# Patient Record
Sex: FEMALE | Race: AMERICAN INDIAN OR ALASKA NATIVE
[De-identification: names, ages, dates, MRNs, and addresses within clinical notes are randomized per-mention and may not be internally consistent; named-entity substitution may affect disease eponyms.]

---

## 2017-04-20 ENCOUNTER — HOSPITAL ENCOUNTER (OUTPATIENT)
Dept: HOSPITAL 42 - ED | Age: 62
Setting detail: OBSERVATION
LOS: 2 days | Discharge: HOME | End: 2017-04-22
Attending: INTERNAL MEDICINE | Admitting: INTERNAL MEDICINE
Payer: COMMERCIAL

## 2017-04-20 VITALS — BODY MASS INDEX: 25.3 KG/M2

## 2017-04-20 DIAGNOSIS — Z79.82: ICD-10-CM

## 2017-04-20 DIAGNOSIS — J20.9: ICD-10-CM

## 2017-04-20 DIAGNOSIS — Z91.018: ICD-10-CM

## 2017-04-20 DIAGNOSIS — N28.9: ICD-10-CM

## 2017-04-20 DIAGNOSIS — M54.9: ICD-10-CM

## 2017-04-20 DIAGNOSIS — N17.9: ICD-10-CM

## 2017-04-20 DIAGNOSIS — Z87.891: ICD-10-CM

## 2017-04-20 DIAGNOSIS — J44.9: ICD-10-CM

## 2017-04-20 DIAGNOSIS — M17.9: ICD-10-CM

## 2017-04-20 DIAGNOSIS — J81.1: ICD-10-CM

## 2017-04-20 DIAGNOSIS — E78.5: ICD-10-CM

## 2017-04-20 DIAGNOSIS — I08.1: Primary | ICD-10-CM

## 2017-04-20 DIAGNOSIS — R07.89: ICD-10-CM

## 2017-04-20 DIAGNOSIS — K76.0: ICD-10-CM

## 2017-04-20 DIAGNOSIS — R53.81: ICD-10-CM

## 2017-04-20 DIAGNOSIS — I48.0: ICD-10-CM

## 2017-04-20 DIAGNOSIS — M25.569: ICD-10-CM

## 2017-04-20 DIAGNOSIS — I10: ICD-10-CM

## 2017-04-20 DIAGNOSIS — K75.9: ICD-10-CM

## 2017-04-20 DIAGNOSIS — J45.909: ICD-10-CM

## 2017-04-20 DIAGNOSIS — R79.89: ICD-10-CM

## 2017-04-20 DIAGNOSIS — R40.2412: ICD-10-CM

## 2017-04-20 DIAGNOSIS — Z79.899: ICD-10-CM

## 2017-04-20 DIAGNOSIS — M10.9: ICD-10-CM

## 2017-04-20 DIAGNOSIS — Z90.49: ICD-10-CM

## 2017-04-20 DIAGNOSIS — R00.0: ICD-10-CM

## 2017-04-20 DIAGNOSIS — E78.00: ICD-10-CM

## 2017-04-20 DIAGNOSIS — Z82.49: ICD-10-CM

## 2017-04-20 DIAGNOSIS — I51.89: ICD-10-CM

## 2017-04-20 DIAGNOSIS — D64.9: ICD-10-CM

## 2017-04-20 DIAGNOSIS — E86.0: ICD-10-CM

## 2017-04-20 LAB
ADD MANUAL DIFF?: NO
ALBUMIN/GLOB SERPL: 1.1 {RATIO} (ref 1.1–1.8)
ALP SERPL-CCNC: 154 U/L (ref 38–133)
ALT SERPL-CCNC: 113 U/L (ref 7–56)
APPEARANCE UR: (no result)
APTT BLD: 23.3 SECONDS (ref 23.7–30.8)
AST SERPL-CCNC: 175 U/L (ref 15–39)
BACTERIA #/AREA URNS HPF: (no result) /[HPF]
BASE EXCESS BLDV CALC-SCNC: -2 MMOL/L (ref 0–2)
BASOPHILS # BLD AUTO: 0.02 K/MM3 (ref 0–2)
BASOPHILS NFR BLD: 0.3 % (ref 0–3)
BILIRUB SERPL-MCNC: 1 MG/DL (ref 0.2–1.3)
BILIRUB UR-MCNC: NEGATIVE MG/DL
BUN SERPL-MCNC: 17 MG/DL (ref 7–21)
CALCIUM SERPL-MCNC: 9.9 MG/DL (ref 8.4–10.5)
CHLORIDE SERPL-SCNC: 95 MMOL/L (ref 98–107)
CO2 SERPL-SCNC: 25 MMOL/L (ref 21–33)
COLOR UR: (no result)
EOSINOPHIL # BLD: 0 10*3/UL (ref 0–0.7)
EOSINOPHIL NFR BLD: 0.6 % (ref 1.5–5)
ERYTHROCYTE [DISTWIDTH] IN BLOOD BY AUTOMATED COUNT: 14.5 % (ref 11.5–14.5)
GLOBULIN SER-MCNC: 3.8 GM/DL
GLUCOSE SERPL-MCNC: 169 MG/DL (ref 70–110)
GLUCOSE UR STRIP-MCNC: NEGATIVE MG/DL
GRANULOCYTES # BLD: 4.48 10*3/UL (ref 1.4–6.5)
GRANULOCYTES NFR BLD: 62.4 % (ref 50–68)
HCT VFR BLD CALC: 33.9 % (ref 36–48)
INR PPP: 0.99 (ref 0.93–1.08)
KETONES UR STRIP-MCNC: (no result) MG/DL
LEUKOCYTE ESTERASE UR-ACNC: (no result) LEU/UL
LYMPHOCYTES # BLD: 1.6 10*3/UL (ref 1.2–3.4)
LYMPHOCYTES NFR BLD AUTO: 22.6 % (ref 22–35)
MAGNESIUM SERPL-MCNC: 1.6 MG/DL (ref 1.7–2.2)
MCH RBC QN AUTO: 31.5 PG (ref 25–35)
MCHC RBC AUTO-ENTMCNC: 34.5 G/DL (ref 31–37)
MCV RBC AUTO: 91.1 FL (ref 80–105)
MONOCYTES # BLD AUTO: 1 10*3/UL (ref 0.1–0.6)
MONOCYTES NFR BLD: 14.1 % (ref 1–6)
PH BLDV: 7.3 [PH] (ref 7.32–7.43)
PH UR STRIP: 6 [PH] (ref 4.7–8)
PHOSPHATE SERPL-MCNC: 3.4 MG/DL (ref 2.5–4.5)
PLATELET # BLD: 249 10^3/UL (ref 120–450)
PMV BLD AUTO: 9.9 FL (ref 7–11)
POTASSIUM SERPL-SCNC: 4 MMOL/L (ref 3.6–5)
PROT SERPL-MCNC: 7.9 G/DL (ref 5.8–8.3)
PROT UR STRIP-MCNC: NEGATIVE MG/DL
RBC # UR STRIP: NEGATIVE /UL
RBC #/AREA URNS HPF: (no result) /HPF (ref 0–2)
SODIUM SERPL-SCNC: 132 MMOL/L (ref 132–148)
SP GR UR STRIP: 1.02 (ref 1–1.03)
TROPONIN I SERPL-MCNC: < 0.01 NG/ML
UROBILINOGEN UR STRIP-ACNC: 0.2 E.U./DL
WBC # BLD AUTO: 7.2 10^3/UL (ref 4.5–11)
WBC #/AREA URNS HPF: (no result) /HPF (ref 0–6)

## 2017-04-20 PROCEDURE — 83036 HEMOGLOBIN GLYCOSYLATED A1C: CPT

## 2017-04-20 PROCEDURE — 83615 LACTATE (LD) (LDH) ENZYME: CPT

## 2017-04-20 PROCEDURE — 93306 TTE W/DOPPLER COMPLETE: CPT

## 2017-04-20 PROCEDURE — 84145 PROCALCITONIN (PCT): CPT

## 2017-04-20 PROCEDURE — 36415 COLL VENOUS BLD VENIPUNCTURE: CPT

## 2017-04-20 PROCEDURE — 84443 ASSAY THYROID STIM HORMONE: CPT

## 2017-04-20 PROCEDURE — 99285 EMERGENCY DEPT VISIT HI MDM: CPT

## 2017-04-20 PROCEDURE — 85610 PROTHROMBIN TIME: CPT

## 2017-04-20 PROCEDURE — 96376 TX/PRO/DX INJ SAME DRUG ADON: CPT

## 2017-04-20 PROCEDURE — 83735 ASSAY OF MAGNESIUM: CPT

## 2017-04-20 PROCEDURE — 82550 ASSAY OF CK (CPK): CPT

## 2017-04-20 PROCEDURE — 71010: CPT

## 2017-04-20 PROCEDURE — 96367 TX/PROPH/DG ADDL SEQ IV INF: CPT

## 2017-04-20 PROCEDURE — 85027 COMPLETE CBC AUTOMATED: CPT

## 2017-04-20 PROCEDURE — 76700 US EXAM ABDOM COMPLETE: CPT

## 2017-04-20 PROCEDURE — 87804 INFLUENZA ASSAY W/OPTIC: CPT

## 2017-04-20 PROCEDURE — 84100 ASSAY OF PHOSPHORUS: CPT

## 2017-04-20 PROCEDURE — 87086 URINE CULTURE/COLONY COUNT: CPT

## 2017-04-20 PROCEDURE — 96365 THER/PROPH/DIAG IV INF INIT: CPT

## 2017-04-20 PROCEDURE — 80061 LIPID PANEL: CPT

## 2017-04-20 PROCEDURE — 93017 CV STRESS TEST TRACING ONLY: CPT

## 2017-04-20 PROCEDURE — 87040 BLOOD CULTURE FOR BACTERIA: CPT

## 2017-04-20 PROCEDURE — 85730 THROMBOPLASTIN TIME PARTIAL: CPT

## 2017-04-20 PROCEDURE — 81001 URINALYSIS AUTO W/SCOPE: CPT

## 2017-04-20 PROCEDURE — 82803 BLOOD GASES ANY COMBINATION: CPT

## 2017-04-20 PROCEDURE — 84484 ASSAY OF TROPONIN QUANT: CPT

## 2017-04-20 PROCEDURE — 96375 TX/PRO/DX INJ NEW DRUG ADDON: CPT

## 2017-04-20 PROCEDURE — 85025 COMPLETE CBC W/AUTO DIFF WBC: CPT

## 2017-04-20 PROCEDURE — 80074 ACUTE HEPATITIS PANEL: CPT

## 2017-04-20 PROCEDURE — 93005 ELECTROCARDIOGRAM TRACING: CPT

## 2017-04-20 PROCEDURE — 80053 COMPREHEN METABOLIC PANEL: CPT

## 2017-04-20 NOTE — RAD
HISTORY:

chest pain  



COMPARISON:

01/13/2017 



FINDINGS:



LUNGS:

No active pulmonary disease.



PLEURA:

No significant pleural effusion identified, no pneumothorax apparent.



CARDIOVASCULAR:

Normal.



OSSEOUS STRUCTURES:

No significant abnormalities.



VISUALIZED UPPER ABDOMEN:

Normal.



OTHER FINDINGS:

None.



IMPRESSION:

No active disease.

## 2017-04-20 NOTE — ED PDOC
Arrival/HPI





- General


Chief Complaint: Weakness/Neurological Deficit


Time Seen by Provider: 04/20/17 14:38


Historian: Patient





- History of Present Illness


Narrative History of Present Illness (Text): 


04/20/17 14:59


62yo female with PMHx of Afib, Asthma, Hypertension, BIBA for complaint of 

generalized weakness. Reports clear productive cough since Monday. States she 

had chest pain earlier today that lasted for minutes. Also admits to melena 

since Monday.  She saw her PMD 3weeks ago. Denies fever, chills, abdominal pain

, nausea, vomiting, diarrhea, constipation, SOB, diaphoresis, LE edema, sore 

throat, sick contact, travel.














Past Medical History





- Provider Review


Nursing Documentation Reviewed: Yes





- Infectious Disease


Hx of Infectious Diseases: None





- Tetanus Immunization


Tetanus Immunization: Unknown





- Past Medical History


Past Medical History: No Previous





- Cardiac


Hx Cardiac Disorders: Yes


Hx Cardiac Arrhythmia: Yes


Hx Heart Murmur: Yes


Hx Hypertension: Yes





- Pulmonary


Hx Respiratory Disorders: Yes


Hx Asthma: Yes


Hx Chronic Obstructive Pulmonary Disease (COPD): Yes





- Neurological


Hx Neurological Disorder: No


Hx Paralysis: No





- HEENT


Hx HEENT Disorder: No


Hx Blind: No


Hx Cataracts: No


Hx Deafness: No


Hx Difficulty Chewing: No


Hx Epistaxis: No


Hx Glaucoma: No


Hx Macular Degeneration: No





- Renal


Hx Renal Disorder: No





- Endocrine/Metabolic


Hx Endocrine Disorders: No





- Hematological/Oncological


Hx Blood Disorders: Yes


Hx Blood Transfusions: Yes (4 YRS AGO)





- Integumentary


Hx Dermatological Disorder: No


Hx Cellulitis: No





- Musculoskeletal/Rheumatological


Hx Musculoskeletal Disorders: Yes


Hx Falls: Yes





- Gastrointestinal


Hx Gastrointestinal Disorders: No


Hx Crohn's Disease: No


Hx Diverticulitis: No


Hx Gastroesophageal Reflux: No


Hx Gastrointestinal Ulcer: No


Hx Liver Failure: No





- Genitourinary/Gynecological


Hx Genitourinary Disorders: No





- Psychiatric


Hx Psychophysiologic Disorder: No


Hx Emotional Abuse: No


Hx Physical Abuse: No


Hx Substance Use: No





- Surgical History


Hx Cardiac Catheterization: Yes


Hx Cholecystectomy: Yes


Other/Comment: B/L Bunion removal to feet





- Anesthesia


Hx Anesthesia Reactions: No


Hx Malignant Hyperthermia: No





- Suicidal Assessment


Feels Threatened In Home Enviroment: No





Family/Social History





- Physician Review


Nursing Documentation Reviewed: Yes


Family/Social History: Unknown Family HX


Smoking Status: Former Smoker


Hx Alcohol Use: No


Amount per day: 6


Hx Substance Use: No


Hx Substance Use Treatment: No





Allergies/Home Meds


Allergies/Adverse Reactions: 


Allergies





ALTOIDS Allergy (Intermediate, Uncoded 04/20/17 14:45)


 SWELLING FACE/LIPS


UNKNOWN FOOD Allergy (Intermediate, Uncoded 04/20/17 14:45)


 SWELLING FACE/LIPS


 SEEN IN ED 8/31/16 








Home Medications: 


 Home Meds











 Medication  Instructions  Recorded  Confirmed


 


Aspirin [Aspir 81] 81 mg PO DAILY 07/12/13 12/04/16


 


Fluticasone/Salmeterol [Advair 1 puff IH BID PRN 07/12/13 12/04/16





250-50 Diskus]   


 


Metoprolol Tartrate [Lopressor] 25 mg PO BID 07/12/13 12/04/16


 


Tiotropium [Spiriva] 18 mcg IH DAILY PRN 07/12/13 12/04/16


 


Enalapril Maleate [Enalapril] 5 mg PO QAM 09/01/16 12/04/16


 


Furosemide 40 mg PO QAM 09/01/16 12/04/16


 


traMADol [Ultram] 50 mg PO TID PRN 11/22/16 12/04/16














Review of Systems





- Physician Review


All systems were reviewed & negative as marked: Yes





- Review of Systems


Constitutional: Fatigue


Eyes: Normal


ENT: Normal


Respiratory: Normal


Cardiovascular: Chest Pain.  absent: Palpitations, Edema, Calf Pain


Gastrointestinal: Stool Changes (Melena).  absent: Abdominal Pain, Constipation

, Diarrhea, Nausea, Vomiting, Hematochezia, Hematemesis


Genitourinary Female: Normal


Musculoskeletal: Normal


Skin: Normal


Neurological: Normal


Endocrine: Normal


Hemo/Lymphatic: Normal


Psychiatric: Normal





Physical Exam


Vital Signs Reviewed: Yes


Vital Signs











  Temp Pulse Resp BP Pulse Ox


 


 04/20/17 20:08   102 H  20  123/81  100


 


 04/20/17 17:00   126 H  18  152/79 H  100


 


 04/20/17 15:47  98.4 F  115 H  20  158/86 H  100











Temperature: Afebrile


Blood Pressure: Normal


Pulse: Regular


Respiratory Rate: Normal


Appearance: Positive for: Well-Appearing, Non-Toxic, Comfortable


Pain Distress: None


Mental Status: Positive for: Alert and Oriented X 3





- Systems Exam


Head: Present: Atraumatic, Normocephalic


Pupils: Present: PERRL


Extroacular Muscles: Present: EOMI


Conjunctiva: Present: Normal


Mouth: Present: Moist Mucous Membranes


Neck: Present: Normal Range of Motion


Respiratory/Chest: Present: Clear to Auscultation, Good Air Exchange.  No: 

Respiratory Distress, Accessory Muscle Use, Wheezes, Decreased Breath Sounds, 

Rales, Retracting, Rhonchi


Cardiovascular: Present: Regular Rate and Rhythm, Normal S1, S2.  No: Murmurs


Abdomen: Present: Normal Bowel Sounds.  No: Tenderness, Distention, Peritoneal 

Signs, Rebound, Guarding, McBurney's Point Tender, Rovsing's Sign Present


Back: Present: Normal Inspection


Upper Extremity: Present: Normal Inspection.  No: Cyanosis, Edema


Lower Extremity: Present: Normal Inspection.  No: Edema


Neurological: Present: GCS=15, CN II-XII Intact, Speech Normal


Skin: Present: Warm, Dry, Normal Color.  No: Rashes


Psychiatric: Present: Alert, Oriented x 3, Normal Insight, Normal Concentration





Medical Decision Making


ED Course and Treatment: 





04/20/17 22:30


PT presented for stated history. She remained tacy in ED even with hydration. 

Lab was reviewed with elevated LFT, Cr. and Lactate noted. Elevated LFT was pt'

s baseline when compared to previous lab result.





Review of pt's lab and VS however indicated that pt does not meet the Sepsis 

criteria. She had no leukocytosis and hypotensive. 





CXR was NAD.





Rocephin and Zithromax was given secondary to pt's complaint of productive 

cough and history of COPD.





Case was DW Dr. Dinh and pt was placed in Tele OBS.





Result and plan was DW the pt and she agreed.











- Lab Interpretations


Lab Results: 








 04/20/17 15:59 





 04/20/17 15:59 





 Lab Results





04/20/17 18:10: Influenza Typ A,B (EIA) Negative for flu a/b


04/20/17 16:05: Phosphorus 3.4, Magnesium 1.6 L


04/20/17 15:59: WBC 7.2, RBC 3.72, Hgb 11.7 L, Hct 33.9 L, MCV 91.1, MCH 31.5, 

MCHC 34.5, RDW 14.5, Plt Count 249, MPV 9.9, Gran % 62.4, Lymph % (Auto) 22.6, 

Mono % (Auto) 14.1 H, Eos % (Auto) 0.6 L, Baso % (Auto) 0.3, Gran # 4.48, Lymph 

# 1.6, Mono # 1.0 H, Eos # 0.0, Baso # 0.02, PT 10.7, INR 0.99, APTT 23.3 L, 

pO2 36, VBG pH 7.30 L, VBG pCO2 51.0, VBG HCO3 25.1, VBG Total CO2 26.7, VBG O2 

Sat (Calc) 68.2 H, VBG Base Excess -2.0 L, VBG Potassium 3.7, Glucose 176 H, 

Lactate 4.4 H*, FiO2 21.0, Sodium 133.0, Potassium 4.0, Chloride 97.0 L, Carbon 

Dioxide 25, Anion Gap 16, BUN 17, Creatinine 1.5 H, Est GFR ( Amer) 43, 

Est GFR (Non-Af Amer) 35, Random Glucose 169 H, Calcium 9.9, Total Bilirubin 1.0

,  H,  H, Alkaline Phosphatase 154 H, Lactate Dehydrogenase 690, 

Total Creatine Kinase 70, Troponin I < 0.01, Total Protein 7.9, Albumin 4.1, 

Globulin 3.8, Albumin/Globulin Ratio 1.1, Venous Blood Potassium 3.7











- RAD Interpretation


Radiology Orders: 








04/20/17 14:58


CHEST PORTABLE [RAD] Stat 














- EKG Interpretation


Interpreted by ED Physician: Yes (sinus tachy with PAC; LAD; LVH @ 101bpm. No 

ST changes)





- Medication Orders


Current Medication Orders: 











Discontinued Medications





Sodium Chloride (Sodium Chloride 0.9%)  1,000 mls @ 250 mls/hr IV .Q4H ONE


   Stop: 04/20/17 18:56


   Last Admin: 04/20/17 16:14  Dose: 250 MLS/HR





eMAR Start Stop


 Document     04/20/17 16:14  OCS  (Rec: 04/20/17 16:14  OCS  [id number])


     Intravenous Solution


      Start Date                                 04/20/17


      Start Time                                 16:14





Sodium Chloride 2,200 ml/ IV (SUPPLIES)  2,200 mls @ 4,408.92 mls/hr IV ONCE ONE


   PRN Reason: 60 ML/KG/HR


   Stop: 04/20/17 16:25


Azithromycin (Zithromax 500mg In Ns)  250 mls @ 167 mls/hr IVPB STAT STA


   PRN Reason: Protocol


   Stop: 04/20/17 18:47


   Last Admin: 04/20/17 20:27  Dose: 167 MLS/HR





eMAR Start Stop


 Document     04/20/17 20:27  OCS  (Rec: 04/20/17 20:28  OCS  [id number])


     Intravenous Solution


      Start Date                                 04/20/17


      Start Time                                 20:28





Ceftriaxone Sodium (Rocephin 1 Gram Ivpb)  100 mls @ 200 mls/hr IVPB STAT STA


   PRN Reason: Protocol


   Stop: 04/20/17 17:46


   Last Admin: 04/20/17 17:31  Dose: 200 MLS/HR





eMAR Start Stop


 Document     04/20/17 17:31  OCS  (Rec: 04/20/17 17:31  OCS  [facility] [id number]


     Intravenous Solution


      Start Date                                 04/20/17


      Start Time                                 17:31














Disposition/Present on Arrival





- Present on Arrival


Any Indicators Present on Arrival: No


History of DVT/PE: No


History of Uncontrolled Diabetes: No


Urinary Catheter: No


History of Decub. Ulcer: No


History Surgical Site Infection Following: None





- Disposition


Have Diagnosis and Disposition been Completed?: Yes


Diagnosis: 


 Chest pain, Bronchitis, Malaise, Elevated liver function tests, Dehydration


Disposition: HOME/ ROUTINE


Disposition Time: 17:20


Patient Problems: 


 Current Active Problems











Problem Status Diagnosed


 


Bronchitis Acute 


 


Chest pain Acute 


 


Malaise Acute 











Condition: FAIR

## 2017-04-21 LAB
ALBUMIN/GLOB SERPL: 1.1 {RATIO} (ref 1.1–1.8)
ALP SERPL-CCNC: 120 U/L (ref 38–133)
ALT SERPL-CCNC: 93 U/L (ref 7–56)
AST SERPL-CCNC: 124 U/L (ref 15–39)
BILIRUB SERPL-MCNC: 0.8 MG/DL (ref 0.2–1.3)
BUN SERPL-MCNC: 14 MG/DL (ref 7–21)
CALCIUM SERPL-MCNC: 9 MG/DL (ref 8.4–10.5)
CHLORIDE SERPL-SCNC: 101 MMOL/L (ref 98–107)
CHOLEST SERPL-MCNC: 171 MG/DL (ref 130–200)
CO2 SERPL-SCNC: 24 MMOL/L (ref 21–33)
GLOBULIN SER-MCNC: 3.3 GM/DL
GLUCOSE SERPL-MCNC: 105 MG/DL (ref 70–110)
MAGNESIUM SERPL-MCNC: 1.5 MG/DL (ref 1.7–2.2)
PHOSPHATE SERPL-MCNC: 3.6 MG/DL (ref 2.5–4.5)
POTASSIUM SERPL-SCNC: 3.4 MMOL/L (ref 3.6–5)
PROT SERPL-MCNC: 6.8 G/DL (ref 5.8–8.3)
SODIUM SERPL-SCNC: 135 MMOL/L (ref 132–148)

## 2017-04-21 RX ADMIN — DILTIAZEM HYDROCHLORIDE SCH MG: 240 CAPSULE, COATED, EXTENDED RELEASE ORAL at 11:30

## 2017-04-21 RX ADMIN — AZITHROMYCIN DIHYDRATE SCH: 500 INJECTION, POWDER, LYOPHILIZED, FOR SOLUTION INTRAVENOUS at 13:41

## 2017-04-21 RX ADMIN — CEFTRIAXONE SCH MLS/HR: 1 INJECTION, SOLUTION INTRAVENOUS at 12:45

## 2017-04-21 RX ADMIN — AZITHROMYCIN DIHYDRATE SCH MLS/HR: 500 INJECTION, POWDER, LYOPHILIZED, FOR SOLUTION INTRAVENOUS at 13:41

## 2017-04-21 NOTE — CP.PCM.CON
History of Present Illness





- History of Present Illness


History of Present Illness: 


61 year old female with PMH of atrial fibrillation, asthma, HTN came in to 

[facility] because of generalized weakness, associated with cough 

productive of clear phlegm and some shortness of breath. She denies fever or 

chills, no nausea or vomiting, no chest pain, no headache or dizziness, no 

abdominal pain, no diarrhea, no dysuria, no sore throat, no colds. Infectious 

Diseases consult is requested to further evaluate and manage.





Review of Systems





- Review of Systems


All systems: reviewed and no additional remarkable complaints except (as per HPI

)





Past Patient History





- Infectious Disease


Hx of Infectious Diseases: None





- Tetanus Immunizations


Tetanus Immunization: Unknown





- Past Social History


Smoking Status: Former Smoker





- CARDIAC


Hx Cardiac Disorders: Yes


Hx Cardia Arrhythmia: Yes


Hx Heart Murmur: Yes


Hx Hypertension: Yes





- PULMONARY


Hx Respiratory Disorders: Yes


Hx Asthma: Yes


Hx Chronic Obstructive Pulmonary Disease (COPD): Yes





- NEUROLOGICAL


Hx Neurological Disorder: No


Hx Paralysis: No





- HEENT


Hx HEENT Problems: No


Hx Blind: No


Hx Cataracts: No


Hx Deafness: No


Hx Difficulty Chewing: No


Hx Epistaxis: No


Hx Glaucoma: No


Hx Macular Degeneration: No





- RENAL


Hx Chronic Kidney Disease: No





- ENDOCRINE/METABOLIC


Hx Endocrine Disorders: No





- HEMATOLOGICAL/ONCOLOGICAL


Hx Blood Disorders: Yes


Hx Blood Transfusions: Yes (4 YRS AGO)





- INTEGUMENTARY


Hx Dermatological Problems: No


Hx Cellulitis: No





- MUSCULOSKELETAL/RHEUMATOLOGICAL


Hx Musculoskeletal Disorders: Yes


Hx Falls: Yes





- GASTROINTESTINAL


Hx Gastrointestinal Disorders: No


Hx Crohn's Disease: No


Hx Diverticulitis: No


Hx Gastroesophageal Reflux: No


Hx Liver Failure: No





- GENITOURINARY/GYNECOLOGICAL


Hx Genitourinary Disorders: No





- PSYCHIATRIC


Hx Psychophysiologic Disorder: No


Hx Emotional Abuse: No


Hx Physical Abuse: No


Hx Substance Use: No





- SURGICAL HISTORY


Hx Cardiac Catheterization: Yes


Hx Cholecystectomy: Yes


Other/Comment: B/L Bunion removal to feet





- ANESTHESIA


Hx Anesthesia Reactions: No


Hx Malignant Hyperthermia: No





Meds


Allergies/Adverse Reactions: 


 Allergies











Allergy/AdvReac Type Severity Reaction Status Date / Time


 


ALTOIDS Allergy Intermediate SWELLING Uncoded 04/20/17 14:45





   FACE/LIPS  


 


UNKNOWN FOOD Allergy Intermediate SWELLING Uncoded 04/20/17 14:45





   FACE/LIPS  














Physical Exam





- Constitutional


Appears: Non-toxic, No Acute Distress





- Head Exam


Head Exam: NORMAL INSPECTION





- ENT Exam


ENT Exam: Mucous Membranes Moist





- Neck Exam


Neck exam: Negative for: Lymphadenopathy, Meningismus





- Respiratory Exam


Respiratory Exam: Decreased Breath Sounds





- Cardiovascular Exam


Cardiovascular Exam: +S1, +S2





- GI/Abdominal Exam


GI & Abdominal Exam: Soft.  absent: Tenderness





Results





- Vital Signs


Recent Vital Signs: 


 Last Vital Signs











Temp  98.4 F   04/20/17 15:47


 


Pulse  102 H  04/20/17 20:08


 


Resp  20   04/20/17 20:08


 


BP  123/81   04/20/17 20:08


 


Pulse Ox  100   04/20/17 20:08














- Labs


Result Diagrams: 


 04/20/17 15:59





 04/21/17 08:30


Labs: 


 Laboratory Results - last 24 hr











  04/20/17





  19:45


 


Urine Color  Dark yellow


 


Urine Appearance  Sl cloudy


 


Urine pH  6.0


 


Ur Specific Gravity  1.020


 


Urine Protein  Negative


 


Urine Glucose (UA)  Negative


 


Urine Ketones  Trace H


 


Urine Blood  Negative


 


Urine Nitrate  Negative


 


Urine Bilirubin  Negative


 


Urine Urobilinogen  0.2


 


Ur Leukocyte Esterase  Trace H


 


Urine RBC  0 - 2


 


Urine WBC  0 - 2


 


Ur Epithelial Cells  1 - 3


 


Urine Bacteria  Rare














Assessment & Plan





- Assessment and Plan (Free Text)


Plan: 


Assessment


Consider acute bronchitis


atrial fibrillation


asthma


HTN





Plan


Started patient on Rocephin and Zithromax; follow up blood cx; reviewed CXR 

which did not show infiltrates; PCT is only 0.15


Will follow clinically

## 2017-04-21 NOTE — US
HISTORY:

high lfts and high creatinine



COMPARISON:

None available.



TECHNIQUE:

Sonographic evaluation of the abdomen.



FINDINGS:



LIVER:

Measures 20.5 cm in sagittal dimension. Echogenic liver may be seen 

in setting of hepatic parenchymal disease or fatty infiltration.  No 

focal hepatic mass identified. The main portal vein appears patent 

with normal directional flow.   No intrahepatic bile duct dilatation.



GALLBLADDER:

Cholecystectomy. 



COMMON BILE DUCT:

Measures 4 mm. 



PANCREAS:

Not well visualized.



RIGHT KIDNEY:

Measures 9.8 x 4.4 x 5.5cm.  No obstructing calculus or 

hydronephrosis identified.



LEFT KIDNEY:

Measures 8.2 x 5.2 x 5.8cm. No obstructing calculus or hydronephrosis 

identified.



SPLEEN:

Measures approximately 5.0 x 3.1 x 2.5 cm. 



AORTA:

Not well-visualized. 



IVC:

Not well-visualized. 



OTHER FINDINGS:

None. 



IMPRESSION:

Hepatomegaly.  Echogenic liver may be seen in setting of hepatic 

parenchymal disease or fatty infiltration. 



Cholecystectomy.

## 2017-04-21 NOTE — CARD
--------------- APPROVED REPORT --------------





EKG Measurement

Heart [id number]

CA [id number]

[id number]

[id number]

[id number]



<Conclusion>

Sinus tachycardia with premature atrial complexes with aberrant 

conduction

Left axis deviation

Moderate voltage criteria for LVH, may be normal variant

Nonspecific ST abnormality

Abnormal ECG

## 2017-04-21 NOTE — CON
DATE: 2017



This patient was seen and evaluated earlier today.  This is a 61-year-old patient with a past medical
 history of paroxysmal atrial fibrillation, hypertension, asthma, admitted with chest pain.  The noah
ent also was admitted with chest pain and dizziness.  The patient also complains of some dark stool s
jw Monday.  No history of vomiting blood.  No abdominal pain.  The patient also complains of exerti
onal shortness of breath.  She said this has been progressively for the last 2 years, but recently go
t worse.  Other past medical history as above.  



The patient has a history of asthma, history of a colonoscopy done in the [facility] about close to 5 year
s ago, history of carpal tunnel syndrome surgery, status post cholecystectomy, and history of bunion 
surgery done in the past.



SOCIAL HISTORY:  Denies smoking.  Alcohol socially over the weekend.



FAMILY HISTORY:  Positive for MI, coronary artery disease.  Brother  at the age of 39 with MI.



ALLERGIES TO SOME FOOD.  



REVIEW OF SYSTEMS:  Positive as above.  Other systems reviewed and negative. 



On examination, the patient is lying on the bed not in acute distress.

Temperature is 97.8, pulse 97, blood pressure is 105/59, respirations 18.

HENT:  Atraumatic.  Anicteric.

NECK:  Supple.

HEART:  S1, S2 heard.

LUNGS:  Bilateral air entry present.

ABDOMEN:  Soft.  There was no tenderness.

HEART:  S1, S2 is heard.  There was a systolic murmur present.

ABDOMEN:  Soft.  There is no mass, no tenderness.

EXTREMITIES:  No edema, no cyanosis.  



CLINICAL LABORATORIES:  Hemoglobin 11.7, hematocrit 33.9, WBC 7.2, platelets 249, BUN 14, creatinine 
0.9, total bilirubin is 1.5.  AST is 134, ALT is 93, improving.  



The patient did have an ultrasound scan of the abdomen done status post cholecystectomy.  Common bile
 duct is normal; measures only 4 mm.



IMPRESSION:  This 61-year-old patient with significant valvular disease secondary to the papular dysf
unction, and a history of paroxysmal atrial fibrillation in the past, admitted with chest pain and al
so shortness of breath.  The patient also was found to have an elevated liver enzymes.  Status post c
holecystectomy, common bile duct is normal.  The patient has come in with exertion.  The likelihood o
f questionable history of dark stools.  



On exam, the patient's hemoglobin was normal at 11.7 _____.  Her other comorbidities include asthma, 
hypertension, asthma, suspected asthmatic bronchitis.  The patient has been started on antibiotic, ce
ftriaxone.  



The LFTs showing improving trend.  The most likely cause for the LFTs could be secondary to the hepat
ic congestion.  Hepatitis profile has been requested.  Will follow up on that.  Followup of CBC.  The
 patient did have a colonoscopy done close to 5 years ago in [facility].  She was told there were no polyp
s.  



Will continue to closely follow up her care, and suggest further management based on the clinical cou
rse.





__________________________________________

Jonathan Frost MD







cc:



DD: 2017 18:35:29  [id number]

TT: 2017 19:25:05

Confirmation # [id number]

Dictation # [id number]

ximena

## 2017-04-21 NOTE — CARD
--------------- APPROVED REPORT --------------





EXAM: Two-dimensional and M-mode echocardiogram with Doppler and 

color Doppler.



INDICATION

CP/LVFX/MR



2D DIMENSIONS 

IVSd1.0   (0.7-1.1cm)LVDd4.1   (3.9-5.9cm)

PWd1.3   (0.7-1.1cm)LVDs3.0   (2.5-4.0cm)

FS (%) 27.4   %LVEF (%)53.6   (>50%)



M-Mode DIMENSIONS 

Aortic Root3.10   (2.2-3.7cm)Aortic Cusp Exc.1.50   (1.5-2.0cm)



Aortic Valve

AoV Peak Ncmusifj048.0cm/Jose Peak GR.9mmHg



Mitral Valve

MV E Ngjimovi85.3cm/sMV A Hcxyxshd244.0cm/sE/A ratio0.5



TDI

Lateral E' Peak V8.87cm/sMedial E' Peak V7.70cm/sE/Lateral E'6.1

E/Medial E'7.1



Pulmonary Valve

PV Peak Wybhqxrn29.9cm/sPV Peak Grad.2mmHg



Tricuspid Valve

TR Peak Zgxxpzwf550ta/sRAP [id number] Peak Gr.29mmHg

[id number]



 LEFT VENTRICLE 

The left ventricle is normal size. There is normal left ventricular 

wall thickness. The left ventricular function is normal.EF-55-60% 

There is borderline hypokinesis in the apical anterior wall. 

Transmitral Doppler flow pattern is Grade III-reversible restrictive 

diastolic dysfunction. No left ventricle thrombus noted on this 

study. There is no ventricular septal defect visualized. There is no 

left ventricular aneurysm. There is no mass noted in the left 

ventricle.



 RIGHT VENTRICLE 

The right ventricle is mildly to moderately dilated. There is normal 

right ventricular wall thickness. Systolic function of RV is mildly 

reduced.



 ATRIA 

The left atrium is mildly dilated. The right atrium is mildly 

dilated. The atrial septum is aneurysmal.



 AORTIC VALVE 

The aortic valve is thickened but opens well. There is trace aortic 

regurgitation. There is no aortic valvular stenosis. There is no 

aortic valvular vegetation.



 MITRAL VALVE 

The mitral valve is thickened but opens well. Mitral annular 

calcification is mild to moderate. Mitral regurgitation is severe. 

The mitral regurgitant jet is posteriorly directed, which is 

consistent with anterior leaflet pathology. There is no mitral valve 

stenosis. A mild mitral valve prolapse is present, of anterior leaflet



 TRICUSPID VALVE 

The tricuspid valve leaflets are thickened , but open well. There is 

moderate tricuspid regurgitation.RVSP-39 mmof Hg. There is no 

tricuspid valve stenosis. There is no tricuspid valve prolapse or 

vegetation.



 PULMONIC VALVE 

The pulmonic valve is mildly thickened. There is no pulmonic valvular 

regurgitation. There is no pulmonic valvular stenosis.



 GREAT VESSELS 

The aortic root is normal in size. The ascending aorta is normal in 

size. The pulmonary artery is normal. The IVC is normal in size and 

collapses >50% with inspiration.



 PERICARDIAL EFFUSION 

There is no pleural effusion. There is no pericardial effusion.



<Conclusion>

The left ventricle is normal size.

There is normal left ventricular wall thickness.

The left ventricular function is normal.EF-55-60%

The right ventricle is mildly to moderately dilated.

Systolic function of RV  is mildly reduced.

There is trace aortic regurgitation.

Mitral regurgitation is  severe.

The mitral regurgitant jet is posteriorly directed, which is 

consistent with anterior leaflet pathology.

There is  moderate tricuspid regurgitation.RVSP-39 mmof Hg.

The IVC is normal in size and collapses >50% with inspiration.

There is no pericardial effusion.

## 2017-04-21 NOTE — HP
CHIEF COMPLAINT:  Generalized weakness, chest pain.



HISTORY OF PRESENT ILLNESS:  According to the patient, she feels whole body aches, general weakness, 
a little bit of cough for the last few days.  She did have chest pain for a few minutes, then went aw
ay and keeps coming back again.  The patient denied any relation to exertion.  She has that pain whil
e she is sitting.  She has no nausea, no dizziness, no other complaint.  The patient also denied any 
fever, chills.  However, she has these symptoms for the last few days, seems getting more frequent of
 chest pain.  Her body aches are still there and she still feels generally weak.



PAST MEDICAL HISTORY:  She has history of hypertension, high cholesterol, history of gout and pseudog
out.  She had history of back cyst, surgical removed cyst from her back and bunion surgery.  She had 
history of supraventricular tachycardia, palpitations, cardiac arrhythmia, supraventricular, mitral r
egurg.  No history of coronary artery disease, no history of any cardiac events like MI.  She also do
es have a history of asthma, sometimes she uses inhalers.



ALLERGIES:  SHE SAID ALTOIDS, BUT NO OTHER ALLERGIES.



SOCIAL HISTORY:  No smoking, no drinking.  She lives by herself.



FAMILY HISTORY:  She had a brother had a heart attack at age 39.



REVIEW OF SYSTEMS:  Shows complaint of knee arthritis, knee pain, back pain.  Sometimes she does comp
hoang of dyspnea or cough, wheezing.  Otherwise, negative.



HOME MEDICATIONS:  She has tramadol, Cardizem-, Spiriva inhalers, Naprosyn, Lopressor 25 b.i.d.
, Mobic 7.5.  Probably, patient does not take both of them, but she takes it when she needs it.  Lasi
x 40 every day, Advair, enalapril 5 mg once a day, colchicine 0.6 once a day, aspirin 81 once a day, 
allopurinol 100 once a day.



PHYSICAL EXAMINATION:  Please be advised that patient was seen on 4/20/2017:

GENERAL:  The patient in the bed.  She is comfortable, no respiratory distress, does not seem to be i
n any significant distress.

VITAL SIGNS:  Temperature 98, heart rate 115, blood pressure 158/86, respirations 20, saturating 100%
.

HEAD AND NECK:  Normal.  No JVD, no thyromegaly.

CHEST:  Clear.

CARDIAC:  First sound, second sound normal.

ABDOMEN:  Soft, nontender, obese.

EXTREMITIES:  No edema.

NEUROLOGIC:  She is normal.  She is alert, awake, oriented x 3, moves all extremities.



LABORATORY DATA:  White count 7.2, hemoglobin 11.7, hematocrit 33.9, platelets 249.  Chemistry:  Sodi
um 132, potassium 4, chloride 95, bicarbonate 25, BUN 17, creatinine 1.5, blood sugar 169.  Her liver
 function test:  AST and ALT is elevated including also alkaline phosphatase.  The patient also had m
agnesium which is low at 1.6.  Her lactate level was slightly elevated.  The patient also had a urina
lysis, which was negative.  She also had serology for influenza type A and B and it was negative.  Sh
e also had a PT/INR, which is normal, PTT was normal.  Her ABG shows pH 7.30, pCO2 36, pCO2 51.  That
 is venous blood and lactate level was 4.4 on room air.



Chest x-ray also was done, which shows no active pulmonary disease.  Cardiovascular is normal and the
 pleura with no significant effusion.  Also, she had an EKG was reported.  There is no acute ST eleva
tion reported.



IMPRESSION AND PLAN:

1.  This is a 61-year-old female with history of hypertension, moderate mitral regurgitation.  Came i
n with generalized weakness, cough, chest pain, which is not related to her cough.  We will admit the
 patient to telemetry.  We will get cardiac enzymes, cardiology consult, ID consult.  Cardiology, Dr. Gaxiola.  ID consult, Dr. Hsu.  We will put the patient on Rocephin, Zithromax for now.  Cultures hav
e been obtained.

2.  Renal insufficiency.

3.  Abnormal liver functions test, hepatitis, could be drug induced versus vital.  Will consider GI c
onsult.  Will give the patient IV fluid.  Repeat labs in the morning.  We did advise the patient not 
to take nonsteroidals unnecessarily.  Hold off on any nonsteroidal anti-inflammatory drugs.  IV hydra
tion.  Hold on Lasix.  Continue IV.  Repeat labs and will follow up clinically.  Ultrasound of the li
minor and kidney has been ordered and will continue follow up with the patient and other consultants.





__________________________________________

Philip Dinh MD







cc:



DD: 04/21/2017 08:11:25  [id number]

TT: 04/21/2017 10:18:48

Job # [id number]

en

## 2017-04-21 NOTE — CON
DATE: 2017



SERVICE:  Cardiology.



CONSULTING PHYSICIAN:  Dr. Ajay Gaxiola.



REASON FOR CONSULTATION:  Cardiac evaluation.  History of mitral regurgitation, shortness of breath, 
admitted with chest pain, palpitation, felt like she was going to pass out.



BRIEF CLINICAL HISTORY:  This is a 61-year-old female with past medical history of asthma, paroxysmal
 atrial fibrillation, hypertension, mitral regurgitation, status post cardiac catheterization many ye
ars ago, normal coronaries, moderate to severe mitral regurgitation, papillary muscle dysfunction.  T
EE was suggested, but patient never showed up and never followed through,  came in yesterday with com
plaint of very brief episode of chest pain for 1 minute.  Prior to that, patient had shortness of stephen
ath, felt like she was going to passed out, used pump couple of times, but did not get better (albute
rol), so came to the Emergency Room.  Denies any episode of chest pain, but complained of shortness o
f breath, dyspnea on exertion.



PAST MEDICAL HISTORY:  Significant for hypertension, history of mitral regurgitation, papillary muscl
e dysfunction.  VALENTÍN was suggested, but the patient never showed up.  History of asthma and questionab
le history of palpitation and paroxysmal atrial fibrillation.



PAST SURGICAL HISTORY:  Significant for surgery for carpal tunnel syndrome and history of cholecystec
emigdio and history of bunion removal from the toe.



Previous cardiac workup as follows:  The patient had a cardiac catheterization done on 2013 siena
t shows normal coronaries, moderate to severe mitral regurgitation.  The patient had a stress test do
ne on 2015 which was negative, ejection fraction 64%, negative for ischemia.  The patient had ec
ho on 2015 that shows ejection fraction 50%, moderate mitral regurgitation, mild to moderate tri
cuspid regurg, RV systolic pressure 35.  Echo shows papillary muscle dysfunction, mild to moderate mi
tral regurg, RV systolic pressure 35.  VALENTÍN was suggested and scheduled.  The patient never showed up.




SOCIAL HISTORY:  Denies smoking.  Denies any history of alcohol abuse, socially drinks alcohol.



FAMILY HISTORY:  One brother  at the age of 39 with MI.



ALLERGIES:  ALLERGY TO UNKNOWN FOOD.



PHYSICAL EXAMINATION:

VITAL SIGNS:  Height of the patient 5 feet 7 inches, weight of the [id number] pounds, body mass index
 25.4 kg/m2.  Temperature afebrile, heart rate 120, blood pressure 142/89.

HEENT:  PERRLA.  Extraocular muscles intact.

NECK:  Supple.  No carotid bruits.  No thyromegaly.

CHEST:  Clear to auscultation.

HEART:  S1, S2 regular.

ABDOMEN:  Soft.

EXTREMITIES:  Clubbing and cyanosis negative.



LABORATORY DATA:  Blood workup as follows:  WBC 7.8, hemoglobin 11.7, hematocrit 33.9, platelet count
 249.  Chemistry shows sodium 130, potassium 4, chloride 95, carbon dioxide 16, anion gap of 17, BUN 
17, creatinine 1.5.



IMPRESSION:  Acute kidney injury, sinus tachycardia, history of mitral regurgitation, moderate to sev
ere; papillary muscle dysfunction, asthma, paroxysmal atrial fibrillation, status post cardiac cathet
erization 2013, normal coronaries, status post a stress test 2015, normal cardiac perfusi
on study.  History of echo 2015 that shows ejection fraction 50%, moderate mitral regurgitation,
 papillary muscle dysfunction, mild to moderate tricuspid regurgitation, right ventricular systolic p
ressure 35.  Transesophageal echocardiogram was recommended and scheduled, but patient never showed u
p.



RECOMMENDATION:  

1.  We will lower the fluid to prevent going into pulmonary edema.  The patient has mitral regurgitat
ion.  We will cut down to 50 mL an hour.  

2.  Schedule repeat echo to assess LV function and mitral regurgitation.  We will get a stress test t
o rule out any ischemia.  Low-dose beta-blocker.  We will follow with you with a lipid profile, TSH, 
thyroid profile, lipid profile and hemoglobin A1c because fasting blood sugar 169.  We will follow wi
th you.  Also, supplement magnesium and repeat the lab in the morning.



Thank you, Dr. Dinh, for providing us the opportunity in taking care of the patient.





__________________________________________

Ajay Gaxiola MD







cc:



DD: 2017 08:22:58  305

TT: 2017 10:14:10

Confirmation # [id number]

Dictation # [id number]

tn

## 2017-04-22 VITALS — OXYGEN SATURATION: 100 % | TEMPERATURE: 98 F

## 2017-04-22 VITALS — SYSTOLIC BLOOD PRESSURE: 91 MMHG | HEART RATE: 75 BPM | DIASTOLIC BLOOD PRESSURE: 69 MMHG

## 2017-04-22 VITALS — RESPIRATION RATE: 18 BRPM

## 2017-04-22 LAB
ALBUMIN/GLOB SERPL: 1 {RATIO} (ref 1.1–1.8)
ALP SERPL-CCNC: 112 U/L (ref 38–133)
ALT SERPL-CCNC: 90 U/L (ref 7–56)
AST SERPL-CCNC: 126 U/L (ref 15–39)
BILIRUB SERPL-MCNC: 0.7 MG/DL (ref 0.2–1.3)
BUN SERPL-MCNC: 9 MG/DL (ref 7–21)
CALCIUM SERPL-MCNC: 9.2 MG/DL (ref 8.4–10.5)
CHLORIDE SERPL-SCNC: 104 MMOL/L (ref 98–107)
CO2 SERPL-SCNC: 23 MMOL/L (ref 21–33)
ERYTHROCYTE [DISTWIDTH] IN BLOOD BY AUTOMATED COUNT: 14.7 % (ref 11.5–14.5)
GLOBULIN SER-MCNC: 3.3 GM/DL
GLUCOSE SERPL-MCNC: 91 MG/DL (ref 70–110)
HCT VFR BLD CALC: 29.2 % (ref 36–48)
MCH RBC QN AUTO: 31.4 PG (ref 25–35)
MCHC RBC AUTO-ENTMCNC: 34.2 G/DL (ref 31–37)
MCV RBC AUTO: 91.8 FL (ref 80–105)
PLATELET # BLD: 247 10^3/UL (ref 120–450)
PMV BLD AUTO: 10.2 FL (ref 7–11)
POTASSIUM SERPL-SCNC: 3.8 MMOL/L (ref 3.6–5)
PROT SERPL-MCNC: 6.5 G/DL (ref 5.8–8.3)
SODIUM SERPL-SCNC: 135 MMOL/L (ref 132–148)
WBC # BLD AUTO: 4.8 10^3/UL (ref 4.5–11)

## 2017-04-22 RX ADMIN — DILTIAZEM HYDROCHLORIDE SCH MG: 240 CAPSULE, COATED, EXTENDED RELEASE ORAL at 10:27

## 2017-04-22 RX ADMIN — AZITHROMYCIN DIHYDRATE SCH MLS/HR: 500 INJECTION, POWDER, LYOPHILIZED, FOR SOLUTION INTRAVENOUS at 10:30

## 2017-04-22 RX ADMIN — CEFTRIAXONE SCH MLS/HR: 1 INJECTION, SOLUTION INTRAVENOUS at 10:30

## 2017-04-22 NOTE — CP.PCM.PN
Subjective





- Date & Time of Evaluation


Date of Evaluation: 04/22/17


Time of Evaluation: 06:54





- Subjective


Subjective: 


Nurse Sharonda calls :"Right  sided chest pain."


 Patient was seen at bedside.


 Complains of waking up with left sided chest pain 20 minutes ago, at 8 /10 in  

the beginning now at 5/10, heaviness, no radiation of pain.


 No complaints of sob, nausea, sweating, palpitation.


 Has no other complaints.


 EKG troponin were ordered.


 This 61 year odl  woman was admitted with body ache , 

generalized weakness, cough, 


 Has PMH of HTN,HLD,Gout, pseudo gout, SVT, MR.








Objective





- Vital Signs/Intake and Output


Vital Signs (last 24 hours): 


 











Temp Pulse Resp BP Pulse Ox


 


 98.0 F   83   18   133/76   99 


 


 04/22/17 05:51  04/22/17 05:51  04/22/17 05:51  04/22/17 05:51  04/22/17 05:51








Intake and Output: 


 











 04/21/17 04/22/17





 18:59 06:59


 


Intake Total 180 1610


 


Output Total 1300 


 


Balance -1120 1610














- Medications


Medications: 


 Current Medications





Allopurinol (Zyloprim)  100 mg PO DAILY [facility]


   Last Admin: 04/21/17 11:31 Dose:  100 mg


Aspirin (Ecotrin)  81 mg PO DAILY [facility]


   Last Admin: 04/21/17 11:31 Dose:  81 mg


Diltiazem HCl (Cardizem Cd)  240 mg PO DAILY [facility]


   Last Admin: 04/21/17 11:30 Dose:  240 mg


Azithromycin (Zithromax 500mg In Ns)  250 mls @ 167 mls/hr IVPB DAILY [facility]


   PRN Reason: Protocol


   Last Admin: 04/21/17 13:41 Dose:  167 mls/hr


Ceftriaxone Sodium (Rocephin 1 Gram Ivpb)  100 mls @ 100 mls/hr IVPB DAILY [facility]


   PRN Reason: Protocol


   Last Admin: 04/21/17 12:45 Dose:  100 mls/hr


Lisinopril (Zestril)  5 mg PO DAILY [facility]


   Last Admin: 04/21/17 11:31 Dose:  5 mg


Metoprolol Tartrate (Lopressor)  25 mg PO BID [facility]


   Last Admin: 04/21/17 18:15 Dose:  25 mg


Tiotropium Bromide (Spiriva)  18 mcg IH DAILY [facility]


   PRN Reason: Cough and congestion


Tramadol HCl (Ultram)  50 mg PO TID [facility]


   PRN Reason: Pain, moderate (4-7)











- Labs


Labs: 


 





 04/21/17 08:30 





 











PT  10.7 Seconds (9.9-11.8)   04/20/17  15:59    


 


INR  0.99  (0.93-1.08)   04/20/17  15:59    


 


APTT  23.3 Seconds (23.7-30.8)  L  04/20/17  15:59    














- Constitutional


Appears: Well, No Acute Distress





- Head Exam


Head Exam: ATRAUMATIC, NORMAL INSPECTION, NORMOCEPHALIC





- Eye Exam


Eye Exam: Normal appearance





- ENT Exam


ENT Exam: Normal External Ear Exam





- Neck Exam


Neck Exam: Normal Inspection





- Respiratory Exam


Respiratory Exam: Clear to Ausculation Bilateral, NORMAL BREATHING PATTERN.  

absent: Accessory Muscle Use, Chest Wall Tenderness, Rhonchi, Wheezes, 

Respiratory Distress, Stridor





- Cardiovascular Exam


Cardiovascular Exam: REGULAR RHYTHM, +S1 (Normal.), +S2 (Normal.).  absent: JVD





- GI/Abdominal Exam


GI & Abdominal Exam: Normal Bowel Sounds.  absent: Distended





- Rectal Exam


Rectal Exam: Deferred





- Extremities Exam


Extremities Exam: Normal Inspection





- Back Exam


Back Exam: NORMAL INSPECTION





- Neurological Exam


Neurological Exam: Oriented x3





- Psychiatric Exam


Psychiatric exam: Normal Affect, Normal Mood, Suicidal Ideation





- Skin


Skin Exam: Normal Color





Assessment and Plan





- Assessment and Plan (Free Text)


Assessment: 


A/P : Chest pain.


         Abnormal LFT.


         Anemia.


         EKG- New flipped T's in 1,aVL, II.


         Troponin -Pending.


         0.4mg SL NTG.


         Serial EKG,troponins.

## 2017-04-22 NOTE — CARD
--------------- APPROVED REPORT --------------





EKG Measurement

Heart [id number]

OR [id number]

[id number]

[id number]

[id number]



<Conclusion>

Sinus rhythm with premature atrial complexes

Minimal voltage criteria for LVH, may be normal variant

consider inferior ischemia

Borderline ECG

## 2017-04-22 NOTE — CARD
--------------- APPROVED REPORT --------------





EKG Measurement

Heart [id number]

OR [id number]

[id number]

[id number]

[id number]



<Conclusion>

 arm lead reversal, 

Sinus rhythm with premature atrial complexes



Septal infarct, age undetermined



Abnormal ECG

## 2017-04-23 NOTE — PN
DATE: 04/21/2017



This patient was seen and evaluated today.  The patient is comfortable, tolerating the diet.



PHYSICAL EXAMINATION:

ABDOMEN:  Soft.  There is no mass palpable.  No tenderness.

HEART:  S1, S2 heard.  There is a systolic murmur present.

LUNGS:  Bilateral air entry present.

EXTREMITIES:  No edema.  No cyanosis.



LABORATORY DATA:  Hemoglobin 10, hematocrit 29.2, WBCs 4.8, platelets 241.  Chemistries showed LFTs: 
 AST is 126, ALT is 90, _____.



IMPRESSION:  This is a 61-year-old patient was admitted with atypical chest pain.  The patient also f
ound to have elevated liver enzymes.  GI consult was requested to evaluate.  Sonogram was negative, s
tatus post cholecystectomy.  The patient does _____ binge drinking, mainly during the weekend.  Recen
tly, she has increased her alcohol intake.  She is very concerned about it.  She is worried about the
 liver damage.  I did explain to her that part of the liver enzyme elevation could be due to the alco
hol and she has promised to change her lifestyle.  The patient may benefit from the elective upper ga
strointestinal endoscopic evaluation.  She did give a history of having colonoscopy done more than 5 
years ago.  She said there were no polyps at that time.  The patient is fully aware of the importance
 of followup outpatient to have this workup done.



Thank you very much for allowing us to participate in the care of the patient.  The patient would mary
efit from empiric PPI, Protonix therapy, empiric short course of PPI therapy.





__________________________________________

Jonathan Frost MD







cc:



DD: 04/23/2017 01:11:08  [id number]

TT: 04/23/2017 07:33:53

Confirmation # [id number]

Dictation # [id number]

en

## 2017-04-24 NOTE — PN
DATE: 04/21/2017



SUBJECTIVE:  The patient is comfortable, no distress.  She feels better.  She had a stress test done,
 results still pending, but she feels better.  



PHYSICAL EXAMINATION:

On 04/21:  

VITAL SIGNS:  Temperature 98.4, heart rate 91, blood pressure 115/77, respirations 20, saturation 90 
on room air.

HEAD AND NECK:  Normal.  No JVD, no thyromegaly.

CHEST:  Clear, good entry.

CARDIAC:  First sound, second sounds are normal.

ABDOMEN:  Soft, nontender.

EXTREMITIES:  No edema.

NEUROLOGIC:  Normal.



LABORATORY DATA:  The patient had an echocardiogram; laboratory-wise, echocardiogram shows severe butch
ral regurgitations.  She also had abnormal liver functions test and high creatinine, and labs were or
dered for tomorrow to check on her creatinine and liver functions test.  She had also liver ultrasoun
d, which shows hepatomegaly, echogenic liver, may be seen in the setting of hepatic parenchymal disea
ses or fatty infiltrations.



IMPRESSION AND PLAN:

1.  Chest pain, dyspnea.  The patient was seen by Dr. Gaxiola.  Echocardiography shows severe mitral reg
urgitation, stress test still pending.  The patient will need a transesophageal echo, she refused.  W
ill discuss with her later.  

2.  History of hypertension, history of paroxysmal atrial fibrillation, stable now.  No need for any 
anticoagulation; however, with severe mitral regurgitation, she may need surgical evaluations includi
ng mitral valve replacement or valve ligation.  Will discuss further with the cardiology consult.

3.  The patient does have a history of abnormal liver function tests, high creatinine.  Will repeat l
abs in the morning.  We will repeat chemistry and liver enzymes and so far her ultrasound of the live
r shows fatty liver.

4.  Valvular heart disease, severe mitral regurgitation, transesophageal echocardiogram recommended.



PLAN:  Continue current treatment for now.  We will follow up clinically.  We will discuss further [facility] cardiology.  Awaiting a stress test.





__________________________________________

Philip Dinh MD







cc:



DD: 04/24/2017 10:02:30  223

TT: 04/24/2017 10:30:37

Confirmation # [id number]

Dictation # [id number]

an

## 2017-04-24 NOTE — DS
The patient - she feels better.  She has no new complaints.  She is comfortable.  She wants to go [facility], and will be discharged home today.  Her discharge date is 4/22/2017.



PHYSICAL EXAMINATION:

VITAL SIGNS:  Today, temperature 98, heart rate 86, blood pressure 134/88, respirations 19, saturatio
n 100%.

HEAD AND NECK:  Normal.  No JVD, no thyromegaly.

CHEST:  Clear.

CARDIAC:  First and second sounds are normal.  Systolic murmur.

ABDOMEN:  Soft, obese, nontender.

EXTREMITIES:  No edema, some varicose veins.

NEUROLOGIC:  Normal.



LABORATORY STUDIES:  The patient has urine culture negative.  Blood culture negative, and she also ha
d a stress thallium, which was negative for ischemia.  Echocardiograph:  Severe MR. The patient advis
ed VALENTÍN.  She refused now.



DISCHARGE DIAGNOSES:

1.  Severe mitral regurgitation.

2.  Chest pain, noncardiac.

3.  Hypertension, hypercholesterolemia.

4.  Abnormal liver function test.  We discontinued allopurinol.  We discontinued metoprolol 25 b.i.d.
 ____ because of low blood pressure of 91, and we will keep enalapril 5 mg 1 a day, and we will keep 
Cardizem.  We will monitor her vitals - blood pressure and heart rate, as outpatient.

5.  Renal insufficiency, resolved.  We will monitor her kidney function as outpatient.  



The patient also advised to hold off on colchicine, to hold off on any allopurinol since she has only
 1 or 2 ____ per year, and we will monitor her condition for that - only use it p.r.n. for the colchi
cine p.r.n.  The patient advised to continue tramadol p.r.n., Cardizem  once a day, Spiriva, La
six 40 mg in the morning, Advair, aspirin 81, Protonix 40 mg 1 a day, and follow up in the office.  E
nalapril 5 mg 1 a day.





__________________________________________

Philip Dinh MD







cc:



DD: 04/24/2017 10:05:40  223

TT: 04/24/2017 10:28:27

Job # [id number]

ximena

## 2017-05-03 ENCOUNTER — HOSPITAL ENCOUNTER (EMERGENCY)
Dept: HOSPITAL 42 - ED | Age: 62
Discharge: HOME | End: 2017-05-03
Payer: MEDICARE

## 2017-05-03 VITALS
SYSTOLIC BLOOD PRESSURE: 106 MMHG | RESPIRATION RATE: 18 BRPM | OXYGEN SATURATION: 95 % | HEART RATE: 97 BPM | DIASTOLIC BLOOD PRESSURE: 75 MMHG

## 2017-05-03 VITALS — TEMPERATURE: 99.2 F

## 2017-05-03 VITALS — BODY MASS INDEX: 25.3 KG/M2

## 2017-05-03 DIAGNOSIS — Z87.891: ICD-10-CM

## 2017-05-03 DIAGNOSIS — T78.40XA: Primary | ICD-10-CM

## 2017-05-03 DIAGNOSIS — I10: ICD-10-CM

## 2017-05-03 PROCEDURE — 99283 EMERGENCY DEPT VISIT LOW MDM: CPT

## 2017-05-03 PROCEDURE — 96372 THER/PROPH/DIAG INJ SC/IM: CPT

## 2017-05-03 NOTE — ED PDOC
Arrival/HPI





- General


Chief Complaint: Allergic Reaction


Time Seen by Provider: 05/03/17 11:17


Historian: Patient





- History of Present Illness


Narrative History of Present Illness (Text): 


05/03/17 11:18


A 61 year old female, whose past medical history includes hypertension, high 

cholesterol, gout, and asthma, presents to the emergency department complaining 

of an allergic reaction that began last night. Patient reports yesterday for 

dinner she had Popeyes chicken, which she usually does but also had a Chewy 

yogurt bar. She states afterwards she went to shower and she used her body wash 

on her face because she ran out of her soap. Patient says before going to bed 

she felt a bump on the left side of her face but went to sleep without 

difficulty. This morning upon waking up she noticed swelling to her face and 

lips. Patient denies any shortness of breath, rash, feeling of throat 

constricting, or any other complaints at this time.





PMD: Dr. Dinh


Time/Duration: Other (8-16 hours)


Symptom Onset: Gradual


Symptom Course: Worsening


Quality: Other


Activities at Onset: Rest


Context: Home





Past Medical History





- Provider Review


Nursing Documentation Reviewed: Yes





- Infectious Disease


Hx of Infectious Diseases: None





- Tetanus Immunization


Tetanus Immunization: Unknown





- Past Medical History


Past Medical History: No Previous





- Cardiac


Hx Cardiac Disorders: Yes


Hx Cardiac Arrhythmia: Yes


Hx Heart Murmur: Yes


Hx Hypertension: Yes





- Pulmonary


Hx Respiratory Disorders: Yes


Hx Asthma: Yes


Hx Chronic Obstructive Pulmonary Disease (COPD): Yes





- Neurological


Hx Neurological Disorder: No


Hx Paralysis: No





- HEENT


Hx HEENT Disorder: No


Hx Sinusitis: Yes





- Renal


Hx Renal Disorder: No





- Endocrine/Metabolic


Hx Endocrine Disorders: No





- Hematological/Oncological


Hx Blood Disorders: Yes


Hx Blood Transfusions: Yes





- Integumentary


Hx Dermatological Disorder: No


Hx Cellulitis: No





- Musculoskeletal/Rheumatological


Hx Musculoskeletal Disorders: Yes


Hx Falls: Yes





- Gastrointestinal


Hx Gastrointestinal Disorders: No


Hx Crohn's Disease: No


Hx Diverticulitis: No


Hx Gastroesophageal Reflux: No


Hx Liver Failure: No





- Genitourinary/Gynecological


Hx Genitourinary Disorders: No





- Psychiatric


Hx Psychophysiologic Disorder: No


Hx Emotional Abuse: No


Hx Physical Abuse: No


Hx Substance Use: No





- Surgical History


Hx Cardiac Catheterization: Yes


Hx Cholecystectomy: Yes


Hx Orthopedic Surgery: Yes





- Anesthesia


Hx Anesthesia Reactions: No


Hx Malignant Hyperthermia: No





- Suicidal Assessment


Feels Threatened In Home Enviroment: No





Family/Social History





- Physician Review


Nursing Documentation Reviewed: Yes


Family/Social History: Unknown Family HX


Smoking Status: Former Smoker


Hx Alcohol Use: No


Amount per day: 6


Hx Substance Use: No


Hx Substance Use Treatment: No





Allergies/Home Meds


Allergies/Adverse Reactions: 


Allergies





ALTOIDS Allergy (Intermediate, Uncoded 05/03/17 11:03)


 SWELLING FACE/LIPS


UNKNOWN FOOD Allergy (Intermediate, Uncoded 05/03/17 11:03)


 SWELLING FACE/LIPS


 SEEN IN ED 8/31/16 








Home Medications: 


 Home Meds











 Medication  Instructions  Recorded  Confirmed


 


Fluticasone/Salmeterol [Advair 1 puff IH BID PRN 07/12/13 05/03/17





250-50 Diskus]   


 


Metoprolol Tartrate [Lopressor] 25 mg PO BID 07/12/13 05/03/17


 


Tiotropium [Spiriva] 18 mcg IH DAILY PRN 07/12/13 12/04/16


 


Furosemide 40 mg PO QAM 09/01/16 05/03/17


 


traMADol [Ultram] 50 mg PO TID PRN 11/22/16 05/03/17














Review of Systems





- Physician Review


All systems were reviewed & negative as marked: Yes





- Review of Systems


Constitutional: Other (facial swelling)


ENT: Other (lip swelling; no throat closing sensation)


Respiratory: absent: SOB


Skin: absent: Rash





Physical Exam


Vital Signs Reviewed: Yes


Vital Signs











  Temp Pulse Resp BP Pulse Ox


 


 05/03/17 14:17   97 H  18  106/75  95


 


 05/03/17 11:06  99.2 F  112 H  17  102/71  97











Temperature: Afebrile


Blood Pressure: Normal


Pulse: Tachycardic


Respiratory Rate: Normal


Appearance: Positive for: Well-Appearing, Non-Toxic, Comfortable


Pain Distress: None


Mental Status: Positive for: Alert and Oriented X 3





- Systems Exam


Head: Present: Atraumatic, Normocephalic, Swelling


Pupils: Present: PERRL


Extroacular Muscles: Present: EOMI


Conjunctiva: Present: Normal


Mouth: Present: Moist Mucous Membranes.  No: Normal Lips (swelling)


Pharnyx: Present: Normal.  No: ERYTHEMA, EXUDATE, TONSILS ENLARGED


Neck: Present: Normal Range of Motion


Respiratory/Chest: Present: Clear to Auscultation, Good Air Exchange.  No: 

Respiratory Distress, Accessory Muscle Use


Cardiovascular: Present: Normal S1, S2, Tachycardic.  No: Murmurs


Abdomen: Present: Normal Bowel Sounds.  No: Tenderness, Distention, Peritoneal 

Signs


Upper Extremity: Present: Normal Inspection.  No: Cyanosis, Edema


Lower Extremity: Present: Normal Inspection.  No: Edema


Neurological: Present: GCS=15, CN II-XII Intact, Speech Normal


Skin: Present: Warm, Dry, Normal Color.  No: Rashes


Psychiatric: Present: Alert, Oriented x 3, Normal Insight, Normal Concentration





Medical Decision Making


ED Course and Treatment: 


05/03/17 11:18


Impression:


A 61 year old female with swelling to the face and lips.





Differential Diagnosis included but are not limited to:  Allergic reaction





Plan:


-- Solu-Medrol and Benadryl


-- Reassess and disposition





Prior Visits:


Notes and results from previous visits were reviewed. The patient last 

presented to the emergency department on 04/20/17 for evaluation of generalized 

weakness. 





Progress Notes:





05/03/17 13:52


On reevaluation the patient feels better and is in no acute distress. Patient 

oxygen saturation is 100. She is sitting up eating food without difficulty. I 

have discussed the results and plan with the patient, who expresses 

understanding. Patient given the opportunity to ask question, all questions 

were answered and there is agreement with the plan to discharge the patient 

home.  Patient is stable for discharge. Patient was instructed to follow up 

with physician/clinic in 1-2 days or return if symptoms persist/worsen or new 

concerning symptoms arise.





- Medication Orders


Current Medication Orders: 











Discontinued Medications





Dexamethasone (Decadron Inj)  10 mg IM STAT STA


   Stop: 05/03/17 11:59


   Last Admin: 05/03/17 12:11  Dose: 10 mg





Diphenhydramine HCl (Benadryl)  25 mg IVP STAT STA


   Stop: 05/03/17 11:26


Diphenhydramine HCl (Benadryl)  25 mg PO STAT STA


   Stop: 05/03/17 11:59


   Last Admin: 05/03/17 12:11  Dose: 25 mg





Methylprednisolone (Solu-Medrol)  125 mg IVP STAT STA


   Stop: 05/03/17 11:25











- Scribe Statement


The provider has reviewed the documentation as recorded by the Mark Li





Provider Scribe Attestation:


All medical record entries made by the Scribe were at my direction and 

personally dictated by me. I have reviewed the chart and agree that the record 

accurately reflects my personal performance of the history, physical exam, 

medical decision making, and the department course for this patient. I have 

also personally directed, reviewed, and agree with the discharge instructions 

and disposition.








Disposition/Present on Arrival





- Present on Arrival


Any Indicators Present on Arrival: No


History of DVT/PE: No


History of Uncontrolled Diabetes: No


Urinary Catheter: No


History of Decub. Ulcer: No


History Surgical Site Infection Following: None





- Disposition


Have Diagnosis and Disposition been Completed?: Yes


Diagnosis: 


 Allergic reaction





Disposition: HOME/ ROUTINE


Disposition Time: 13:52


Patient Problems: 


 Current Active Problems











Problem Status Onset


 


Allergic reaction Acute  











Condition: IMPROVED


Discharge Instructions (ExitCare):  General Allergic Reaction (ED)


Additional Instructions: 





Thank you for letting us take care of you today. Your provider was Dr. Rivas. You were treated for an allergic reaction. The emergency medical 

care you received today was directed at your acute symptoms. If you were 

prescribed any medication, please fill it and take as directed. It may take 

several days for your symptoms to resolve. Return to the Emergency Department 

if your symptoms worsen, do not improve, or if you have any other problems.





Please contact your doctor or call one of the physicians/clinics you have been 

referred to that are listed on the Patient Visit Information form that is 

included in your discharge packet. Bring any paperwork you were given at 

discharge with you along with any medications you are taking to your follow up 

visit. Our treatment cannot replace ongoing medical care by a primary care 

provider (PCP) outside of the emergency department.





Thank you for allowing the [facility] team to be part of your care today.














Follow up with your doctor in 2-3 days to be re-evaluation.








DO NOT EAT THE GRANOLA BAR THAT YOU MAY BE ALLERGIC TO!


Prescriptions: 


predniSONE [Prednisone] 40 mg PO DAILY #10 tab


Referrals: 


Philip Dinh MD [Primary Care Provider] - Follow up with primary

## 2017-06-16 ENCOUNTER — HOSPITAL ENCOUNTER (INPATIENT)
Dept: HOSPITAL 42 - ED | Age: 62
LOS: 3 days | Discharge: HOME | DRG: 309 | End: 2017-06-19
Attending: INTERNAL MEDICINE | Admitting: INTERNAL MEDICINE
Payer: MEDICARE

## 2017-06-16 VITALS — BODY MASS INDEX: 25.3 KG/M2

## 2017-06-16 DIAGNOSIS — I27.2: ICD-10-CM

## 2017-06-16 DIAGNOSIS — Z79.01: ICD-10-CM

## 2017-06-16 DIAGNOSIS — M10.9: ICD-10-CM

## 2017-06-16 DIAGNOSIS — E78.00: ICD-10-CM

## 2017-06-16 DIAGNOSIS — I10: ICD-10-CM

## 2017-06-16 DIAGNOSIS — J44.1: ICD-10-CM

## 2017-06-16 DIAGNOSIS — J45.909: ICD-10-CM

## 2017-06-16 DIAGNOSIS — K76.1: ICD-10-CM

## 2017-06-16 DIAGNOSIS — M17.0: ICD-10-CM

## 2017-06-16 DIAGNOSIS — I48.2: ICD-10-CM

## 2017-06-16 DIAGNOSIS — E87.1: ICD-10-CM

## 2017-06-16 DIAGNOSIS — M11.20: ICD-10-CM

## 2017-06-16 DIAGNOSIS — I08.1: ICD-10-CM

## 2017-06-16 DIAGNOSIS — I48.0: Primary | ICD-10-CM

## 2017-06-16 DIAGNOSIS — I47.1: ICD-10-CM

## 2017-06-16 DIAGNOSIS — E66.3: ICD-10-CM

## 2017-06-16 DIAGNOSIS — D64.9: ICD-10-CM

## 2017-06-16 LAB
ADD MANUAL DIFF?: NO
ALBUMIN/GLOB SERPL: 1.2 {RATIO} (ref 1.1–1.8)
ALP SERPL-CCNC: 180 U/L (ref 38–133)
ALT SERPL-CCNC: 68 U/L (ref 7–56)
APTT BLD: 24.9 SECONDS (ref 23.7–30.8)
AST SERPL-CCNC: 82 U/L (ref 15–39)
BASOPHILS # BLD AUTO: 0.02 K/MM3 (ref 0–2)
BASOPHILS NFR BLD: 0.2 % (ref 0–3)
BILIRUB SERPL-MCNC: 0.7 MG/DL (ref 0.2–1.3)
BUN SERPL-MCNC: 24 MG/DL (ref 7–21)
CALCIUM SERPL-MCNC: 9.5 MG/DL (ref 8.4–10.5)
CHLORIDE SERPL-SCNC: 96 MMOL/L (ref 98–107)
CO2 SERPL-SCNC: 21 MMOL/L (ref 21–33)
EOSINOPHIL # BLD: 0 10*3/UL (ref 0–0.7)
EOSINOPHIL NFR BLD: 0.1 % (ref 1.5–5)
ERYTHROCYTE [DISTWIDTH] IN BLOOD BY AUTOMATED COUNT: 13.6 % (ref 11.5–14.5)
GLOBULIN SER-MCNC: 3.5 GM/DL
GLUCOSE SERPL-MCNC: 107 MG/DL (ref 70–110)
GRANULOCYTES # BLD: 5.81 10*3/UL (ref 1.4–6.5)
GRANULOCYTES NFR BLD: 68.4 % (ref 50–68)
HCT VFR BLD CALC: 35.8 % (ref 36–48)
INR PPP: 1.02 (ref 0.93–1.08)
LYMPHOCYTES # BLD: 1.5 10*3/UL (ref 1.2–3.4)
LYMPHOCYTES NFR BLD AUTO: 17.7 % (ref 22–35)
MCH RBC QN AUTO: 31.5 PG (ref 25–35)
MCHC RBC AUTO-ENTMCNC: 35.2 G/DL (ref 31–37)
MCV RBC AUTO: 89.5 FL (ref 80–105)
MONOCYTES # BLD AUTO: 1.2 10*3/UL (ref 0.1–0.6)
MONOCYTES NFR BLD: 13.6 % (ref 1–6)
PLATELET # BLD: 315 10^3/UL (ref 120–450)
PMV BLD AUTO: 9.6 FL (ref 7–11)
POTASSIUM SERPL-SCNC: 4.5 MMOL/L (ref 3.6–5)
PROT SERPL-MCNC: 7.7 G/DL (ref 5.8–8.3)
SODIUM SERPL-SCNC: 129 MMOL/L (ref 132–148)
TROPONIN I SERPL-MCNC: 0.02 NG/ML
WBC # BLD AUTO: 8.5 10^3/UL (ref 4.5–11)

## 2017-06-16 RX ADMIN — HEPARIN SODIUM PRN MLS/HR: 10000 INJECTION, SOLUTION INTRAVENOUS at 22:58

## 2017-06-16 NOTE — ED PDOC
Arrival/HPI





- General


Chief Complaint: Respiratory Distress


Time Seen by Provider: 06/16/17 18:29


Historian: Patient





- History of Present Illness


Narrative History of Present Illness (Text): 





06/16/17 18:30


Patient is a 61 year old female whose past medical history includes atrial 

fibrillation and "asthma since I was a kid", who presents to the emergency 

department with shortness of breath for the past 2-3 days. She states it has 

been constant. She also reports intermittent chest pain. Denies leg pain or 

swelling. Denies pleuritic pain. Denies lightheadedness or dizziness. Nonsmoker 

but states family members smoked heavily in past. Currently no chest discomfort.











Time/Duration: < week


Symptom Onset: Gradual


Symptom Course: Unchanged


Modifying Factors (Text): 


None 





Past Medical History





- Provider Review


Nursing Documentation Reviewed: Yes





- Infectious Disease


Hx of Infectious Diseases: None





- Tetanus Immunization


Tetanus Immunization: Unknown





- Past Medical History


Past Medical History: No Previous





- Cardiac


Hx Cardiac Disorders: Yes


Hx Cardiac Arrhythmia: Yes


Hx Heart Murmur: Yes


Hx Hypertension: Yes





- Pulmonary


Hx Respiratory Disorders: Yes


Hx Asthma: Yes


Hx Chronic Obstructive Pulmonary Disease (COPD): Yes





- Neurological


Hx Neurological Disorder: No





- HEENT


Hx HEENT Disorder: No





- Renal


Hx Renal Disorder: No





- Endocrine/Metabolic


Hx Endocrine Disorders: No





- Hematological/Oncological


Hx Blood Disorders: Yes


Hx Blood Transfusions: Yes





- Integumentary


Hx Dermatological Disorder: No





- Musculoskeletal/Rheumatological


Hx Musculoskeletal Disorders: Yes


Hx Falls: Yes





- Gastrointestinal


Hx Gastrointestinal Disorders: No





- Genitourinary/Gynecological


Hx Genitourinary Disorders: No





- Psychiatric


Hx Psychophysiologic Disorder: No


Hx Substance Use: No





- Surgical History


Hx Cardiac Catheterization: Yes


Hx Cholecystectomy: Yes


Hx Orthopedic Surgery: Yes





- Anesthesia


Hx Anesthesia Reactions: No


Hx Malignant Hyperthermia: No





- Suicidal Assessment


Feels Threatened In Home Enviroment: No





Family/Social History





- Physician Review


Nursing Documentation Reviewed: Yes


Family/Social History: Unknown Family HX


Smoking Status: Former Smoker


Hx Alcohol Use: No


Amount per day: 6


Hx Substance Use: No


Hx Substance Use Treatment: No





Allergies/Home Meds


Allergies/Adverse Reactions: 


Allergies





ALTOIDS Allergy (Intermediate, Uncoded 05/03/17 11:03)


 SWELLING FACE/LIPS


UNKNOWN FOOD Allergy (Intermediate, Uncoded 05/03/17 11:03)


 SWELLING FACE/LIPS


 SEEN IN ED 8/31/16 








Home Medications: 


 Home Meds











 Medication  Instructions  Recorded  Confirmed


 


Unobtainable  06/16/17 06/16/17














Review of Systems





- Review of Systems


Eyes: absent: Vision Changes


ENT: absent: Hearing Changes


Respiratory: SOB


Cardiovascular: Chest Pain, Palpitations, MCLEAN.  absent: Edema, Calf Pain


Gastrointestinal: absent: Abdominal Pain, Nausea, Vomiting


Genitourinary Female: absent: Dysuria


Musculoskeletal: absent: Back Pain


Skin: absent: Rash


Neurological: absent: Dizziness


Endocrine: absent: Diaphoresis


Psychiatric: absent: Depression





Physical Exam





- Physical Exam


Narrative Physical Exam (Text): 


Head:  Atraumatic.  Normocephalic. 


Eyes:  PERRL.  EOMI.  Conjunctivae are not pale.


ENT:  Mucous membranes are moist and intact.  Oropharynx is clear and 

symmetric. 


Neck:  Supple.  Full ROM.  No JVD.  No lymphadenopathy. No palpable thyromegaly.


Cardiovascular:  Tachycardic, systolic murmur noted.


Pulmonary/Chest:  No evidence of respiratory distress.  Mild expiratory wheeze, 

no rales or rhonchi.


Abdominal:  Soft and non-distended.  There is no tenderness.  No rebound, 

guarding, or rigidity.  No organomegaly.  Good bowel sounds. 


Back:  No CVA tenderness.


Extremities:  No edema.   No cyanosis.  No clubbing.  Full range of motion in 

all extremities.  No calf tenderness. Soft tissue swelling to lateral aspect of 

left neck soft and nontender, no fluctuance.


Skin:  Skin is warm and dry.  No petechiae.  No purpura. No vesicular rash.


Neurological:  Alert, awake, and oriented to person, place, time, and 

situation.  Normal speech. Motor and sensory exam intact.


Psychiatric:  Good eye contact.  Normal interaction, affect, and behavior.


 








Vital Signs Reviewed: Yes


Vital Signs











  Temp Pulse Resp BP Pulse Ox


 


 06/17/17 01:00   126 H  16  151/96 H  100


 


 06/16/17 23:21   124 H  16  124/84  100


 


 06/16/17 20:10   125 H   136/59 L 


 


 06/16/17 20:03   136 H   136/59 L 


 


 06/16/17 19:08   122 H   155/92 H 


 


 06/16/17 18:14    20  


 


 06/16/17 18:13  98.0 F  103 H  20  152/105 H  97











Temperature: Afebrile


Blood Pressure: Normal


Pulse: Tachycardic


Respiratory Rate: Normal


Appearance: Positive for: Well-Appearing, Non-Toxic


Pain Distress: None


Mental Status: Positive for: Alert and Oriented X 3





Medical Decision Making


ED Course and Treatment: 


Differential Diagnosis included but are not limited to:  Atrial fibrillation 

with RVR vs congestive heart failure vs. copd   





Patient is noted to be in narrow complex tachcardia.


EKG suggestive of sinus tachycardia with PACs, although on monitor patient is 

noted to have intermittent irregularly irregular rhythm consistent with atrial 

fibrillation.


Suspicion that patient alternates from sinus tachycardia with PACs and atrial 

fibrillation. She reports prior history of atrial fibrillation.


Denies smoking.


Denies ocp.


Denies calf pain or swelling. Denies prolonged immobilization.


Denies headache or trauma or recent active bleeding.


She denies chest pain or pleuritic chest discomfort.


Cardizem bolus and drip given with improvement in heart rate. On exam, patient 

feels less sob.


Heparin ordered for tachyarrhythmia.





Case d/w Dr Dinh, will admit to telemetry and consult Dr. Gaxiola.


Risks/side effects of medications reviewed with patient.





06/16/17 23:16





06/16/17 23:20


LFTs elevated, but no abdominal pain or ruq pain.


Patient also noted to be hyponatremic, cannot exclude lung disease or 

malignancy.


Currently no obvious infiltrate noted on cxr, if symptoms persist recommend 

chest ct.


06/16/17 23:57


VQ scan ordered as persistent tachycardia.


I endorsed VQ scan results to Dr. Carrillo, house doctor. Patient with no hypoxia, 

no pleuritic pain at this time.


Prior vq scan from 2014 reviewed low probability.





- Critical Care


Critical Care Minutes: 30 minutes





- Lab Interpretations


Lab Results: 








 06/16/17 19:00 





 06/16/17 19:00 





 Lab Results





06/16/17 19:00: Sodium 129 L, Potassium 4.5, Chloride 96 L, Carbon Dioxide 21, 

Anion Gap 17, BUN 24 H, Creatinine 1.2, Est GFR (African Amer) 55, Est GFR (Non-

Af Amer) 46, Random Glucose 107, Calcium 9.5, Total Bilirubin 0.7, AST 82 H, 

ALT 68 H, Alkaline Phosphatase 180 H, Lactate Dehydrogenase 602, Total Creatine 

Kinase 57, Troponin I 0.02  D, NT-Pro-B Natriuret Pep 115, Total Protein 7.7, 

Albumin 4.2, Globulin 3.5, Albumin/Globulin Ratio 1.2


06/16/17 19:00: PT 11.0, INR 1.02, APTT 24.9


06/16/17 19:00: WBC 8.5  D, RBC 4.00, Hgb 12.6, Hct 35.8 L, MCV 89.5, MCH 31.5, 

MCHC 35.2, RDW 13.6, Plt Count 315, MPV 9.6, Gran % 68.4 H, Lymph % (Auto) 17.7 

L, Mono % (Auto) 13.6 H, Eos % (Auto) 0.1 L, Baso % (Auto) 0.2, Gran # 5.81, 

Lymph # 1.5, Mono # 1.2 H, Eos # 0.0, Baso # 0.02











- RAD Interpretation


Radiology Orders: 








06/16/17 18:35


CHEST PORTABLE [RAD] Stat 











: Radiologist





- EKG Interpretation


Interpreted by ED Physician: Yes


Type: 12 lead EKG





- Medication Orders


Current Medication Orders: 








Heparin Sodium/Sodium Chloride (Heparin 08374 Units/250ml 1/2 Normal Saline)  25

,000 units in 250 mls @ 13.227 mls/hr IV .[id number] PRN; Protocol; 18 UNITS/KG/HR


   PRN Reason: ADJUST RATE PER PROTOCOL


   Last Admin: 06/17/17 10:56  Dose: 13.88 units/kg/hr, 10.2 mls/hr





diltiaZEM IVPB 100mg in NS (Cardizem 100mg In Ns)  100 mls @ 10 mls/hr IV .Q10H 

PRN; Protocol; 10 MG/HR


   PRN Reason: TITRATE PER MD ORDER


   Last Admin: 06/17/17 10:50  Dose: 10 mg/hr, 10 mls/hr





Levalbuterol HCl (Xopenex)  1.25 mg IH QIDRESP PRN


   PRN Reason: Shortness of Breath


Metoprolol Tartrate (Lopressor)  25 mg PO BRKDIN [facility]


   Last Admin: 06/17/17 08:16  Dose: 25 mg





Discontinued Medications





Diltiazem HCl (Cardizem)  15 mg IVP ONCE ONE


   Stop: 06/16/17 18:37


   Last Admin: 06/16/17 19:08  Dose: 15 mg





Diltiazem HCl (Cardizem)  20 mg IVP ONCE ONE


   Stop: 06/16/17 19:53


   Last Admin: 06/16/17 20:10  Dose: 20 mg





Diltiazem HCl (Cardizem)  10 mg IVP STAT STA


   Stop: 06/17/17 07:00


   Last Admin: 06/17/17 07:55  Dose: 10 mg





Heparin Sodium (Porcine) (Heparin)  4,000 units IV ONCE ONE


   PRN Reason: Protocol


   Stop: 06/16/17 21:22


   Last Admin: 06/16/17 22:57  Dose: 4,000 units





diltiaZEM IVPB 100mg in NS (Cardizem 100mg In Ns)  100 mls @ 5 mls/hr IV .Q20H 

PRN; Protocol; 5 MG/HR


   PRN Reason: PER MD


   Last Admin: 06/16/17 20:03  Dose: 5 mls/hr











- Scribe Statement


The provider has reviewed the documentation as recorded by the Mark Queen





Provider Scribe Attestation:


All medical record entries made by the Mark were at my direction and 

personally dictated by me. I have reviewed the chart and agree that the record 

accurately reflects my personal performance of the history, physical exam, 

medical decision making, and the department course for this patient. I have 

also personally directed, reviewed, and agree with the discharge instructions 

and disposition. 





Disposition/Present on Arrival





- Present on Arrival


Any Indicators Present on Arrival: No


History of DVT/PE: No


History of Uncontrolled Diabetes: No


Urinary Catheter: No


History of Decub. Ulcer: No


History Surgical Site Infection Following: None





- Disposition


Have Diagnosis and Disposition been Completed?: Yes


Diagnosis: 


 Atrial fibrillation, Tachycardia, Elevated liver enzymes, Abnormal chest xray





Disposition: HOSPITALIZED


Disposition Time: 18:00


Patient Plan: Admission, Telemetry


Patient Problems: 


 Current Active Problems











Problem Status Onset


 


Atrial fibrillation Acute  


 


Tachycardia Acute  











Condition: SERIOUS

## 2017-06-16 NOTE — RAD
HISTORY:

sob   



COMPARISON:

Chest x-ray performed 4/20/17 



TECHNIQUE:

Chest, one view.



FINDINGS:





LUNGS:

Hyperinflation may be seen in the setting of COPD. No focal 

consolidation.



Please note that chest x-ray has limited sensitivity for the 

detection of pulmonary masses.



PLEURA:

No significant pleural effusion identified. No definite pneumothorax .



CARDIOVASCULAR:

Borderline cardiomegaly. 



OSSEOUS STRUCTURES:

 Degenerative changes.



VISUALIZED UPPER ABDOMEN:

Unremarkable.



OTHER FINDINGS:

None.



IMPRESSION:

Findings consistent with COPD.  Correlate clinically.

## 2017-06-17 LAB
ADD MANUAL DIFF?: NO
ALBUMIN/GLOB SERPL: 1.2 {RATIO} (ref 1.1–1.8)
ALP SERPL-CCNC: 142 U/L (ref 38–133)
ALT SERPL-CCNC: 57 U/L (ref 7–56)
APPEARANCE UR: CLEAR
APTT BLD: 178.8 SECONDS (ref 23.7–30.8)
AST SERPL-CCNC: 58 U/L (ref 15–39)
BACTERIA #/AREA URNS HPF: (no result) /[HPF]
BASOPHILS # BLD AUTO: 0.02 K/MM3 (ref 0–2)
BASOPHILS NFR BLD: 0.3 % (ref 0–3)
BILIRUB SERPL-MCNC: 1.1 MG/DL (ref 0.2–1.3)
BILIRUB UR-MCNC: NEGATIVE MG/DL
BUN SERPL-MCNC: 22 MG/DL (ref 7–21)
CALCIUM SERPL-MCNC: 9.1 MG/DL (ref 8.4–10.5)
CHLORIDE SERPL-SCNC: 99 MMOL/L (ref 95–110)
CHOLEST SERPL-MCNC: 217 MG/DL (ref 130–200)
CO2 SERPL-SCNC: 20 MMOL/L (ref 21–33)
COLOR UR: YELLOW
EOSINOPHIL # BLD: 0 10*3/UL (ref 0–0.7)
EOSINOPHIL NFR BLD: 0.4 % (ref 1.5–5)
ERYTHROCYTE [DISTWIDTH] IN BLOOD BY AUTOMATED COUNT: 13.5 % (ref 11.5–14.5)
GLOBULIN SER-MCNC: 3.1 GM/DL
GLUCOSE SERPL-MCNC: 136 MG/DL (ref 70–110)
GLUCOSE UR STRIP-MCNC: NEGATIVE MG/DL
GRANULOCYTES # BLD: 4.53 10*3/UL (ref 1.4–6.5)
GRANULOCYTES NFR BLD: 63.2 % (ref 50–68)
HCT VFR BLD CALC: 32.4 % (ref 36–48)
INR PPP: 1.06 (ref 0.93–1.08)
KETONES UR STRIP-MCNC: NEGATIVE MG/DL
LEUKOCYTE ESTERASE UR-ACNC: NEGATIVE LEU/UL
LYMPHOCYTES # BLD: 1.4 10*3/UL (ref 1.2–3.4)
LYMPHOCYTES NFR BLD AUTO: 19.3 % (ref 22–35)
MAGNESIUM SERPL-MCNC: 1.6 MG/DL (ref 1.7–2.2)
MCH RBC QN AUTO: 30.8 PG (ref 25–35)
MCHC RBC AUTO-ENTMCNC: 34.3 G/DL (ref 31–37)
MCV RBC AUTO: 90 FL (ref 80–105)
MONOCYTES # BLD AUTO: 1.2 10*3/UL (ref 0.1–0.6)
MONOCYTES NFR BLD: 16.8 % (ref 1–6)
PH UR STRIP: 6 [PH] (ref 4.7–8)
PLATELET # BLD: 279 10^3/UL (ref 120–450)
PMV BLD AUTO: 9.6 FL (ref 7–11)
POTASSIUM SERPL-SCNC: 3.8 MMOL/L (ref 3.6–5)
PROT SERPL-MCNC: 6.7 G/DL (ref 5.8–8.3)
PROT UR STRIP-MCNC: (no result) MG/DL
RBC # UR STRIP: NEGATIVE /UL
RBC #/AREA URNS HPF: (no result) /HPF (ref 0–2)
SODIUM SERPL-SCNC: 128 MMOL/L (ref 132–148)
SP GR UR STRIP: 1.02 (ref 1–1.03)
UROBILINOGEN UR STRIP-ACNC: 0.2 E.U./DL
WBC # BLD AUTO: 7.2 10^3/UL (ref 4.5–11)
WBC #/AREA URNS HPF: (no result) /HPF (ref 0–6)

## 2017-06-17 RX ADMIN — DILTIAZEM HYDROCHLORIDE PRN MLS/HR: 100 INJECTION, POWDER, LYOPHILIZED, FOR SOLUTION INTRAVENOUS at 20:33

## 2017-06-17 RX ADMIN — DILTIAZEM HYDROCHLORIDE PRN MLS/HR: 100 INJECTION, POWDER, LYOPHILIZED, FOR SOLUTION INTRAVENOUS at 10:50

## 2017-06-17 RX ADMIN — HEPARIN SODIUM PRN MLS/HR: 10000 INJECTION, SOLUTION INTRAVENOUS at 10:56

## 2017-06-17 RX ADMIN — HEPARIN SODIUM PRN MLS/HR: 10000 INJECTION, SOLUTION INTRAVENOUS at 20:32

## 2017-06-17 RX ADMIN — HEPARIN SODIUM PRN MLS/HR: 10000 INJECTION, SOLUTION INTRAVENOUS at 19:21

## 2017-06-17 NOTE — CARD
--------------- APPROVED REPORT --------------





EKG Measurement

Heart [id number]

AK [id number]

[id number]

[id number]

[id number]



<Conclusion>

Sinus tachycardia and premature ventricular complexes or fusion 

complexes

Left axis deviation

Minimal voltage criteria for LVH, may be normal variant

Nonspecific ST abnormality

Abnormal ECG

## 2017-06-17 NOTE — CON
DATE: 06/17/2017



REASON FOR CONSULTATION:  Rapid atrial fibrillation.



HISTORY OF PRESENT ILLNESS:  The patient is a 61-year-old female who has a history of bronchial asthm
a and severe significant mitral insufficiency related to anterior mitral leaflet pathology.  The noah
ent also has right ventricular systolic dysfunction.  According to the patient, she was offered heart
 mitral valve surgery in the past.  The patient presented because of palpitations and dizziness, was 
found to be in rapid atrial fibrillation and was started on Cardizem infusion.  The patient is curren
tly on a rate of 10 mg per hour.  The patient denies any syncope.  The patient is unaware of any muna
nary artery disease.  Myoview stress test was attempted in April of this year, but was not completed 
because the patient was claustrophobic.  The patient denies any retrosternal chest pain.



MEDICATIONS:  Cardizem infusion at 10 mg per hour, intravenous heparin infusion in a therapeutic jose
men, Lopressor 25 mg once a day, Xopenex inhaler p.r.n.



REVIEW OF SYSTEMS:  No nausea or vomiting.  No fever or chills.



PHYSICAL EXAMINATION:

GENERAL:  The patient is a middle-aged female who does not appear to be in any distress.

VITAL SIGNS:  Blood pressure 111/69, heart rate 70, temperature 98.5, respirations 16.

HEENT:  Normocephalic.

NECK:  No JVD.

CHEST:  Bilateral rhonchi.

HEART:  S1, S2 irregular.

ABDOMEN:  Soft.

EXTREMITIES:  No edema.



LABORATORY DATA:  Hemoglobin and hematocrit 11.1 and 32.4, white count and platelet count are within 
normal limits.  SMA-7:  Sodium 128, potassium 3.8, chloride 99, CO2 of 20, glucose 136, BUN 22, creat
inine 1.2.  ALT and AST are 58 and 57 respectively, slight improvement compared to yesterday.  Total 
cholesterol 217, HDL cholesterol is 98, both are elevated.  TSH level is within normal limits.  The m
ost recent PTT is 178 seconds which is significantly high on heparin therapy.  EKG revealed sinus tac
hycardia with PVCs, heart rate 113, minimal voltage criteria for LVH.



ASSESSMENT:

1.  New onset atrial fibrillation.

2.  Severe mitral insufficiency.

3.  Cor pulmonale with mildly depressed right ventricular systolic function and mild pulmonary hypert
ension.



RECOMMENDATIONS:  Continue current IV Cardizem and IV heparin.  Continue Lopressor 25 mg twice a day 
and Xopenex inhaler 1.25 mg p.r.n., ventilation perfusion scan was low probability for PE.  My review
 of the chest x-ray revealed right lower lobe infiltrate with the possibility of pneumonia.





__________________________________________

Victorino Coley MD







cc:



DD: 06/17/2017 14:40:39  [id number]

TT: 06/17/2017 19:06:21

Confirmation # [id number]

Dictation # [id number]

jn

## 2017-06-17 NOTE — CON
DATE: 2017



HISTORY OF PRESENT ILLNESS:  This patient was seen and evaluated earlier.  This is a 61-year-old noah
ent with a history of paroxysmal atrial fibrillation, presented to the Emergency Room with shortness 
of breath.  The patient was found to have AFib with rapid ventricular response.  The rate is controll
ed with Cardizem drip.  The patient has also been started on heparin.  The patient has a history of a
nemia, noticed to have a slight drop in hemoglobin from 12.6-11.1.  GI consultation was requested bec
ause of the drop in hemoglobin.  The patient also found to have elevated liver enzymes on admission. 
 Denies any abdominal pain.  The patient was seen by our service in April.  She was admitted at that 
time with chest pain.  The patient was evaluated by cardiologist.  The patient noticed to have severe
 mitral regurgitation due to papillary muscle dysfunction.  The patient also has a history of ETOH us
e.  Recommended at that time to follow up.  LFTs were improving.  The patient was advised to follow u
p as an outpatient evaluation for an endoscopy.  The patient was noncompliant, did not follow up.  Th
e patient still states that she has been socially drinking alcohol.



OTHER PAST MEDICAL HISTORY:  Status post cholecystectomy.  The patient had a colonoscopy about 5 year
s ago at [facility] and she was told there were no polyps.  The patient had bunion surgery and carpal tunn
el surgery in the past.



SOCIAL HISTORY:  Positive as above, history of ETOH use socially.  Denies smoking.



FAMILY HISTORY:  Positive for coronary artery disease.  Brother  at the age of 39.



ALLERGIES:  To some food.



REVIEW OF SYSTEMS:  Positive as above.  Other systems reviewed and negative.



PHYSICAL EXAMINATION:

GENERAL:  The patient is lying on the bed, not in acute distress.

VITAL SIGNS:  Temperature is 98.4, blood pressure 123/85, respirations are 16, pulse 86.

HEENT:  Atraumatic, anicteric.

NECK:  Supple.

HEART:  S1, S2.  There is a systolic murmur present.

EXTREMITIES:  No cyanosis.  No clubbing.

NEUROLOGIC:  Alert, oriented.  Moves all the extremities.



LABORATORY DATA:  Hemoglobin 11.1, hematocrit 32.4, WBC 7.2, platelets 279.  Chemistries:  Total bili
bustamante 1.1, AST 58, ALT 57, alkaline phosphatase 142.  



IMPRESSION: This is a 61-year-old patient admitted with shortness of breath, found to have atrial fib
rillation with rapid ventricular response.  The patient is on Cardizem drip and heparin.  The patient
 noticed to have a drop in hemoglobin from 12.6-11.1. 



PROBLEMS:

1.  Anemia, drop in blood count.  Her baseline in April was around 11.7.  The patient denies any obvi
ous bleeding per rectum or melena now.  The differential diagnosis should include peptic ulcer diseas
e, angiodysplasia, include gastrointestinal loss of blood.  Would recommend stool for occult blood an
d also close followup of the hemoglobin and hematocrit.  The patient would benefit from a gastrointes
tinal workup if any valvular surgery is considered prior to that, if the cardiac condition is optimiz
ed.  

2.  His abnormal liver function tests are improving.  Status post cholecystectomy.  The differential 
diagnosis should include hepatic congestion.  The patient also has increased alcohol intake and binge
 drinking, but she has now cut down.  This could also partly contribute.

3.  History of asthma.



Thank you very much for allowing us to participate in the care of the patient.  We will continue to c
losely follow up and suggest further management based on the clinical course.





__________________________________________

Jonathan Frost MD







cc:



DD: 2017 18:03:43  [id number]

TT: 2017 20:45:38

Confirmation # [id number]

Dictation # [id number]

dilan

## 2017-06-17 NOTE — CARD
--------------- APPROVED REPORT --------------





EKG Measurement

Heart [id number]

CO 142P29

[id number]

ZG332P-4

[id number]



<Conclusion>

Sinus tachycardia with premature supraventricular complexes

Left axis deviation

Minimal voltage criteria for LVH, may be normal variant

Nonspecific ST abnormality

Abnormal ECG

## 2017-06-17 NOTE — NM
COMPARISON:

Chest x-ray performed 6/16/17 



TECHNIQUE:

30.0 mCi technetium 99-m  DTPA was inhaled. 



2.0 mCI technetium 99-m MAA administered intravenously.



FINDINGS:



VENTILATION COMPONENT:

Ventilation images demonstrate heterogeneous radiopharmaceutical 

uptake.  Hilar clumping of radiopharmaceutical may represent 

underlying COPD. Tracheal deposition of radiotracer consistent with 

swallowed radiotracer.



PERFUSION COMPONENT:

No significant mismatched perfusion defects are appreciated.



IMPRESSION:

Lowprobability ventilation perfusion scan for pulmonary embolism. 



Additional findings as above. 



Preliminary impression was provided by virtual radiologic.

## 2017-06-18 LAB
BUN SERPL-MCNC: 17 MG/DL (ref 7–21)
CALCIUM SERPL-MCNC: 9.3 MG/DL (ref 8.4–10.5)
CHLORIDE SERPL-SCNC: 100 MMOL/L (ref 98–107)
CO2 SERPL-SCNC: 22 MMOL/L (ref 21–33)
GLUCOSE SERPL-MCNC: 166 MG/DL (ref 70–110)
POTASSIUM SERPL-SCNC: 4.5 MMOL/L (ref 3.6–5)
SODIUM SERPL-SCNC: 130 MMOL/L (ref 132–148)

## 2017-06-18 RX ADMIN — METHYLPREDNISOLONE SODIUM SUCCINATE SCH MG: 40 INJECTION, POWDER, FOR SOLUTION INTRAMUSCULAR; INTRAVENOUS at 23:15

## 2017-06-18 RX ADMIN — METHYLPREDNISOLONE SODIUM SUCCINATE SCH MG: 40 INJECTION, POWDER, FOR SOLUTION INTRAMUSCULAR; INTRAVENOUS at 01:14

## 2017-06-18 RX ADMIN — BUDESONIDE SCH: 0.5 SUSPENSION RESPIRATORY (INHALATION) at 02:20

## 2017-06-18 RX ADMIN — ARFORMOTEROL TARTRATE SCH MCG: 15 SOLUTION RESPIRATORY (INHALATION) at 08:02

## 2017-06-18 RX ADMIN — METHYLPREDNISOLONE SODIUM SUCCINATE SCH MG: 40 INJECTION, POWDER, FOR SOLUTION INTRAMUSCULAR; INTRAVENOUS at 18:19

## 2017-06-18 RX ADMIN — BUDESONIDE SCH MG: 0.5 SUSPENSION RESPIRATORY (INHALATION) at 19:24

## 2017-06-18 RX ADMIN — ENOXAPARIN SODIUM SCH MG: 60 INJECTION SUBCUTANEOUS at 14:47

## 2017-06-18 RX ADMIN — CEFTRIAXONE SCH MLS/HR: 1 INJECTION, SOLUTION INTRAVENOUS at 09:38

## 2017-06-18 RX ADMIN — METHYLPREDNISOLONE SODIUM SUCCINATE SCH MG: 40 INJECTION, POWDER, FOR SOLUTION INTRAMUSCULAR; INTRAVENOUS at 13:12

## 2017-06-18 RX ADMIN — BUDESONIDE SCH MG: 0.5 SUSPENSION RESPIRATORY (INHALATION) at 08:02

## 2017-06-18 RX ADMIN — ARFORMOTEROL TARTRATE SCH MCG: 15 SOLUTION RESPIRATORY (INHALATION) at 19:24

## 2017-06-18 RX ADMIN — DILTIAZEM HYDROCHLORIDE PRN MLS/HR: 100 INJECTION, POWDER, LYOPHILIZED, FOR SOLUTION INTRAVENOUS at 06:30

## 2017-06-18 RX ADMIN — METHYLPREDNISOLONE SODIUM SUCCINATE SCH MG: 40 INJECTION, POWDER, FOR SOLUTION INTRAMUSCULAR; INTRAVENOUS at 06:29

## 2017-06-18 NOTE — PN
DATE: 06/18/2017



SUBJECTIVE:  The patient is comfortable, tolerating the diet.  No abdominal pain.  Her breathing is a
lso better.



PHYSICAL EXAMINATION:

VITAL SIGNS:  Her temperature is 98, blood pressure 130/87, pulse 83, respirations 18.

HEENT:  Atraumatic, anicteric.

NECK:  Supple.

HEART:  S1, S2 heard.

LUNGS:  Bilateral air entry present.

ABDOMEN:  Soft.  There is no tenderness.

EXTREMITIES:  No cyanosis, no clubbing, no edema.



LABORATORY DATA:  There are no recent labs today.  Chemistry is essentially unremarkable except sodiu
m is 130.



IMPRESSION:  This is a 61-year-old patient admitted with shortness of breath, found to have atrial fi
brillation with rapid ventricular response.  The patient was on Cardizem drip, which has been changed
 to p.o. and heparin drip also has been discontinued and changed to Lovenox.  Her problems include:

1.  Anemia, drop in blood count, on anticoagulation.  Will need close followup of the hemoglobin and 
hematocrit.  Would need a gastrointestinal workup if _____ surgery is planned.

2.  Mitral regurgitation, due to papillary dysfunction.

3.  Abnormal liver function tests probably secondary to hepatic congestion.  The patient also has his
tory of ETOH use.



RECOMMENDATIONS:  Followup of the LFTs.  



Thank you very much for allowing us to participate in the care of the patient.





__________________________________________

Jonathan Frost MD







cc:



DD: 06/18/2017 16:48:49  [id number]

TT: 06/18/2017 17:46:00

Confirmation # [id number]

Dictation # [id number]

dilan

## 2017-06-18 NOTE — PN
DATE: 06/18/2017



The patient was seen today.  She was comfortable, in no distress.  She felt better after IV steroids.
  She has no chest pain, breathing is much better and she feels better.



PHYSICAL EXAMINATION:

VITAL SIGNS:  Temperature 98, heart rate 83, blood pressure 130/87, respirations _____.

HEAD AND NECK:  Normal.  No JVD, no thyromegaly.

CHEST:  Clear, good entry.

CARDIAC:  First sound, second sound regular.

ABDOMEN:  Soft, nontender.

EXTREMITIES:  No edema.

NEUROLOGIC:  Normal.



LABORATORY:  Sodium 130, potassium 4.5, chloride 100, bicarbonate 22, BUN 17, creatinine 1, blood sug
ar 166, calcium 9.3.  CBC is 7.2, white count 7.2, hemoglobin 11.1, hematocrit 32.4, platelets 279.



IMPRESSION:  

1.  Ventricular tachycardia, atrial fibrillation.  Continue Cardizem infusion; switch to p.o. Cardize
m per cardiology; she is a patient of Dr. Coley, and will continue Cardizem 60 mg 3 times a day a
nd metoprolol 25 mg p.o. daily.  Follow up clinically and will discuss further with the cardiology.

2.  Acute chronic obstructive pulmonary disease exacerbation, mild.  Continue IV steroids, continue i
nhaled bronchodilators, continue Rocephin.  Follow up clinically.

3.  History of gouty arthritis - pseudogout also.  Continue colchicine and allopurinol.

4.  Hypercholesterolemia.  The patient is getting Lipitor.



PLAN:  Continue current treatment.  Continue Lovenox 60 mg q.12.  Will follow up clinically.





__________________________________________

Philip Dinh MD







cc:



DD: 06/18/2017 17:57:50  223

TT: 06/18/2017 18:22:55

Confirmation # [id number]

Dictation # [id number]

dn

## 2017-06-18 NOTE — PN
DATE: 06/18/2017



SUBJECTIVE:  The patient denies any palpitations, dizziness, or shortness of breath.



OBJECTIVE

VITAL SIGNS:  Blood pressure 130/87, heart rate 83, temperature 98, respiration 18.  The monitor reve
als normal sinus rhythm.

HEAD, EARS, EYES, NOSE, AND THROAT:  Normocephalic.

CHEST:  Clear.

HEART:  S1 and S2 regular.

EXTREMITIES:  No edema.



LABORATORIES:  SMA-7:  Sodium 130, potassium 4.5, chloride 100, CO2 of 22, glucose 166, BUN 17, creat
inine 1.0.



ASSESSMENT

1.  Paroxysmal atrial fibrillation.

2.  Improving hyponatremia.

3.  Severe mitral insufficiency.

4.  Cor pulmonale with mildly depressed right ventricular systolic function.

5.  Mild pulmonary hypertension.



RECOMMENDATIONS:  Discontinue IV Cardizem.  Discontinue IV heparin and switch to therapeutic subcutan
eous Lovenox regimen.  Continue IV Rocephin at 1 gram daily, Lopressor 25 mg twice a day.  Start Card
izem at 60 mg orally t.i.d.





__________________________________________

Victorino Coley MD







cc:



DD: 06/18/2017 13:06:33  718

TT: 06/18/2017 14:14:28

Confirmation # [id number]

Dictation # [id number]

fn

## 2017-06-19 VITALS
DIASTOLIC BLOOD PRESSURE: 83 MMHG | HEART RATE: 74 BPM | TEMPERATURE: 98.7 F | RESPIRATION RATE: 74 BRPM | SYSTOLIC BLOOD PRESSURE: 135 MMHG

## 2017-06-19 VITALS — OXYGEN SATURATION: 98 %

## 2017-06-19 LAB
ALBUMIN/GLOB SERPL: 1.2 {RATIO} (ref 1.1–1.8)
ALP SERPL-CCNC: 110 U/L (ref 38–133)
ALT SERPL-CCNC: 47 U/L (ref 7–56)
AST SERPL-CCNC: 34 U/L (ref 15–39)
BILIRUB SERPL-MCNC: 0.5 MG/DL (ref 0.2–1.3)
BUN SERPL-MCNC: 17 MG/DL (ref 7–21)
CALCIUM SERPL-MCNC: 9.4 MG/DL (ref 8.4–10.5)
CHLORIDE SERPL-SCNC: 100 MMOL/L (ref 95–110)
CO2 SERPL-SCNC: 18 MMOL/L (ref 21–33)
ERYTHROCYTE [DISTWIDTH] IN BLOOD BY AUTOMATED COUNT: 13.5 % (ref 11.5–14.5)
FOLATE SERPL-MCNC: > 20 NG/ML
GLOBULIN SER-MCNC: 3.2 GM/DL
GLUCOSE SERPL-MCNC: 215 MG/DL (ref 70–110)
HCT VFR BLD CALC: 31.8 % (ref 36–48)
IRON SERPL-MCNC: 60 UG/DL (ref 45–180)
MAGNESIUM SERPL-MCNC: 1.6 MG/DL (ref 1.7–2.2)
MCH RBC QN AUTO: 30.7 PG (ref 25–35)
MCHC RBC AUTO-ENTMCNC: 34 G/DL (ref 31–37)
MCV RBC AUTO: 90.3 FL (ref 80–105)
PLATELET # BLD: 245 10^3/UL (ref 120–450)
PMV BLD AUTO: 9.6 FL (ref 7–11)
POTASSIUM SERPL-SCNC: 3.8 MMOL/L (ref 3.6–5)
PROT SERPL-MCNC: 6.9 G/DL (ref 5.8–8.3)
SODIUM SERPL-SCNC: 128 MMOL/L (ref 132–148)
WBC # BLD AUTO: 8.7 10^3/UL (ref 4.5–11)

## 2017-06-19 RX ADMIN — CEFTRIAXONE SCH MLS/HR: 1 INJECTION, SOLUTION INTRAVENOUS at 09:12

## 2017-06-19 RX ADMIN — METHYLPREDNISOLONE SODIUM SUCCINATE SCH MG: 40 INJECTION, POWDER, FOR SOLUTION INTRAMUSCULAR; INTRAVENOUS at 12:45

## 2017-06-19 RX ADMIN — ENOXAPARIN SODIUM SCH MG: 60 INJECTION SUBCUTANEOUS at 12:46

## 2017-06-19 RX ADMIN — ARFORMOTEROL TARTRATE SCH MCG: 15 SOLUTION RESPIRATORY (INHALATION) at 07:58

## 2017-06-19 RX ADMIN — ENOXAPARIN SODIUM SCH MG: 60 INJECTION SUBCUTANEOUS at 01:27

## 2017-06-19 RX ADMIN — METHYLPREDNISOLONE SODIUM SUCCINATE SCH MG: 40 INJECTION, POWDER, FOR SOLUTION INTRAMUSCULAR; INTRAVENOUS at 05:40

## 2017-06-19 RX ADMIN — BUDESONIDE SCH MG: 0.5 SUSPENSION RESPIRATORY (INHALATION) at 07:58

## 2017-06-19 NOTE — HP
REASON FOR ADMISSION:  Chest pain and palpitations.



HISTORY OF PRESENT ILLNESS:  The patient is a 61-year-old female with a history of mitral valvular di
sease, insufficiency.  She came into the hospital with palpitations, short of breath and feeling dizz
y.  The patient came to the ER for evaluation and found to have rapid atrial fibrillation.  No syncop
e or chest discomfort; maybe tightness, but no chest pain.  She denied any associated symptoms like f
ever.  Maybe a mild cough she has; otherwise, stable.



MEDICATIONS:  She takes Cardizem-CD, she takes metoprolol 25, she takes allopurinol 100 p.o. daily, s
he takes also aspirin 81 mg daily, she takes colchicine 0.6 mg once a day and she takes an inhaler, V
entolin.



ALLERGIES:  SHE IS ALLERGIC TO ALTOIDS, _____ FOOD AND NO DRUG ALLERGY.  



FAMILY HISTORY:  Noncontributory.



SOCIAL HISTORY:  She lives by herself, nonsmoker, no children.  She does drink alcohol occasionally. 
 She might drink a little more recently.



REVIEW OF SYSTEMS:  Knee arthritis.  She gets a cough, sometimes a wheeze, back pain, otherwise negat
fabio.



PAST MEDICAL HISTORY:  Mitral valve disease, she has had palpitations in the past, osteoarthritis bot
h knees, chronic back pain, overweight, hypertension.



PHYSICAL EXAMINATION:

VITAL SIGNS:  When she came in on the _____, the patient had the following: Temperature 98.5, heart r
ate 151 when she came in, blood pressure 118/84, respirations 18.

HEAD AND NECK:  Normal.  No JVD, no thyromegaly.

CHEST:  Clear, a few rhonchi bilaterally.

CARDIAC:  First sound and second sounds are normal.

ABDOMEN:  Soft, obese, nontender.

EXTREMITIES:  No edema.

NEUROLOGIC:  Normal.



LABORATORY DATA:  On the 06/17:  White count 8.5, hemoglobin 12.6, hematocrit 35.8, platelets 315.  C
hemistry noted for sodium 129, potassium 4.5, chloride 96, bicarbonate 21, BUN 24, creatinine 1.2, bl
ood sugar 107.  AST and ALT:  Mild elevation, AST is 82, ALT 68, alkaline phosphatase 180.  The patie
nt's troponin 0.02.  BNP is normal at 115.  The patient's chest x-ray is no active pulmonary disease,
 just COPD, no pneumonia, no infiltrates.  EKG:  Atrial fibrillation _____.  An electrocardiogram was
 done and was read by Dr. Coley, which shows left axis deviation, sinus tachycardia with prematur
e supraventricular complexes.



IMPRESSION AND PLAN:

1.  Acute supraventricular tachycardia, probably atrial fibrillations.  Will continue Cardizem infusi
ons, IV heparin and a cardiology consult with Dr. Coley.

2.  Chronic obstructive pulmonary disease, mild exacerbation.  Will put the patient on IV steroids an
d nebulizer treatment.  Continue metoprolol, continue inhalers, continue steroids, continue heparin. 
 The patient may benefit later on from Eliquis for chronic atrial fibrillations.  We will address the
 echo findings and mitral valve disease; maybe she needs a _____ mitral valve repair.  Will discuss t
hat with cardiology.





__________________________________________

Philip Dinh MD







cc:



DD: 06/18/2017 17:54:32  223

TT: 06/18/2017 18:40:01

Job # [id number]

jose m

## 2017-06-19 NOTE — PN
DATE: 06/19/2017



ADDENDUM



This is an addendum to the GI progress report dictated by BACILIO Sparks.  The patient has paroxysm
al atrial fibrillation.



This is an addendum to the GI progress report dictated by BACILIO Sparks.  The cardiology note was 
reviewed.  The patient has paroxysmal atrial fibrillation.  Admitted with anemia.  The patient had an
emia.  The patient would benefit from EGD and a colonoscopy.  The patient will be scheduled for this 
as an outpatient.  The patient agreed to schedule for a VALENTÍN.  Planned to have the VALENTÍN.  The patient i
s agreeable.  At the moment, the patient wants to make a decision about the valvular surgery.  The agata charles was strictly advised to lifestyle changes and reducing the alcohol intake.  Will continue to cl
osely follow up her care and suggest further management based on the clinical course.  The patient wi
ll be scheduled for an outpatient EGD and a colonoscopy.  If the patient is sent home with Lovenox, t
he plan is to hold the Lovenox dose the night before.





__________________________________________

Jonathan Frost MD







cc:



DD: 06/19/2017 20:15:01  416

TT: 06/19/2017 20:29:40

Confirmation # [id number]

Dictation # [id number]

dn

## 2017-06-19 NOTE — PN
DATE: 06/19/2017



REASON FOR CONSULTATION:  Shortness of breath, AFib with rapid ventricular rate, chronic atrial fibri
llation.



Consult was seen by Dr. Coley, who is covering us.



BRIEF CLINICAL HISTORY:  This is a 61-year-old female with a past medical history significant for ast
hma, history of chronic atrial fibrillation on anticoagulation.  Came in with palpitation, AFib with 
rapid rate of 120 and shortness of breath.  V/Q scan was done, was low probability.  History of parox
ysmal atrial fibrillation, history of mitral insufficiency, severe decreased LV function.  Denies any
 chest pain, shortness of breath, any palpitation.  Now lying flat.



PHYSICAL EXAMINATION:  As follows: 

VITAL SIGNS:  Temperature afebrile, heart rate 74, blood pressure 135/83.

HEENT:  PERRLA, intact.

NECK:  Supple.  No carotid bruits.  No thyromegaly.

CHEST:  Clear to auscultation.

HEART:  Loud systolic murmur noted in mitral area.  S1, S2 irregular.  

ABDOMEN:  Soft.

EXTREMITIES:  Clubbing and cyanosis negative.



BLOOD WORKUP:  As follows:  WBC 8.7, hemoglobin 10.8, hematocrit 31.8, platelet count 245.  Chemistry
 shows sodium 128, potassium ____, chloride ____, carbon dioxide 18, anion gap of 14, BUN 17, creatin
ine 0.9, magnesium 1.6.



IMPRESSION:  The patient's last echocardiography done on 04/21/2017, that showed ejection fraction 55
% to 60%, right ventricle mild to moderately dilated, systolic right ventricular function is mildly r
educed, trace aortic regurgitation, severe mitral regurgitation, mild ____ status post ____ consisten
t with anterior leaflet pathology, moderate tricuspid regurgitation, right ventricular systolic press
ure 39.  History of cardiac catheterization 08/01/2013 that shows essentially normal coronary, preser
chalino left ventricular function, ejection fraction 65%, normal right-sided pressure, 3-4+ mitral regurg
itation, possibly severe.  The patient was scheduled for VALENTÍN, but patient did not show up in the past
.  Status post cardiac catheterization, normal coronaries dated in 2015.  Last stress test 07/22/2015
 shows ischemia, but essentially unchanged from 2013.  After that, the patient had a cardiac catheter
ization that was found to be normal coronaries.  The patient's most recent echo was 04/21/2017.  The 
patient was mentioned to have VALENTÍN done to assess the severity of the mitral valve for possible mitral
 valve repair.  The patient is still thinking.  Discussed with Dr. Dinh this morning.  If the patie
nt agree, we will schedule a VALENTÍN as an outpatient for preparation of the valve surgery.  We will foll
ow with you.



Thank you, Dr. Dinh, for providing us the opportunity in taking care of the patient.  We will follo
w with you.





__________________________________________

Ajay Gaxiola MD







cc:



DD: 06/19/2017 15:20:30  305

TT: 06/19/2017 16:23:06

Confirmation # [id number]

Dictation # [id number]

sn

## 2017-06-19 NOTE — PN
DATE: 06/19/2017



ADDENDUM



Addendum to initial progress note.  



REASON FOR ADDENDUM:  Old chart reviewed.  The patient had a cardiac catheterization done on 08/01/20
13 that showed normal coronaries, preserved LV function, ejection fraction 55%, normal right-sided pr
essure, ejection fraction 65%, normal right-sided pressure, 3-4 MR, possibly severe MR, possibly jyoti
re MR with a severely enlarged left atrium, dated 08/01/2013.  The patient subsequently scheduled for
 VALENTÍN to assess MR 08/15/2013.  The patient did not show up.  Later on, patient had the echo done, mos
t recently on 04/21/2017 that shows severe mitral regurgitation, ____ 39, ____ function 55% to 60%.  
Discussed this morning about the patient, that needs VALENTÍN and possibly since 4 years, needs cardiac ca
theterization for progression of the valve.  The patient has a history of paroxysmal atrial fibrillat
ion.  The patient admitted in ____, now is normal sinus.  Discussed with the patient.  The patient is
 thinking, discussed also with Fabrizio.



Thank you, Dr. Dinh, for providing the opportunity in taking care of the patient.





__________________________________________

Ajay Gaxiola MD







cc:Philip Dinh MD



DD: 06/19/2017 15:23:28  305

TT: 06/19/2017 16:32:27

Confirmation # [id number]

Dictation # [id number]

sn

## 2017-06-19 NOTE — PN
DATE: 06/19/2017



ADDENDUM



TYPE OF DICTATION:  Addendum to the progress note dictated.



REASON FOR ADDENDUM:  Question was asked by _____ whether the patient needs long-term anticoagulation
 and can we send the patient home on Lovenox.  All EKG reviewed.  The patient very briefly was in Bisi
b, possibly secondary to, precipitated by upper respiratory tract infection as patient was at home, a
zithromycin, albuterol, and prednisone, so that triggers AFib.  All the previous EKG was normal sinus
, so we will discontinue Lovenox.  Change Cardizem to  mg daily and on Lopressor 25 mg and aspi
rin 325 mg daily.  If the patient agreed, we will schedule a VALENTÍN and cardiac catheterization _____ in
 preparation for the valve.  If the patient does not want to go for the valve, continue current treat
ment.  We will follow with you.  Will discuss with Dr. Dinh.



Thank you, Dr. Dinh, for providing us the opportunity in taking care of the patient.





__________________________________________

Ajay Gaxiola MD







cc:



DD: 06/19/2017 15:28:44  305

TT: 06/19/2017 16:29:17

Confirmation # [id number]

Dictation # [id number]

en

## 2017-06-19 NOTE — PN
DATE: 06/19/2017



Seen and examined at the bedside earlier today.  The patient states she is feeling better.  No shortn
ess of breath.  No chest pain.  She wants to go home.  Tolerating diet.



VITAL SIGNS:  Temperature is 98.3, blood pressure 141/78, her pulse is 97, respirations 21, 98% on ro
om air.



LABORATORIES:  WBC is 8.7, H and H is 10.8 and 31.8, platelets of 245.  Sodium is 128, K is 3.8, BUN 
17, creatinine 0.9.  Her LFTs are normal.  Mag is 1.6.



PHYSICAL EXAMINATION:

HEENT:  Sclera is anicteric.

NECK:  Supple.

CARDIAC:  S1, S2.

LUNG SOUNDS:  With decreased breath sounds at the bases, but good air entry, no rales or wheeze.

ABDOMEN:  With bowel sounds, soft, nontender.  No rebound, guarding, or organomegaly.

EXTREMITIES:  No edema.

NEUROLOGIC:  Awake, alert, and oriented.



ASSESSMENT:  This is a 61-year-old female with history of mitral regurgitation, came with shortness o
f breath and found to be in atrial fibrillation with rapid ventricular response.  The patient was on 
Cardizem and also on heparin drip, which have both been discontinued.  The patient is on oral Cardize
m and on Lovenox.  The patient has anemia, abnormal liver functions which may be secondary to hepatic
 congestion.  She does have a history of ethyl alcohol use.  Her liver enzymes are now normalized.  A
void any hepatotoxic drugs as well.  Had discussion with patient.  The patient wants to go home.  We 
discussed with her that she would benefit from endoscopy and colonoscopy in view of decreased blood c
ount and she is going to be on anticoagulants.  The patient would prefer to have this done as an outp
atient and would like to go home.  The patient is currently on Lovenox q. 12, on Solu-Medrol and, lik
e I said, Cardizem.  The patient has low magnesium, which is going to be replaced.



The patient was seen and case discussed with Dr. Frost.





__________________________________________

Xi QUEEN







cc:



DD: 06/19/2017 13:44:37  451

TT: 06/19/2017 14:12:25

Confirmation # [id number]

Dictation # [id number]

en

## 2017-06-20 NOTE — DS
SUBJECTIVE:  A 61-year-old female was discharged 6/19/2017.  Patient came in with acute chest discomf
ort associated with palpitations, found to have atrial fibrillation, transient, which converted to si
nus rhythm on IV Cardizem.  The patient was given IV heparin.  Seen initially by Dr. Coley and wa
s seen lately by Dr. Gaxiola.  The patient is stable.  She feels comfortable, no distress.



PHYSICAL EXAMINATION:

VITAL SIGNS:  Temperature 98.7, heart rate 74, blood pressure 135/83, respiratory rate 18.

HEAD AND NECK:  Normal.  No JVD, no thyromegaly.

CHEST:  Clear, good air entry.

CARDIAC:  First and second sounds are normal.

ABDOMEN:  Soft, nontender.

EXTREMITIES:  No edema.

NEUROLOGIC:  Normal.



LABORATORY DATA:  White count 8.7, hemoglobin 10.8, hematocrit 31.8, platelets 245.  Chemistry shows 
sodium 128, potassium 3.8, chloride 100, bicarbonate 18, BUN 17, creatinine 0.9, blood sugar was 215.
  The patient magnesium also was 1.6.



She was seen by cardiology, Dr. Gaxiola.  She also had a VQ scan initially when she came in for chest di
scomfort and palpitations, which shows low variability for ventilation perfusion scan.  The patient d
oes have a history also of mitral valve disease, was severe mitral regurgitation.  



DISCHARGE DIAGNOSIS:  

1.  Paroxysmal atrial fibrillation.  The patient now in sinus rhythm.  Discussed with Dr. Gaxiola.  No n
eed for Coumadin.  She will continue with aspirin 325.

2.  Hypertension.

3.  History of mitral valve disease with severe mitral regurgitation.  Patient will need a valve ____
_ or valve replacement.  He will be seen by a cardiac surgeon and he will look at the patient and her
 echo.

4.  The patient is obese.  The patient advised to follow diet, low carbohydrate diet and walking ever
y day.

5.  Chronic osteoarthritis.  Continue allopurinol and colchicine.

6.  Anemia.  The patient will benefit from esophagogastroduodenoscopy (EGD), colonoscopy.



PLAN:  We will discharge the patient home.  Continue aspirin, continue Cardizem 120 once a day, metop
rolol 25 b.i.d.  The patient also was given Medrol pack and magnesium 400 b.i.d., Advair, Z-YVONNE, Lipi
tor 20, and allopurinol 100 and also she has an inhaler bronchodilator Ventolin 2 puffs q.i.d. We mehdi
l discharge the patient and follow up in office.  The patient encouraged to go for cardiac catheteriz
ation for further evaluation of coronary anatomy as well valve pathology Discharge home.  Follow up w
[place] a week.  Also follow up with the GI consult, Dr. Frost for outpatient EGD, colon.





__________________________________________

Philip Dinh MD







cc:



DD: 06/20/2017 09:27:20  223

TT: 06/20/2017 11:12:25

Job # [id number]

jn

## 2017-06-23 ENCOUNTER — HOSPITAL ENCOUNTER (OUTPATIENT)
Dept: HOSPITAL 42 - ENDO | Age: 62
Discharge: HOME | End: 2017-06-23
Attending: INTERNAL MEDICINE
Payer: MEDICARE

## 2017-06-23 VITALS — BODY MASS INDEX: 25.3 KG/M2

## 2017-06-23 VITALS — OXYGEN SATURATION: 96 % | RESPIRATION RATE: 20 BRPM | TEMPERATURE: 98.1 F

## 2017-06-23 VITALS — SYSTOLIC BLOOD PRESSURE: 126 MMHG | DIASTOLIC BLOOD PRESSURE: 70 MMHG | HEART RATE: 72 BPM

## 2017-06-23 DIAGNOSIS — K29.50: ICD-10-CM

## 2017-06-23 DIAGNOSIS — D50.9: ICD-10-CM

## 2017-06-23 DIAGNOSIS — K64.8: ICD-10-CM

## 2017-06-23 DIAGNOSIS — K25.9: Primary | ICD-10-CM

## 2017-06-23 DIAGNOSIS — K57.10: ICD-10-CM

## 2017-06-23 DIAGNOSIS — K63.89: ICD-10-CM

## 2017-06-23 PROCEDURE — 43239 EGD BIOPSY SINGLE/MULTIPLE: CPT

## 2017-06-23 PROCEDURE — 45378 DIAGNOSTIC COLONOSCOPY: CPT

## 2017-06-23 PROCEDURE — 88305 TISSUE EXAM BY PATHOLOGIST: CPT

## 2017-06-23 PROCEDURE — 88342 IMHCHEM/IMCYTCHM 1ST ANTB: CPT

## 2017-08-19 ENCOUNTER — HOSPITAL ENCOUNTER (OUTPATIENT)
Dept: HOSPITAL 42 - ED | Age: 62
Setting detail: OBSERVATION
LOS: 1 days | Discharge: HOME | End: 2017-08-20
Attending: INTERNAL MEDICINE | Admitting: INTERNAL MEDICINE
Payer: MEDICARE

## 2017-08-19 VITALS — OXYGEN SATURATION: 99 %

## 2017-08-19 VITALS — BODY MASS INDEX: 25.9 KG/M2

## 2017-08-19 DIAGNOSIS — I48.0: ICD-10-CM

## 2017-08-19 DIAGNOSIS — Z87.891: ICD-10-CM

## 2017-08-19 DIAGNOSIS — J45.909: ICD-10-CM

## 2017-08-19 DIAGNOSIS — I08.3: ICD-10-CM

## 2017-08-19 DIAGNOSIS — R00.0: ICD-10-CM

## 2017-08-19 DIAGNOSIS — I10: Primary | ICD-10-CM

## 2017-08-19 DIAGNOSIS — M17.11: ICD-10-CM

## 2017-08-19 DIAGNOSIS — Z90.49: ICD-10-CM

## 2017-08-19 DIAGNOSIS — Z91.018: ICD-10-CM

## 2017-08-19 DIAGNOSIS — I51.7: ICD-10-CM

## 2017-08-19 LAB
ALBUMIN/GLOB SERPL: 1.2 {RATIO} (ref 1.1–1.8)
ALP SERPL-CCNC: 153 U/L (ref 38–133)
ALT SERPL-CCNC: 34 U/L (ref 7–56)
APPEARANCE UR: CLEAR
APTT BLD: 24.5 SECONDS (ref 23.7–30.8)
AST SERPL-CCNC: 41 U/L (ref 15–39)
BASOPHILS # BLD AUTO: 0.02 K/MM3 (ref 0–2)
BASOPHILS NFR BLD: 0.3 % (ref 0–3)
BILIRUB SERPL-MCNC: 0.6 MG/DL (ref 0.2–1.3)
BILIRUB UR-MCNC: (no result) MG/DL
BUN SERPL-MCNC: 7 MG/DL (ref 7–21)
CALCIUM SERPL-MCNC: 9.7 MG/DL (ref 8.4–10.5)
CHLORIDE SERPL-SCNC: 106 MMOL/L (ref 98–107)
CO2 SERPL-SCNC: 21 MMOL/L (ref 21–33)
COLOR UR: YELLOW
EOSINOPHIL # BLD: 0.1 10*3/UL (ref 0–0.7)
EOSINOPHIL NFR BLD: 1.7 % (ref 1.5–5)
ERYTHROCYTE [DISTWIDTH] IN BLOOD BY AUTOMATED COUNT: 14.1 % (ref 11.5–14.5)
GLOBULIN SER-MCNC: 3.4 GM/DL
GLUCOSE SERPL-MCNC: 149 MG/DL (ref 70–110)
GLUCOSE UR STRIP-MCNC: NEGATIVE MG/DL
GRANULOCYTES # BLD: 4.69 10*3/UL (ref 1.4–6.5)
GRANULOCYTES NFR BLD: 66.8 % (ref 50–68)
HCT VFR BLD CALC: 35.8 % (ref 36–48)
INR PPP: 0.99 (ref 0.93–1.08)
KETONES UR STRIP-MCNC: NEGATIVE MG/DL
LEUKOCYTE ESTERASE UR-ACNC: NEGATIVE LEU/UL
LYMPHOCYTES # BLD: 1.4 10*3/UL (ref 1.2–3.4)
LYMPHOCYTES NFR BLD AUTO: 19.8 % (ref 22–35)
MCH RBC QN AUTO: 30.7 PG (ref 25–35)
MCHC RBC AUTO-ENTMCNC: 33.8 G/DL (ref 31–37)
MCV RBC AUTO: 90.9 FL (ref 80–105)
MONOCYTES # BLD AUTO: 0.8 10*3/UL (ref 0.1–0.6)
MONOCYTES NFR BLD: 11.4 % (ref 1–6)
PH UR STRIP: 6 [PH] (ref 4.7–8)
PLATELET # BLD: 286 10^3/UL (ref 120–450)
PMV BLD AUTO: 10.1 FL (ref 7–11)
POTASSIUM SERPL-SCNC: 3.8 MMOL/L (ref 3.6–5)
PROT SERPL-MCNC: 7.4 G/DL (ref 5.8–8.3)
PROT UR STRIP-MCNC: (no result) MG/DL
RBC # UR STRIP: NEGATIVE /UL
SODIUM SERPL-SCNC: 139 MMOL/L (ref 132–148)
SP GR UR STRIP: 1.02 (ref 1–1.03)
TROPONIN I SERPL-MCNC: < 0.01 NG/ML
UROBILINOGEN UR STRIP-ACNC: 1 E.U./DL
WBC # BLD AUTO: 7 10^3/UL (ref 4.5–11)

## 2017-08-19 PROCEDURE — 84484 ASSAY OF TROPONIN QUANT: CPT

## 2017-08-19 PROCEDURE — 85610 PROTHROMBIN TIME: CPT

## 2017-08-19 PROCEDURE — 83615 LACTATE (LD) (LDH) ENZYME: CPT

## 2017-08-19 PROCEDURE — 80053 COMPREHEN METABOLIC PANEL: CPT

## 2017-08-19 PROCEDURE — 81001 URINALYSIS AUTO W/SCOPE: CPT

## 2017-08-19 PROCEDURE — 87040 BLOOD CULTURE FOR BACTERIA: CPT

## 2017-08-19 PROCEDURE — 85025 COMPLETE CBC W/AUTO DIFF WBC: CPT

## 2017-08-19 PROCEDURE — 93005 ELECTROCARDIOGRAM TRACING: CPT

## 2017-08-19 PROCEDURE — 99285 EMERGENCY DEPT VISIT HI MDM: CPT

## 2017-08-19 PROCEDURE — 83880 ASSAY OF NATRIURETIC PEPTIDE: CPT

## 2017-08-19 PROCEDURE — 36415 COLL VENOUS BLD VENIPUNCTURE: CPT

## 2017-08-19 PROCEDURE — 87086 URINE CULTURE/COLONY COUNT: CPT

## 2017-08-19 PROCEDURE — 73560 X-RAY EXAM OF KNEE 1 OR 2: CPT

## 2017-08-19 PROCEDURE — 85730 THROMBOPLASTIN TIME PARTIAL: CPT

## 2017-08-19 PROCEDURE — 80061 LIPID PANEL: CPT

## 2017-08-19 PROCEDURE — 71010: CPT

## 2017-08-19 PROCEDURE — 82550 ASSAY OF CK (CPK): CPT

## 2017-08-19 PROCEDURE — 71250 CT THORAX DX C-: CPT

## 2017-08-19 PROCEDURE — 85027 COMPLETE CBC AUTOMATED: CPT

## 2017-08-19 RX ADMIN — ASPIRIN SCH MG: 325 TABLET, DELAYED RELEASE ORAL at 19:14

## 2017-08-19 RX ADMIN — Medication SCH MG: at 19:14

## 2017-08-19 RX ADMIN — DILTIAZEM HYDROCHLORIDE SCH MG: 120 CAPSULE, COATED, EXTENDED RELEASE ORAL at 19:13

## 2017-08-19 NOTE — ED PDOC
Arrival/HPI





- General


Chief Complaint: Shortness Of Breath


Time Seen by Provider: 08/19/17 11:47


Historian: Patient





- History of Present Illness


Narrative History of Present Illness (Text): 


08/19/17 11:51


A 61 year old female, whose past medical history includes, atrial fibrillation 

and asthma, presents to the emergency department with shortness of breath upon 

exertion, which has worsened within the last few days. The patient notes she 

was hospitalized in June for the same exact complaint and symptoms. She admits 

to feel better after being hospitalized, but the pain returned shortly after 

and is now experiencing the same symptoms. She reports that over the past week 

she feels short of breath with exertion and intermittent chest discomfort. Pain 

not pleuritic. Also reports right knee pain for several weeks, denies trauma.











Time/Duration: < week (4 days)


Symptom Course: Unchanged


Activities at Onset: Light


Context: Home





Past Medical History





- Provider Review


Nursing Documentation Reviewed: Yes





- Infectious Disease


Hx of Infectious Diseases: None





- Tetanus Immunization


Tetanus Immunization: Unknown





- Reproductive


Menopause: Yes





- Past Medical History


Past Medical History: No Previous





- Cardiac


Hx Hypertension: Yes


Hx Pacemaker: No


Other/Comment: leaky valve





- Pulmonary


Hx Respiratory Disorders: Yes


Hx Asthma: Yes


Hx Chronic Obstructive Pulmonary Disease (COPD): Yes





- Neurological


Hx Paralysis: No





- HEENT


Hx HEENT Disorder: No





- Renal


Hx Renal Disorder: No





- Endocrine/Metabolic


Hx Endocrine Disorders: No





- Hematological/Oncological


Hx Blood Transfusions: Yes


Hx Blood Transfusion Reaction: No





- Integumentary


Hx Dermatological Disorder: No





- Musculoskeletal/Rheumatological


Hx Musculoskeletal Disorders: Yes





- Gastrointestinal


Hx Gastrointestinal Disorders: No





- Genitourinary/Gynecological


Hx Genitourinary Disorders: No





- Psychiatric


Hx Emotional Abuse: No


Hx Physical Abuse: No


Hx Substance Use: No





- Surgical History


Hx Orthopedic Surgery: Yes





- Anesthesia


Hx Anesthesia Reactions: No


Hx Malignant Hyperthermia: No





- Suicidal Assessment


Feels Threatened In Home Enviroment: No





Family/Social History





- Physician Review


Nursing Documentation Reviewed: Yes


Family/Social History: No Known Family HX


Smoking Status: Former Smoker


Hx Alcohol Use: Yes (SOCIALLY)


Amount per day: 6


Hx Substance Use: No


Hx Substance Use Treatment: No





Allergies/Home Meds


Allergies/Adverse Reactions: 


Allergies





ALTOIDS Allergy (Intermediate, Uncoded 08/19/17 11:50)


 SWELLING FACE/LIPS


UNKNOWN FOOD Allergy (Intermediate, Uncoded 08/19/17 11:50)


 SWELLING FACE/LIPS


 SEEN IN ED 8/31/16 








Home Medications: 


 Home Meds











 Medication  Instructions  Recorded  Confirmed


 


Albuterol/Ipratropium [Combivent 1 puff IH QID 08/19/17 08/19/17





Respimat]   


 


Enalapril Maleate [Vasotec] 5 mg PO DAILY 08/19/17 08/19/17


 


Ibuprofen [Motrin Tab] 600 mg PO TID 08/19/17 08/19/17


 


Pantoprazole [Protonix EC Tab] 40 mg PO DAILY 08/19/17 08/19/17














Review of Systems





- Review of Systems


Constitutional: absent: Fevers


Eyes: absent: Vision Changes


ENT: absent: Hearing Changes


Respiratory: SOB.  absent: Cough


Cardiovascular: Chest Pain, MCLEAN.  absent: Calf Pain


Gastrointestinal: absent: Abdominal Pain, Nausea, Vomiting


Genitourinary Female: absent: Hematuria


Musculoskeletal: Other (right side knee pain-chronic).  absent: Back Pain


Skin: absent: Rash


Neurological: absent: Headache


Endocrine: absent: Diaphoresis


Hemo/Lymphatic: absent: Easy Bleeding


Psychiatric: absent: Depression





Physical Exam





- Physical Exam


Narrative Physical Exam (Text): 


08/19/17 12:00


Head:  Atraumatic.  Normocephalic. 


Eyes:  PERRL.  EOMI.  Conjunctivae are not pale.


ENT:  Mucous membranes are moist and intact.  Oropharynx is clear and 

symmetric. 


Neck:  Supple.  Full ROM.  No JVD.  No lymphadenopathy.


Cardiovascular:  Occasional premature beat. Systolic murmur.  Distal pulses are 

2+ and symmetric.


Pulmonary/Chest:  No evidence of respiratory distress.  Clear to auscultation 

bilaterally.  No wheezing, rales or rhonchi.


Abdominal:  Soft and non-distended.  There is no tenderness.  No rebound, 

guarding, or rigidity.  No organomegaly.  Good bowel sounds. 


Back:  No CVA tenderness.


Extremities:  No edema.   No cyanosis.  No clubbing.  Full range of motion in 

all extremities. There is mild pain on palpation of anterior right knee but no 

warmth or erythema, no hip pain, no streaking. Distal pulses intact. No calf 

tenderness.


Skin:  Skin is warm and dry.  No petechiae.  No purpura. 


Neurological:  Alert, awake, and oriented to person, place, time, and 

situation.  Normal speech. Motor and sensory exam intact.


Psychiatric:  Good eye contact.  Normal interaction, affect, and behavior.


 











Vital Signs Reviewed: Yes


Vital Signs











  Temp Pulse Resp BP Pulse Ox


 


 08/19/17 21:41   97 H  18  150/72  99


 


 08/19/17 19:13   78   132/62 


 


 08/19/17 19:00   98 H  18  143/75  99


 


 08/19/17 17:00   102 H  18  145/81  99


 


 08/19/17 15:00   99 H  18  148/89  99


 


 08/19/17 13:39   112 H  18  151/99 H  99


 


 08/19/17 11:47    18  


 


 08/19/17 11:46  98.2 F  103 H  18  136/107 H  97











Temperature: Afebrile


Blood Pressure: Hypertensive


Pulse: Tachycardic


Respiratory Rate: Normal


Appearance: Positive for: Well-Appearing, Non-Toxic, Uncomfortable


Pain Distress: Mild


Mental Status: Positive for: Alert and Oriented X 3





Medical Decision Making


ED Course and Treatment: 


08/19/17 12:00


Impression: A 61 year old female with shortness of breath and mild chest pain.








Differential Diagnosis included but are not limited to:  CHF vs. COPD vs. CAD





Plan:





-- EKG


-- Chest X-ray


-- Labs


-- Urinalysis 


-- Reassess and disposition





Prior Visits:


Notes and results from previous visits were reviewed.


The patient was last seen in the emergency department on 06/16/17 for shortness 

of breath. The patient was hospitalized. 





Progress Notes:


Patient with complaints of chest pain and shortness of breath. Initial EKG 

unremarkable. Tachycardia improved on monitor. Not hypoxic. No calf pain or 

swelling.


There has been unremarkable VQ scan earlier this year. No pleuritic pain or 

hypoxia.





Chest X-Ray


Approver : Carlton Chaudhry MD


Report Date : 08/19/2017 12:54:30


HISTORY:sob  


COMPARISON:6/16/2017 


FINDINGS:


LUNGS:Ill-defined opacity at right base.  Possible early pneumonia.  Followup 

advised.


PLEURA:No significant pleural effusion identified, no pneumothorax apparent.


CARDIOVASCULAR:Normal.


OSSEOUS STRUCTURES:No significant abnormalities.


VISUALIZED UPPER ABDOMEN:Normal.


OTHER FINDINGS:None.


IMPRESSION: Possible early right basilar pneumonia.  Follow-up advised.





Given abnormal cxr findings, ct chest ordered as patient with complaints of 

chest pain and sob.





CT Chest without contrast


Approver : Carlton Chaudhry MD


Report Date : 08/19/2017 15:07:10


HISTORY:abnormal cxr ? pneumonia


COMPARISON:Chest x-ray 8/19/2017


FINDINGS:


LUNGS:No pulmonary infiltrate.  Bilateral minimal lower lobe linear scar/ 

atelectasis. Incidental 5 mm subpleural nodule in left lower lobe. No followup 

recommended as per Fleischner society criteria. 


MEDIASTINUM:Unremarkable thoracic aorta. No aneurysm. Normal sized heart. Main 

pulmonary artery unremarkable. No vascular congestion. No lymphadenopathy.


PLEURA:No pleural fluid. No pneumothorax.


BONES:Moderate thoracic levoscoliosis. No acute fracture. 


UPPER ABDOMEN:Grossly unremarkable.


OTHER FINDINGS:None.


IMPRESSION:No pulmonary infiltrate.  Opacity seen at right base on chest 

radiograph was artifactual. No acute abnormality.





Given persistent pain and no recent cardiac evaluation, patient admitted for 

observation for chest pain.


Case accepted by hospitalist.




















- Lab Interpretations


Microbiology Results: 


Microbiology Results





08/19/17 13:47   Blood   Blood Culture - Preliminary


                            NO GROWTH AFTER 24 HOURS


08/19/17 13:47   Blood   Blood Culture - Preliminary


                            NO GROWTH AFTER 24 HOURS








Lab Results: 








 08/19/17 13:47 





 08/19/17 13:47 





 Lab Results





08/19/17 13:47: PT 10.7, INR 0.99, APTT 24.5


08/19/17 13:47: WBC 7.0, RBC 3.94, Hgb 12.1, Hct 35.8 L, MCV 90.9, MCH 30.7, 

MCHC 33.8, RDW 14.1, Plt Count 286, MPV 10.1, Gran % 66.8, Lymph % (Auto) 19.8 L

, Mono % (Auto) 11.4 H, Eos % (Auto) 1.7, Baso % (Auto) 0.3, Gran # 4.69, Lymph 

# 1.4, Mono # 0.8 H, Eos # 0.1, Baso # 0.02


08/19/17 13:47: Sodium 139, Potassium 3.8, Chloride 106, Carbon Dioxide 21, 

Anion Gap 16, BUN 7, Creatinine 0.8, Est GFR (African Amer) > 60, Est GFR (Non-

Af Amer) > 60, Random Glucose 149 H, Calcium 9.7, Total Bilirubin 0.6, AST 41 H

, ALT 34, Alkaline Phosphatase 153 H, Lactate Dehydrogenase 578, Total Creatine 

Kinase 54, Troponin I < 0.01  D, NT-Pro-B Natriuret Pep 357, Total Protein 7.4, 

Albumin 4.0, Globulin 3.4, Albumin/Globulin Ratio 1.2











- RAD Interpretation


Radiology Orders: 








08/19/17 12:01


CHEST PORTABLE [RAD] Stat 





08/19/17 13:59


CHEST W/O CONTRAST [CT] Stat 














- EKG Interpretation


EKG Interpretation (Text): 





08/19/17 23:01


EKG sinus tachycardia rate of 108, left axis deviation, left ventricular 

hypertrophy


Interpreted by ED Physician: Yes


Type: 12 lead EKG





- Medication Orders


Current Medication Orders: 











Discontinued Medications





Albuterol Sulfate (Albuterol 0.083% Inhal Sol (2.5 Mg/3 Ml) Ud)  2.5 mg IH 

QIDRESP PRN


   PRN Reason: Shortness of Breath


Albuterol/Ipratropium (Duoneb 3 Mg/0.5 Mg (3 Ml) Ud)  3 ml IH QIDRESP [facility]


   Last Admin: 08/19/17 22:00  Dose: 3 ml





Aspirin (Ecotrin)  325 mg PO DAILY [facility]


   Last Admin: 08/20/17 09:05  Dose: 325 mg





Atorvastatin Calcium (Lipitor)  20 mg PO DIN [facility]


   Last Admin: 08/19/17 19:14  Dose: 20 mg





Diltiazem HCl (Cardizem Cd)  120 mg PO DAILY [facility]


   Last Admin: 08/20/17 09:05  Dose: 120 mg





Ibuprofen (Motrin Tab)  600 mg PO TID [facility]


Ibuprofen (Motrin Tab)  400 mg PO TID [facility]


Ibuprofen (Motrin Tab)  400 mg PO TID PRN


   PRN Reason: Pain, moderate (4-7)


   Last Admin: 08/20/17 03:21  Dose: 400 mg





Re-Assess: MAR Pain/Vitals


 Document     08/20/17 04:21  CDL  (Rec: 08/20/17 05:21  CDL  [id number])


     Pain Reassessment


      Is This A Pain ReAssessment?               Yes


     Sleep


      Is patient sleeping during reassessment?   Yes





Lisinopril (Zestril)  5 mg PO DAILY [facility]


   Last Admin: 08/20/17 09:06  Dose: 5 mg





Magnesium Oxide (Mag-Ox)  400 mg PO DAILY [facility]


   Last Admin: 08/20/17 09:05  Dose: 400 mg





Metoprolol Tartrate (Lopressor)  25 mg PO BRKDIN [facility]


   Last Admin: 08/20/17 08:25  Dose: 25 mg





Pantoprazole Sodium (Protonix Ec Tab)  40 mg PO ACB [facility]


   Last Admin: 08/20/17 08:26  Dose: 40 mg











- PA / NP / Resident Statement


MD/DO has reviewed & agrees with the documentation as recorded.





- Scribe Statement


The provider has reviewed the documentation as recorded by the Abneribe


Cindy Crouch





Provider Scribe Attestation:


All medical record entries made by the Abneribkarrie were at my direction and 

personally dictated by me. I have reviewed the chart and agree that the record 

accurately reflects my personal performance of the history, physical exam, 

medical decision making, and the department course for this patient. I have 

also personally directed, reviewed, and agree with the discharge instructions 

and disposition.








Disposition/Present on Arrival





- Present on Arrival


Any Indicators Present on Arrival: No


History of DVT/PE: No


History of Uncontrolled Diabetes: No


Urinary Catheter: No


History of Decub. Ulcer: No


History Surgical Site Infection Following: None





- Disposition


Have Diagnosis and Disposition been Completed?: Yes


Diagnosis: 


 Chest pain





Disposition: HOSPITALIZED


Disposition Time: 13:25


Patient Plan: Admission, Observation


Condition: FAIR

## 2017-08-19 NOTE — CP.PCM.HP
<Carlos Guzman - Last Filed: 08/19/17 17:50>





History of Present Illness





- History of Present Illness


History of Present Illness: 





CC: Shortness of breath, chest pain, and R knee pain 





61 F whose pmh includes severe mitral regurge,  atrial fibrillation and asthma, 

presents to the ED with chest pain and shortness of breath mainly upon 

exertion. Pt states that the pain and sob started 10 days ago but has gotten 

recently worse in the last few days. She c/o chest discomfort under her L 

breast that is non radiating and 4/10 in severity. Pt was hospitalized in June 

for similar complaints.  She also complains of right knee pain that she 

attributes to a fall in 2014 and she states that she normally is treated with a 

cortisone shot. The patient denies any f/c, vision changes, hearing changes, 

cough, wheezing, calf pain, abdominal pain, n/v, back pain, headache, sweats, 

or any other complaints at this time. 





PMH: as above 


PSH: cholecystectomy, bunioectomy, and carpal tunnel 


ALL: NKA 


Med: refer to MAR 


SH: denies any smoking, or drugs; social drinker 


FH: denies  





Present on Admission





- Present on Admission


Any Indicators Present on Admission: No





Review of Systems





- Review of Systems


All systems: reviewed and no additional remarkable complaints except (HPI)





Past Patient History





- Infectious Disease


Hx of Infectious Diseases: None





- Tetanus Immunizations


Tetanus Immunization: Unknown





- Past Social History


Smoking Status: Former Smoker





- CARDIAC


Hx Hypertension: Yes


Hx Pacemaker: No


Other/Comment: leaky valve





- PULMONARY


Hx Respiratory Disorders: Yes


Hx Asthma: Yes


Hx Chronic Obstructive Pulmonary Disease (COPD): Yes





- NEUROLOGICAL


Hx Paralysis: No





- HEENT


Hx HEENT Problems: No





- RENAL


Hx Chronic Kidney Disease: No





- ENDOCRINE/METABOLIC


Hx Endocrine Disorders: No





- HEMATOLOGICAL/ONCOLOGICAL


Hx Blood Transfusions: Yes


Hx Blood Transfusion Reaction: No





- INTEGUMENTARY


Hx Dermatological Problems: No





- MUSCULOSKELETAL/RHEUMATOLOGICAL


Hx Musculoskeletal Disorders: Yes





- GASTROINTESTINAL


Hx Gastrointestinal Disorders: No





- GENITOURINARY/GYNECOLOGICAL


Hx Genitourinary Disorders: No





- PSYCHIATRIC


Hx Emotional Abuse: No


Hx Physical Abuse: No


Hx Substance Use: No





- SURGICAL HISTORY


Hx Orthopedic Surgery: Yes





- ANESTHESIA


Hx Anesthesia Reactions: No


Hx Malignant Hyperthermia: No





Meds


Allergies/Adverse Reactions: 


 Allergies











Allergy/AdvReac Type Severity Reaction Status Date / Time


 


ALTOIDS Allergy Intermediate SWELLING Uncoded 08/19/17 11:50





   FACE/LIPS  


 


UNKNOWN FOOD Allergy Intermediate SWELLING Uncoded 08/19/17 11:50





   FACE/LIPS  














Physical Exam





- Constitutional


Appears: No Acute Distress





- Head Exam


Head Exam: ATRAUMATIC, NORMAL INSPECTION, NORMOCEPHALIC





- Eye Exam


Eye Exam: EOMI, PERRL


Pupil Exam: NORMAL ACCOMODATION, PERRL





- ENT Exam


ENT Exam: Mucous Membranes Moist





- Respiratory Exam


Respiratory Exam: Clear to Auscultation Bilateral.  absent: Rales, Rhonchi, 

Wheezes





- Cardiovascular Exam


Cardiovascular Exam: Tachycardia, +S1, +S2.  absent: Irregular Rhythm





- GI/Abdominal Exam


GI & Abdominal Exam: Normal Bowel Sounds, Soft.  absent: Tenderness





- Extremities Exam


Extremities exam: Negative for: calf tenderness, pedal edema, tenderness


Additional comments: 





R knee warm to touch and mildly swollen 





- Back Exam


Back exam: absent: CVA tenderness (L)





- Neurological Exam


Neurological exam: Alert, CN II-XII Intact, Oriented x3





- Psychiatric Exam


Psychiatric exam: Normal Affect, Normal Mood





- Skin


Skin Exam: Dry, Intact, Warm





Results





- Vital Signs


Recent Vital Signs: 





 Last Vital Signs











Temp  98.2 F   08/19/17 11:46


 


Pulse  102 H  08/19/17 17:00


 


Resp  18   08/19/17 17:00


 


BP  145/81   08/19/17 17:00


 


Pulse Ox  99   08/19/17 17:00














- Labs


Result Diagrams: 


 08/19/17 13:47





 08/19/17 13:47


Labs: 





 Laboratory Results - last 24 hr











  08/19/17





  16:26


 


Urine Color  Yellow


 


Urine Appearance  Clear


 


Urine pH  6.0


 


Ur Specific Gravity  1.020


 


Urine Protein  Trace H


 


Urine Glucose (UA)  Negative


 


Urine Ketones  Negative


 


Urine Blood  Negative


 


Urine Nitrate  Negative


 


Urine Bilirubin  Small H


 


Urine Urobilinogen  1.0 H


 


Ur Leukocyte Esterase  Negative


 


Urine RBC  1 - 3


 


Urine WBC  1 - 3


 


Ur Epithelial Cells  3 - 4














Assessment & Plan





- Assessment and Plan (Free Text)


Assessment: 





61 F whose pmh includes severe mitral regurg,  atrial fibrillation and asthma, 

presents to the ED with chest pain and shortness of breath possibly secondary 

to her paroxysmal afib, and R knee pain. 





1. Chest pain: 


- Trops x 1 negative, BNP negative 


- f/u serial trops 


- EKG showed sinus tach 103bpm, LVH and LAD 


- CXR showed possible infiltrates 


- CT chest showed no signs of pneumonia 


- Cardiology consulted for recs 


- Cont home Aspirin, Cardizem , and Metoprolol  


- F/u daily labs, Lipid profile 





2. R knee pain 


- Motrin 400mg PRN 


- F/u R knee xray 





3. HTN


- Cont home Lisinopril





4. Hx of asthma 


- Cont Albuterol as needed 





5. GI/DVT ppx 


- protonix and SCDs 





Case and plan was seen, reviewed, and discussed in detail with Dr Selby.  





<Trevon Selby - Last Filed: 08/19/17 18:12>





Results





- Vital Signs


Recent Vital Signs: 





 Last Vital Signs











Temp  98.2 F   08/19/17 11:46


 


Pulse  102 H  08/19/17 17:00


 


Resp  18   08/19/17 17:00


 


BP  145/81   08/19/17 17:00


 


Pulse Ox  99   08/19/17 17:00














- Labs


Result Diagrams: 


 08/19/17 13:47





 08/19/17 13:47


Labs: 





 Laboratory Results - last 24 hr











  08/19/17





  16:26


 


Urine Color  Yellow


 


Urine Appearance  Clear


 


Urine pH  6.0


 


Ur Specific Gravity  1.020


 


Urine Protein  Trace H


 


Urine Glucose (UA)  Negative


 


Urine Ketones  Negative


 


Urine Blood  Negative


 


Urine Nitrate  Negative


 


Urine Bilirubin  Small H


 


Urine Urobilinogen  1.0 H


 


Ur Leukocyte Esterase  Negative


 


Urine RBC  1 - 3


 


Urine WBC  1 - 3


 


Ur Epithelial Cells  3 - 4














Attending/Attestation





- Attestation


I have personally seen and examined this patient.: Yes


I have fully participated in the care of the patient.: Yes


I have reviewed all pertinent clinical information: Yes


Notes (Text): 





08/19/17 18:08


61 year old female with past medical history of paroxysmal afib, mitral 

regurgitation, asthma, arthritis and hypertension who presents with complaint 

of intermittent chest pain and shortness of breath.  EKG showed sinus 

tachycardia at 103 bpm.  Initial cardiac enzyme was negative.  CXR showed 

possible infiltrate however this was followed up with CT chest which was 

negative.





Will admit to telemetry unit and obtain serial cardiac enzymes to rule out ACS.

  Cardiology evaluation is requested.  Continue with home medications including 

aspirin, cardizem and metoprolol.





She also complained of right knee pain.  She is currently on motrin and xray of 

the knee is ordered.





She is on albuterol for history of asthma.





Trevon Selby MD


Hospitalist.

## 2017-08-19 NOTE — CARD
--------------- APPROVED REPORT --------------





EKG Measurement

Heart [id number]

[id number]

[id number]

[id number]

[id number]



<Conclusion>

Sinus tachycardia

Possible Left atrial enlargement

Left axis deviation

Left ventricular hypertrophy

Abnormal ECG

## 2017-08-19 NOTE — CT
PROCEDURE:  CT Chest without contrast



HISTORY:

abnormal cxr ? pneumonia



COMPARISON:

Chest x-ray 8/19/2017



TECHNIQUE:

Contiguous axial images were obtained through the chest without 

intravenous contrast enhancement. Sagittal and coronal 

reconstructions were performed.







Radiation dose (DLP): 341.13 mGy-cm. 



This CT exam was performed using one or more of the following dose 

reduction techniques: Automated exposure control, adjustment of the 

mA and/or kV according to patient size, and/or use of iterative 

reconstruction technique.



FINDINGS:



LUNGS:

No pulmonary infiltrate.  Bilateral minimal lower lobe linear scar/ 

atelectasis. Incidental 5 mm subpleural nodule in left lower lobe. No 

followup recommended as per Fleischner society criteria. 



MEDIASTINUM:

Unremarkable thoracic aorta. No aneurysm. Normal sized heart. Main 

pulmonary artery unremarkable. No vascular congestion. No 

lymphadenopathy.



PLEURA:

No pleural fluid. No pneumothorax.



BONES:

Moderate thoracic levoscoliosis. No acute fracture. 



UPPER ABDOMEN:

Grossly unremarkable.



OTHER FINDINGS:

None.



IMPRESSION:

No pulmonary infiltrate.  Opacity seen at right base on chest 

radiograph was artifactual. No acute abnormality.

## 2017-08-19 NOTE — RAD
HISTORY:

sob  



COMPARISON:

6/16/2017 



FINDINGS:



LUNGS:

Ill-defined opacity at right base.  Possible early pneumonia.  

Followup advised.



PLEURA:

No significant pleural effusion identified, no pneumothorax apparent.



CARDIOVASCULAR:

Normal.



OSSEOUS STRUCTURES:

No significant abnormalities.



VISUALIZED UPPER ABDOMEN:

Normal.



OTHER FINDINGS:

None.



IMPRESSION:

Possible early right basilar pneumonia.  Follow-up advised.

## 2017-08-20 VITALS — RESPIRATION RATE: 20 BRPM

## 2017-08-20 VITALS — TEMPERATURE: 98 F

## 2017-08-20 VITALS — SYSTOLIC BLOOD PRESSURE: 142 MMHG | DIASTOLIC BLOOD PRESSURE: 89 MMHG | HEART RATE: 111 BPM

## 2017-08-20 LAB
ALBUMIN/GLOB SERPL: 1.2 {RATIO} (ref 1.1–1.8)
ALP SERPL-CCNC: 129 U/L (ref 38–133)
ALT SERPL-CCNC: 33 U/L (ref 7–56)
AST SERPL-CCNC: 40 U/L (ref 15–39)
BILIRUB SERPL-MCNC: 0.4 MG/DL (ref 0.2–1.3)
BUN SERPL-MCNC: 8 MG/DL (ref 7–21)
CALCIUM SERPL-MCNC: 9.3 MG/DL (ref 8.4–10.5)
CHLORIDE SERPL-SCNC: 106 MMOL/L (ref 98–107)
CHOLEST SERPL-MCNC: 191 MG/DL (ref 130–200)
CO2 SERPL-SCNC: 24 MMOL/L (ref 21–33)
ERYTHROCYTE [DISTWIDTH] IN BLOOD BY AUTOMATED COUNT: 14.3 % (ref 11.5–14.5)
GLOBULIN SER-MCNC: 3 GM/DL
GLUCOSE SERPL-MCNC: 107 MG/DL (ref 70–110)
HCT VFR BLD CALC: 34.6 % (ref 36–48)
MCH RBC QN AUTO: 29.6 PG (ref 25–35)
MCHC RBC AUTO-ENTMCNC: 32.7 G/DL (ref 31–37)
MCV RBC AUTO: 90.6 FL (ref 80–105)
PLATELET # BLD: 254 10^3/UL (ref 120–450)
PMV BLD AUTO: 9.7 FL (ref 7–11)
POTASSIUM SERPL-SCNC: 3.9 MMOL/L (ref 3.6–5)
PROT SERPL-MCNC: 6.5 G/DL (ref 5.8–8.3)
SODIUM SERPL-SCNC: 138 MMOL/L (ref 132–148)
TROPONIN I SERPL-MCNC: < 0.01 NG/ML
TROPONIN I SERPL-MCNC: < 0.01 NG/ML
WBC # BLD AUTO: 4.2 10^3/UL (ref 4.5–11)

## 2017-08-20 RX ADMIN — DILTIAZEM HYDROCHLORIDE SCH MG: 120 CAPSULE, COATED, EXTENDED RELEASE ORAL at 09:05

## 2017-08-20 RX ADMIN — Medication SCH MG: at 09:05

## 2017-08-20 RX ADMIN — ASPIRIN SCH MG: 325 TABLET, DELAYED RELEASE ORAL at 09:05

## 2017-08-20 NOTE — RAD
PROCEDURE:  Right Knee Radiographs. 



HISTORY:

right knee pain



COMPARISON:

None.



FINDINGS:



BONES:

No acute fracture 



JOINTS:

Medial and patellofemoral osteoarthritis noted. No articular erosion. 



JOINT EFFUSION:

Trace 



OTHER FINDINGS:

None.



IMPRESSION:

Medial and patellofemoral osteoarthritis.

## 2017-08-20 NOTE — CP.PCM.DIS
<MU CLIFFORD - Last Filed: 08/20/17 15:14>





Provider





- Provider


Date of Admission: 


08/19/17 15:17





Attending physician: 


Trveon Selby MD





Primary care physician: 


Philip Dinh MD





Consults: 





Cardio: Minor





Time Spent in preparation of Discharge (in minutes): 45





Hospital Course





- Lab Results


Lab Results: 


 Micro Results





08/19/17 16:26   Urine   Urine Culture - Preliminary


                            No growth.





 Most Recent Lab Values











WBC  4.2 10^3/ul (4.5-11.0)  L D 08/20/17  06:00    


 


RBC  3.82 10^6/uL (3.5-6.1)   08/20/17  06:00    


 


Hgb  11.3 g/dL (12.0-16.0)  L  08/20/17  06:00    


 


Hct  34.6 % (36.0-48.0)  L  08/20/17  06:00    


 


MCV  90.6 fl (80.0-105.0)   08/20/17  06:00    


 


MCH  29.6 pg (25.0-35.0)   08/20/17  06:00    


 


MCHC  32.7 g/dl (31.0-37.0)   08/20/17  06:00    


 


RDW  14.3 % (11.5-14.5)   08/20/17  06:00    


 


Plt Count  254 10^3/uL (120.0-450.0)   08/20/17  06:00    


 


MPV  9.7 fl (7.0-11.0)   08/20/17  06:00    


 


Gran %  66.8 % (50.0-68.0)   08/19/17  13:47    


 


Lymph % (Auto)  19.8 % (22.0-35.0)  L  08/19/17  13:47    


 


Mono % (Auto)  11.4 % (1.0-6.0)  H  08/19/17  13:47    


 


Eos % (Auto)  1.7 % (1.5-5.0)   08/19/17  13:47    


 


Baso % (Auto)  0.3 % (0.0-3.0)   08/19/17  13:47    


 


Gran #  4.69  (1.4-6.5)   08/19/17  13:47    


 


Lymph #  1.4  (1.2-3.4)   08/19/17  13:47    


 


Mono #  0.8  (0.1-0.6)  H  08/19/17  13:47    


 


Eos #  0.1  (0.0-0.7)   08/19/17  13:47    


 


Baso #  0.02 K/mm3 (0.0-2.0)   08/19/17  13:47    


 


PT  10.7 Seconds (9.9-11.8)   08/19/17  13:47    


 


INR  0.99  (0.93-1.08)   08/19/17  13:47    


 


APTT  24.5 Seconds (23.7-30.8)   08/19/17  13:47    


 


Sodium  138 mmol/L (132-148)   08/20/17  06:00    


 


Potassium  3.9 mmol/L (3.6-5.0)   08/20/17  06:00    


 


Chloride  106 mmol/L ()   08/20/17  06:00    


 


Carbon Dioxide  24 mmol/L (21-33)   08/20/17  06:00    


 


Anion Gap  12  (10-20)   08/20/17  06:00    


 


BUN  8 mg/dL (7-21)   08/20/17  06:00    


 


Creatinine  0.9 mg/dL (0.5-1.4)   08/20/17  06:00    


 


Est GFR ( Amer)  > 60   08/20/17  06:00    


 


Est GFR (Non-Af Amer)  > 60   08/20/17  06:00    


 


Random Glucose  107 mg/dL ()   08/20/17  06:00    


 


Calcium  9.3 mg/dL (8.4-10.5)   08/20/17  06:00    


 


Total Bilirubin  0.4 mg/dL (0.2-1.3)   08/20/17  06:00    


 


AST  40 U/L (15-39)  H  08/20/17  06:00    


 


ALT  33 U/L (7-56)   08/20/17  06:00    


 


Alkaline Phosphatase  129 U/L ()   08/20/17  06:00    


 


Lactate Dehydrogenase  486 U/L (333-699)   08/20/17  06:00    


 


Total Creatine Kinase  40 U/L ()   08/20/17  06:00    


 


Troponin I  < 0.01 ng/mL  08/20/17  06:00    


 


NT-Pro-B Natriuret Pep  357 pg/mL (0-450)   08/19/17  13:47    


 


Total Protein  6.5 g/dL (5.8-8.3)   08/20/17  06:00    


 


Albumin  3.5 g/dL (3.0-4.8)   08/20/17  06:00    


 


Globulin  3.0 gm/dL  08/20/17  06:00    


 


Albumin/Globulin Ratio  1.2  (1.1-1.8)   08/20/17  06:00    


 


Triglycerides  55 mg/dL ()   08/20/17  06:00    


 


Cholesterol  191 mg/dL (130-200)   08/20/17  06:00    


 


LDL Cholesterol Direct  107 mg/dL (0-129)   08/20/17  06:00    


 


HDL Cholesterol  72 mg/dL (29-60)  H  08/20/17  06:00    


 


Urine Color  Yellow  (YELLOW)   08/19/17  16:26    


 


Urine Appearance  Clear  (CLEAR)   08/19/17  16:26    


 


Urine pH  6.0  (4.7-8.0)   08/19/17  16:26    


 


Ur Specific Gravity  1.020  (1.005-1.035)   08/19/17  16:26    


 


Urine Protein  Trace mg/dL (<30 mg/dL)  H  08/19/17  16:26    


 


Urine Glucose (UA)  Negative mg/dL (NEGATIVE)   08/19/17  16:26    


 


Urine Ketones  Negative mg/dL (NEGATIVE)   08/19/17  16:26    


 


Urine Blood  Negative  (NEGATIVE)   08/19/17  16:26    


 


Urine Nitrate  Negative  (NEGATIVE)   08/19/17  16:26    


 


Urine Bilirubin  Small  (NEGATIVE)  H  08/19/17  16:26    


 


Urine Urobilinogen  1.0 E.U./dL (<1 E.U./dL)  H  08/19/17  16:26    


 


Ur Leukocyte Esterase  Negative Malgorzata/uL (NEGATIVE)   08/19/17  16:26    


 


Urine RBC  1 - 3 /hpf (0-2)   08/19/17  16:26    


 


Urine WBC  1 - 3 /hpf (0-6)   08/19/17  16:26    


 


Ur Epithelial Cells  3 - 4 /hpf (0-5)   08/19/17  16:26    














- Hospital Course


Hospital Course: 





61 F whose pmh includes severe mitral regurge, atrial fibrillation and asthma, 

presents to the ED with chest pain and shortness of breath mainly upon 

exertion. Pt states that the pain and sob started 10 days ago but has gotten 

recently worse in the last few days. She c/o chest discomfort under her L 

breast that is non radiating and 4/10 in severity. Pt was hospitalized in June 

for similar complaints.  She also complains of right knee pain that she 

attributes to a fall in 2014 and she states that she normally is treated with a 

cortisone shot. The patient denies any f/c, vision changes, hearing changes, 

cough, wheezing, calf pain, abdominal pain, n/v, back pain, headache, sweats, 

or any other complaints at this time. In the ED, labs and imaging were ordered. 

EKG showed sinus tachycardia. CXR shows possible early right basilar pneumonia. 

Chest CT shows no pulmonary infiltrate. CBC and CMP were unremarkable. Troponin 

negative x1. Pt was admitted to evaluate chest pain to rule out acute coronary 

syndrome. Troponins were trended and were negative x2. Cardio was consulted. Pt 

clear for discharge from their standpoint, but should continue diltiazem and 

metoprolol. Today, pt was seen and examined at bedside. Pt denies any acute 

overnight events. Pt states that CP has subsided. Pt states that right knee was 

less swollen and painful. Official right knee x-ray interpretation showed 

osteoarthritis. Pt denied CP, SOB, n/v/d, chills, fevers, abdominal pain, HA, 

dizziness, dysuria, polyuria, or joint pain other than right knee. Pt will 

discharged home and continued on current medications. Pt was advised to follow 

up with PMD and cardiologist to further manage her condition.








Discharge Exam





- Head Exam


Head Exam: ATRAUMATIC, NORMAL INSPECTION, NORMOCEPHALIC





- Eye Exam


Eye Exam: EOMI, PERRL





- ENT Exam


ENT Exam: Mucous Membranes Moist





- Neck Exam


Neck exam: Full Rom





- Respiratory Exam


Respiratory Exam: Clear to PA & Lateral.  absent: Rales, Rhonchi, Wheezes





- Cardiovascular Exam


Cardiovascular Exam: RRR, +S1, +S2.  absent: Diastolic murmur, Gallop, Rubs, 

Systolic Murmur





- GI/Abdominal Exam


GI & Abdominal Exam: Soft.  absent: Distended, Guarding, Rebound, Rigid, 

Tenderness





- Extremities Exam


Additional comments: 





Decreased right knee swelling and tenderness





- Neurological Exam


Neurological exam: Alert, CN II-XII Intact, Oriented x3





- Psychiatric Exam


Psychiatric exam: Normal Affect, Normal Mood





- Skin


Skin Exam: Dry, Intact, Normal Color, Warm





Discharge Plan





- Follow Up Plan


Condition: FAIR


Disposition: HOME/ ROUTINE


Instructions:  Chest Pain (DC), Chest Pain (GEN)


Additional Instructions: 


1. Follow up with PMD within 1 week


2. Get referral for orthopedic from PMD if knee pain worsens


3. Follow up with cardiologist within 1 week


4. Take medications as prescribed


5. Return to ED, if symptoms worsen, including but not limited to chest with 

radiation to left arm or shortness of breath


Referrals: 


Ajay Gaxiola MD [Staff Provider] - 


Philip Dinh MD [Primary Care Provider] - 





<Trevon Selby - Last Filed: 08/20/17 15:45>





Provider





- Provider


Date of Admission: 


08/19/17 15:17





Attending physician: 


Trevon Selby MD





Primary care physician: 


Philip Dinh MD








Hospital Course





- Lab Results


Lab Results: 


 Micro Results





08/19/17 16:26   Urine   Urine Culture - Preliminary


                            No growth.





 Most Recent Lab Values











WBC  4.2 10^3/ul (4.5-11.0)  L D 08/20/17  06:00    


 


RBC  3.82 10^6/uL (3.5-6.1)   08/20/17  06:00    


 


Hgb  11.3 g/dL (12.0-16.0)  L  08/20/17  06:00    


 


Hct  34.6 % (36.0-48.0)  L  08/20/17  06:00    


 


MCV  90.6 fl (80.0-105.0)   08/20/17  06:00    


 


MCH  29.6 pg (25.0-35.0)   08/20/17  06:00    


 


MCHC  32.7 g/dl (31.0-37.0)   08/20/17  06:00    


 


RDW  14.3 % (11.5-14.5)   08/20/17  06:00    


 


Plt Count  254 10^3/uL (120.0-450.0)   08/20/17  06:00    


 


MPV  9.7 fl (7.0-11.0)   08/20/17  06:00    


 


Gran %  66.8 % (50.0-68.0)   08/19/17  13:47    


 


Lymph % (Auto)  19.8 % (22.0-35.0)  L  08/19/17  13:47    


 


Mono % (Auto)  11.4 % (1.0-6.0)  H  08/19/17  13:47    


 


Eos % (Auto)  1.7 % (1.5-5.0)   08/19/17  13:47    


 


Baso % (Auto)  0.3 % (0.0-3.0)   08/19/17  13:47    


 


Gran #  4.69  (1.4-6.5)   08/19/17  13:47    


 


Lymph #  1.4  (1.2-3.4)   08/19/17  13:47    


 


Mono #  0.8  (0.1-0.6)  H  08/19/17  13:47    


 


Eos #  0.1  (0.0-0.7)   08/19/17  13:47    


 


Baso #  0.02 K/mm3 (0.0-2.0)   08/19/17  13:47    


 


PT  10.7 Seconds (9.9-11.8)   08/19/17  13:47    


 


INR  0.99  (0.93-1.08)   08/19/17  13:47    


 


APTT  24.5 Seconds (23.7-30.8)   08/19/17  13:47    


 


Sodium  138 mmol/L (132-148)   08/20/17  06:00    


 


Potassium  3.9 mmol/L (3.6-5.0)   08/20/17  06:00    


 


Chloride  106 mmol/L ()   08/20/17  06:00    


 


Carbon Dioxide  24 mmol/L (21-33)   08/20/17  06:00    


 


Anion Gap  12  (10-20)   08/20/17  06:00    


 


BUN  8 mg/dL (7-21)   08/20/17  06:00    


 


Creatinine  0.9 mg/dL (0.5-1.4)   08/20/17  06:00    


 


Est GFR ( Amer)  > 60   08/20/17  06:00    


 


Est GFR (Non-Af Amer)  > 60   08/20/17  06:00    


 


Random Glucose  107 mg/dL ()   08/20/17  06:00    


 


Calcium  9.3 mg/dL (8.4-10.5)   08/20/17  06:00    


 


Total Bilirubin  0.4 mg/dL (0.2-1.3)   08/20/17  06:00    


 


AST  40 U/L (15-39)  H  08/20/17  06:00    


 


ALT  33 U/L (7-56)   08/20/17  06:00    


 


Alkaline Phosphatase  129 U/L ()   08/20/17  06:00    


 


Lactate Dehydrogenase  486 U/L (333-699)   08/20/17  06:00    


 


Total Creatine Kinase  40 U/L ()   08/20/17  06:00    


 


Troponin I  < 0.01 ng/mL  08/20/17  06:00    


 


NT-Pro-B Natriuret Pep  357 pg/mL (0-450)   08/19/17  13:47    


 


Total Protein  6.5 g/dL (5.8-8.3)   08/20/17  06:00    


 


Albumin  3.5 g/dL (3.0-4.8)   08/20/17  06:00    


 


Globulin  3.0 gm/dL  08/20/17  06:00    


 


Albumin/Globulin Ratio  1.2  (1.1-1.8)   08/20/17  06:00    


 


Triglycerides  55 mg/dL ()   08/20/17  06:00    


 


Cholesterol  191 mg/dL (130-200)   08/20/17  06:00    


 


LDL Cholesterol Direct  107 mg/dL (0-129)   08/20/17  06:00    


 


HDL Cholesterol  72 mg/dL (29-60)  H  08/20/17  06:00    


 


Urine Color  Yellow  (YELLOW)   08/19/17  16:26    


 


Urine Appearance  Clear  (CLEAR)   08/19/17  16:26    


 


Urine pH  6.0  (4.7-8.0)   08/19/17  16:26    


 


Ur Specific Gravity  1.020  (1.005-1.035)   08/19/17  16:26    


 


Urine Protein  Trace mg/dL (<30 mg/dL)  H  08/19/17  16:26    


 


Urine Glucose (UA)  Negative mg/dL (NEGATIVE)   08/19/17  16:26    


 


Urine Ketones  Negative mg/dL (NEGATIVE)   08/19/17  16:26    


 


Urine Blood  Negative  (NEGATIVE)   08/19/17  16:26    


 


Urine Nitrate  Negative  (NEGATIVE)   08/19/17  16:26    


 


Urine Bilirubin  Small  (NEGATIVE)  H  08/19/17  16:26    


 


Urine Urobilinogen  1.0 E.U./dL (<1 E.U./dL)  H  08/19/17  16:26    


 


Ur Leukocyte Esterase  Negative Malgorzata/uL (NEGATIVE)   08/19/17  16:26    


 


Urine RBC  1 - 3 /hpf (0-2)   08/19/17  16:26    


 


Urine WBC  1 - 3 /hpf (0-6)   08/19/17  16:26    


 


Ur Epithelial Cells  3 - 4 /hpf (0-5)   08/19/17  16:26    














Attending/Attestation





- Attestation


I have personally seen and examined this patient.: Yes


I have fully participated in the care of the patient.: Yes


I have reviewed all pertinent clinical information, including history, physical 

exam and plan: Yes


Notes (Text): 





08/20/17 15:40


61 year old female with past medical history of paroxysmal afib, mitral 

regurgitation, asthma, arthritis and hypertension who presented with complaint 

of intermittent chest pain and shortness of breath.  CXR showed possible 

infiltrate however this was followed up with CT chest which was negative.  

Serial cardiac enzymes were negative and ACS was ruled out.  Her symptoms 

improved.  She was seen by cardiology and cleared for discharge to continue 

with her home medications, aspirin, cardizem and metoprolol.


She also complained of right knee pain.  Xray of the knee showed arthritis.  

Symptoms improved with motrin.  





Patient is discharged home to follow up with her pmd Dr. Dinh and cardiology.


If worsening knee pain consider orthopedic or rheum eval as per pmd.





Trevon Selby MD


Hospitalist.

## 2017-08-21 NOTE — CON
DATE:  08/19/2017



CONSULTING PHYSICIAN:  Dr. Ajay Gaxiola.



REASON FOR CONSULTATION:  Followup chest pain, shortness of breath, right

knee pain, cardiac evaluation, history of cardiac catheterization, has

normal coronaries, and severe mitral regurgitation.



BRIEF CLINICAL HISTORY:  This is a 61-year-old female with past medical

history significant for severe mitral regurgitation, paroxysmal atrial

fibrillation, asthma, COPD, status post cardiac catheterization,has normal

coronaries, admitted with complaints of shortness of breath, mild chest

discomfort and right knee pain.  Denies any episode of chest pain.  Denies

any palpitation, but complaining of dyspnea on exertion and chest pain. 

The patient was offered for evaluation of open heart surgery, but the

patient scared and does not want to come in.  Now, the patient is

determined to go for open heart surgery.



PAST MEDICAL HISTORY:  Significant for paroxysmal atrial fibrillation,

mitral regurgitation, normal coronaries.



PAST SURGICAL HISTORY:  Significant for cholecystectomy, carpal tunnel

syndrome and bunionectomy, removal of bunion from the foot.



ALLERGIES:  NO KNOWN DRUG ALLERGIES.



SOCIAL HISTORY:  Denies smoking.  Denies any history of alcohol abuse.



CURRENT MEDICATIONS:  The patient is taking at home:  Ibuprofen, enalapril

5 mg daily, atorvastatin 20 mg daily, propranolol 20 mg daily, albuterol

sulfate 90 mcg daily, magnesium 400 mg daily, Cardizem 120 mg daily,

metoprolol tartarate 25 mg twice a day, and aspirin 81 mg daily.



REVIEW OF SYSTEMS:  As per HPI.



Previous cardiac workup as follows:



The patient's last echocardiography on 04/21/2017, ejection fraction

55-60%, right ventricle mild to moderately dilated, RV systolic pressure

mildly reduced, trace aortic regurgitation, severe mitral regurgitation,

eccentric jet _____ with anterior mitral valve pathology, moderate

tricuspid regurgitation, RV systolic pressure of 39.  History of cardiac

catheterization on 08/01/2013 that showed eccentric normal coronaries,

preserved LV function ejection fraction 65%,normal right sided pressure 3

to 4 mitral regurgitation, possibly severe.  She is scheduled for VALENTÍN, but

the patient did not show up in the past.  Last stress test 07/22/2015 shows

anemia, but essentially unchanged from 2013.  After that the patient had a

cardiac catheterization that revealed normal coronaries.  The patient was

scheduled multiple times for VALENTÍN, but the patient did not show up for VALENTÍN

to assess the severity of the mitral valve.  Last time, the patient

admitted in 2016 for a very brief period of atrial fibrillation after being

treated with Proventil, converted to normal sinus.  Will be discharged on

Cardizem  mg, Lopressor 25 mg b.i.d. and aspirin and scheduled for

VALENTÍN for preoperation evaluation, but the patient did not show up.  Now, the

patient think that she will go for surgery.



PHYSICAL EXAMINATION

VITAL SIGNS:  Temperature afebrile, heart rate 111, blood pressure 142/88.

HEENT:  PERRLA.  Extraocular muscles intact.

NECK:  Supple.  No carotid bruit or thyromegaly.

CHEST:  Clear to auscultation.

HEART:  S1 and S2 regular.  Loud systolic murmur in the mitral area.

ABDOMEN:  Soft.

EXTREMITIES:  Clubbing and cyanosis negative.



LABORATORY DATA:  Blood workup as follows:  WBC 4.2, hemoglobin 11.3,

hematocrit 34.6, platelet count 254.  Chemistry shows sodium 130, potassium

3.9, chloride 106, carbon dioxide 24, anion gap of 4, BUN 8, creatinine

0.9.  Troponin 0.01 negative.



EKG shows sinus tachycardia with APCs.  Repeat EKG shows heart rate of 100.



IMPRESSION:  Severe mitral regurgitation, normal coronaries, status post

cardiac catheterization, chronic obstructive pulmonary disease, possible

atrial fibrillation.  No evidence of acute myocardial infarction.  No

evidence of acute coronary artery syndrome.  Troponin remains flat and

negative.



RECOMMENDATION:  Okay to discharge.  Need the patient to follow up VALENTÍN and

possible cardiac catheterization as it was more than 2 to 3 years. 

Emphasis made with compliance of the medication as mentioned, the patient

may need mitral valve to be repaired.  The patient partially agreed and

wanted to go home.  We will discuss with Dr. Selby.  The patient will go

home and will schedule outpatient to see and to set up for VALENTÍN and followup

possible valve repair.  Continue Cardizem  mg.  Continue metoprolol. 

Continue atorvastatin.  Continue enalapril.  We will follow with you.



Thank you, Dr. Selby for providing me opportunity in taking care of the

patient, Malu Meeks.  We will follow with you.





__________________________________________

Ajay Gaxiola MD



cc:  Dr. Selby



DD:  08/20/2017 14:27:48

DT:  08/21/2017 2:43:02

Job # [id number]

## 2017-09-11 ENCOUNTER — HOSPITAL ENCOUNTER (OUTPATIENT)
Dept: HOSPITAL 42 - TEE | Age: 62
Discharge: HOME | End: 2017-09-11
Payer: MEDICARE

## 2017-09-11 VITALS — SYSTOLIC BLOOD PRESSURE: 137 MMHG | OXYGEN SATURATION: 100 % | HEART RATE: 85 BPM | DIASTOLIC BLOOD PRESSURE: 72 MMHG

## 2017-09-11 VITALS — TEMPERATURE: 98 F | RESPIRATION RATE: 18 BRPM

## 2017-09-11 VITALS — BODY MASS INDEX: 24.7 KG/M2

## 2017-09-11 DIAGNOSIS — I34.0: Primary | ICD-10-CM

## 2017-09-11 DIAGNOSIS — I10: ICD-10-CM

## 2017-09-11 DIAGNOSIS — G56.00: ICD-10-CM

## 2017-09-11 DIAGNOSIS — I48.0: ICD-10-CM

## 2017-09-11 DIAGNOSIS — E78.5: ICD-10-CM

## 2017-09-11 PROCEDURE — 93312 ECHO TRANSESOPHAGEAL: CPT

## 2017-09-11 NOTE — HP
REASON FOR ADMISSION:  Transesophageal echo, has significant mitral

regurgitation.



BRIEF CLINICAL HISTORY:  This is a 61-year-old female with past medical

history significant for paroxysmal atrial fibrillation, mitral

regurgitation, status post cardiac cath with normal coronaries, admitted

electively for VALENTÍN to assess the significance of mitral regurg for possible

mitral valve surgery, MV repair.  Denies any chest pain, shortness of

breath or any palpitation.



PAST MEDICAL HISTORY:  Significant for paroxysmal atrial fibrillation,

mitral regurgitation, normal coronaries.



PAST SURGICAL HISTORY:  Significant for cholecystectomy, carpal tunnel

syndrome and bunionectomy, removal of bunion from the foot.



ALLERGIES:  NO KNOWN DRUG ALLERGIES.



SOCIAL HISTORY:  Denies smoking.  Denies any history of alcohol abuse.



RECENT CARDIAC WORKUP:  The patient had prior echo done that shows

significant mitral regurgitation, history of cardiac catheterization in the

past.



PREVIOUS CARDIAC WORKUP:  As follows, the patient had echocardiography on

04/21/2017, ejection fraction _____ right ventricle mild-to-moderate

dilated, RV systolic function moderately reduced, trace aortic

regurgitation, severe mitral regurgitation, eccentric jet posteriorly

directed _____ posterior mitral valve pathology, moderate tricuspid

regurgitation, RV systolic pressure of 39.  History of cardiac

catheterization on 08/01/2013 that shows normal coronaries, preserved LV

function ejection fraction 65% and the patient had been earlier scheduled

VALENTÍN 2-3 times that was canceled.  The patient recently admitted to [facility] on 08/20/2017 and is scheduled for cardiac cath for VALENTÍN

today.



CURRENT MEDICATIONS:  The patient is taking simvastatin 20 mg,

cholestyramine, metoprolol tartarate 25 mg, enalapril 5 mg, Cardizem 180

mg, aspirin 81 mg, Protonix 40, hydroxyzine 25 mg daily.



REVIEW OF SYSTEMS:  As per HPI.



PHYSICAL EXAMINATION:

VITAL SIGNS:  As follows, temperature afebrile, heart rate 79, blood

pressure 114/74.

HEENT:  PERRLA.  Extraocular muscles intact.

NECK:  Supple.  No carotid bruit or thyromegaly.

CHEST:  Clear to auscultation.

HEART:  S1 and S2 regular.

ABDOMEN:  Soft.

EXTREMITIES:  Clubbing and cyanosis negative.



LABORATORY DATA:  Blood workup not done.  Last blood workup on 08/20/2017,

WBC 4.2, hemoglobin 11.3, hematocrit 34.6, platelet count 254.  Chemistry

shows sodium 130, potassium 3.9, chloride 106, carbon dioxide 24, anion gap

of 12, BUN 8, creatinine 0.9.  Troponin at that time was negative.



IMPRESSION:  Severe mitral regurgitation, ordered the transesophageal

echocardiography to assess significance of mitral regurgitation and need

for mitral valve repair, history of hypertension, hyperlipidemia,

paroxysmal atrial fibrillation.



RECOMMENDATION:  VALENTÍN.  Further recommendation after VALENTÍN.  We will follow

with you.



Thank you Dr. Selby for providing me the opportunity in taking care of

patient, Malu Meeks.





__________________________________________

Ajay Gaxiola MD



DD:  09/11/2017 13:00:56

DT:  09/11/2017 14:29:15

Job # [id number]

## 2017-09-12 NOTE — DS
BRIEF CLINICAL HISTORY:  This is a 61-year-old female with past medical

history significant for severe mitral regurgitation, paroxysmal atrial

fibrillation, admitted for VALENTÍN.  VALENTÍN was attempted.  A total 5 mg of Versed

was given and 150 mcg of fentanyl was given, and multiple attempts were

made for the VALENTÍN.  The patient pulled the tube three times, (while probe

was going into the esophagus), so the procedure was aborted to prevent

injury to the esophagus.  The patient was bending herself and making

posturing as well as pulling the tube, so we decided to abort the procedure

and refer the patient for evaluation for mitral valve repair and do the VALENTÍN

if needed under anesthesia.



FINAL DIAGNOSES:  Mitral regurgitation, paroxysmal atrial fibrillation,

hypertension, hyperlipidemia.



PRINCIPAL PROCEDURES DONE:  VALENTÍN was attempted, unsuccessful because the

patient pulled the tube 3 times.





__________________________________________

Ajay Gaxiola MD



cc:  _____



DD:  09/11/2017 13:03:11

DT:  09/11/2017 20:53:25

Job # [id number]

## 2017-11-05 ENCOUNTER — HOSPITAL ENCOUNTER (INPATIENT)
Dept: HOSPITAL 42 - TRCU | Age: 62
LOS: 7 days | Discharge: HOME | DRG: 945 | End: 2017-11-12
Attending: INTERNAL MEDICINE | Admitting: INTERNAL MEDICINE
Payer: COMMERCIAL

## 2017-11-05 VITALS — BODY MASS INDEX: 26.2 KG/M2

## 2017-11-05 DIAGNOSIS — E78.00: ICD-10-CM

## 2017-11-05 DIAGNOSIS — E46: ICD-10-CM

## 2017-11-05 DIAGNOSIS — I50.33: ICD-10-CM

## 2017-11-05 DIAGNOSIS — I13.0: ICD-10-CM

## 2017-11-05 DIAGNOSIS — R53.1: Primary | ICD-10-CM

## 2017-11-05 DIAGNOSIS — I48.2: ICD-10-CM

## 2017-11-05 DIAGNOSIS — G56.00: ICD-10-CM

## 2017-11-05 DIAGNOSIS — Z95.2: ICD-10-CM

## 2017-11-05 DIAGNOSIS — E66.9: ICD-10-CM

## 2017-11-05 DIAGNOSIS — I25.10: ICD-10-CM

## 2017-11-05 DIAGNOSIS — J44.0: ICD-10-CM

## 2017-11-05 DIAGNOSIS — Z79.01: ICD-10-CM

## 2017-11-05 DIAGNOSIS — Z90.49: ICD-10-CM

## 2017-11-05 DIAGNOSIS — J18.9: ICD-10-CM

## 2017-11-05 DIAGNOSIS — N18.1: ICD-10-CM

## 2017-11-05 DIAGNOSIS — I35.8: ICD-10-CM

## 2017-11-05 DIAGNOSIS — D62: ICD-10-CM

## 2017-11-05 DIAGNOSIS — E87.6: ICD-10-CM

## 2017-11-05 DIAGNOSIS — F41.9: ICD-10-CM

## 2017-11-05 DIAGNOSIS — R04.2: ICD-10-CM

## 2017-11-05 DIAGNOSIS — I48.0: ICD-10-CM

## 2017-11-05 DIAGNOSIS — K22.10: ICD-10-CM

## 2017-11-05 DIAGNOSIS — Z87.891: ICD-10-CM

## 2017-11-05 DIAGNOSIS — D50.0: ICD-10-CM

## 2017-11-05 DIAGNOSIS — J44.1: ICD-10-CM

## 2017-11-05 DIAGNOSIS — E83.42: ICD-10-CM

## 2017-11-05 DIAGNOSIS — T50.2X5A: ICD-10-CM

## 2017-11-05 DIAGNOSIS — I42.9: ICD-10-CM

## 2017-11-05 DIAGNOSIS — E87.1: ICD-10-CM

## 2017-11-05 PROCEDURE — 3E0F7GC INTRODUCTION OF OTHER THERAPEUTIC SUBSTANCE INTO RESPIRATORY TRACT, VIA NATURAL OR ARTIFICIAL OPENING: ICD-10-PCS | Performed by: INTERNAL MEDICINE

## 2017-11-05 RX ADMIN — POLYVINYL ALCOHOL SCH DROP: 14 SOLUTION/ DROPS OPHTHALMIC at 19:00

## 2017-11-05 RX ADMIN — OXYCODONE HYDROCHLORIDE AND ACETAMINOPHEN PRN TAB: 5; 325 TABLET ORAL at 19:24

## 2017-11-05 RX ADMIN — POLYVINYL ALCOHOL SCH: 14 SOLUTION/ DROPS OPHTHALMIC at 21:13

## 2017-11-05 RX ADMIN — ACETYLCYSTEINE SCH ML: 200 INHALANT RESPIRATORY (INHALATION) at 19:40

## 2017-11-05 RX ADMIN — PURIFIED WATER PRN LOZ: 99.05 LIQUID OPHTHALMIC at 21:32

## 2017-11-05 RX ADMIN — OXYCODONE HYDROCHLORIDE AND ACETAMINOPHEN PRN TAB: 5; 325 TABLET ORAL at 23:42

## 2017-11-05 RX ADMIN — METHYLPREDNISOLONE SODIUM SUCCINATE SCH MG: 40 INJECTION, POWDER, FOR SOLUTION INTRAMUSCULAR; INTRAVENOUS at 21:24

## 2017-11-05 RX ADMIN — GUAIFENESIN SCH MG: 600 TABLET, EXTENDED RELEASE ORAL at 19:00

## 2017-11-05 RX ADMIN — IPRATROPIUM BROMIDE AND ALBUTEROL SULFATE SCH ML: .5; 3 SOLUTION RESPIRATORY (INHALATION) at 19:40

## 2017-11-06 LAB
ALBUMIN/GLOB SERPL: 0.9 {RATIO} (ref 1.1–1.8)
ALP SERPL-CCNC: 113 U/L (ref 38–126)
ALT SERPL-CCNC: 41 U/L (ref 7–56)
AST SERPL-CCNC: 32 U/L (ref 14–36)
BASOPHILS # BLD AUTO: 0 K/MM3 (ref 0–2)
BASOPHILS NFR BLD: 0 % (ref 0–3)
BILIRUB SERPL-MCNC: 0.4 MG/DL (ref 0.2–1.3)
BUN SERPL-MCNC: 28 MG/DL (ref 7–21)
CALCIUM SERPL-MCNC: 9.2 MG/DL (ref 8.4–10.5)
CHLORIDE SERPL-SCNC: 105 MMOL/L (ref 95–110)
CO2 SERPL-SCNC: 29 MMOL/L (ref 21–33)
EOSINOPHIL # BLD: 0 10*3/UL (ref 0–0.7)
EOSINOPHIL NFR BLD: 0 % (ref 1.5–5)
ERYTHROCYTE [DISTWIDTH] IN BLOOD BY AUTOMATED COUNT: 14.5 % (ref 11.5–14.5)
GLOBULIN SER-MCNC: 3.3 GM/DL
GLUCOSE SERPL-MCNC: 190 MG/DL (ref 70–110)
GRANULOCYTES # BLD: 8.49 10*3/UL (ref 1.4–6.5)
GRANULOCYTES NFR BLD: 85.8 % (ref 50–68)
HCT VFR BLD CALC: 25.1 % (ref 36–48)
INR PPP: 4.34 (ref 0.93–1.08)
IRON SERPL-MCNC: 38 UG/DL (ref 45–180)
LYMPHOCYTES # BLD: 0.7 10*3/UL (ref 1.2–3.4)
LYMPHOCYTES NFR BLD AUTO: 7.3 % (ref 22–35)
MCH RBC QN AUTO: 29.6 PG (ref 25–35)
MCHC RBC AUTO-ENTMCNC: 33.1 G/DL (ref 31–37)
MCV RBC AUTO: 89.6 FL (ref 80–105)
MONOCYTES # BLD AUTO: 0.7 10*3/UL (ref 0.1–0.6)
MONOCYTES NFR BLD: 6.9 % (ref 1–6)
PLATELET # BLD: 524 10^3/UL (ref 120–450)
PMV BLD AUTO: 8.4 FL (ref 7–11)
POTASSIUM SERPL-SCNC: 4.1 MMOL/L (ref 3.6–5)
PROT SERPL-MCNC: 6.3 G/DL (ref 5.8–8.3)
SODIUM SERPL-SCNC: 139 MMOL/L (ref 132–148)
WBC # BLD AUTO: 9.9 10^3/UL (ref 4.5–11)

## 2017-11-06 PROCEDURE — F08Z4ZZ HOME MANAGEMENT TREATMENT: ICD-10-PCS | Performed by: INTERNAL MEDICINE

## 2017-11-06 PROCEDURE — F07Z9ZZ GAIT TRAINING/FUNCTIONAL AMBULATION TREATMENT: ICD-10-PCS | Performed by: INTERNAL MEDICINE

## 2017-11-06 RX ADMIN — POLYVINYL ALCOHOL SCH DROP: 14 SOLUTION/ DROPS OPHTHALMIC at 06:55

## 2017-11-06 RX ADMIN — POLYVINYL ALCOHOL SCH: 14 SOLUTION/ DROPS OPHTHALMIC at 02:22

## 2017-11-06 RX ADMIN — IPRATROPIUM BROMIDE AND ALBUTEROL SULFATE SCH ML: .5; 3 SOLUTION RESPIRATORY (INHALATION) at 20:11

## 2017-11-06 RX ADMIN — ACETYLCYSTEINE SCH ML: 200 INHALANT RESPIRATORY (INHALATION) at 01:06

## 2017-11-06 RX ADMIN — PURIFIED WATER PRN LOZ: 99.05 LIQUID OPHTHALMIC at 21:29

## 2017-11-06 RX ADMIN — POLYVINYL ALCOHOL SCH DROP: 14 SOLUTION/ DROPS OPHTHALMIC at 08:27

## 2017-11-06 RX ADMIN — POTASSIUM CHLORIDE SCH MEQ: 20 TABLET, EXTENDED RELEASE ORAL at 08:26

## 2017-11-06 RX ADMIN — ACETYLCYSTEINE SCH ML: 200 INHALANT RESPIRATORY (INHALATION) at 20:10

## 2017-11-06 RX ADMIN — GUAIFENESIN SCH MG: 600 TABLET, EXTENDED RELEASE ORAL at 17:22

## 2017-11-06 RX ADMIN — IPRATROPIUM BROMIDE AND ALBUTEROL SULFATE SCH ML: .5; 3 SOLUTION RESPIRATORY (INHALATION) at 01:07

## 2017-11-06 RX ADMIN — OXYCODONE HYDROCHLORIDE AND ACETAMINOPHEN PRN TAB: 5; 325 TABLET ORAL at 21:28

## 2017-11-06 RX ADMIN — POLYVINYL ALCOHOL SCH: 14 SOLUTION/ DROPS OPHTHALMIC at 11:29

## 2017-11-06 RX ADMIN — IPRATROPIUM BROMIDE AND ALBUTEROL SULFATE SCH ML: .5; 3 SOLUTION RESPIRATORY (INHALATION) at 07:23

## 2017-11-06 RX ADMIN — POLYVINYL ALCOHOL SCH: 14 SOLUTION/ DROPS OPHTHALMIC at 15:36

## 2017-11-06 RX ADMIN — GUAIFENESIN SCH MG: 600 TABLET, EXTENDED RELEASE ORAL at 10:10

## 2017-11-06 RX ADMIN — ACETYLCYSTEINE SCH ML: 200 INHALANT RESPIRATORY (INHALATION) at 13:37

## 2017-11-06 RX ADMIN — POLYVINYL ALCOHOL SCH DROP: 14 SOLUTION/ DROPS OPHTHALMIC at 10:05

## 2017-11-06 RX ADMIN — ACETYLCYSTEINE SCH ML: 200 INHALANT RESPIRATORY (INHALATION) at 07:23

## 2017-11-06 RX ADMIN — POLYVINYL ALCOHOL SCH DROP: 14 SOLUTION/ DROPS OPHTHALMIC at 17:21

## 2017-11-06 RX ADMIN — POLYVINYL ALCOHOL SCH DROP: 14 SOLUTION/ DROPS OPHTHALMIC at 21:23

## 2017-11-06 RX ADMIN — POLYVINYL ALCOHOL SCH: 14 SOLUTION/ DROPS OPHTHALMIC at 20:00

## 2017-11-06 RX ADMIN — POLYVINYL ALCOHOL SCH: 14 SOLUTION/ DROPS OPHTHALMIC at 15:00

## 2017-11-06 RX ADMIN — IPRATROPIUM BROMIDE AND ALBUTEROL SULFATE SCH ML: .5; 3 SOLUTION RESPIRATORY (INHALATION) at 13:37

## 2017-11-06 RX ADMIN — POLYVINYL ALCOHOL SCH: 14 SOLUTION/ DROPS OPHTHALMIC at 06:54

## 2017-11-06 RX ADMIN — METHYLPREDNISOLONE SODIUM SUCCINATE SCH MG: 40 INJECTION, POWDER, FOR SOLUTION INTRAMUSCULAR; INTRAVENOUS at 10:11

## 2017-11-06 NOTE — PN
DATE:  11/06/2017



SUBJECTIVE:  The patient is seen lying in bed.  She is awake and alert. 

She is comfortable.  She complains of cough.  She complains of whitish

phlegm.  She denies any shortness of breath, she denies any fevers.



PHYSICAL EXAMINATION

GENERAL:  Middle-aged lady sitting in bed.

VITAL SIGNS:  Blood pressure 118/78, heart rate 68, respiratory rate 20 and

temperature 97.6.

HEENT:  Normocephalic and atraumatic.

NECK:  Supple, no JVD.

LUNGS:  Bilateral equal entry, no rales.

CARDIAC:  S1 and S2, regular rate and rhythm, no murmur, no rub.

ABDOMEN:  Soft, nondistended, nontender, bowel sounds present.

EXTREMITIES:  No lower extremity edema.

INTAKE AND OUTPUT:  Not charted.



LABORATORY DATA:  WBC 9.9, hemoglobin 8.3, hematocrit 25 and platelets 524.

Sodium 139, potassium 4.1, chloride 105, CO2 of 29, BUN 28, creatinine 1.1,

glucose 190 and calcium 9.2.  Iron saturation 17, iron 38 and ferritin 190.

Albumin 2.9.



MEDICATIONS:  List reviewed.



ASSESSMENT:

1.  Resolved acute kidney injury.

2.  Stable chronic kidney disease stage I/II.

3.  Mild prerenal azotemia.

4.  History of mitral valve replacement.

5.  Left ventricular hypertrophy.

6.  Paroxysmal atrial fibrillation.

7.  History of anemia.

























PLAN:

1.  Avoid nephrotoxins.

2.  Intravenous Venofer.

3.  Monitor labs closely.

4.  Monitor H&H.

5.  Continue physical therapy.







__________________________________________

Archana Leong MD





DD:  11/06/2017 15:15:21

DT:  11/06/2017 15:20:17

Job # [id number]

## 2017-11-06 NOTE — CP.PCM.CON
History of Present Illness





- History of Present Illness


History of Present Illness: 


Heme/Onc Consult Note for NASIR Hatfield PGY2





Reason for consult: Anemia





This is a 61yo  female with past medical history of HTN, 

hyperlipidemia, atrial fibrillation, severe mitral valve regurgitation from 

history of mitral valve prolapse with recent mitral valve replacement at [facility] on 10/28/17 who was admitted for cough and shortness of breath. Patient 

was sent from [facility] for rehabilitation felt chest pain when 

coughing and shortness of breath. CXR in ED showed severe cardiomegaly with 

possible pneumonia. Patient was treated for pneumonia empirically. An echo was 

done which did not show vegetations or abnormalities of the new valve and EF 

was 52%. Patient was also found to have anemia upon admission. Upon examination

, patient reports having some chest soreness when she coughs. She reports 

coughing up clear mucus. She denies having dark colored stool, blood in stool, 

hematuria or any vaginal bleeding. She denies shortness of breath, nausea, 

vomiting, diarrhea, constipation, fever or chills. 





Past medical history: HTN, hyperlipidemia, atrial fibrillation, severe mitral 

valve regurgitation from history of mitral valve prolapse 


Past surgical history: open heart surgery with mitral valve replacement (

bioprosthetic), cholecystectomy, carpal tunnel repair, bunionectomy


Home meds: As per MAR


Allergies: no known drug allergies, allergies to altoids and unknown food 


Social history: Denies EtOH, drug or tobacco use











Review of Systems





- Review of Systems


All systems: reviewed and no additional remarkable complaints except


Review of Systems: 





+ cough,  pleuritic chest pain 





Past Patient History





- Infectious Disease


Hx of Infectious Diseases: None





- Tetanus Immunizations


Tetanus Immunization: Unknown





- Past Social History


Smoking Status: Never Smoked


Alcohol: None


Drugs: Denies





- CARDIAC


Hx Cardiac Disorders: Yes


Hx Pacemaker: No


Other/Comment: Mitral Valve Replacement





- PULMONARY


Hx Respiratory Disorders: Yes


Hx Asthma: Yes


Hx Chronic Obstructive Pulmonary Disease (COPD): Yes





- NEUROLOGICAL


Hx Neurological Disorder: Yes


Hx Dizziness: Yes


Hx Migraine: Yes





- HEENT


Hx HEENT Problems: No





- RENAL


Hx Chronic Kidney Disease: No





- ENDOCRINE/METABOLIC


Hx Endocrine Disorders: No





- HEMATOLOGICAL/ONCOLOGICAL


Hx Blood Disorders: No





- INTEGUMENTARY


Hx Dermatological Problems: No





- MUSCULOSKELETAL/RHEUMATOLOGICAL


Hx Falls: No





- GASTROINTESTINAL


Hx Gastrointestinal Disorders: No





- GENITOURINARY/GYNECOLOGICAL


Hx Genitourinary Disorders: No





- PSYCHIATRIC


Hx Emotional Abuse: No


Hx Physical Abuse: No





- SURGICAL HISTORY


Other/Comment: Mitral Valve Replacement





- ANESTHESIA


Hx Anesthesia: Yes


Hx Anesthesia Reactions: No


Hx Malignant Hyperthermia: No





Meds


Allergies/Adverse Reactions: 


 Allergies











Allergy/AdvReac Type Severity Reaction Status Date / Time


 


ALTOIDS Allergy Intermediate SWELLING Uncoded 11/05/17 17:20





   FACE/LIPS  


 


UNKNOWN FOOD Allergy Intermediate SWELLING Uncoded 11/05/17 17:20





   FACE/LIPS  














- Medications


Medications: 


 Current Medications





Acetylcysteine (Acetylcysteine 20%)  2 ml IH Q6 OLMAN


   PRN Reason: Protocol


   Last Admin: 11/06/17 07:23 Dose:  2 ml


Albuterol/Ipratropium (Duoneb 3 Mg/0.5 Mg (3 Ml) Ud)  3 ml IH [id number] OLMAN


   PRN Reason: Protocol


   Last Admin: 11/06/17 07:23 Dose:  3 ml


Artificial Tears (Artificial Tears)  0 ml OU Q4H OLMAN


   PRN Reason: Protocol


   Last Admin: 11/06/17 10:05 Dose:  1 drop


Artificial Tears (Artificial Tears)  0 ml OP Q4 OLMAN


   Last Admin: 11/06/17 08:27 Dose:  1 drop


Aspirin (Ecotrin)  81 mg PO DAILY OLMAN


   PRN Reason: Protocol


   Last Admin: 11/06/17 10:10 Dose:  81 mg


Atorvastatin Calcium (Lipitor)  10 mg PO HS OLMAN


   PRN Reason: Protocol


   Last Admin: 11/05/17 21:24 Dose:  10 mg


Benzocaine/Menthol (Cepacol Sore Throat)  1 wade MT Q2 PRN


   PRN Reason: Sore Throat


   Last Admin: 11/05/17 21:32 Dose:  1 wade


Bisacodyl (Dulcolax)  10 mg AR DAILY PRN; Protocol


   PRN Reason: Constipation


Carvedilol (Coreg)  6.25 mg PO BID OLMAN


   PRN Reason: Protocol


   Last Admin: 11/06/17 10:06 Dose:  6.25 mg


Colchicine (Colocrys)  0.6 mg PO DAILY OLMAN


   PRN Reason: Protocol


   Last Admin: 11/06/17 10:06 Dose:  0.6 mg


Diphenhydramine HCl (Benadryl)  25 mg PO HS PRN; Protocol


   PRN Reason: Insomnia


   Last Admin: 11/05/17 21:26 Dose:  25 mg


Doxycycline Hyclate (Doryx)  100 mg PO Q12 OLMAN


   PRN Reason: Protocol


   Stop: 11/11/17 22:01


   Last Admin: 11/06/17 10:10 Dose:  100 mg


Ergocalciferol (Drisdol 50,000 Intl Units Cap)  1 cap PO Q7D OLMAN


   PRN Reason: Protocol


Furosemide (Lasix)  40 mg PO BID OLMAN


   PRN Reason: Protocol


   Last Admin: 11/06/17 10:10 Dose:  40 mg


Guaifenesin (Mucinex La)  600 mg PO BID OLMAN


   PRN Reason: Protocol


   Last Admin: 11/06/17 10:10 Dose:  600 mg


Hydroxyzine HCl (Atarax)  25 mg PO Q6H PRN; Protocol


   PRN Reason: Allergy symptoms


Cefepime HCl (Maxipime 1gm)  1 gm in 100 mls @ 100 mls/hr IVPB Q24H OLMAN


   PRN Reason: Protocol


   Last Admin: 11/06/17 05:44 Dose:  100 mls/hr


Levalbuterol HCl (Xopenex)  0.63 mg IH [id number] PRN; Protocol


   PRN Reason: Shortness of Breath


Lisinopril (Zestril)  5 mg PO DAILY OLMAN


   PRN Reason: Protocol


   Last Admin: 11/06/17 10:11 Dose:  5 mg


Loratadine (Claritin)  10 mg PO DAILY OLMAN


   PRN Reason: Protocol


   Last Admin: 11/06/17 10:06 Dose:  10 mg


Methylprednisolone (Solu-Medrol)  40 mg IVP Q12 OLMAN


   PRN Reason: Protocol


   Last Admin: 11/06/17 10:11 Dose:  40 mg


Montelukast Sodium (Singulair)  10 mg PO HS OLMAN


   PRN Reason: Protocol


   Last Admin: 11/05/17 21:25 Dose:  10 mg


Oxycodone/Acetaminophen (Percocet 5/325 Mg Tab)  1 tab PO Q4H PRN; Protocol


   PRN Reason: Pain, moderate (4-7)


   Stop: 11/08/17 19:16


   Last Admin: 11/05/17 23:42 Dose:  1 tab


Pantoprazole Sodium (Protonix Ec Tab)  40 mg PO 0600 OLMAN


   PRN Reason: Protocol


Potassium Chloride (K-Dur 20 Meq Er Tab)  20 meq PO BRK OLMAN


   PRN Reason: Protocol


   Last Admin: 11/06/17 08:26 Dose:  20 meq











Physical Exam





- Constitutional


Appears: No Acute Distress





- Head Exam


Head Exam: ATRAUMATIC, NORMAL INSPECTION, NORMOCEPHALIC





- Eye Exam


Eye Exam: Normal appearance, PERRL


Pupil Exam: NORMAL ACCOMODATION





- ENT Exam


ENT Exam: Mucous Membranes Moist





- Respiratory Exam


Respiratory Exam: Clear to Auscultation Bilateral, NORMAL BREATHING PATTERN.  

absent: Rales, Rhonchi, Wheezes





- Cardiovascular Exam


Cardiovascular Exam: REGULAR RHYTHM, +S1, +S2.  absent: Gallop, Rubs, Systolic 

Murmur


Additional comments: 





midline sternal scar clean and dry 





- GI/Abdominal Exam


GI & Abdominal Exam: Normal Bowel Sounds, Soft.  absent: Rebound, Rigid, 

Tenderness





- Extremities Exam


Extremities exam: Positive for: normal inspection.  Negative for: calf 

tenderness, pedal edema





- Neurological Exam


Neurological exam: Alert, CN II-XII Intact, Oriented x3





- Psychiatric Exam


Psychiatric exam: Normal Affect, Normal Mood





- Skin


Skin Exam: Dry, Warm





Results





- Vital Signs


Recent Vital Signs: 


 Last Vital Signs











Temp  97.6 F   11/05/17 18:12


 


Pulse  68   11/05/17 18:12


 


Resp  20   11/05/17 18:12


 


BP  118/78   11/06/17 10:11


 


Pulse Ox      














- Labs


Result Diagrams: 


 11/06/17 07:48





 11/06/17 07:48


Labs: 


 Laboratory Results - last 24 hr











  11/06/17 11/06/17 11/06/17





  07:48 07:48 07:48


 


WBC  9.9  


 


RBC  2.80 L  


 


Hgb  8.3 L  


 


Hct  25.1 L  


 


MCV  89.6  


 


MCH  29.6  


 


MCHC  33.1  


 


RDW  14.5  


 


Plt Count  524 H  


 


MPV  8.4  


 


Gran %  85.8 H  


 


Lymph % (Auto)  7.3 L  


 


Mono % (Auto)  6.9 H  


 


Eos % (Auto)  0.0 L  


 


Baso % (Auto)  0.0  


 


Gran #  8.49 H  


 


Lymph #  0.7 L  


 


Mono #  0.7 H  


 


Eos #  0.0  


 


Baso #  0.00  


 


PT    49.2 H


 


INR    4.34 H*


 


Sodium   139 


 


Potassium   4.1 


 


Chloride   105 


 


Carbon Dioxide   29 


 


Anion Gap   9 L 


 


BUN   28 H 


 


Creatinine   1.1 


 


Est GFR ( Amer)   > 60 


 


Est GFR (Non-Af Amer)   50 


 


Random Glucose   190 H 


 


Calcium   9.2 


 


Iron   


 


TIBC   


 


% Saturation   


 


Total Bilirubin   0.4 


 


AST   32 


 


ALT   41 


 


Alkaline Phosphatase   113 


 


Total Protein   6.3 


 


Albumin   2.9 L 


 


Globulin   3.3 


 


Albumin/Globulin Ratio   0.9 L 














  11/06/17





  07:48


 


WBC 


 


RBC 


 


Hgb 


 


Hct 


 


MCV 


 


MCH 


 


MCHC 


 


RDW 


 


Plt Count 


 


MPV 


 


Gran % 


 


Lymph % (Auto) 


 


Mono % (Auto) 


 


Eos % (Auto) 


 


Baso % (Auto) 


 


Gran # 


 


Lymph # 


 


Mono # 


 


Eos # 


 


Baso # 


 


PT 


 


INR 


 


Sodium 


 


Potassium 


 


Chloride 


 


Carbon Dioxide 


 


Anion Gap 


 


BUN 


 


Creatinine 


 


Est GFR ( Amer) 


 


Est GFR (Non-Af Amer) 


 


Random Glucose 


 


Calcium 


 


Iron  38 L


 


TIBC  226 L


 


% Saturation  17 L


 


Total Bilirubin 


 


AST 


 


ALT 


 


Alkaline Phosphatase 


 


Total Protein 


 


Albumin 


 


Globulin 


 


Albumin/Globulin Ratio 














Assessment & Plan





- Assessment and Plan (Free Text)


Assessment: 


This is a 61yo  female with past medical history of HTN, 

hyperlipidemia, atrial fibrillation, severe mitral valve regurgitation from 

history of mitral valve prolapse with recent mitral valve replacement (

bioprosthetic valve) at [facility] on 10/28/17 who was admitted for cough and 

shortness of breath. Patient was found to have acute on chronic decompensated 

CHF secondary to recent mitral valve replacement surgery (POD#8) and pneumonia 

who was transferred to TCU for further strengthening. Patient was found to have 

anemia on admission. She was placed on Coumadin with supratherapeutic INR 

without overt signs of bleeding. Patient is also noted to have mild 

thrombocytosis as well. 





Anemia can be secondary to blood loss from surgery versus blood loss from 

elevated INR (unlikely). Thrombocytosis can be reactive from surgery versus 

iron deficiency anemia. With elevated thrombocytosis >600, patient can be at 

risk for small strokes. 


Plan: 


- Iron studies showed low Iron, TIBC and % saturation 


- Ferritin pending


- Continue to monitor INR- trending down (goal INR is between 2-3)


- Continue to hold Coumadin


- Continue ASA


- Will obtain stool occult 


- Will start patient on Venofer 200mg x 3. 





Case seen, discussed and reviewed with Dr. Alatorre. 


NASIR Marie PGY2





- Date & Time


Date: 11/06/17


Time: 11:11

## 2017-11-06 NOTE — CP.PCM.CON
History of Present Illness





- History of Present Illness


History of Present Illness: 


1 year old female with PMH of atrial fibrillation, asthma, HTN, severe mitral 

regurgitation S/P mitral valve replacement, esophageal ulcers, carpal tunnel 

syndrome, COPD, dyslipidemia, S/P cholecystectomy, S/P foot surgery came in to 

[facility] because of generalized weakness and cough and was found to be in congestive 

heart failure. Pneumonia could not be ruled out either and the patient is also 

being treated for this. She has been doing well and is now transferred to [facility] 

for continued medical therapy and physical therapy. Infectious Diseases consult 

is requested to continue her antibiotic therapy. She denies fever or chills, no 

nausea or vomiting, no chest pain, no SOB at rest, no abdominal pain , no 

diarrhea, no dysuria.





Review of Systems





- Review of Systems


All systems: reviewed and no additional remarkable complaints except (as per HPI

)





Past Patient History





- Infectious Disease


Hx of Infectious Diseases: None





- Tetanus Immunizations


Tetanus Immunization: Unknown





- Past Social History


Smoking Status: Never Smoked





- CARDIAC


Hx Cardiac Disorders: Yes


Hx Pacemaker: No


Other/Comment: Mitral Valve Replacement





- PULMONARY


Hx Respiratory Disorders: Yes


Hx Asthma: Yes


Hx Chronic Obstructive Pulmonary Disease (COPD): Yes





- NEUROLOGICAL


Hx Neurological Disorder: Yes


Hx Dizziness: Yes


Hx Migraine: Yes





- HEENT


Hx HEENT Problems: No





- RENAL


Hx Chronic Kidney Disease: No





- ENDOCRINE/METABOLIC


Hx Endocrine Disorders: No





- HEMATOLOGICAL/ONCOLOGICAL


Hx Blood Disorders: No





- INTEGUMENTARY


Hx Dermatological Problems: No





- MUSCULOSKELETAL/RHEUMATOLOGICAL


Hx Falls: No





- GASTROINTESTINAL


Hx Gastrointestinal Disorders: No





- GENITOURINARY/GYNECOLOGICAL


Hx Genitourinary Disorders: No





- PSYCHIATRIC


Hx Emotional Abuse: No


Hx Physical Abuse: No





- SURGICAL HISTORY


Other/Comment: Mitral Valve Replacement





- ANESTHESIA


Hx Anesthesia: Yes


Hx Anesthesia Reactions: No


Hx Malignant Hyperthermia: No





Meds


Allergies/Adverse Reactions: 


 Allergies











Allergy/AdvReac Type Severity Reaction Status Date / Time


 


ALTOIDS Allergy Intermediate SWELLING Uncoded 11/05/17 17:20





   FACE/LIPS  


 


UNKNOWN FOOD Allergy Intermediate SWELLING Uncoded 11/05/17 17:20





   FACE/LIPS  














- Medications


Medications: 


 Current Medications





Acetylcysteine (Acetylcysteine 20%)  2 ml IH Q6 OLMAN


   PRN Reason: Protocol


   Last Admin: 11/06/17 01:06 Dose:  2 ml


Albuterol/Ipratropium (Duoneb 3 Mg/0.5 Mg (3 Ml) Ud)  3 ml IH [id number] OLMAN


   PRN Reason: Protocol


   Last Admin: 11/06/17 01:07 Dose:  3 ml


Artificial Tears (Artificial Tears)  0 ml OU Q4H OLMAN


   PRN Reason: Protocol


   Last Admin: 11/06/17 02:22 Dose:  Not Given


Artificial Tears (Artificial Tears)  0 ml OP Q4 OLMAN


   Last Admin: 11/06/17 02:22 Dose:  Not Given


Aspirin (Ecotrin)  81 mg PO DAILY OLMAN


   PRN Reason: Protocol


Atorvastatin Calcium (Lipitor)  10 mg PO HS OLMAN


   PRN Reason: Protocol


   Last Admin: 11/05/17 21:24 Dose:  10 mg


Benzocaine/Menthol (Cepacol Sore Throat)  1 wade MT Q2 PRN


   PRN Reason: Sore Throat


   Last Admin: 11/05/17 21:32 Dose:  1 wade


Bisacodyl (Dulcolax)  10 mg [place] DAILY PRN; Protocol


   PRN Reason: Constipation


Carvedilol (Coreg)  6.25 mg PO BID OLMAN


   PRN Reason: Protocol


   Last Admin: 11/05/17 19:00 Dose:  6.25 mg


Colchicine (Colocrys)  0.6 mg PO DAILY OLMAN


   PRN Reason: Protocol


Diphenhydramine HCl (Benadryl)  25 mg PO HS PRN; Protocol


   PRN Reason: Insomnia


   Last Admin: 11/05/17 21:26 Dose:  25 mg


Doxycycline Hyclate (Doryx)  100 mg PO Q12 OLMAN


   PRN Reason: Protocol


   Stop: 11/11/17 22:01


   Last Admin: 11/05/17 21:24 Dose:  100 mg


Ergocalciferol (Drisdol 50,000 Intl Units Cap)  1 cap PO Q7D OLMAN


   PRN Reason: Protocol


Furosemide (Lasix)  40 mg PO BID OLMAN


   PRN Reason: Protocol


   Last Admin: 11/05/17 19:00 Dose:  40 mg


Guaifenesin (Mucinex La)  600 mg PO BID OLMAN


   PRN Reason: Protocol


   Last Admin: 11/05/17 19:00 Dose:  600 mg


Hydroxyzine HCl (Atarax)  25 mg PO Q6H PRN; Protocol


   PRN Reason: Allergy symptoms


Cefepime HCl (Maxipime 1gm)  1 gm in 100 mls @ 100 mls/hr IVPB Q24H OLMAN


   PRN Reason: Protocol


   Last Admin: 11/06/17 05:44 Dose:  100 mls/hr


Levalbuterol HCl (Xopenex)  0.63 mg IH [id number] PRN; Protocol


   PRN Reason: Shortness of Breath


Lisinopril (Zestril)  5 mg PO DAILY OLMAN


   PRN Reason: Protocol


Loratadine (Claritin)  10 mg PO DAILY OLMAN


   PRN Reason: Protocol


Methylprednisolone (Solu-Medrol)  40 mg IVP Q12 OLMAN


   PRN Reason: Protocol


   Last Admin: 11/05/17 21:24 Dose:  40 mg


Montelukast Sodium (Singulair)  10 mg PO HS OLMAN


   PRN Reason: Protocol


   Last Admin: 11/05/17 21:25 Dose:  10 mg


Oxycodone/Acetaminophen (Percocet 5/325 Mg Tab)  1 tab PO Q4H PRN; Protocol


   PRN Reason: Pain, moderate (4-7)


   Stop: 11/08/17 19:16


   Last Admin: 11/05/17 23:42 Dose:  1 tab


Pantoprazole Sodium (Protonix Ec Tab)  40 mg PO DAILY OLMAN


   PRN Reason: Protocol


Potassium Chloride (K-Dur 20 Meq Er Tab)  20 meq PO BRK OLMAN


   PRN Reason: Protocol











Physical Exam





- Constitutional


Appears: Non-toxic, No Acute Distress





- Head Exam


Head Exam: NORMAL INSPECTION





- Neck Exam


Neck exam: Negative for: Meningismus





- Respiratory Exam


Respiratory Exam: Decreased Breath Sounds





- Cardiovascular Exam


Cardiovascular Exam: +S1, +S2





- GI/Abdominal Exam


GI & Abdominal Exam: Soft.  absent: Tenderness





Results





- Vital Signs


Recent Vital Signs: 


 Last Vital Signs











Temp  97.6 F   11/05/17 18:12


 


Pulse  68   11/05/17 18:12


 


Resp  20   11/05/17 18:12


 


BP  122/76   11/05/17 19:00


 


Pulse Ox      














- Labs


Result Diagrams: 


 11/06/17 07:48





 11/06/17 07:48





Assessment & Plan





- Assessment and Plan (Free Text)


Plan: 





Assessment


sytemic inflammatory response syndrome, consider due to congestive heart failure

, R/O HCAP


severe mitral regurgitation S/P mitral valve replacement


atrial fibrillation


asthma


HTN


esophageal ulcers


carpal tunnel syndrome


COPD


dyslipidemia


atrial fibrillation


S/P cholecystectomy


S/P foot surgery





Plan


sputum cx only showing normal oral lata; PCT is low; continue Doxycycline day 

5 - continue for 2-4 more days 


will continue to follow clinically

## 2017-11-06 NOTE — CON
DATE:  11/06/2017



LOCATION:  The patient is in room 321, bed 1.



REASON FOR CONSULTATION:  Status post mitral valve bioprosthetic valve

replacement, CHF, and paroxysmal atrial fibrillation.



HISTORY OF PRESENT ILLNESS:  A 62-year-old female with past medical history

significant for severe mitral regurgitation, and chronic paroxysmal atrial

fibrillation since last two years.  The patient has history of mitral valve

prolapse along with severe mitral regurgitation and two years ago she was

told to have surgery, but she did not want to do it.  Finally, she agreed

and she recently had mitral valve replacement and status post continue to

have paroxysmal atrial fibrillation.  The patient was _____ nursing home

for this physical therapy, but the patient then started coughing with

shortness of breath and was brought to the emergency room for respiratory

tract infection and possible CHF.  The patient treated medically on the

floor.  Now, she is in Transitional Care Unit for deconditioning and

physical therapy.



PAST MEDICAL HISTORY:  Significant for paroxysmal atrial fibrillation,

mitral valve prolapse, severe mitral regurgitation, status post mitral

valve, bioprosthetic valve replacement.  The patient's cardiac

catheterization preop showed 50% LAD disease otherwise no significant

coronary artery disease was seen and the patient has only mitral valve

replacement surgery.  The patient also known to have cholecystectomy,

carpal tunnel surgery, bunionectomy, bioprosthetic mitral valve replacement

was done, worked by _____ [facility].



ALLERGIES:  THE PATIENT DENIES ANY ALLERGIES.



PERSONAL HISTORY:  No history of smoking or alcohol abuse.



MEDICATION:  The patient's medications from [facility] post

discharge included aspirin, oxycodone, albuterol inhaler, Coumadin 3 mg

daily, carvedilol, and atorvastatin.



REVIEW OF SYSTEMS:  All other system reviewed, positive mentioned the

history otherwise negative.



PHYSICAL EXAMINATION:

VITAL SIGNS:  Blood pressure is 118/78, respiration 20 afebrile, pulse is

70 per minute.

HEENT:  Head is normocephalic.  Eyes, pupil normal.  Conjunctivae are pale.

NECK:  JVP low.  Carotids equal.  Thorax; AP diameter normal.  Thorax has

surgical scar.

LUNGS:  No significant rales.

CARDIOVASCULAR:  S1 and S2.

ABDOMEN:  Soft and nontender.  No organomegaly.

EXTREMITIES:  No clubbing, no cyanosis.



LABORATORY DATA:  WBC 9.9, hemoglobin 8.3, hematocrit 25.1 and platelet

524.  Sodium 139, potassium 4.1, BUN 28 and creatinine 1.1.  AST and ALT

normal.  Total protein 6.3, albumin 2.9, prothrombin time 49.2, INR 4.34,

which is elevated.



DIAGNOSES:  Decompensated congestive heart failure, acute and chronic

secondary to recent postop IV fluid overload, diastolic dysfunction,

history of paroxysmal atrial fibrillation, severe mitral regurgitation,

status post mitral valve replacement, bioprosthetic mitral valve,

hypertension, cough, possible pneumonia, and deconditioning.  Chest x-ray

on 11/03/2017 showed vascular congestion.  EKG shows sinus rhythm, first

degree AV block, left axis deviation, septal infarct age undetermined, and

increased prothrombin time.



PLAN:  The patient is on carvedilol 6.25 mg b.i.d., colchicine 0.6 mg p.o.

daily, Doryx 100 mg p.o. q. 12 hour, DuoNeb hand nebulizer therapy, aspirin

81 mg daily, Venofer IV therapy, furosemide 40 mg b.i.d., Lipitor 10 mg

p.o. daily, Maxipime 1 g IV q. 24 hours, Protonix 40 mg daily, Singulair 10

mg at bedtime, Solu-Medrol 40 mg IV q. 12 hour, lisinopril 5 mg daily.  The

patient will have repeat PT/INR in the morning and we will follow closely.





__________________________________________

Ajay Myers MD







DD:  11/06/2017 13:31:04

DT:  11/06/2017 18:42:54

Job # [id number]

## 2017-11-07 LAB — INR PPP: 3.21 (ref 0.93–1.08)

## 2017-11-07 RX ADMIN — GUAIFENESIN SCH MG: 600 TABLET, EXTENDED RELEASE ORAL at 17:29

## 2017-11-07 RX ADMIN — POTASSIUM CHLORIDE SCH MEQ: 20 TABLET, EXTENDED RELEASE ORAL at 08:00

## 2017-11-07 RX ADMIN — ACETYLCYSTEINE SCH ML: 200 INHALANT RESPIRATORY (INHALATION) at 20:16

## 2017-11-07 RX ADMIN — POLYVINYL ALCOHOL SCH: 14 SOLUTION/ DROPS OPHTHALMIC at 21:04

## 2017-11-07 RX ADMIN — IPRATROPIUM BROMIDE AND ALBUTEROL SULFATE SCH ML: .5; 3 SOLUTION RESPIRATORY (INHALATION) at 07:27

## 2017-11-07 RX ADMIN — POLYVINYL ALCOHOL SCH: 14 SOLUTION/ DROPS OPHTHALMIC at 04:58

## 2017-11-07 RX ADMIN — ACETYLCYSTEINE SCH ML: 200 INHALANT RESPIRATORY (INHALATION) at 13:15

## 2017-11-07 RX ADMIN — POLYVINYL ALCOHOL SCH: 14 SOLUTION/ DROPS OPHTHALMIC at 01:41

## 2017-11-07 RX ADMIN — POLYVINYL ALCOHOL SCH: 14 SOLUTION/ DROPS OPHTHALMIC at 13:40

## 2017-11-07 RX ADMIN — POLYVINYL ALCOHOL SCH: 14 SOLUTION/ DROPS OPHTHALMIC at 17:26

## 2017-11-07 RX ADMIN — IPRATROPIUM BROMIDE AND ALBUTEROL SULFATE SCH ML: .5; 3 SOLUTION RESPIRATORY (INHALATION) at 01:38

## 2017-11-07 RX ADMIN — POLYVINYL ALCOHOL SCH: 14 SOLUTION/ DROPS OPHTHALMIC at 11:44

## 2017-11-07 RX ADMIN — IPRATROPIUM BROMIDE AND ALBUTEROL SULFATE SCH ML: .5; 3 SOLUTION RESPIRATORY (INHALATION) at 13:15

## 2017-11-07 RX ADMIN — PANTOPRAZOLE SODIUM SCH MG: 40 TABLET, DELAYED RELEASE ORAL at 06:40

## 2017-11-07 RX ADMIN — POLYVINYL ALCOHOL SCH: 14 SOLUTION/ DROPS OPHTHALMIC at 10:04

## 2017-11-07 RX ADMIN — POLYVINYL ALCOHOL SCH: 14 SOLUTION/ DROPS OPHTHALMIC at 06:40

## 2017-11-07 RX ADMIN — GUAIFENESIN SCH MG: 600 TABLET, EXTENDED RELEASE ORAL at 10:05

## 2017-11-07 RX ADMIN — ACETYLCYSTEINE SCH ML: 200 INHALANT RESPIRATORY (INHALATION) at 01:38

## 2017-11-07 RX ADMIN — POLYVINYL ALCOHOL SCH: 14 SOLUTION/ DROPS OPHTHALMIC at 00:00

## 2017-11-07 RX ADMIN — ACETYLCYSTEINE SCH ML: 200 INHALANT RESPIRATORY (INHALATION) at 07:26

## 2017-11-07 RX ADMIN — IPRATROPIUM BROMIDE AND ALBUTEROL SULFATE SCH ML: .5; 3 SOLUTION RESPIRATORY (INHALATION) at 20:16

## 2017-11-07 RX ADMIN — OXYCODONE HYDROCHLORIDE AND ACETAMINOPHEN PRN TAB: 5; 325 TABLET ORAL at 21:06

## 2017-11-07 RX ADMIN — POLYVINYL ALCOHOL SCH: 14 SOLUTION/ DROPS OPHTHALMIC at 07:58

## 2017-11-07 RX ADMIN — THERA TABS SCH TAB: TAB at 11:45

## 2017-11-07 NOTE — PN
DATE:  11/07/2017



REASON FOR CONSULTATION:  Followup status post mitral valve bioprosthetic

replaced, history of paroxysmal atrial fibrillation, admitted with cough

and shortness of breath postop from rehab facility.



SUBJECTIVE:  The patient denies any chest pain, shortness of breath or any

palpitation, feels a lot better.



PHYSICAL EXAMINATION:

VITAL SIGNS:  As follows; temperature afebrile, heart rate 91 and blood

pressure 130/83.

HEENT:  PERRLA.  Extraocular muscles intact.

NECK:  Supple.  No carotid bruit or thyromegaly.

CHEST:  Clear to auscultation.

HEART:  S1 and S2 regular.

ABDOMEN:  Soft.

EXTREMITIES:  Clubbing and cyanosis negative.



LABORATORY DATA:  Blood workup as follows; WBC 9.9, hemoglobin 8.3,

hematocrit 25.1, and platelet count 524.  Chemistry shows sodium 139,

potassium 4.1, chloride 105, carbon dioxide 29, anion gap of 9, BUN 28 and

creatinine 1.1.



IMPRESSION:  A 62-year-old female with past medical history significant for

severe mitral regurgitation for many years status post cardiac

catheterization twice, nonobstructive coronary artery disease, preop

evaluation, cardiac cath mid LAD 40% stenosis.  He is status post mitral

valve replacement bioprosthetic mitral valve, hypertension, cough, possible

pneumonia, deconditioning of the body where recent EKG shows normal sinus

rhythm and paroxysmal atrial fibrillation.



RECOMMENDATIONS:  Continue Coreg, continue colchicine, continue DuoNeb

nebulizer treatment.  Continue Lisinopril.  INR is 3.21.  When INR gets

below 2.5, we will start anticoagulation.  Right now, we will hold because

of supratherapeutic INR.  We will repeat INR tomorrow.  If INR is below

2.5, we will start Coumadin.  INR trend is down.  It was 5.2 on 11/05/2017,

now it is 3.2.  We will repeat tomorrow.





__________________________________________

Ajay Gaxiola MD





DD:  11/07/2017 9:57:36

DT:  11/07/2017 10:33:55

Job # [id number]

## 2017-11-07 NOTE — CON
PULMONARY CONSULTATION



DATE:  11/06/2017



REFERRING PHYSICIAN:  Philip Dinh MD



REASON FOR CONSULT:  Cough, shortness of breath.



HISTORY OF PRESENT ILLNESS:  This is a 62-year-old female known to me from

*------*of the hospital with cough, shortness of breath, sputum production,

treated with antibiotics and diuretics also, steroids and inhaled

bronchodilators given, now admitted to TICU for continued care.  Overall,

she feels better.  Still has some cough and shortness of breath.  No

nausea, no vomiting, diarrhea, leg pain or leg swelling.



PAST MEDICAL HISTORY:  Valvular heart disease status post valve repair,

chronic obstructive lung disease, atrial fibrillation.



ALLERGIES:  TO SEAFOOD AND ALSO ALTOIDS.



SOCIAL HISTORY:  A few years ago, stopped smoking.  Denied any alcohol use.



FAMILY HISTORY:  No significant cardiopulmonary disease reported.



MEDICATIONS:  She is on Mucomyst 20% 2 mL q. 6 hours, artificial tears q. 4

hours, Atarax 25 mg q. 6 hours p.r.n., Benadryl 25 mg at bedtime p.r.n,

Cepacol lozenges q. 12 hours p.r.n., Claritin 10 mg daily, colchicine 0.6

mg daily, Coreg 6.25 mg twice a day,doxycycline 100 mg twice a day, vitamin

D 50,000 units q. 7 days, Dulcolax suppository p.r.n. basis, DuoNeb q. 6

hours, Ecotrin 81 mg daily, iron sucrose 100 mg daily, potassium 20 mEq

daily, Lasix 20 mg twice a day, Lipitor 10 mg at bedtime, Mucinex  mg

twice a day, Percocet 5/325 one tablet q. 4 hours p.r.n., prednisone 30 mg

daily, Protonix 40 mg daily, Singulair 10 mg daily, Xopenex inhaled q. 6

hours, Zestril 5 mg daily.



REVIEW OF SYSTEMS:  No headache.  No rhinitis.  Still have cough, shortness

of breath.  No nausea.  No vomiting, diarrhea, leg pain or leg swelling.



PHYSICAL EXAMINATION:

GENERAL:  Sitting up in a chair, in no acute distress.

VITAL SIGNS:  Temp is 98, heart rate is 104, respiratory rate is 20, blood

pressure 136/89, pulse ox 93% on nasal cannula.

HEENT:  Moist mucous membranes.  Crowded airway.  Mallampati score is IV.

NECK:  Supple.  No JVD.

LUNGS:  Have prolonged expiratory phase.  Have scattered rhonchi.

HEART:  S1 and S2.

ABDOMEN:  Soft and nontender.  No organomegaly.

EXTREMITIES:  There is no edema.

NEUROLOGIC:  Awake and alert.  Follow simple commands.



LABORATORY DATA:  Shows hemoglobin 8.3, hematocrit 25.1, WBC 9.9, platelet

count is 524.  PTT is 4.34.  Sodium 136, potassium 4.1, chloride 105,

bicarbonate 29, BUN 28, creatinine 1.1, glucose 190, calcium is 9.2, iron

is 38, ferritin is 190, AST 32, ALT 41, alkaline phosphatase is 113,

albumin is 2.9, vitamin B12 is 741, folate is 10.8.



IMPRESSION AND PLAN:  Exacerbation of chronic obstructive lung disease,

cardiomyopathy, pulmonary infiltrate, may have a sleep apnea syndrome,

atrial fibrillation.  Pulmonary point of view, doing okay.  Continue p.r.n.

inhaled bronchodilator.  Gastric prophylaxis.  Hold Coumadin.  INR in the

morning.  Start therapy.



Thank you, and we will follow with you.





__________________________________________

Ajay Wisdom MD



DD:  11/06/2017 20:22:28

DT:  11/07/2017 1:01:20

Job # [id number]

## 2017-11-07 NOTE — PN
PULMONARY PROGRESS NOTE



DATE:  11/07/2017



REFERRING PHYSICIAN:  Philip Dinh MD



SUBJECTIVE:  She is sitting up in a chair, feels better, still has a cough,

shortness of breath.  No nausea or vomiting.  No diarrhea.  No leg pain or

leg swelling.



PHYSICAL EXAMINATION

GENERAL:  No acute distress.

VITAL SIGNS:  Temperature is 98, heart rate is 81, respiratory rate is 20,

blood pressure 125/78 and pulse oximetry is 93% on nasal cannula.

HEENT:  Moist mucous membranes.  Crowded airway.

NECK:  Supple.  No JVD.

LUNGS:  Has expiratory rhonchi, overall fair airflow.

HEART:  S1 and S2.

ABDOMEN:  Soft and nontender.  No organomegaly.

EXTREMITIES:  No edema.

NEUROLOGIC:  Awake and alert.  Follows simple commands.



MEDICATIONS:  She is on Mucomyst 2 mL q.6 hours, artificial tears to both

eyes q.4 hours, Atarax 25 mg q.6 hours p.r.n., Benadryl 25 mg at bedtime

p.r.n., Cepacol lozenges q.2 hours p.r.n., Claritin 10 mg daily, colchicine

0.6 mg daily, Coreg 6.25 mg twice a day, doxycycline 100 mg twice a day,

vitamin D 50,000 units subq q.7 days, Dulcolax 10 mg p.r.n.,

albuterol/Atrovent nebulizer q.6 hours around the clock, Ecotrin 81 mg

daily, ferrous sulfate 324 mg 3 times a day, also getting IV sucrose, Lasix

is at 40 mg twice a day, Lipitor 10 mg daily, Mucinex  mg twice a

day, Percocet 5/325 one tablet q.4 hours p.r.n., prednisone 30 mg daily,

Protonix 40 mg daily, multivitamins daily, Xopenex q.6 hours p.r.n. and

Zestril 5 mg daily.



LABORATORY DATA:  Reviewed and shows today's INR is 3.21.  Blood sugar is

260.



IMPRESSION AND PLAN:  Exacerbation of chronic obstructive lung disease,

cardiomyopathy, pulmonary infiltrate, may have a sleep apnea syndrome,

atrial fibrillation.  Pulmonary point of view doing okay.  Continue p.o.

and inhaled bronchodilator.  Gastric prophylaxis.  Coumadin on hold. 

Followup INR.

















Thank you and we will follow with you.





__________________________________________

Ajay Wisdom MD





DD:  11/07/2017 16:47:35

DT:  11/07/2017 18:15:58

[facility] # [id number]

## 2017-11-07 NOTE — PN
DATE:



SUBJECTIVE:  The patient is currently seen ambulating in the TCU.  She has

no specific complaints.  Her creatinine levels down to 1.1 which is her

baseline.  The patient remains on oral diuretic therapy.  The patient is

noted to be significantly anemic.  She is receiving IV iron for iron

saturation of 17%.



MEDICATIONS:  Medication list reviewed.  The patient is currently on

acetylcysteine, Artificial Tears, Atarax, Benadryl, Cepacol, Claritin,

colchicine, Coreg, doxycycline, vitamin D, Dulcolax, DuoNeb, Ecotrin,

Feosol, IV iron, K-tabs, Lasix orally, Lipitor, Mucinex, Percocet p.r.n.,

prednisone, Protonix, Singulair, Thera tabs, Xopenex, and Zestril.



OBJECTIVE:

VITAL SIGNS:  Blood pressure 125/78, temperature 97.4, respiratory rate 16

with a pulse of 81.

HEENT:  Normocephalic and atraumatic.  Conjunctivae are pale.  Sclerae are

nonicteric.

NECK:  Supple.  No neck vein distention.

CHEST:  Clear to auscultation and percussion.  Scattered rhonchi.  Slight

decreased breath sounds at the bases.  No rales.

CARDIOVASCULAR:  Normal S1 and S2.  No audible murmurs, rubs or gallops.

ABDOMEN:  Soft.  Bowel sounds normal.  No rebound or guarding.  No masses.

EXTREMITIES:  Show no cyanosis, clubbing or edema.



LABORATORY DATA and IMAGING:  No labs done today.  Yesterday's CBC, white

blood cell count 9.9, hemoglobin 8.2 with a platelet count of 524,000. 

Chemistries from yesterday showed normal electrolytes.  BUN 28 with a

creatinine of 1.1.  Creatinine is back to baseline levels.  Last glucose is

260.  Iron saturations is 17%.  Albumin is 2.9 with a calcium of 9.2.



ASSESSMENT:

1.  Slight elevation of BUN with a creatinine that is fallen back to

baseline range.  The patient was initially admitted with a diagnosis of

congestive heart failure and possible pneumonia.  The patient remains on

steroid therapy and Lasix therapy as a consequence that her BUN remains

slightly elevated.

2.  Significant anemia.  The patient is on oral and IV iron therapy.  She

should continue Venofer in the TCU.

3.  History of borderline left ventricular hypertrophy with mild valvular

heart disease, aortic insufficiency, aortic stenosis, mitral regurgitation

with a prostatic mitral valve.

4.  History of paroxysmal atrial fibrillation.  The patient is on chronic

anticoagulation.  Coumadin is on hold for a PT of 36.21 and INR of 3.21.

5.  No past history of chronic kidney disease.



PLAN

1.  Suggest continuing IV Venofer therapy to help improve her iron

saturations and erase her severe anemia.

2.  Complete a course of oral antibiotic therapy for possible pneumonia

versus CHF.

3.  When able try and wean the patient off of steroids which will help

improve her BUN.

4.  Agree with transfer to the TCU.  The patient will continue

rehabilitation in the TCU.





__________________________________________

Binu Arevalo MD





DD:  11/07/2017 15:32:23

DT:  11/07/2017 15:37:47

Job # [id number]



MTDD

## 2017-11-07 NOTE — CP.PCM.PN
Subjective





- Date & Time of Evaluation


Date of Evaluation: 11/07/17


Time of Evaluation: 10:55





- Subjective


Subjective: 





Comfortable, not in distress, no fevers.





Objective





- Vital Signs/Intake and Output


Vital Signs (last 24 hours): 


 











Temp Pulse Resp BP Pulse Ox


 


 98.1 F   104 H  20   136/89   93 L


 


 11/06/17 16:00  11/06/17 16:00  11/06/17 16:00  11/06/17 17:22  11/06/17 16:00











- Medications


Medications: 


 Current Medications





Acetylcysteine (Acetylcysteine 20%)  2 ml IH Q6 OLMAN


   PRN Reason: Protocol


   Last Admin: 11/07/17 01:38 Dose:  2 ml


Albuterol/Ipratropium (Duoneb 3 Mg/0.5 Mg (3 Ml) Ud)  3 ml IH [id number] OLMAN


   PRN Reason: Protocol


   Last Admin: 11/07/17 01:38 Dose:  3 ml


Artificial Tears (Artificial Tears)  0 ml OU Q4H OLMAN


   PRN Reason: Protocol


   Last Admin: 11/07/17 01:41 Dose:  Not Given


Artificial Tears (Artificial Tears)  0 ml OP Q4 OLMAN


   Last Admin: 11/07/17 04:58 Dose:  Not Given


Aspirin (Ecotrin)  81 mg PO DAILY OLMAN


   PRN Reason: Protocol


   Last Admin: 11/06/17 10:10 Dose:  81 mg


Atorvastatin Calcium (Lipitor)  10 mg PO HS OLMAN


   PRN Reason: Protocol


   Last Admin: 11/06/17 21:24 Dose:  10 mg


Benzocaine/Menthol (Cepacol Sore Throat)  1 wade MT Q2 PRN


   PRN Reason: Sore Throat


   Last Admin: 11/06/17 21:29 Dose:  1 wade


Bisacodyl (Dulcolax)  10 mg LA DAILY PRN; Protocol


   PRN Reason: Constipation


Carvedilol (Coreg)  6.25 mg PO BID OLMAN


   PRN Reason: Protocol


   Last Admin: 11/06/17 17:21 Dose:  6.25 mg


Colchicine (Colocrys)  0.6 mg PO DAILY OLMAN


   PRN Reason: Protocol


   Last Admin: 11/06/17 10:06 Dose:  0.6 mg


Diphenhydramine HCl (Benadryl)  25 mg PO HS PRN; Protocol


   PRN Reason: Insomnia


   Last Admin: 11/05/17 21:26 Dose:  25 mg


Doxycycline Hyclate (Doryx)  100 mg PO Q12 OLMAN


   PRN Reason: Protocol


   Stop: 11/11/17 22:01


   Last Admin: 11/06/17 21:24 Dose:  100 mg


Ergocalciferol (Drisdol 50,000 Intl Units Cap)  1 cap PO Q7D OLMAN


   PRN Reason: Protocol


Furosemide (Lasix)  40 mg PO BID OLMAN


   PRN Reason: Protocol


   Last Admin: 11/06/17 17:22 Dose:  40 mg


Guaifenesin (Mucinex La)  600 mg PO BID OLMAN


   PRN Reason: Protocol


   Last Admin: 11/06/17 17:22 Dose:  600 mg


Hydroxyzine HCl (Atarax)  25 mg PO Q6H PRN; Protocol


   PRN Reason: Allergy symptoms


Iron Sucrose 200 mg/ Sodium (Chloride)  110 mls @ 110 mls/hr IVPB Q2D OLMAN


   Stop: 11/10/17 12:14


   Last Admin: 11/06/17 11:36 Dose:  110 mls/hr


Levalbuterol HCl (Xopenex)  0.63 mg IH [id number] PRN; Protocol


   PRN Reason: Shortness of Breath


Lisinopril (Zestril)  5 mg PO DAILY OLMAN


   PRN Reason: Protocol


   Last Admin: 11/06/17 10:11 Dose:  5 mg


Loratadine (Claritin)  10 mg PO DAILY OLMAN


   PRN Reason: Protocol


   Last Admin: 11/06/17 10:06 Dose:  10 mg


Montelukast Sodium (Singulair)  10 mg PO HS OLMAN


   PRN Reason: Protocol


   Last Admin: 11/06/17 21:24 Dose:  10 mg


Oxycodone/Acetaminophen (Percocet 5/325 Mg Tab)  1 tab PO Q4H PRN; Protocol


   PRN Reason: Pain, moderate (4-7)


   Stop: 11/08/17 19:16


   Last Admin: 11/06/17 21:28 Dose:  1 tab


Pantoprazole Sodium (Protonix Ec Tab)  40 mg PO 0600 OLMAN


   PRN Reason: Protocol


Potassium Chloride (K-Dur 20 Meq Er Tab)  20 meq PO BRK OLMAN


   PRN Reason: Protocol


   Last Admin: 11/06/17 08:26 Dose:  20 meq


Prednisone (Prednisone Tab)  30 mg PO DAILY OLMAN


   PRN Reason: Protocol











- Labs


Labs: 


 





 11/06/17 07:48 





 11/06/17 07:48 





 











PT  49.2 SECONDS (9.4-12.5)  H  11/06/17  07:48    


 


INR  4.34  (0.93-1.08)  H*  11/06/17  07:48    














- Constitutional


Appears: Non-toxic





- Head Exam


Head Exam: NORMAL INSPECTION





- ENT Exam


ENT Exam: Mucous Membranes Moist





- Neck Exam


Neck Exam: absent: Meningismus





- Respiratory Exam


Respiratory Exam: Decreased Breath Sounds





- Cardiovascular Exam


Cardiovascular Exam: +S1, +S2





- GI/Abdominal Exam


GI & Abdominal Exam: Soft.  absent: Tenderness





Assessment and Plan





- Assessment and Plan (Free Text)


Plan: 





Assessment


sytemic inflammatory response syndrome, consider due to congestive heart failure

, R/O HCAP


severe mitral regurgitation S/P mitral valve replacement


atrial fibrillation


asthma


HTN


esophageal ulcers


carpal tunnel syndrome


COPD


dyslipidemia


atrial fibrillation


S/P cholecystectomy


S/P foot surgery





Plan


sputum cx only showing normal oral lata; PCT is low; continue Doxycycline day 

6 - continue for 1-3 more days 


will continue to follow clinically

## 2017-11-08 LAB
ALBUMIN/GLOB SERPL: 0.9 {RATIO} (ref 1.1–1.8)
ALP SERPL-CCNC: 122 U/L (ref 38–126)
ALT SERPL-CCNC: 35 U/L (ref 7–56)
AST SERPL-CCNC: 42 U/L (ref 14–36)
BILIRUB SERPL-MCNC: 0.5 MG/DL (ref 0.2–1.3)
BUN SERPL-MCNC: 26 MG/DL (ref 7–21)
CALCIUM SERPL-MCNC: 9.2 MG/DL (ref 8.4–10.5)
CHLORIDE SERPL-SCNC: 106 MMOL/L (ref 98–107)
CO2 SERPL-SCNC: 29 MMOL/L (ref 21–33)
ERYTHROCYTE [DISTWIDTH] IN BLOOD BY AUTOMATED COUNT: 14.8 % (ref 11.5–14.5)
GLOBULIN SER-MCNC: 3.2 GM/DL
GLUCOSE SERPL-MCNC: 132 MG/DL (ref 70–110)
HCT VFR BLD CALC: 27.2 % (ref 36–48)
INR PPP: 2.24 (ref 0.93–1.08)
MAGNESIUM SERPL-MCNC: 1.2 MG/DL (ref 1.7–2.2)
MCH RBC QN AUTO: 29 PG (ref 25–35)
MCHC RBC AUTO-ENTMCNC: 32.4 G/DL (ref 31–37)
MCV RBC AUTO: 89.8 FL (ref 80–105)
PHOSPHATE SERPL-MCNC: 3.7 MG/DL (ref 2.5–4.5)
PLATELET # BLD: 505 10^3/UL (ref 120–450)
PMV BLD AUTO: 8.7 FL (ref 7–11)
POTASSIUM SERPL-SCNC: 3.3 MMOL/L (ref 3.6–5)
PROT SERPL-MCNC: 6.1 G/DL (ref 5.8–8.3)
SODIUM SERPL-SCNC: 141 MMOL/L (ref 132–148)
WBC # BLD AUTO: 10.6 10^3/UL (ref 4.5–11)

## 2017-11-08 RX ADMIN — IPRATROPIUM BROMIDE AND ALBUTEROL SULFATE SCH ML: .5; 3 SOLUTION RESPIRATORY (INHALATION) at 20:34

## 2017-11-08 RX ADMIN — POTASSIUM CHLORIDE SCH MEQ: 20 TABLET, EXTENDED RELEASE ORAL at 07:56

## 2017-11-08 RX ADMIN — OXYCODONE HYDROCHLORIDE AND ACETAMINOPHEN PRN TAB: 5; 325 TABLET ORAL at 21:22

## 2017-11-08 RX ADMIN — GUAIFENESIN SCH MG: 600 TABLET, EXTENDED RELEASE ORAL at 09:54

## 2017-11-08 RX ADMIN — POLYVINYL ALCOHOL SCH: 14 SOLUTION/ DROPS OPHTHALMIC at 17:14

## 2017-11-08 RX ADMIN — POLYVINYL ALCOHOL SCH DROP: 14 SOLUTION/ DROPS OPHTHALMIC at 22:14

## 2017-11-08 RX ADMIN — ACETYLCYSTEINE SCH ML: 200 INHALANT RESPIRATORY (INHALATION) at 20:34

## 2017-11-08 RX ADMIN — POLYVINYL ALCOHOL SCH: 14 SOLUTION/ DROPS OPHTHALMIC at 09:51

## 2017-11-08 RX ADMIN — PANTOPRAZOLE SODIUM SCH MG: 40 TABLET, DELAYED RELEASE ORAL at 05:49

## 2017-11-08 RX ADMIN — POLYVINYL ALCOHOL SCH: 14 SOLUTION/ DROPS OPHTHALMIC at 00:44

## 2017-11-08 RX ADMIN — IPRATROPIUM BROMIDE AND ALBUTEROL SULFATE SCH ML: .5; 3 SOLUTION RESPIRATORY (INHALATION) at 13:57

## 2017-11-08 RX ADMIN — THERA TABS SCH TAB: TAB at 07:59

## 2017-11-08 RX ADMIN — POLYVINYL ALCOHOL SCH: 14 SOLUTION/ DROPS OPHTHALMIC at 11:39

## 2017-11-08 RX ADMIN — Medication SCH MG: at 09:57

## 2017-11-08 RX ADMIN — POLYVINYL ALCOHOL SCH: 14 SOLUTION/ DROPS OPHTHALMIC at 13:22

## 2017-11-08 RX ADMIN — POLYVINYL ALCOHOL SCH: 14 SOLUTION/ DROPS OPHTHALMIC at 01:07

## 2017-11-08 RX ADMIN — GUAIFENESIN SCH MG: 600 TABLET, EXTENDED RELEASE ORAL at 17:21

## 2017-11-08 RX ADMIN — POLYVINYL ALCOHOL SCH: 14 SOLUTION/ DROPS OPHTHALMIC at 05:49

## 2017-11-08 RX ADMIN — ACETYLCYSTEINE SCH ML: 200 INHALANT RESPIRATORY (INHALATION) at 13:56

## 2017-11-08 RX ADMIN — IPRATROPIUM BROMIDE AND ALBUTEROL SULFATE SCH ML: .5; 3 SOLUTION RESPIRATORY (INHALATION) at 07:21

## 2017-11-08 RX ADMIN — ACETYLCYSTEINE SCH ML: 200 INHALANT RESPIRATORY (INHALATION) at 07:21

## 2017-11-08 NOTE — CP.PCM.PN
Subjective





- Date & Time of Evaluation


Date of Evaluation: 11/08/17


Time of Evaluation: 09:36





- Subjective


Subjective: 


Heme/Onc Progress Note for NASIR Hatfield PGY2





Patient seen and examined at bedside. As per nursing, there were no acute 

overnight events. Patient has been refusing IV access intermittently. She 

reports feeling well today. She denies chest pain, shortness of breath, nausea/

vomiting/diarrhea/constipation, fever/chills, dysuria or hematuria. 





Objective





- Vital Signs/Intake and Output


Vital Signs (last 24 hours): 


 











Temp Pulse Resp BP Pulse Ox


 


 98 F   86   20   130/79   100 


 


 11/07/17 17:07  11/08/17 07:55  11/07/17 17:07  11/08/17 07:57  11/07/17 17:07











- Medications


Medications: 


 Current Medications





Acetylcysteine (Acetylcysteine 20%)  2 ml IH Q6 OLMAN


   PRN Reason: Protocol


   Last Admin: 11/08/17 07:21 Dose:  2 ml


Albuterol/Ipratropium (Duoneb 3 Mg/0.5 Mg (3 Ml) Ud)  3 ml IH [id number] OLMAN


   PRN Reason: Protocol


   Last Admin: 11/08/17 07:21 Dose:  3 ml


Artificial Tears (Artificial Tears)  0 ml OU Q4H OLMAN


   PRN Reason: Protocol


   Last Admin: 11/08/17 05:49 Dose:  Not Given


Artificial Tears (Artificial Tears)  0 ml OP Q4 OLMAN


   Last Admin: 11/08/17 05:49 Dose:  Not Given


Aspirin (Ecotrin)  81 mg PO 0800 OLMAN


   PRN Reason: Protocol


   Last Admin: 11/08/17 07:56 Dose:  81 mg


Atorvastatin Calcium (Lipitor)  10 mg PO HS OLMAN


   PRN Reason: Protocol


   Last Admin: 11/07/17 21:05 Dose:  10 mg


Benzocaine/Menthol (Cepacol Sore Throat)  1 wade MT Q2 PRN


   PRN Reason: Sore Throat


   Last Admin: 11/06/17 21:29 Dose:  1 wade


Bisacodyl (Dulcolax)  10 mg W[place] DAILY PRN; Protocol


   PRN Reason: Constipation


Carvedilol (Coreg)  6.25 mg PO 0800,1800 OLMAN


   PRN Reason: Protocol


   Last Admin: 11/08/17 07:55 Dose:  6.25 mg


Colchicine (Colocrys)  0.6 mg PO DAILY OLMAN


   PRN Reason: Protocol


   Last Admin: 11/07/17 10:05 Dose:  0.6 mg


Diphenhydramine HCl (Benadryl)  25 mg PO HS PRN; Protocol


   PRN Reason: Insomnia


   Last Admin: 11/05/17 21:26 Dose:  25 mg


Doxycycline Hyclate (Doryx)  100 mg PO Q12 OLMAN


   PRN Reason: Protocol


   Stop: 11/11/17 22:01


   Last Admin: 11/07/17 21:05 Dose:  100 mg


Ergocalciferol (Drisdol 50,000 Intl Units Cap)  1 cap PO Q7D OLMAN


   PRN Reason: Protocol


Ferrous Sulfate (Feosol)  324 mg PO TID OLMAN


   Last Admin: 11/07/17 17:28 Dose:  324 mg


Furosemide (Lasix)  40 mg PO 0800,1800 OLMAN


   PRN Reason: Protocol


   Last Admin: 11/08/17 07:57 Dose:  40 mg


Guaifenesin (Mucinex La)  600 mg PO BID OLMAN


   PRN Reason: Protocol


   Last Admin: 11/07/17 17:29 Dose:  600 mg


Hydroxyzine HCl (Atarax)  25 mg PO Q6H PRN; Protocol


   PRN Reason: Allergy symptoms


Iron Sucrose 200 mg/ Sodium (Chloride)  110 mls @ 110 mls/hr IVPB Q2D OLMAN


   Stop: 11/10/17 12:14


   Last Admin: 11/06/17 11:36 Dose:  110 mls/hr


Levalbuterol HCl (Xopenex)  0.63 mg IH [id number] PRN; Protocol


   PRN Reason: Shortness of Breath


Lisinopril (Zestril)  5 mg PO DAILY OLMAN


   PRN Reason: Protocol


   Last Admin: 11/07/17 10:06 Dose:  5 mg


Loratadine (Claritin)  10 mg PO DAILY OLMAN


   PRN Reason: Protocol


   Last Admin: 11/07/17 10:05 Dose:  10 mg


Magnesium Oxide (Mag-Ox)  400 mg PO DAILY OLMAN


Montelukast Sodium (Singulair)  10 mg PO HS OLMAN


   PRN Reason: Protocol


   Last Admin: 11/07/17 21:05 Dose:  10 mg


Multivitamins (Thera Tab)  1 tab PO 0800 OLMAN


   Last Admin: 11/08/17 07:59 Dose:  1 tab


Oxycodone/Acetaminophen (Percocet 5/325 Mg Tab)  1 tab PO Q4H PRN; Protocol


   PRN Reason: Pain, moderate (4-7)


   Stop: 11/08/17 19:16


   Last Admin: 11/07/17 21:06 Dose:  1 tab


Pantoprazole Sodium (Protonix Ec Tab)  40 mg PO 0600 OLMAN


   PRN Reason: Protocol


   Last Admin: 11/08/17 05:49 Dose:  40 mg


Potassium Chloride (K-Dur 20 Meq Er Tab)  20 meq PO BRK OLMAN


   PRN Reason: Protocol


   Last Admin: 11/08/17 07:56 Dose:  20 meq


Prednisone (Prednisone Tab)  30 mg PO 0800 OLMAN


   PRN Reason: Protocol


   Last Admin: 11/08/17 07:58 Dose:  30 mg


Warfarin Sodium (Coumadin)  2 mg PO 1800 [facility]


   PRN Reason: Protocol











- Labs


Labs: 


 





 11/08/17 07:05 





 11/08/17 07:05 





 











PT  25.1 SECONDS (9.4-12.5)  H  11/08/17  07:05    


 


INR  2.24  (0.93-1.08)  H  11/08/17  07:05    














- Constitutional


Appears: No Acute Distress





- Head Exam


Head Exam: ATRAUMATIC, NORMAL INSPECTION, NORMOCEPHALIC





- Eye Exam


Eye Exam: Normal appearance, PERRL


Pupil Exam: PERRL





- ENT Exam


ENT Exam: Mucous Membranes Moist





- Respiratory Exam


Respiratory Exam: Clear to Ausculation Bilateral, NORMAL BREATHING PATTERN.  

absent: Rales, Rhonchi, Wheezes





- Cardiovascular Exam


Cardiovascular Exam: REGULAR RHYTHM, +S1, +S2.  absent: Gallop, Rubs, Murmur


Additional comments: 





midline sternal scarring at previous surgical site - clean and dry 





- GI/Abdominal Exam


GI & Abdominal Exam: Soft, Normal Bowel Sounds.  absent: Rigid, Tenderness, 

Rebound





- Extremities Exam


Extremities Exam: Normal Inspection.  absent: Calf Tenderness, Pedal Edema





- Neurological Exam


Neurological Exam: Alert, Awake, CN II-XII Intact, Oriented x3





- Skin


Skin Exam: Dry, Warm





Assessment and Plan





- Assessment and Plan (Free Text)


Assessment: 


This is a 61yo  female with past medical history of HTN, 

hyperlipidemia, atrial fibrillation, severe mitral valve regurgitation from 

history of mitral valve prolapse with recent mitral valve replacement (

bioprosthetic valve) at [facility] on 10/28/17 who was admitted for cough and 

shortness of breath. Patient was found to have acute on chronic decompensated 

CHF secondary to recent mitral valve replacement surgery (POD#10) and pneumonia 

who was transferred to TCU for further strengthening. Patient was found to have 

anemia on admission. She was placed on Coumadin with supratherapeutic INR 

without overt signs of bleeding. Patient is also noted to have mild 

thrombocytosis as well. 





Anemia can be secondary to blood loss from surgery versus blood loss from 

elevated INR (unlikely). Thrombocytosis can be reactive from surgery versus 

iron deficiency anemia. With elevated thrombocytosis >600, patient can be at 

risk for small strokes. 





Plan: 


- Patient has limited IV access


- Continue venofer if pt has access


- Continue PO Iron with Dulcolax prn 


- Patient Hgb stable at 8.8- no overt signs of bleeding


- Will set up patient to have outpatient transfusion of 2U PRBC with goal Hgb>

10. 


- INR now 2.4 which is therapeutic


- Patient will restart Coumadin as per Cardiology with goal of 2-3 


- Continue ASA





Case seen, discussed and reviewed with Dr. Alatorre. 


NASIR Marie PGY2

## 2017-11-08 NOTE — PN
DATE:  11/07/2017



REASON FOR CONSULTATION AND FOLLOWUP:  Status post mitral valve

bioprosthetic replacement, history of paroxysmal atrial fibrillation,

admitted with cough and shortness of breath postop from rehab facility.



SUBJECTIVE:  The patient denies any chest pain, shortness of breath or any

palpitation, feels a lot better.



OBJECTIVE:

GENERAL:  Lying flat in the bed, not in apparent distress.

VITAL SIGNS:  As follows; temperature afebrile, heart rate 86 and blood

pressure 137/90.

HEENT:  PERRLA intact.

NECK:  Supple.  No carotid bruit or thyromegaly.

CHEST:  Clear to auscultation.

HEART:  S1 and S2 regular.

ABDOMEN:  Soft.

EXTREMITIES:  Clubbing and cyanosis negative.



LABORATORY DATA:  Blood workup as follows; WBC 10.6, hemoglobin 8.8,

hematocrit 27.2, and platelet count 505.  Sodium 140, potassium 3.2,

chloride 106, carbon dioxide 22, anion gap of 9, BUN 26 and creatinine 0.9.

Total protein 6.9, albumin 2.9, albumin-globulin ratio 0.9.



IMPRESSION:  Anemia postop hypokalemia, protein-calorie malnutrition, which

was present on admission to Transitional Care Unit, history of severe

mitral regurgitation, mild  nonobstructive coronary artery disease, status

post cardiac catheterization twice, last one before the open heart surgery,

status post open heart surgery for mitral valve replacement bioprosthetic,

and paroxysmal atrial fibrillation of the normal sinus.



RECOMMENDATIONS:  Supplement potassium.  Increase nutrition support. 

Continue Coreg, multivitamins and iron added to the current regimen. 

Supplement potassium today.  Increase nutritional support and we will

restart low dose of Coumadin because history of recent valve replacement,

history of paroxysmal atrial fibrillation.  Now INR is 2.2, before INR was

supratherapeutic, so was held.  We will start low dose 2 mg and follow the

PT/INR.  Discussed with the patient at length.  Continue rehab and we will

add on some supplement for increase nutrition support for hypoproteinemia. 

We will add ensure pudding four times a day to increase nutrition support

for treatment of hypoproteinemia, which is moderate.  which has been

present since admission.



Thank you Dr. Dinh, for providing me the opportunity in taking care of

the patient, Malu Meeks.





__________________________________________

Ajay Gaxiola MD





DD:  11/08/2017 8:39:57

DT:  11/08/2017 9:13:26

[facility] # [id number]

## 2017-11-08 NOTE — PN
DATE:  11/07/2017



SUBJECTIVE:  The patient has problem with IV access, could not get IV into

her arm.  Currently, she is on p.o. iron.  She is on nebulizer treatment

and her IV steroids were later changed to p.o. prednisone.  She is better

clinically.  She is still having some cough associated with midline chest

pain due to surgery.  She is afebrile.  No nausea.  No vomiting.  Her INR

is elevated.  We are going to hold the Coumadin and we will repeat the

PT/INR in the morning.



PHYSICAL EXAMINATION:

VITAL SIGNS:  Temperature 98, heart rate 90, blood pressure 116/78,

respirations 20, and saturation 100%.

HEAD/NECK:  Normal.

CHEST:  Clear.

CARDIAC:  First sound and second sound normal.

ABDOMEN:  Soft, obese, and nontender.

EXTREMITIES:  No edema.

NEUROLOGIC:  Normal.



LABORATORY DATA:  The patient's laboratory studies on 11/07/2017 was noted

for PT/INR, PT 36.2 and INR is 3.21.



IMPRESSION AND PLAN:

1.  Anemia, postoperative.  We will continue iron.  We will try to get IV

access for IV iron, if not we will continue p.o.

2.  Paroxysmal atrial fibrillation, status post mitral valve replacement,

biologic valve, stable.  Echocardiogram shows valve in the good position;

no paravalvular leak and no pericardial effusion.  We will continue

Coumadin.  INR repeated in the morning.

3.  Nosocomial infections and respiratory infections.  We will continue

current therapy.  We will follow up with ID consult with further

recommendations.

4.  Intravenous problem.  We may need to switch to p.o.

5.  Generalized weakness and decondition.  Continue physical therapy.

6.  Chronic obstructive pulmonary disease with acute exacerbation. 

Continue p.o. prednisone.  Continue inhaled bronchodilators.

7.  Hypertension and hypercholesterolemia.  Continue Lipitor.  Continue

current medications.

8.  Congestive heart failure, probably diastolic.  She had good left

ventricular functions and continue Lasix for now.  We will follow up with

other consultant.





__________________________________________

Philip Dinh MD





DD:  11/08/2017 9:48:43

DT:  11/08/2017 11:27:27

Job # [id number]

## 2017-11-08 NOTE — HP
REASON FOR ADMISSION:

1.  Generalized weakness.

2.  Status post mitral valve replacement, needs physical therapy and also

nosocomial infection, needs IV antibiotics.



HISTORY OF PRESENT ILLNESS:  This is a 62-year-old female was operated at

[facility] for valve replacement.  Patient's echo done on the valve, seems

okay.  She came in with symptoms of upper respiratory infections, cough,

difficulty getting the sputum out, chest pain with cough and patient was

admitted with nosocomial infections.  She is seen by Pulmonary consult Dr. Wisdmo, ID consult Dr. Macdonald.  Patient was having heavy and difficulty

breathing, short of breath, could not stop coughing and she was feeling

more weak.  Patient admitted, given IV antibiotics.  We will continue

current antibiotics, chest CT, mucolytic agents Mucomyst, and continue

current therapy in TCU plus physical therapy.  She will be seen by the same

consult as in TCU also.  Patient denied any fever, any chills at this time

and we will continue physical therapy and current IV medications as before.

She also had anemia which _____ IV iron, may be transfusions; however, her

coronary artery was normal meaning no significant stenosis and may benefit

at least from iron infusion.



FAMILY HISTORY:  Significant for hypertension, cardiac disease, otherwise

negative.



SOCIAL HISTORY:  She does not smoke, does not drink.  She lives by herself.



REVIEW OF SYSTEMS:  As in the present illness in addition to gouty

arthritis.



PAST MEDICAL AND SURGICAL HISTORY:  As I mentioned, patient had valve

replacement recently, she had severe mitral regurgitation before, dyspnea

due to underlying COPD and also she had pseudogout arthritis, which is

treated with colchicine and Naprosyn.  She also had a couple of knee

aspirations, steroid injections, she is overweight, hypertension,

hypercholesterolemia.



PHYSICAL EXAMINATION:

VITAL SIGNS:  Temperature 98, heart rate 91, blood pressure 133/83,

saturation 93 liters, respirations 20 that is on room air.

HEAD AND NECK:  Normal.  No JVD.  No thyromegaly.

CHEST:  Few rales on the bases.

CARDIAC:  First sound and second sound normal.

ABDOMEN:  Soft, obese and nontender.

EXTREMITIES:  No edema.

NEUROLOGIC:  Normal.



LABORATORY DATA:  Her laboratory studies when she came in here, white count

9.9, hemoglobin 8.3, hematocrit 25.3 and platelets 545.  PT/INR; INR 4.3

which is high.  Chemistry noted for sodium 139, potassium 4.1, chloride

105, bicarb 79, BUN 28 and creatinine 1.1.  Her iron studies shows low

iron, low saturation, and low ferritin.  Blood sugar 172.  Patient is on IV

steroids.



Previous chest x-ray shows congestion consistent with congestive heart

failure.



IMPRESSION AND PLAN:

1.  Acute respiratory infection, nosocomial, continue Maxipime and

doxycycline.  Continue chest CT and mucolytic agents, acetylcysteine or

Mucomyst q. 6 hours.

2.  Congestive heart failure, probably fluid overload or diastolic heart

failure, continue Lasix.  Patient feels better.

3.  Hypercholesterolemia.

4.  Hypertension.

5.  Chronic anxiety.

6.  Plan to continue Zestril, continue Lipitor, continue aspirin, followup

clinically.

7.  History of paroxysmal atrial fibrillation, status post valve

replacement.  Continue Coumadin.  We will adjust Coumadin.  Hold off on

Coumadin today due to high INR and repeated in the morning.  Continue

current medications.  Followup with other consultants.

8.  Generalized weakness and deconditioning.  Continue physical therapy and

followup clinically.





__________________________________________

Philip Dinh MD



DD:  11/07/2017 9:06:55

DT:  11/07/2017 11:40:32

Job # [id number]

## 2017-11-09 RX ADMIN — IPRATROPIUM BROMIDE AND ALBUTEROL SULFATE SCH ML: .5; 3 SOLUTION RESPIRATORY (INHALATION) at 07:44

## 2017-11-09 RX ADMIN — POLYVINYL ALCOHOL SCH: 14 SOLUTION/ DROPS OPHTHALMIC at 00:48

## 2017-11-09 RX ADMIN — IPRATROPIUM BROMIDE AND ALBUTEROL SULFATE SCH ML: .5; 3 SOLUTION RESPIRATORY (INHALATION) at 13:28

## 2017-11-09 RX ADMIN — POTASSIUM CHLORIDE SCH MEQ: 20 TABLET, EXTENDED RELEASE ORAL at 07:59

## 2017-11-09 RX ADMIN — IPRATROPIUM BROMIDE AND ALBUTEROL SULFATE SCH: .5; 3 SOLUTION RESPIRATORY (INHALATION) at 01:30

## 2017-11-09 RX ADMIN — THERA TABS SCH TAB: TAB at 08:00

## 2017-11-09 RX ADMIN — Medication SCH MG: at 10:23

## 2017-11-09 RX ADMIN — ACETYLCYSTEINE SCH ML: 200 INHALANT RESPIRATORY (INHALATION) at 07:43

## 2017-11-09 RX ADMIN — ACETYLCYSTEINE SCH: 200 INHALANT RESPIRATORY (INHALATION) at 01:30

## 2017-11-09 RX ADMIN — POLYVINYL ALCOHOL SCH DROP: 14 SOLUTION/ DROPS OPHTHALMIC at 17:00

## 2017-11-09 RX ADMIN — POLYVINYL ALCOHOL SCH: 14 SOLUTION/ DROPS OPHTHALMIC at 05:53

## 2017-11-09 RX ADMIN — PANTOPRAZOLE SODIUM SCH MG: 40 TABLET, DELAYED RELEASE ORAL at 05:22

## 2017-11-09 RX ADMIN — GUAIFENESIN SCH MG: 600 TABLET, EXTENDED RELEASE ORAL at 18:04

## 2017-11-09 RX ADMIN — ACETYLCYSTEINE SCH ML: 200 INHALANT RESPIRATORY (INHALATION) at 13:29

## 2017-11-09 RX ADMIN — POLYVINYL ALCOHOL SCH DROP: 14 SOLUTION/ DROPS OPHTHALMIC at 13:00

## 2017-11-09 RX ADMIN — ACETYLCYSTEINE SCH ML: 200 INHALANT RESPIRATORY (INHALATION) at 20:04

## 2017-11-09 RX ADMIN — IPRATROPIUM BROMIDE AND ALBUTEROL SULFATE SCH ML: .5; 3 SOLUTION RESPIRATORY (INHALATION) at 20:04

## 2017-11-09 RX ADMIN — POTASSIUM CHLORIDE SCH MEQ: 20 TABLET, EXTENDED RELEASE ORAL at 18:03

## 2017-11-09 RX ADMIN — PURIFIED WATER PRN LOZ: 99.05 LIQUID OPHTHALMIC at 21:18

## 2017-11-09 RX ADMIN — POLYVINYL ALCOHOL SCH DROP: 14 SOLUTION/ DROPS OPHTHALMIC at 21:16

## 2017-11-09 RX ADMIN — OXYCODONE HYDROCHLORIDE AND ACETAMINOPHEN PRN TAB: 5; 325 TABLET ORAL at 21:20

## 2017-11-09 RX ADMIN — POLYVINYL ALCOHOL SCH DROP: 14 SOLUTION/ DROPS OPHTHALMIC at 07:56

## 2017-11-09 RX ADMIN — GUAIFENESIN SCH MG: 600 TABLET, EXTENDED RELEASE ORAL at 10:23

## 2017-11-09 NOTE — CP.PCM.PN
Subjective





- Date & Time of Evaluation


Date of Evaluation: 11/09/17


Time of Evaluation: 10:30





- Subjective


Subjective: 





Comfortable, no fevers, not in distress, no SOB at rest.





Objective





- Vital Signs/Intake and Output


Vital Signs (last 24 hours): 


 











Temp Pulse Resp BP Pulse Ox


 


 98 F   100 H  16   128/76   92 L


 


 11/08/17 16:00  11/09/17 07:57  11/08/17 16:00  11/09/17 07:57  11/08/17 10:00








Intake and Output: 


 











 11/09/17 11/09/17





 06:59 18:59


 


Intake Total  420


 


Balance  420














- Medications


Medications: 


 Current Medications





Acetylcysteine (Acetylcysteine 20%)  2 ml IH Q6 OLMAN


   PRN Reason: Protocol


   Last Admin: 11/09/17 07:43 Dose:  2 ml


Albuterol/Ipratropium (Duoneb 3 Mg/0.5 Mg (3 Ml) Ud)  3 ml IH [id number] OLMAN


   PRN Reason: Protocol


   Last Admin: 11/09/17 07:44 Dose:  3 ml


Artificial Tears (Artificial Tears)  0 ml OP Q4 OLMAN


   Last Admin: 11/09/17 07:56 Dose:  1 drop


Aspirin (Ecotrin)  81 mg PO 0800 OLMAN


   PRN Reason: Protocol


   Last Admin: 11/09/17 07:57 Dose:  81 mg


Atorvastatin Calcium (Lipitor)  10 mg PO HS OLMAN


   PRN Reason: Protocol


   Last Admin: 11/08/17 21:22 Dose:  10 mg


Benzocaine/Menthol (Cepacol Sore Throat)  1 wade MT Q2 PRN


   PRN Reason: Sore Throat


   Last Admin: 11/06/17 21:29 Dose:  1 wade


Bisacodyl (Dulcolax)  10 mg [place] DAILY PRN; Protocol


   PRN Reason: Constipation


Carvedilol (Coreg)  6.25 mg PO 0800,1800 OLMAN


   PRN Reason: Protocol


   Last Admin: 11/09/17 07:57 Dose:  6.25 mg


Colchicine (Colocrys)  0.6 mg PO DAILY OLMAN


   PRN Reason: Protocol


   Last Admin: 11/08/17 09:52 Dose:  0.6 mg


Diphenhydramine HCl (Benadryl)  25 mg PO HS PRN; Protocol


   PRN Reason: Insomnia


   Last Admin: 11/05/17 21:26 Dose:  25 mg


Doxycycline Hyclate (Doryx)  100 mg PO Q12 OLMAN


   PRN Reason: Protocol


   Stop: 11/11/17 22:01


   Last Admin: 11/08/17 21:21 Dose:  100 mg


Ergocalciferol (Drisdol 50,000 Intl Units Cap)  1 cap PO Q7D OLMAN


   PRN Reason: Protocol


   Last Admin: 11/09/17 08:00 Dose:  1 cap


Ferrous Sulfate (Feosol)  324 mg PO TID [facility]


   Last Admin: 11/08/17 17:20 Dose:  324 mg


Furosemide (Lasix)  40 mg PO 0800,1800 OLMAN


   PRN Reason: Protocol


   Last Admin: 11/09/17 07:57 Dose:  40 mg


Guaifenesin (Mucinex La)  600 mg PO BID OLMAN


   PRN Reason: Protocol


   Last Admin: 11/08/17 17:21 Dose:  600 mg


Hydroxyzine HCl (Atarax)  25 mg PO Q6H PRN; Protocol


   PRN Reason: Allergy symptoms


Iron Sucrose 200 mg/ Sodium (Chloride)  110 mls @ 110 mls/hr IVPB Q2D [facility]


   Stop: 11/10/17 12:14


   Last Admin: 11/08/17 11:40 Dose:  110 mls/hr


Levalbuterol HCl (Xopenex)  0.63 mg IH [id number] PRN; Protocol


   PRN Reason: Shortness of Breath


Lisinopril (Zestril)  5 mg PO DAILY [facility]


   PRN Reason: Protocol


   Last Admin: 11/08/17 09:55 Dose:  5 mg


Loratadine (Claritin)  10 mg PO DAILY OLMAN


   PRN Reason: Protocol


   Last Admin: 11/08/17 09:52 Dose:  10 mg


Magnesium Oxide (Mag-Ox)  400 mg PO DAILY [facility]


   Last Admin: 11/08/17 09:57 Dose:  400 mg


Montelukast Sodium (Singulair)  10 mg PO HS [facility]


   PRN Reason: Protocol


   Last Admin: 11/08/17 21:22 Dose:  10 mg


Multivitamins (Thera Tab)  1 tab PO 0800 [facility]


   Last Admin: 11/09/17 08:00 Dose:  1 tab


Oxycodone/Acetaminophen (Percocet 5/325 Mg Tab)  1 tab PO Q4H PRN; Protocol


   PRN Reason: Pain, moderate (4-7)


   Stop: 11/11/17 20:02


   Last Admin: 11/08/17 21:22 Dose:  1 tab


Pantoprazole Sodium (Protonix Ec Tab)  40 mg PO 0600 OLMAN


   PRN Reason: Protocol


   Last Admin: 11/09/17 05:22 Dose:  40 mg


Potassium Chloride (K-Dur 20 Meq Er Tab)  20 meq PO BRK OLMAN


   PRN Reason: Protocol


   Last Admin: 11/09/17 07:59 Dose:  20 meq


Prednisone (Prednisone Tab)  20 mg PO 0800 OLMAN


   PRN Reason: Protocol


   Last Admin: 11/09/17 07:59 Dose:  20 mg


Warfarin Sodium (Coumadin)  2 mg PO 1800 OLMAN


   PRN Reason: Protocol


   Last Admin: 11/08/17 17:20 Dose:  2 mg











- Labs


Labs: 


 





 11/08/17 07:05 





 11/08/17 07:05 





 











PT  25.1 SECONDS (9.4-12.5)  H  11/08/17  07:05    


 


INR  2.24  (0.93-1.08)  H  11/08/17  07:05    














- Constitutional


Appears: Non-toxic, No Acute Distress





- Head Exam


Head Exam: NORMAL INSPECTION





- ENT Exam


ENT Exam: Mucous Membranes Moist





- Neck Exam


Neck Exam: absent: Lymphadenopathy, Meningismus





- Respiratory Exam


Respiratory Exam: Decreased Breath Sounds





- Cardiovascular Exam


Cardiovascular Exam: +S1, +S2





- GI/Abdominal Exam


GI & Abdominal Exam: Soft.  absent: Tenderness





Assessment and Plan





- Assessment and Plan (Free Text)


Plan: 








Assessment


sytemic inflammatory response syndrome, consider due to congestive heart failure

, R/O HCAP


severe mitral regurgitation S/P mitral valve replacement


atrial fibrillation


asthma


HTN


esophageal ulcers


carpal tunnel syndrome


COPD


dyslipidemia


atrial fibrillation


S/P cholecystectomy


S/P foot surgery





Plan


sputum cx only showing normal oral lata; PCT is low; continue Doxycycline day 

8 - will d/c after today 


will continue to monitor clinically

## 2017-11-09 NOTE — PN
DATE:  11/08/2017



SUBJECTIVE:  The patient is seen sitting in chair.  She is awake.  She is

alert.  She complains of cough.  She complains of white phlegm.  She

complains of dyspnea on exertion.  She denies any chest tightness,

palpitations.



PHYSICAL EXAMINATION:

GENERAL:  An elderly lady sitting in chair.

VITAL SIGNS:  Blood pressure 105/75, heart rate 74, respiratory rate 18,

temperature 98.

HEENT:  Normocephalic, atraumatic.

NECK:  Supple, no JVD.

LUNGS:  Bilateral equal entry, no rales.

CARDIAC:  S1 and S2, regular rate and rhythm, no rub, no murmur.

ABDOMEN:  Soft, nondistended, nontender, bowel sounds present.

EXTREMITIES:  No lower extremity edema.



INTAKE AND OUTPUT:  Not charted.



LABORATORY DATA:  WBC 10.6, hemoglobin 8.8, hematocrit 27, platelets 505. 

Sodium 141, potassium 3.3, chloride 106, CO2 of 29, BUN 26, creatinine 0.9,

glucose 132.



ASSESSMENT:

1.  Hypokalemia.

2.  Severe anemia.

3.  Left ventricular hypertrophy.

4.  Paroxysmal atrial fibrillation.

5.  Resolved acute kidney injury/prerenal azotemia.



PLAN:

1.  Replace potassium.

2.  Continue antibiotics.

3.  Push p.o. fluids.

4.  Continue Lasix.

5.  Avoid nephrotoxins.





__________________________________________

Archana Leong MD



DD:  11/09/2017 11:46:53

DT:  11/09/2017 11:50:00

Job # [id number]

## 2017-11-09 NOTE — PN
DATE:  11/09/2017



SUBJECTIVE:  The patient is seen, sitting in chair.  She is awake.  She is

alert.  She is comfortable.  She is complaining of cough.  She is bringing

a lot of white phlegm.



PHYSICAL EXAMINATION:

GENERAL:  Elderly lady, sitting in chair.

VITAL SIGNS:  Blood pressure _____/72, heart rate 120, respiratory rate 16,

temperature 98.

HEENT:  Normocephalic, atraumatic.

NECK:  Supple, no JVD.

LUNGS:  Bilateral rhonchi, expiratory wheeze.

CARDIAC:  S1 and S2, regular rate and rhythm, no murmur, no rub.

ABDOMEN:  Obese, distended, soft, nontender, bowel sounds are present.

EXTREMITIES:  No lower extremity edema.



LABORATORY DATA:  No new labs today.



ASSESSMENT:

1.  Acute kidney injury/prerenal azotemia, resolved.

2.  Hypokalemia secondary to diuretics.

3.  Pneumonia.

4.  Congestive heart failure.

5.  Severe anemia.

6.  Paroxysmal atrial fibrillation.



PLAN:

1.  Continue to replace potassium.

2.  Complete antibiotics.

3.  Continue Lasix.

4.  Continue Coreg for rate control, continue anticoagulation.







__________________________________________

Archana Leong MD





DD:  11/09/2017 15:15:31

DT:  11/09/2017 15:18:05

Job # [id number]

## 2017-11-09 NOTE — PN
DATE:  11/08/2017



PULMONARY PROGRESS NOTE



REFERRING PHYSICIAN:  Philip Dinh MD



SUBJECTIVE:  The patient is sitting up in a chair, night was unremarkable. 

Feels much better.  Still has some cough and sputum production.  No nausea,

no vomiting, no diarrhea, leg pain or leg swelling.



OBJECTIVE:

GENERAL:  In no acute distress.

VITAL SIGNS:  Temperature 98, heart rate 86, respiratory rate 20, blood

pressure 130/79, and pulse oximetry 100% on nasal cannula.

HEENT:  Moist mucous membranes.  No ulcer or thrush.

NECK:  Supple.  No JVD.

LUNGS:  Fair airflow without rhonchi.

HEART:  S1 and S2.

ABDOMEN:  Soft and nontender.  No organomegaly.

EXTREMITIES:  No edema.

NEUROLOGIC:  Alert, awake, follow simple commands.



MEDICATIONS:  She is on Mucomyst 20% inhale q.6 hours, artificial tears to

both eyes, Atarax 25 mg q.6 hours p.r.n., Benadryl 25 mg at bedtime p.r.n.,

Cepacol lozenges q.2 hours p.r.n., Claritin 10 mg daily, colchicine 0.6 mg

daily, Coreg 6.25 mg twice a day, Coumadin 2 mg will be given, doxycycline

100 mg twice a day, vitamin D 50,000 units q.7 days, Dulcolax 10 mg p.r.n.,

DuoNeb q.6 hours, Ecotrin 81 mg daily, ferrous sulfate 324 mg 3 times a

day, IV iron, also on potassium 20 mEq daily, Lasix is at 40 mg twice a

day, Lipitor 10 mg daily, magnesium oxide 400 mg daily, Mucinex  mg

twice a day, Percocet 5/325 one tablet q.4 hours p.r.n., prednisone 30 mg

daily, Protonix 40 mg daily, Singulair 10 mg daily, multivitamins daily,

Xopenex inhale on a p.r.n. basis, and Zestril 5 mg daily.



LABORATORY DATA:  Shows hemoglobin 8.8, hematocrit 27.2, WBC 10.6, and

platelets 505.  INR of 3.24.



Sodium 141, potassium 3.3, chloride 106, bicarbonate 29, BUN 26, creatinine

0.9, glucose 131, calcium 9.2, magnesium 1.2, AST 42, ALT 35, alkaline

phosphatase 122, and albumin 2.9.



IMPRESSION AND PLAN:  Chronic obstructive lung disease, cardiomyopathy,

pulmonary infiltrate, may have a sleep apnea syndrome, and atrial

fibrillation.



From a pulmonary point of view she is doing okay.  Decrease prednisone to

20 mg.  Continue antibiotics.  Continue inhaled bronchodilator.



Gastric prophylaxis.  On anticoagulation.  Fall precautions.  Outpatient

sleep study and PFT.



Thank you and we will follow with you.





__________________________________________

Ajay Wisdom MD





DD:  11/08/2017 14:53:29

DT:  11/08/2017 15:45:57

Job # [id number]

## 2017-11-09 NOTE — CP.PCM.PN
Subjective





- Date & Time of Evaluation


Date of Evaluation: 11/09/17


Time of Evaluation: 09:23





- Subjective


Subjective: 


Heme/Onc Progress Note for NASIR Hatfield PGY2





Patient seen and examined at bedside. I spoke with nursing staff who did not 

report any acute overnight events. Patient was found to have an empty pizza box 

in her room. I discussed with her the importance of low salt, low fat diet 

considering her recent open heart surgery. Patient reports she would like to 

talk to a nutritionist to learn about what her diet should be with her cardiac 

history. Otherwise she feels well. She denies chest pain, shortness of breath, 

nausea/vomiting/diarrhea, fever/chills, numbness or tingling. 





Objective





- Vital Signs/Intake and Output


Vital Signs (last 24 hours): 


 











Temp Pulse Resp BP Pulse Ox


 


 98 F   100 H  16   128/76   92 L


 


 11/08/17 16:00  11/09/17 07:57  11/08/17 16:00  11/09/17 07:57  11/08/17 10:00








Intake and Output: 


 











 11/09/17 11/09/17





 06:59 18:59


 


Intake Total  420


 


Balance  420














- Medications


Medications: 


 Current Medications





Acetylcysteine (Acetylcysteine 20%)  2 ml IH Q6 OLMAN


   PRN Reason: Protocol


   Last Admin: 11/09/17 07:43 Dose:  2 ml


Albuterol/Ipratropium (Duoneb 3 Mg/0.5 Mg (3 Ml) Ud)  3 ml IH [id number] OLMAN


   PRN Reason: Protocol


   Last Admin: 11/09/17 07:44 Dose:  3 ml


Artificial Tears (Artificial Tears)  0 ml OP Q4 OLMAN


   Last Admin: 11/09/17 07:56 Dose:  1 drop


Aspirin (Ecotrin)  81 mg PO 0800 OLMAN


   PRN Reason: Protocol


   Last Admin: 11/09/17 07:57 Dose:  81 mg


Atorvastatin Calcium (Lipitor)  10 mg PO HS OLMAN


   PRN Reason: Protocol


   Last Admin: 11/08/17 21:22 Dose:  10 mg


Benzocaine/Menthol (Cepacol Sore Throat)  1 wade MT Q2 PRN


   PRN Reason: Sore Throat


   Last Admin: 11/06/17 21:29 Dose:  1 wade


Bisacodyl (Dulcolax)  10 mg [place] DAILY PRN; Protocol


   PRN Reason: Constipation


Carvedilol (Coreg)  6.25 mg PO 0800,1800 OLMAN


   PRN Reason: Protocol


   Last Admin: 11/09/17 07:57 Dose:  6.25 mg


Colchicine (Colocrys)  0.6 mg PO DAILY OLMAN


   PRN Reason: Protocol


   Last Admin: 11/08/17 09:52 Dose:  0.6 mg


Diphenhydramine HCl (Benadryl)  25 mg PO HS PRN; Protocol


   PRN Reason: Insomnia


   Last Admin: 11/05/17 21:26 Dose:  25 mg


Doxycycline Hyclate (Doryx)  100 mg PO Q12 OLMAN


   PRN Reason: Protocol


   Stop: 11/11/17 22:01


   Last Admin: 11/08/17 21:21 Dose:  100 mg


Ergocalciferol (Drisdol 50,000 Intl Units Cap)  1 cap PO Q7D OLMAN


   PRN Reason: Protocol


   Last Admin: 11/09/17 08:00 Dose:  1 cap


Ferrous Sulfate (Feosol)  324 mg PO TID [facility]


   Last Admin: 11/08/17 17:20 Dose:  324 mg


Furosemide (Lasix)  40 mg PO 0800,1800 OLMAN


   PRN Reason: Protocol


   Last Admin: 11/09/17 07:57 Dose:  40 mg


Guaifenesin (Mucinex La)  600 mg PO BID OLMAN


   PRN Reason: Protocol


   Last Admin: 11/08/17 17:21 Dose:  600 mg


Hydroxyzine HCl (Atarax)  25 mg PO Q6H PRN; Protocol


   PRN Reason: Allergy symptoms


Iron Sucrose 200 mg/ Sodium (Chloride)  110 mls @ 110 mls/hr IVPB Q2D [facility]


   Stop: 11/10/17 12:14


   Last Admin: 11/08/17 11:40 Dose:  110 mls/hr


Levalbuterol HCl (Xopenex)  0.63 mg IH [id number] PRN; Protocol


   PRN Reason: Shortness of Breath


Lisinopril (Zestril)  5 mg PO DAILY [facility]


   PRN Reason: Protocol


   Last Admin: 11/08/17 09:55 Dose:  5 mg


Loratadine (Claritin)  10 mg PO DAILY OLMAN


   PRN Reason: Protocol


   Last Admin: 11/08/17 09:52 Dose:  10 mg


Magnesium Oxide (Mag-Ox)  400 mg PO DAILY [facility]


   Last Admin: 11/08/17 09:57 Dose:  400 mg


Montelukast Sodium (Singulair)  10 mg PO HS [facility]


   PRN Reason: Protocol


   Last Admin: 11/08/17 21:22 Dose:  10 mg


Multivitamins (Thera Tab)  1 tab PO 0800 [facility]


   Last Admin: 11/09/17 08:00 Dose:  1 tab


Oxycodone/Acetaminophen (Percocet 5/325 Mg Tab)  1 tab PO Q4H PRN; Protocol


   PRN Reason: Pain, moderate (4-7)


   Stop: 11/11/17 20:02


   Last Admin: 11/08/17 21:22 Dose:  1 tab


Pantoprazole Sodium (Protonix Ec Tab)  40 mg PO 0600 [facility]


   PRN Reason: Protocol


   Last Admin: 11/09/17 05:22 Dose:  40 mg


Potassium Chloride (K-Dur 20 Meq Er Tab)  20 meq PO BRK [facility]


   PRN Reason: Protocol


   Last Admin: 11/09/17 07:59 Dose:  20 meq


Prednisone (Prednisone Tab)  20 mg PO 0800 [facility]


   PRN Reason: Protocol


   Last Admin: 11/09/17 07:59 Dose:  20 mg


Warfarin Sodium (Coumadin)  2 mg PO 1800 [facility]


   PRN Reason: Protocol


   Last Admin: 11/08/17 17:20 Dose:  2 mg











- Labs


Labs: 


 





 11/08/17 07:05 





 11/08/17 07:05 





 











PT  25.1 SECONDS (9.4-12.5)  H  11/08/17  07:05    


 


INR  2.24  (0.93-1.08)  H  11/08/17  07:05    














- Constitutional


Appears: No Acute Distress





- Head Exam


Head Exam: ATRAUMATIC, NORMAL INSPECTION, NORMOCEPHALIC





- Eye Exam


Eye Exam: Normal appearance, PERRL


Pupil Exam: NORMAL ACCOMODATION, PERRL





- ENT Exam


ENT Exam: Mucous Membranes Moist





- Neck Exam


Neck Exam: Full ROM





- Respiratory Exam


Respiratory Exam: Clear to Ausculation Bilateral, NORMAL BREATHING PATTERN.  

absent: Rales, Rhonchi, Wheezes





- Cardiovascular Exam


Cardiovascular Exam: REGULAR RHYTHM, +S1, +S2.  absent: Gallop, Murmur





- GI/Abdominal Exam


GI & Abdominal Exam: Soft, Normal Bowel Sounds.  absent: Rigid, Tenderness, 

Rebound





- Extremities Exam


Extremities Exam: Normal Inspection.  absent: Calf Tenderness, Pedal Edema





- Neurological Exam


Neurological Exam: Alert, Awake, CN II-XII Intact, Oriented x3





- Psychiatric Exam


Psychiatric exam: Normal Affect, Normal Mood





- Skin


Skin Exam: Dry, Warm





Assessment and Plan





- Assessment and Plan (Free Text)


Assessment: 


This is a 63yo  female with past medical history of HTN, 

hyperlipidemia, atrial fibrillation, severe mitral valve regurgitation from 

history of mitral valve prolapse with recent mitral valve replacement (

bioprosthetic valve) at [facility] on 10/28/17 who was admitted for cough and 

shortness of breath. Patient was found to have acute on chronic decompensated 

CHF secondary to recent mitral valve replacement surgery (POD#11) and pneumonia 

who was transferred to TCU for further strengthening. Patient was found to have 

anemia on admission. She was placed on Coumadin with supratherapeutic INR 

without overt signs of bleeding. Patient is also noted to have mild 

thrombocytosis as well. 





Anemia can be secondary to blood loss from surgery versus blood loss from 

elevated INR (unlikely). 


Thrombocytosis (improved) can be reactive from surgery versus iron deficiency 

anemia. 


Plan: 


- Hgb stable without overt signs of bleeding. 


- Pt received Venofer yesterday 


- Continue Iron PO


- Pt now on Coumadin- Continue to monitor INR with goal between 2-3


- Patient will have outpatient transfusion of 2U PRBC to maintain Hgb>10. It 

will most likely be scheduled next week.  





Case seen, discussed and reviewed with Dr. Alatorre. 


NASIR Marie PGY2

## 2017-11-09 NOTE — PN
DATE:  11/09/2017



LOCATION:  The patient in room 321, bed 1.



REASON FOR CONSULTATION AND FOLLOWUP:  Status post mitral valve

bioprosthetic replacement, history of paroxysmal atrial fibrillation,

admitted with cough and shortness of breath, postop from rehab facility,

also complaining chest pain, which is a localized musculoskeletal pain at

the site of the _____.



SUBJECTIVE:  The patient is sitting in chair comfortably without any

shortness of breath or palpitation.  She still feels tenderness on the

chest wall with some pain at the site of the surgery.



PHYSICAL EXAMINATION:

VITAL SIGNS:  Blood pressure 113/77, respirations 16, pulse 113, earlier

pulse was 100, temperature 98.

HEENT:  Head is normocephalic.  Eyes:  Pupils are normal.  Conjunctivae

slightly pale.

NECK:  JVP low.  Carotids equal.

THORAX:  AP diameter normal.

LUNGS:  Clear.

CARDIOVASCULAR:  S1 and S2.  Tenderness around the _____ of the chest wall.

ABDOMEN:  Soft.  No tenderness.  No organomegaly.

EXTREMITIES:  No clubbing.  No cyanosis.



LABORATORY DATA:  WBC 10.6, hemoglobin 8.8, hematocrit 27.2, platelets 505.

Sodium 141, potassium 3.3, BUN 26, creatinine 0.9, calcium 9.2, phosphorus

3.7, magnesium 1.2.  This was yesterday.



DIAGNOSES:  Status post bioprosthetic mitral valve replacement; anemia;

postoperative; history of severe mitral regurgitation prior to surgery;

mild nonobstructive coronary artery disease; paroxysmal atrial

fibrillation, but now maintaining sinus rhythm, but has sinus tachycardia;

hypokalemia; hypomagnesemia.



PLAN:  The patient received supplemental potassium yesterday.  We will

increase the Coreg dose to help the sinus tachycardia.  Sinus tachycardia

is multifactorial, probably large role of anemia.  The patient's

prothrombin time is 25.1 and INR is 2.24.  Plan is that we will increase

the carvedilol dose to 12.5 b.i.d. because of sinus tachycardia.  Warfarin

is 2 mg p.o. daily and the patient's prothrombin time/INR is therapeutic. 

The patient is getting ferrous sulfate 324 mg p.o. t.i.d. for anemia.  The

patient received Venofer IV, furosemide 40 mg p.o. b.i.d., Lipitor 10

daily, magnesium 400 mg p.o. daily, prednisone 20 mg p.o. daily, Protonix

40 daily, Singulair 10 mg at bedtime, Zestril 5 mg daily.  We will repeat

SMA-7, magnesium and phosphorus in the morning.  We will follow with you.





__________________________________________

Ajay Myers MD





DD:  11/09/2017 12:26:43

DT:  11/09/2017 13:10:04

Job # [id number]

## 2017-11-10 LAB
ALBUMIN/GLOB SERPL: 1 {RATIO} (ref 1.1–1.8)
ALP SERPL-CCNC: 135 U/L (ref 38–126)
ALT SERPL-CCNC: 46 U/L (ref 7–56)
AST SERPL-CCNC: 39 U/L (ref 14–36)
BASOPHILS # BLD AUTO: 0.01 K/MM3 (ref 0–2)
BASOPHILS NFR BLD: 0.1 % (ref 0–3)
BILIRUB SERPL-MCNC: 0.6 MG/DL (ref 0.2–1.3)
BUN SERPL-MCNC: 23 MG/DL (ref 7–21)
CALCIUM SERPL-MCNC: 9.6 MG/DL (ref 8.4–10.5)
CHLORIDE SERPL-SCNC: 104 MMOL/L (ref 98–107)
CO2 SERPL-SCNC: 28 MMOL/L (ref 21–33)
EOSINOPHIL # BLD: 0.5 10*3/UL (ref 0–0.7)
EOSINOPHIL NFR BLD: 3.8 % (ref 1.5–5)
ERYTHROCYTE [DISTWIDTH] IN BLOOD BY AUTOMATED COUNT: 14.7 % (ref 11.5–14.5)
GLOBULIN SER-MCNC: 3.5 GM/DL
GLUCOSE SERPL-MCNC: 132 MG/DL (ref 70–110)
GRANULOCYTES # BLD: 7.06 10*3/UL (ref 1.4–6.5)
GRANULOCYTES NFR BLD: 60.2 % (ref 50–68)
HCT VFR BLD CALC: 30.1 % (ref 36–48)
INR PPP: 1.44 (ref 0.93–1.08)
LYMPHOCYTES # BLD: 3 10*3/UL (ref 1.2–3.4)
LYMPHOCYTES NFR BLD AUTO: 25.8 % (ref 22–35)
MAGNESIUM SERPL-MCNC: 1.1 MG/DL (ref 1.7–2.2)
MCH RBC QN AUTO: 28.9 PG (ref 25–35)
MCHC RBC AUTO-ENTMCNC: 32.2 G/DL (ref 31–37)
MCV RBC AUTO: 89.6 FL (ref 80–105)
MONOCYTES # BLD AUTO: 1.2 10*3/UL (ref 0.1–0.6)
MONOCYTES NFR BLD: 10.1 % (ref 1–6)
PLATELET # BLD: 430 10^3/UL (ref 120–450)
PMV BLD AUTO: 9.3 FL (ref 7–11)
POTASSIUM SERPL-SCNC: 3.4 MMOL/L (ref 3.6–5)
PROT SERPL-MCNC: 7 G/DL (ref 5.8–8.3)
SODIUM SERPL-SCNC: 141 MMOL/L (ref 132–148)
WBC # BLD AUTO: 11.7 10^3/UL (ref 4.5–11)

## 2017-11-10 RX ADMIN — POLYVINYL ALCOHOL SCH: 14 SOLUTION/ DROPS OPHTHALMIC at 01:43

## 2017-11-10 RX ADMIN — IPRATROPIUM BROMIDE AND ALBUTEROL SULFATE SCH: .5; 3 SOLUTION RESPIRATORY (INHALATION) at 07:32

## 2017-11-10 RX ADMIN — GUAIFENESIN SCH MG: 600 TABLET, EXTENDED RELEASE ORAL at 10:35

## 2017-11-10 RX ADMIN — POLYVINYL ALCOHOL SCH DROP: 14 SOLUTION/ DROPS OPHTHALMIC at 12:20

## 2017-11-10 RX ADMIN — PANTOPRAZOLE SODIUM SCH MG: 40 TABLET, DELAYED RELEASE ORAL at 05:59

## 2017-11-10 RX ADMIN — THERA TABS SCH TAB: TAB at 08:32

## 2017-11-10 RX ADMIN — IPRATROPIUM BROMIDE AND ALBUTEROL SULFATE SCH ML: .5; 3 SOLUTION RESPIRATORY (INHALATION) at 20:39

## 2017-11-10 RX ADMIN — ACETYLCYSTEINE SCH: 200 INHALANT RESPIRATORY (INHALATION) at 01:55

## 2017-11-10 RX ADMIN — ACETYLCYSTEINE SCH ML: 200 INHALANT RESPIRATORY (INHALATION) at 20:39

## 2017-11-10 RX ADMIN — POLYVINYL ALCOHOL SCH DROP: 14 SOLUTION/ DROPS OPHTHALMIC at 07:30

## 2017-11-10 RX ADMIN — POLYVINYL ALCOHOL SCH DROP: 14 SOLUTION/ DROPS OPHTHALMIC at 21:25

## 2017-11-10 RX ADMIN — POTASSIUM CHLORIDE SCH MEQ: 20 TABLET, EXTENDED RELEASE ORAL at 17:35

## 2017-11-10 RX ADMIN — POLYVINYL ALCOHOL SCH DROP: 14 SOLUTION/ DROPS OPHTHALMIC at 17:32

## 2017-11-10 RX ADMIN — POTASSIUM CHLORIDE SCH MEQ: 20 TABLET, EXTENDED RELEASE ORAL at 10:34

## 2017-11-10 RX ADMIN — GUAIFENESIN SCH MG: 600 TABLET, EXTENDED RELEASE ORAL at 17:36

## 2017-11-10 RX ADMIN — ACETYLCYSTEINE SCH ML: 200 INHALANT RESPIRATORY (INHALATION) at 13:12

## 2017-11-10 RX ADMIN — POLYVINYL ALCOHOL SCH: 14 SOLUTION/ DROPS OPHTHALMIC at 05:58

## 2017-11-10 RX ADMIN — ACETYLCYSTEINE SCH ML: 200 INHALANT RESPIRATORY (INHALATION) at 13:13

## 2017-11-10 RX ADMIN — Medication SCH MG: at 10:35

## 2017-11-10 RX ADMIN — ACETYLCYSTEINE SCH: 200 INHALANT RESPIRATORY (INHALATION) at 07:32

## 2017-11-10 RX ADMIN — IPRATROPIUM BROMIDE AND ALBUTEROL SULFATE SCH: .5; 3 SOLUTION RESPIRATORY (INHALATION) at 01:55

## 2017-11-10 RX ADMIN — IPRATROPIUM BROMIDE AND ALBUTEROL SULFATE SCH ML: .5; 3 SOLUTION RESPIRATORY (INHALATION) at 13:13

## 2017-11-10 NOTE — PN
DATE:  11/10/2017



REASON FOR CONSULTATION:  Followup status post mitral valve repair,

bioprosthetic replacement, paroxysmal atrial fibrillation, admitted with

cough and shortness of breath, transferred from postop from rehab facility,

and chest pain, musculoskeletal.



SUBJECTIVE:  The patient denies any chest pain, but complains of coughing

some blood.  Denies any chest pain.  Denies any shortness of breath. 

Denies any palpitation.



OBJECTIVE:

GENERAL:  Lying flat in the bed, not in apparent distress.

VITAL SIGNS:  As follows; temperature afebrile, heart rate 65, and blood

pressure 127/62.

HEENT:  PERRLA intact.

NECK:  Supple.  No carotid bruit or thyromegaly.

CHEST:  Clear to auscultation.

HEART:  S1 and S2 regular.

ABDOMEN:  Soft.

EXTREMITIES:  Clubbing and cyanosis negative.



LABORATORY DATA:  Blood workup as follows; WBC 11.7, hemoglobin 9.7,

hematocrit 30.1, and platelet count 430..  Chemistry shows sodium 141,

potassium 4.3, chloride 104, carbon dioxide 28, anion gap of 12, BUN 23,

and creatinine 0.9.  Total protein 7 and albumin 3.5.



IMPRESSION:  Severe mitral regurgitation, paroxysmal atrial fibrillation,

status post open heart surgery for bioprosthetic mitral valve replacement,

anemia, postoperative pneumonia, now the patient is in normal sinus,

hypokalemia, and electrolyte abnormality.  Coughing up of the blood.



RECOMMENDATIONS:  We will get a chest x-ray PA and lateral to rule out any

early pneumonia.  Increase Coumadin.  Today, Coumadin level is 1.44,

yesterday it was 2.24, we will increase to 5 mg today.  Admitting INR was

4.34, supratherapeutic, so it was on hold.  Chest x-ray PA and lateral and

increase Coumadin to 5 today, continue Coreg.  We will follow with you.



Thank you Dr. Dinh for providing me the opportunity in taking care of the

patient, Malu Meeks, who will follow with you.



__________________________________________

Ajay Gaxiola MD





DD:  11/10/2017 8:49:11

DT:  11/10/2017 9:17:36

Job # [id number]

## 2017-11-10 NOTE — CP.PCM.PN
Subjective





- Date & Time of Evaluation


Date of Evaluation: 11/10/17


Time of Evaluation: 10:21





- Subjective


Subjective: 


Heme/Onc Progress Note for NASIR Hatfield PGY2





Patient seen and examined at bedside. As per nursing, patient was having 

hemoptysis overnight. She has been having a productive cough the past couple 

days with clear sputum and then started to cough small amounts of bright red 

blood overnight. She reports having chest soreness which is not new. She denies 

shortness of breath, fever/chills, numbness/tingling, dysuria, hematuria, nausea

/vomiting/diarrhea or blood in stool. The patient is very anxious on exam. 





Objective





- Vital Signs/Intake and Output


Vital Signs (last 24 hours): 


 











Temp Pulse Resp BP Pulse Ox


 


 97.6 F   65   14   127/62   98 


 


 11/09/17 17:39  11/09/17 18:02  11/09/17 17:39  11/09/17 18:04  11/09/17 17:39








Intake and Output: 


 











 11/10/17 11/10/17





 06:59 18:59


 


Intake Total  420


 


Balance  420














- Medications


Medications: 


 Current Medications





Acetylcysteine (Acetylcysteine 20%)  2 ml IH Q6 [facility]MAN


   PRN Reason: Protocol


   Last Admin: 11/10/17 07:32 Dose:  Not Given


Albuterol/Ipratropium (Duoneb 3 Mg/0.5 Mg (3 Ml) Ud)  3 ml IH [id number] [facility]


   PRN Reason: Protocol


   Last Admin: 11/10/17 07:32 Dose:  Not Given


Artificial Tears (Artificial Tears)  0 ml OP Q4 [facility]


   Last Admin: 11/10/17 05:58 Dose:  Not Given


Aspirin (Ecotrin)  81 mg PO 0800 [facility]


   PRN Reason: Protocol


   Last Admin: 11/09/17 07:57 Dose:  81 mg


Atorvastatin Calcium (Lipitor)  10 mg PO HS [facility]MAN


   PRN Reason: Protocol


   Last Admin: 11/09/17 21:17 Dose:  10 mg


Benzocaine/Menthol (Cepacol Sore Throat)  1 wade MT Q2 PRN


   PRN Reason: Sore Throat


   Last Admin: 11/09/17 21:18 Dose:  1 wade


Bisacodyl (Dulcolax)  10 mg ME DAILY PRN; Protocol


   PRN Reason: Constipation


Carvedilol (Coreg)  12.5 mg PO 0800,1800 [facility]


   Last Admin: 11/09/17 18:02 Dose:  12.5 mg


Colchicine (Colocrys)  0.6 mg PO DAILY [facility]MAN


   PRN Reason: Protocol


   Last Admin: 11/09/17 10:23 Dose:  0.6 mg


Diphenhydramine HCl (Benadryl)  25 mg PO HS PRN; Protocol


   PRN Reason: Insomnia


   Last Admin: 11/09/17 22:30 Dose:  25 mg


Ergocalciferol (Drisdol 50,000 Intl Units Cap)  1 cap PO Q7D [facility]MAN


   PRN Reason: Protocol


   Last Admin: 11/09/17 08:00 Dose:  1 cap


Ferrous Sulfate (Feosol)  324 mg PO TID [facility]


   Last Admin: 11/09/17 18:03 Dose:  324 mg


Furosemide (Lasix)  40 mg PO 0800,1800 [facility]


   PRN Reason: Protocol


   Last Admin: 11/09/17 18:04 Dose:  40 mg


Guaifenesin (Mucinex La)  600 mg PO BID [facility]MAN


   PRN Reason: Protocol


   Last Admin: 11/09/17 18:04 Dose:  600 mg


Hydroxyzine HCl (Atarax)  25 mg PO Q6H PRN; Protocol


   PRN Reason: Allergy symptoms


Iron Sucrose 200 mg/ Sodium (Chloride)  110 mls @ 110 mls/hr IVPB Q2D [facility]


   Stop: 11/10/17 12:14


   Last Admin: 11/08/17 11:40 Dose:  110 mls/hr


Levalbuterol HCl (Xopenex)  0.63 mg IH [id number] PRN; Protocol


   PRN Reason: Shortness of Breath


Lisinopril (Zestril)  5 mg PO DAILY [facility]


   PRN Reason: Protocol


   Last Admin: 11/09/17 10:23 Dose:  5 mg


Loratadine (Claritin)  10 mg PO DAILY [facility]


   PRN Reason: Protocol


   Last Admin: 11/09/17 10:23 Dose:  10 mg


Magnesium Oxide (Mag-Ox)  400 mg PO DAILY [facility]


   Last Admin: 11/09/17 10:23 Dose:  400 mg


Montelukast Sodium (Singulair)  10 mg PO HS [facility]MAN


   PRN Reason: Protocol


   Last Admin: 11/09/17 21:17 Dose:  10 mg


Multivitamins (Thera Tab)  1 tab PO 0800 [facility]


   Last Admin: 11/09/17 08:00 Dose:  1 tab


Oxycodone/Acetaminophen (Percocet 5/325 Mg Tab)  1 tab PO Q4H PRN; Protocol


   PRN Reason: Pain, moderate (4-7)


   Stop: 11/11/17 20:02


   Last Admin: 11/09/17 21:20 Dose:  1 tab


Pantoprazole Sodium (Protonix Ec Tab)  40 mg PO 0600 [facility]


   PRN Reason: Protocol


   Last Admin: 11/10/17 05:59 Dose:  40 mg


Potassium Chloride (K-Dur 20 Meq Er Tab)  20 meq PO BID [facility]


   PRN Reason: Protocol


   Last Admin: 11/09/17 18:03 Dose:  20 meq


Prednisone (Prednisone Tab)  20 mg PO 0800 [facility]


   PRN Reason: Protocol


   Last Admin: 11/09/17 07:59 Dose:  20 mg


Warfarin Sodium (Coumadin)  5 mg PO 1800 [facility]


   PRN Reason: Protocol











- Labs


Labs: 


 





 11/10/17 07:38 





 11/10/17 07:38 





 











PT  16.0 SECONDS (9.4-12.5)  H  11/10/17  07:38    


 


INR  1.44  (0.93-1.08)  H  11/10/17  07:38    














- Constitutional


Appears: No Acute Distress





- Head Exam


Head Exam: ATRAUMATIC, NORMAL INSPECTION, NORMOCEPHALIC





- Eye Exam


Eye Exam: Normal appearance


Pupil Exam: NORMAL ACCOMODATION, PERRL





- ENT Exam


ENT Exam: Mucous Membranes Moist


Additional comments: 





Pt had some blood under her dentures but no signs of bleeding in her 

oropharynx. 





- Respiratory Exam


Respiratory Exam: Clear to Ausculation Bilateral, NORMAL BREATHING PATTERN.  

absent: Rales, Rhonchi, Wheezes





- Cardiovascular Exam


Cardiovascular Exam: Tachycardia, REGULAR RHYTHM, +S1, +S2.  absent: Gallop, 

Rubs, Murmur





- GI/Abdominal Exam


GI & Abdominal Exam: Soft, Normal Bowel Sounds.  absent: Rigid, Tenderness, Mass

, Rebound





- Extremities Exam


Extremities Exam: Normal Inspection.  absent: Calf Tenderness, Pedal Edema





- Neurological Exam


Neurological Exam: Alert, Awake, CN II-XII Intact, Oriented x3





- Psychiatric Exam


Psychiatric exam: Anxious, Normal Affect, Normal Mood





- Skin


Skin Exam: Dry, Intact, Warm





Assessment and Plan





- Assessment and Plan (Free Text)


Assessment: 





This is a 63yo  female with past medical history of HTN, 

hyperlipidemia, atrial fibrillation, severe mitral valve regurgitation from 

history of mitral valve prolapse with recent mitral valve replacement (

bioprosthetic valve) at [facility] on 10/28/17 who was admitted for cough and 

shortness of breath. Patient was found to have acute on chronic decompensated 

CHF secondary to recent mitral valve replacement surgery (POD#11) and pneumonia 

who was transferred to TCU for further strengthening. Patient was found to have 

anemia on admission. She was placed on Coumadin with supratherapeutic INR 

without overt signs of bleeding. Thrombocytosis was noted but now has resolved.

  





Anemia can be secondary to blood loss from surgery versus blood loss from 

elevated INR (unlikely). 


Hemoptysis can be secondary to chronic cough and bronchitis in the setting of 

being on Coumadin. 


Plan: 


- Hgb 9.7 which has improved (was 8.8)


- Continue Coumadin


- INR: 1.44 - Continue to monitor INR with goal between 2-3


- Spoke with Dr. Wisdom who reports to obtain CXR for patient


- Will give patient Codeine for cough


- As per nursing staff, patient has been very anxious since admission. Will 

consult Psych. 


- Continue Iron PO 


- Patient will have outpatient transfusion of 2U PRBC to maintain Hgb>10





Case seen, discussed and reviewed with Dr. Alatorre. 


NASIR Marie PGY2

## 2017-11-10 NOTE — RAD
HISTORY:

F/U pneumonia and compare  



COMPARISON:

11/03/2017



TECHNIQUE:

Chest PA and lateral



FINDINGS:



LUNGS:

There is improvement in the bibasilar infiltrates.  Minimal linear 

infiltrates or atelectasis can still be seen.



PLEURA:

No significant pleural effusion identified. No pneumothorax apparent.



CARDIOVASCULAR:

Moderate cardiomegaly



OSSEOUS STRUCTURES:

Sternal wires



VISUALIZED UPPER ABDOMEN:

Normal.



OTHER FINDINGS:

None.



IMPRESSION:

Improved bibasilar infiltrates

## 2017-11-10 NOTE — PN
PULMONARY PROGRESS NOTE



DATE:  11/09/2017



REFERRING PHYSICIAN:  Philip Dinh MD



SUBJECTIVE:  She is out of bed to chair, doing well in therapy, feels

better, still has some cough and sputum production.  No nausea or vomiting.

No diarrhea.  No leg pain or leg swelling.



OBJECTIVE:

GENERAL:  In no acute distress.

VITAL SIGNS:  Temperature is 98, heart rate is 65, respiratory rate is 14,

blood pressure is 127/62, and pulse oximetry is 98% on room air.

HEENT:  Moist mucous membranes.  No ulcer or thrush noted.

NECK:  Supple.  No JVD.

LUNGS:  Has a fair airflow with few rhonchi.

HEART:  S1 and S2.

ABDOMEN:  Soft and nontender.  No organomegaly.

EXTREMITIES:  There is no edema.

NEUROLOGIC:  Awake and alert, follow simple commands.



CURRENT MEDICATIONS:  She is on Mucomyst 20% inhaled q.6 hours, artificial

tears q.4 hours, Atarax 25 mg q.6 hours p.r.n., Benadryl 25 mg at bedtime

p.r.n., Cepacol lozenges q.2 hours p.r.n., Claritin 10 mg daily, colchicine

0.6 mg daily, Coreg 12.5 mg twice a day, Coumadin 2 mg daily, doxycycline

100 mg twice a day, vitamin D 50,000 units q.7 days, Dulcolax p.r.n. basis,

DuoNeb q.6 hours, Ecotrin 81 mg daily, ferrous sulfate 325 mg 3 times a

day, IV iron, also on potassium 20 mEq twice a day, Lasix 40 mg twice a

day, Lipitor 10 mg daily, magnesium oxide 400 mg daily, Mucinex 600 mg

twice a day, Percocet 5/325 one tablet q.4 hours p.r.n., prednisone 20 mg

daily, Protonix 40 mg daily, Singulair 10 mg at bedtime, multivitamins

daily, Xopenex inhaled q.6 hours, and Zestril 5 mg daily.



LABORATORY DATA:  Reviewed and no new lab is available since yesterday.



IMPRESSION AND PLAN:  Chronic obstructive lung disease, cardiomyopathy,

pulmonary infiltrate, may have sleep apnea syndrome, and atrial

fibrillation.  From a pulmonary point of view, she is doing okay.  Continue

bronchodilator.  Keep head at 45 degrees, aspiration precaution, gastric

prophylaxis, on anticoagulation, and continue therapy.

















Thank you and we will follow with you.





__________________________________________

Ajay Wisdom MD





DD:  11/09/2017 23:03:54

DT:  11/09/2017 23:50:37

Job # [id number]

## 2017-11-11 LAB
BASOPHILS # BLD AUTO: 0.01 K/MM3 (ref 0–2)
BASOPHILS NFR BLD: 0.1 % (ref 0–3)
BUN SERPL-MCNC: 22 MG/DL (ref 7–21)
CALCIUM SERPL-MCNC: 9.7 MG/DL (ref 8.4–10.5)
CHLORIDE SERPL-SCNC: 102 MMOL/L (ref 98–107)
CO2 SERPL-SCNC: 29 MMOL/L (ref 21–33)
EOSINOPHIL # BLD: 0.5 10*3/UL (ref 0–0.7)
EOSINOPHIL NFR BLD: 4.1 % (ref 1.5–5)
ERYTHROCYTE [DISTWIDTH] IN BLOOD BY AUTOMATED COUNT: 14.8 % (ref 11.5–14.5)
GLUCOSE SERPL-MCNC: 200 MG/DL (ref 70–110)
GRANULOCYTES # BLD: 7.97 10*3/UL (ref 1.4–6.5)
GRANULOCYTES NFR BLD: 60.1 % (ref 50–68)
HCT VFR BLD CALC: 29 % (ref 36–48)
INR PPP: 1.67 (ref 0.93–1.08)
LYMPHOCYTES # BLD: 3.5 10*3/UL (ref 1.2–3.4)
LYMPHOCYTES NFR BLD AUTO: 26.5 % (ref 22–35)
MCH RBC QN AUTO: 29.6 PG (ref 25–35)
MCHC RBC AUTO-ENTMCNC: 32.8 G/DL (ref 31–37)
MCV RBC AUTO: 90.3 FL (ref 80–105)
MONOCYTES # BLD AUTO: 1.2 10*3/UL (ref 0.1–0.6)
MONOCYTES NFR BLD: 9.2 % (ref 1–6)
PLATELET # BLD: 390 10^3/UL (ref 120–450)
PMV BLD AUTO: 9.7 FL (ref 7–11)
POTASSIUM SERPL-SCNC: 3.8 MMOL/L (ref 3.6–5)
SODIUM SERPL-SCNC: 139 MMOL/L (ref 132–148)
WBC # BLD AUTO: 13.3 10^3/UL (ref 4.5–11)

## 2017-11-11 RX ADMIN — Medication SCH MG: at 10:40

## 2017-11-11 RX ADMIN — POLYVINYL ALCOHOL SCH DROP: 14 SOLUTION/ DROPS OPHTHALMIC at 08:24

## 2017-11-11 RX ADMIN — ACETYLCYSTEINE SCH: 200 INHALANT RESPIRATORY (INHALATION) at 01:52

## 2017-11-11 RX ADMIN — POLYVINYL ALCOHOL SCH DROP: 14 SOLUTION/ DROPS OPHTHALMIC at 00:00

## 2017-11-11 RX ADMIN — POTASSIUM CHLORIDE SCH MEQ: 20 TABLET, EXTENDED RELEASE ORAL at 08:25

## 2017-11-11 RX ADMIN — POTASSIUM CHLORIDE SCH MEQ: 20 TABLET, EXTENDED RELEASE ORAL at 17:28

## 2017-11-11 RX ADMIN — POLYVINYL ALCOHOL SCH DROP: 14 SOLUTION/ DROPS OPHTHALMIC at 21:21

## 2017-11-11 RX ADMIN — GUAIFENESIN SCH MG: 600 TABLET, EXTENDED RELEASE ORAL at 10:40

## 2017-11-11 RX ADMIN — ACETYLCYSTEINE SCH ML: 200 INHALANT RESPIRATORY (INHALATION) at 07:29

## 2017-11-11 RX ADMIN — GUAIFENESIN SCH MG: 600 TABLET, EXTENDED RELEASE ORAL at 17:29

## 2017-11-11 RX ADMIN — Medication SCH MG: at 17:29

## 2017-11-11 RX ADMIN — POLYVINYL ALCOHOL SCH DROP: 14 SOLUTION/ DROPS OPHTHALMIC at 03:11

## 2017-11-11 RX ADMIN — OXYCODONE HYDROCHLORIDE AND ACETAMINOPHEN PRN TAB: 5; 325 TABLET ORAL at 03:09

## 2017-11-11 RX ADMIN — IPRATROPIUM BROMIDE AND ALBUTEROL SULFATE SCH: .5; 3 SOLUTION RESPIRATORY (INHALATION) at 01:52

## 2017-11-11 RX ADMIN — POLYVINYL ALCOHOL SCH DROP: 14 SOLUTION/ DROPS OPHTHALMIC at 17:26

## 2017-11-11 RX ADMIN — PANTOPRAZOLE SODIUM SCH MG: 40 TABLET, DELAYED RELEASE ORAL at 05:39

## 2017-11-11 RX ADMIN — ACETYLCYSTEINE SCH ML: 200 INHALANT RESPIRATORY (INHALATION) at 19:32

## 2017-11-11 RX ADMIN — IPRATROPIUM BROMIDE AND ALBUTEROL SULFATE SCH ML: .5; 3 SOLUTION RESPIRATORY (INHALATION) at 19:32

## 2017-11-11 RX ADMIN — IPRATROPIUM BROMIDE AND ALBUTEROL SULFATE SCH ML: .5; 3 SOLUTION RESPIRATORY (INHALATION) at 13:51

## 2017-11-11 RX ADMIN — IPRATROPIUM BROMIDE AND ALBUTEROL SULFATE SCH ML: .5; 3 SOLUTION RESPIRATORY (INHALATION) at 07:29

## 2017-11-11 RX ADMIN — POLYVINYL ALCOHOL SCH: 14 SOLUTION/ DROPS OPHTHALMIC at 12:25

## 2017-11-11 RX ADMIN — ACETYLCYSTEINE SCH ML: 200 INHALANT RESPIRATORY (INHALATION) at 13:50

## 2017-11-11 RX ADMIN — THERA TABS SCH TAB: TAB at 08:25

## 2017-11-11 NOTE — PN
DATE:  11/11/2017



REASON FOR CONSULTATION:  Followup status post mitral valve repair,

bioprosthetic replacement, paroxysmal atrial fibrillation, admitted with

cough and shortness of breath, possible early pneumonia.



SUBJECTIVE:  Denies any chest pain, shortness of breath or any palpitation.



PHYSICAL EXAMINATION:

VITAL SIGNS:  As follows, temperature afebrile, heart rate 110, and blood

pressure 99/70.

HEENT:  PERRLA.  Extraocular muscles intact.

NECK:  Supple.  No carotid bruit or thyromegaly.

CHEST:  Clear to auscultation.

HEART:  S1 and S2, regular.

ABDOMEN:  Soft.

EXTREMITIES:  Clubbing and cyanosis negative.



LABORATORY DATA:  Blood workup as follows:  WBC 13.8, hemoglobin _____,

hematocrit 29.0, and platelet count 390.  Chemistries; sodium _____,

chloride 102, carbon dioxide 29, anion gap of 12, BUN 32, and creatinine

1.0.



IMPRESSION:  Pneumonia, status post open heart surgery, status post

bioprosthetic aortic valve replacement, and paroxysmal atrial fibrillation

on anticoagulation.  INR 1.67.  Has admitting with supratherapeutic INR. 

Followup chest x-ray yesterday done, improved with bibasilar infiltrates.



RECOMMENDATIONS:  Continue antibiotic.  Continue gentle diuretic.  Continue

aspirin.  Continue atorvastatin.  Coumadin is started.  Followup PT and

INR.



Thank you Dr. Dinh for providing me the opportunity in taking care of the

patient, Malu Meeks, we will repeat INR in the morning.





__________________________________________

Ajay Gaxiola MD





DD:  11/11/2017 11:19:14

DT:  11/11/2017 12:10:31

Job # [id number]

## 2017-11-11 NOTE — PN
DATE:  11/10/2017



SUBJECTIVE:  The patient is in Transitional Care Unit.  She is having the

problem with IV access to give the patient IV magnesium as her magnesium is

low.  The patient tried several times, almost more than 10 times, difficult

to access.  The patient advised to take magnesium p.o.  The patient has

mood distress.  She is still coughing on and off, but less frequent than

before and often and less severe.  She does have chest discomfort from

coughing and recent cardiothoracic surgery from the midline wound that

which expected, but seems stable.



PHYSICAL EXAMINATION:

VITAL SIGNS:  On 11/10/2017, her temperature is 98, her heart rate is 107,

blood pressure 128/87, respirations 14, and saturation 100%.

HEAD/NECK:  Normal.  No JVD.  No thyromegaly.

CHEST:  Clear, good air entry.

CARDIAC:  First sound and second sound normal.

ABDOMEN:  Soft, obese, and nontender.

EXTREMITIES:  No edema.

NEUROLOGIC:  Normal.



LABORATORY DATA:  On 11/10/2017, sodium 141, potassium 3.4, chloride 104,

bicarbonate 28, BUN 23, creatinine 0.9.  Blood sugar 132, calcium 9.6,

magnesium 1.1.  Liver function test noted for alkaline phosphatase slightly

elevated 135 and AST is 39.



IMPRESSION AND PLAN:

1.  Generalized weakness, chest discomfort with recent cardiothoracic

surgery, status post valve replacement, mitral valve.  The patient doing

better regarding surgery.  We will continue physical therapy and we will

follow up clinically.

2.  Iron deficiency anemia, acute blood loss secondary to recent surgery. 

The patient received already iron IV and we are having the problem with IV

access to give any magnesium IV for hypomagnesemia.  We will hold of on

that for today, we will try in the morning.  Continue magnesium 400 mg p.o.

b.i.d.  Followup clinically.

3.  Acute chronic obstructive pulmonary disease exacerbation, seems doing

better, less wheezing.  Continue prednisone, currently on 20 mg plus

inhaled bronchodilator, Xopenex and _____.  Continue current therapy. 

Continue chest PT.  Followup clinically.

4.  Paroxysmal atrial fibrillation, hypertension, hypercholesteremia,

chronic pseudogout, obesity, high cholesterol.  Continue current therapy. 

Continue gastrointestinal and deep venous thrombosis prophylaxis.  Followup

clinically.  The patient will come next week after discharge to get blood

transfusion.



__________________________________________

Philip Dinh MD



DD:  11/11/2017 8:49:51

DT:  11/11/2017 9:47:14

Job # [id number]



LAILA

## 2017-11-11 NOTE — PN
SUBJECTIVE:  The patient is sitting chair.  She is awake.  She is alert. 

She complains of cough.  She is bringing up a lot of phlegm.  She denies

any fever, chills.  She denies any chest tightness.



PHYSICAL EXAMINATION:

GENERAL:  An elderly lady sitting in chair.

VITAL SIGNS:  Blood pressure 128/87, heart rate 107, respiratory rate 14,

temperature 97.8.

HEENT:  Normocephalic, atraumatic.

NECK:  Supple, no JVD.

LUNGS:  Bilateral equal entry, bilateral scattered rhonchi.

CARDIAC:  S1 and S2, regular rate and rhythm, no murmur, no rub.

ABDOMEN:  Obese, distended, soft, nontender, bowel sounds present.

EXTREMITIES:  No lower extremity edema.

INTAKE AND OUTPUT:  Not charted.



LABORATORY DATA:  WBC 11.7, hemoglobin 9.7, hematocrit 30, platelets 430. 

Sodium 141, potassium 3.4, chloride 104, CO2 of 28, BUN 23, creatinine 0.9,

glucose 132, calcium 9.6, phosphorus 3.7, magnesium 1.1.



CURRENT MEDICATIONS:  Atarax, Benadryl, Cepacol, Claritin, colchicine,

Coreg, Coumadin, DuoNeb, Ecotrin, Feosol, K-Dur, magnesium oxide 400 daily,

Singulair and Xopenex.



ASSESSMENT AND PLAN:

1.  Persistent severe hypokalemia.

2.  Hypomagnesemia, K-Dur 40 mEq p.o times one dose now.

3.  Continue Lasix.

4.  Complete antibiotics.

5.  Monitor electrolytes closely.







__________________________________________

Archana Leong MD





DD:  11/10/2017 19:08:39

DT:  11/10/2017 19:40:09

Job # [id number]

## 2017-11-11 NOTE — PN
DATE:  11/11/2017



This is [facility] visit on TCU.



For Dr. Alatorre.



SUBJECTIVE:  The patient is a 62-year-old female seen sitting up in chair,

participating with reconditioning on TCU with non-anemic indices with

recommendations for transfusion to be done as per protocols for TCU.  The

patient did have an episode of hemoptysis in the recent past.  She is

treated for pneumonia with the hemoptysis spontaneously improving as per

Dr. Wisdom.  Her recent admission was for COPD, cardiomyopathy with

pulmonary infiltrates, atrial fibrillation with anemia.



The patient is otherwise in no acute distress this visit.



She had a chest x-ray done yesterday, which was read as improved bibasilar

infiltrates.



OBJECTIVE:

VITAL SIGNS:  Temperature 98, pulse 111, respirations 18, blood pressure

99/70, pulse ox 97%.

HEENT:  Unremarkable.

NECK:  Supple.

HEART:  Regular rate, occasional ectopic beat.

LUNGS:  Rare rhonchi.

ABDOMEN:  Soft, nontender.

EXTREMITIES:  No edema.

SKIN:  Warm and dry.

NEUROLOGIC:  Awake, alert and oriented.



LABORATORY DATA:  The patient's labs were done.  White blood cell count of

13.3, hemoglobin of 9.5 improved from 8.2 five days prior in the hospital,

hematocrit of 29.0, and platelet count of 390,000 with a chem metabolic

panel showing a BUN of 22 with a normal creatinine of 1.0, otherwise normal

chem metabolic panel.  Her INR is 1.67 today.



ASSESSMENT:  For this patient is that of deconditioning, anticoagulation on

Coumadin, iron deficiency anemia secondary to blood loss, chronic

obstructive pulmonary disease, paroxysmal atrial fibrillation,

hypertension, cardiomyopathy, hemoptysis improved.



The patient with recent mitral valve replacement surgery, postop day 14, I

believe, with pneumonia, improving.



PLAN:  For this patient after conversation with Dr. Alatorre is to continue

present medical regimen.  We will monitor clinically and with labs with

consideration for transfusion as per TCU protocols.  She should be

discharged as per Dr. Dinh.  We will monitor clinically and with labs,

with adjustment of her INR, with her Coumadin as indicated.





__________________________________________

Jah Wright MD





DD:  11/11/2017 14:10:57

DT:  11/11/2017 15:15:40

Job # [id number]

## 2017-11-11 NOTE — PN
DATE:  11/10/2017



REFERRING PHYSICIAN:  Philip Dinh MD



SUBJECTIVE:  She is sitting up in a reclining chair.  Night was

unremarkable.  This morning when she woke up, she saw some blood in the

mouth and spit out blood which improved spontaneously this afternoon when I

examined her.  She does have some cough and clear sputum.  There is no more

hemoptysis noted.  No nausea, vomiting, or diarrhea.  No leg pain or leg

swelling.



OBJECTIVE:

GENERAL:  No acute distress.

VITAL SIGNS:  Temperature 98, heart rate is 98, respiratory rate is 18,

blood pressure 102/68, pulse ox 100% on room air.

HEENT:  Moist mucous membranes, has a denture, no sign of bleeding.  Nasal

mucosa looks okay.  No bleeding.

LUNGS:  Has a fair airflow with few rhonchi.

HEART:  S1 and S2.

ABDOMEN:  Soft, nontender, no organomegaly.

EXTREMITIES:  There is no edema.

NEUROLOGIC:  Awake and follows simple commands.



MEDICATIONS:  She is on Mucomyst 20% inhaled q.6 hours, Atarax 25 mg q.6

hours p.r.n., Benadryl 25 mg at bedtime p.r.n., Cepacol lozenges q.12 hours

one p.r.n., Claritin 10 mg daily, colchicine 0.6 mg daily, Coreg 12.5 mg

twice a day, Coumadin 5 mg given today, vitamin D 50,000 units weekly,

Dulcolax 10 mg daily, Ecotrin 81 mg daily, ferrous sulfate 324 mg three

times a day, potassium 20 mEq twice a day, Lasix 40 mg twice a day, Lipitor

10 mg at bedtime, magnesium oxide 400 mg twice a day, Mucinex  mg

twice a day, Percocet 5/325 one tab q.4 hours p.r.n., promethazine with

codeine q.4 hours p.r.n., prednisone 20 mg daily, Protonix 40 mg daily,

Singulair 10 mg daily, multivitamins daily, Xopenex inhaled q.6 hours, and

Zestril 5 mg daily.



LABORATORY DATA:  Shows hemoglobin 9.7, hematocrit 30.1, WBC 11.7, platelet

count is 430.  INR 1.4.  Sodium 141, potassium 3.4, chloride 104,

bicarbonate 28, BUN 23, creatinine 0.9, glucose 132, calcium 9.6, magnesium

1.1.  AST 39, ALT 46, alk phos is 135.  Chest x-ray done today shows

improved basilar infiltrates.



IMPRESSION AND PLAN:  Chronic obstructive lung disease, cardiomyopathy,

pulmonary infiltrates, may have sleep apnea syndrome, atrial fibrillation,

has some blood from the mouth, worse with sputum.  Presently, there is no

more bleeding and the cough with clear sputum.  Pulmonary point of view,

continue taper down of steroids, antibiotics, inhaled bronchodilator. 

Continue therapy.



Thank you and we will follow with you.







__________________________________________

Ajay Wisdom MD





DD:  11/10/2017 23:15:20

DT:  11/10/2017 23:17:34

Job # [id number]

## 2017-11-11 NOTE — PN
DATE:  11/11/2017



REFERRING PHYSICIAN:  Philip Dinh MD.



SUBJECTIVE:  The patient is sitting up in a reclining chair.  Night was

unremarkable.  Family is at bedside.  Since yesterday, there is no more

blood in the mouth.  Still has a cough and clear sputum.  No nausea,

vomiting, or diarrhea.  No leg pain or leg swelling.



OBJECTIVE:

GENERAL:  No acute distress.

VITAL SIGNS:  Temperature 98, heart rate is 110, respiratory rate is 20,

blood pressure 111/77, pulse ox 95% on room air.

HEENT:  Moist mucous membranes.  No ulcer or thrush noted.

NECK:  Supple.  No JVD.

LUNGS:  Has a fair airflow with few rhonchi.

HEART:  S1 and S2.

ABDOMEN:  Soft, nontender, no organomegaly.

EXTREMITIES:  There is no edema.

NEUROLOGIC:  Awake, alert, and follows simple commands.



MEDICATIONS:  She is on Mucomyst inhaled q. 6 hours, Artificial tears to

both eyes, hydroxizine 25 mg q. 6 hours p.r.n., Benadryl 25 mg at bedtime

p.r.n., Cepacol lozenges q.12 hours one p.r.n., Claritin 10 mg daily,

colchicine 0.6 mg daily, Coreg 12.5 mg twice a day, Coumadin 5 mg daily,

vitamin D 50,000 units subcutaneously weekly, Dulcolax p.r.n. basis,

albuterol/Atrovent nebulizer q. 6 hours, Ecotrin 81 mg daily, ferrous

sulfate 324 mg 3 times a day, potassium 20 mEq twice a day, Lasix 40 mg

twice a day, Lipitor 10 mg daily, magnesium oxide 400 mg twice a day,

Mucinex  mg twice a day, Percocet 5/325 one tablet q. 4 hours p.r.n.,

Phenergan with codeine 5 mL q. 4 hour p.r.n., prednisone 20 mg daily,

Protonix 40 mg daily, Singulair 10 mg daily, multivitamins daily, Xopenex

inhale q. 8 hours p.r.n. and Zestril 5 mg daily.



LABORATORY DATA:  Shows hemoglobin 9.5, hematocrit 29.0, WBC 13.3, platelet

is 390.  INR 1.67.  Sodium 139, potassium 2.8, chloride 102, bicarbonate

29, BUN 22, creatinine 1.0, glucose is 200, and calcium is 9.7.



IMPRESSION AND PLAN:  Chronic obstructive lung disease, cardiomyopathy,

pulmonary infiltrates, may have sleep apnea syndrome, atrial fibrillation. 

Pulmonary point of view, doing okay.  We will cut down prednisone to 10 mg

daily, inhaled bronchodilator, gastric prophylaxis, deep vein thrombosis

prophylaxis.  Continue therapy.



Thank you and we will follow with you.













__________________________________________

Ajay Wisdom MD





DD:  11/11/2017 16:59:52

DT:  11/11/2017 19:25:43

Job # [id number]

## 2017-11-11 NOTE — PN
DATE:  11/09/2017



SUBJECTIVE:  The patient is getting IV iron, seems tolerating it well.  She

still have cough, but less often and less frequent, and she seems doing

well.  Otherwise, no other complaint other than chest pain from recent

cardiothoracic surgery.



PHYSICAL EXAMINATION:

VITAL SIGNS:  The patient had vital signs checked.  Temperature was 97.6,

heart rate is 68, blood pressure 132/86, respiratory rate 20, and

saturation 100% on room air.

HEAD AND NECK:  Normal.  No JVD.  No thyromegaly.

CHEST:  Few rhonchi on the bases, otherwise clear.  Midline incision seems

clear, no infections.

CARDIAC:  First sound and second sound normal.

ABDOMEN:  Soft, obese and nontender.

EXTREMITIES:  No edema.

NEUROLOGIC:  Normal.



LABORATORY DATA:  White count 11.7, hemoglobin 9.7, hematocrit 30.1,

platelets 43.  Chemistry show sodium 141, blood sugar 227.  Her iron study

was reported to be low as iron level 38 and total iron capacity 226 and

iron saturation or transferrin saturation is 17.



IMPRESSION AND PLAN:

1.  Acute iron-deficiency anemia due to blood loss.  Continue IV Venofer. 

The patient getting second dose, seems doing well.  Continue current

therapy.  The patient will need blood transfusion as outpatient after

completion of iron therapy.  We will maintain the IV which is a problem to

keep, but we will observe.

2.  Electrolyte abnormalities.  The patient almost to have potassium

problem.  She will continue to get potassium K-Dur 20 mEq b.i.d., also

renal functions seems better after hydration, it is 0.9 and seems doing

well.

3.  Status post mitral valve replacement.  Echo shows normal function and

no paravalvular leak, no vegetations.  Pericardium is clear.  No bleeding. 

No effusions.

4.  The patient has hypertension, hypercholesterolemia, generalized

weakness, midline chest pain with cough.  We will continue current therapy.

5.  Chronic obstructive pulmonary disease with acute exacerbation. 

Continue steroids, continue inhaled bronchodilators, Mucomyst, chest PT and

followup clinically.  Continue GI and DVT prophylaxis.  Continue aspirin. 

Also, the patient taken Coumadin for paroxysmal atrial fibrillation and we

will follow up clinically.

6.  Congestive heart failure, diastolic dysfunctions.  We will continue

Lasix.  We will continue potassium and follow up clinically.  The patient

seems comfortable and less dyspneic.



CURRENT MEDICATIONS:  Mucomyst, artificial tears, Atarax, IV iron, Cepacol,

Claritin, Coreg, Coumadin, vitamin D, Dulcolax, Ecotrin 81 and K-Dur 20

b.i.d., Lasix 40 p.o. b.i.d. for diastolic heart failure, Percocet for

pain, promethazine with codeine for cough.  Continue prednisone 30 mg, we

will decrease to 20 mg gradually and taper it off.  Also, the patient is

getting Zestril 5 mg plus inhaled bronchodilators, Zithromax and we will

follow up clinically.







__________________________________________

Philip Dinh MD





DD:  11/11/2017 8:45:09

DT:  11/11/2017 9:18:20

Job # [id number]

## 2017-11-12 VITALS
OXYGEN SATURATION: 96 % | HEART RATE: 104 BPM | DIASTOLIC BLOOD PRESSURE: 75 MMHG | TEMPERATURE: 98.9 F | RESPIRATION RATE: 78 BRPM | SYSTOLIC BLOOD PRESSURE: 104 MMHG

## 2017-11-12 RX ADMIN — PANTOPRAZOLE SODIUM SCH MG: 40 TABLET, DELAYED RELEASE ORAL at 06:34

## 2017-11-12 RX ADMIN — POLYVINYL ALCOHOL SCH DROP: 14 SOLUTION/ DROPS OPHTHALMIC at 08:19

## 2017-11-12 RX ADMIN — IPRATROPIUM BROMIDE AND ALBUTEROL SULFATE SCH ML: .5; 3 SOLUTION RESPIRATORY (INHALATION) at 07:33

## 2017-11-12 RX ADMIN — IPRATROPIUM BROMIDE AND ALBUTEROL SULFATE SCH: .5; 3 SOLUTION RESPIRATORY (INHALATION) at 03:00

## 2017-11-12 RX ADMIN — POLYVINYL ALCOHOL SCH: 14 SOLUTION/ DROPS OPHTHALMIC at 06:34

## 2017-11-12 RX ADMIN — ACETYLCYSTEINE SCH ML: 200 INHALANT RESPIRATORY (INHALATION) at 07:33

## 2017-11-12 RX ADMIN — THERA TABS SCH TAB: TAB at 08:23

## 2017-11-12 RX ADMIN — GUAIFENESIN SCH MG: 600 TABLET, EXTENDED RELEASE ORAL at 09:02

## 2017-11-12 RX ADMIN — POLYVINYL ALCOHOL SCH: 14 SOLUTION/ DROPS OPHTHALMIC at 03:03

## 2017-11-12 RX ADMIN — ACETYLCYSTEINE SCH: 200 INHALANT RESPIRATORY (INHALATION) at 03:00

## 2017-11-12 RX ADMIN — POLYVINYL ALCOHOL SCH: 14 SOLUTION/ DROPS OPHTHALMIC at 12:15

## 2017-11-12 RX ADMIN — POTASSIUM CHLORIDE SCH MEQ: 20 TABLET, EXTENDED RELEASE ORAL at 08:22

## 2017-11-12 RX ADMIN — Medication SCH MG: at 09:01

## 2017-11-12 NOTE — PN
DATE:  11/12/2017



PULMONARY PROGRESS NOTE



REFERRING PHYSICIAN:  Dr. Dinh.



SUBJECTIVE:  She is sitting up in the chair, being discharged home.  Feels

better.  No headache.  No rhinitis.  No nausea.  Decreased cough.  No

dysuria.  No leg pain or leg swelling.



OBJECTIVE:

GENERAL:  In no acute distress.

VITAL SIGNS:  Temperature is 98, heart rate is 85, respiratory rate is 20,

blood pressure is 104/75, and pulse ox is 96% on room air.

HEENT:  Moist mucous membranes.  No ulcer or thrush noted.

NECK:  Supple.  No JVD.

LUNGS:  Has a fair airflow with few rhonchi.

HEART:  S1 and S2.

ABDOMEN:  Soft and nontender.  No organomegaly.

EXTREMITIES:  No edema.

NEUROLOGIC:  Awake, alert, and follow simple commands.



LABORATORY DATA:  Reviewed and shows hemoglobin 9.5, hematocrit 29.0.  WBC

13.3, platelets 390.  INR 1.67.  Sodium 139, potassium 3.8, chloride 102,

bicarbonate 29, BUN 22, creatinine 1.0, glucose is 200, calcium is 9.7,



MEDICATIONS:  Reviewed and noted.  No new change in medications since

yesterday.



IMPRESSION AND PLAN:  Chronic obstructive lung disease, cardiomyopathy,

pulmonary infiltrates, may have sleep apnea syndrome, atrial fibrillation. 

From pulmonary point of view, doing okay.  May discharge home on p.o.

Prednisone, inhaled bronchodilator.  Outpatient attended sleep study and

PFT.  Followup with PMD.



Thank you and we will follow up with you.





__________________________________________

Ajay Wisdom MD



DD:  11/12/2017 20:34:04

DT:  11/12/2017 21:54:48

Job # [id number]

## 2017-11-12 NOTE — PN
DATE:  11/12/2017



REASON FOR CONSULTATION AND FOLLOWUP:  Status post mitral valve

replacement, bioprosthetic replacement, paroxysmal atrial fibrillation,

admitted with cough and pneumonia.  Continue care at [facility]

[facility].



SUBJECTIVE:  Denies any chest pain, shortness of breath or any palpitation.

Feels a lot better.



OBJECTIVE:

GENERAL:  Walking in the hallway, not in acute distress.

VITAL SIGNS:  Temperature afebrile, heart rate 85, and blood pressure

131/90.

HEENT:  PERRLA, intact.

NECK:  Supple.  No carotid bruits.  No thyromegaly.

CHEST:  Clear to auscultation.

HEART:  S1 and S2, regular.

ABDOMEN:  Soft.

EXTREMITIES:  Clubbing and cyanosis negative.



LABORATORY DATA:  WBC 13.3, hemoglobin 9.9, hematocrit 29.0, platelet count

390.  Chemistry shows sodium 139, potassium 3.8, chloride 102, carbon

dioxide 29, anion gap of 12, BUN of 22, creatinine 1.0.



DIAGNOSTIC DATA:  Repeat chest x-ray shows improvement in bibasilar

infiltrate.



IMPRESSION:  A 62-year-old female with a past medical history significant

for severe mitral regurgitation status post recently two to three weeks

ago, open heart surgery for mitral valve replacement, bioprosthetic,

history of paroxysmal atrial fibrillation.  The patient was on

anticoagulation.  The patient was discharged to the nursing home, but

readmitted here at the [facility] because of the cough and

pneumonia.  The patient is on anticoagulation, INR is subtherapeutic,

increased yesterday to 5 mg _____ today.  INR is pending.  Repeat chest

x-ray shows improvement in the pneumonia.



RECOMMENDATIONS:  Continue anticoagulation, goal is to keep INR between 2

to 2.5.  Continue carvedilol.  Continue aspirin.  We will follow with you. 

If the patient been in normal sinus for 3 months,we will discontinue

anticoagulant and discussed with the patient.







__________________________________________

Ajay Gaxiola MD



DD:  11/12/2017 10:53:19

DT:  11/12/2017 11:35:18

Job # [id number]

## 2017-11-12 NOTE — PN
DATE:  11/12/2017



This is patient's hospital visit on the TCU.



For Dr. Alatorre.



SUBJECTIVE:  The patient is a 62-year-old female now getting dressed for

discharge home today as per Dr. Dinh with Dr. Wisdom also in attendance

at this visit her pulmonologist.  The patient is requesting to go home on

steroids for her breathing which Dr. Wisdom reports he will do as

indicated.  The patient is now reconditioned after 7 days on TCU, anxious

to go home after approximately 20 plus days in the hospital.  With this her

hemoptysis has significantly improved, her COPD is also improved as was her

pneumonia.  She also suffers from cardiomyopathy, atrial fibrillation and

anemia.



OBJECTIVE:

VITAL SIGNS:  Temperature 98.9, pulse 85, respirations 12, blood pressure

131/90, pulse ox 96%.

HEENT:  Unremarkable.

NECK:  Supple.

HEART:  Regular rate.

LUNGS:  Rare rhonchi.

ABDOMEN:  Soft, obese, nontender.

EXTREMITIES:  No edema.

SKIN:  Warm and dry.

NEUROLOGIC:  Awake, alert and oriented x3.



LABORATORY DATA:  The patient's labs were not done today, they were to be

repeated in the morning; however, her labs from yesterday showed a

hemoglobin of 9.5 improved from 8.2 on 11/06 with a chem metabolic panel

within normal range except for nonfasting glucose of 200.



ASSESSMENT:  For this patient is that of deconditioning, anticoagulation on

Coumadin, iron deficiency anemia secondary to blood loss, chronic

obstructive pulmonary disease, paroxysmal atrial fibrillation,

hypertension, cardiomyopathy, hemoptysis, improved; also being treated for

pneumonia with improvement.



PLAN:  For this patient after conversation with Dr. Alatorre is to continue

present medical regimen.  Yesterday, her INR was 1.67, it was not repeated

today.  However, she is taking Coumadin 5 mg daily as per Dr. Gaxiola.  We

would recommend to continue that with a plan for her to followup with Dr. Alatorre in the office in approximately 2 days time for a repeat of her labs

to consider a transfusion as indicated.  However, her hemoglobin has

significantly improved to 9.5 with her INR also be checked at that time. 

Otherwise, she is to continue present medical regimen as per her primary

Dr. Dinh and Dr. Wisdom with followup with Dr. Alatorre in 2 days for

repeat labs with consideration for transfusions as indicated.







__________________________________________

Jah Wright MD





DD:  11/12/2017 14:31:42

DT:  11/12/2017 15:14:28

Job # [id number]

## 2017-11-12 NOTE — PN
DATE:  11/11/2017



SUBJECTIVE:  The patient is seen sitting in chair.  She is awake, she is

alert.  She is complaining of cough.  She is also complaining of copious

white phlegm.



PHYSICAL EXAMINATION:

GENERAL:  Elderly lady sitting in chair.

VITAL SIGNS:  Blood pressure 108/74, respiratory rate 18, temperature 98.4,

heart rate 110.

HEENT:  Normocephalic, atraumatic, positive pallor.

NECK:  Supple, no JVD.

LUNGS:  Bilateral equal air entry, bilateral equal expansion.

CARDIAC:  S1 and S2, regular rate and rhythm, positive murmur, no rub.

EXTREMITIES:  No lower extremity edema.



LABORATORY DATA:  WBC 13.3, hemoglobin 9.5, hematocrit 29, platelets 390. 

Sodium 139, potassium 3.8, chloride 102, CO2 of 29, BUN 22, creatinine 1.0,

glucose 200, and calcium 9.7.



ASSESSMENT:

1.  Hyponatremia, improving.

2.  Resolved acute kidney injury.

3.  Decompensated congestive heart failure, improving.

4.  Bilateral pneumonia.

5.  Chronic obstructive pulmonary disease.



PLAN:

1.  Continue Aldactone 25 b.i.d.

2.  Complete antibiotics.

3.  Continue Lasix.

4.  Close outpatient followup.





__________________________________________

Archana Leong MD





DD:  11/12/2017 17:03:25

DT:  11/12/2017 17:04:46

Job # [id number]

## 2017-11-13 NOTE — CP.PCM.PCO
Physician Communication Note





- Physician Communication Note


Physician Communication Note: pt was d/c before this writer saroj

## 2017-11-13 NOTE — DS
HISTORY OF PRESENT ILLNESS:  The patient is a 62-year-old female status

post valve replacement.  She came into the hospital with coughing and

dyspnea and short of breath.  The patient was treated for acute COPD

exacerbation, congestive heart failure, was given IV Lasix, potassium, and

was oxygen, nebulizer treatment, IV steroids, and cough medicines plus

short course of antibiotics including Maxipime and then doxycycline

followed by doxycycline alone.  The patient clinically improved.  She was

in the Medical Floor transferred to this Transitional Care Unit.  She did

very well.  The patient is seen by Pulmonary consult Dr. Wisdom, Cardiology

consult Dr. Gaxiola, ID consult Dr. Macdonald.



The patient's cough improved.  Her mid sternotomy area was clean.  No wound

infections.  She did have physical therapy for generalized weakness and

postoperative.  Her echo shows good valve placement.  No paravalvular leak.

No pericardial effusions or hemopericardium.  Clinically stable.  Good LV

function.  The patient does have chronic probably diastolic heart failure. 

She is improved with Lasix.  The patient was tapered off steroids.  She was

switched to p.o. prednisone.  Seen by pulmonologist and the patient was

discharged home to be followed up.  The patient does have a history of

paroxysmal atrial fibrillation for which she takes Coumadin for it.  Her

INR is therapeutic and she was then changed on Coumadin 5 mg daily.



PHYSICAL EXAMINATION:

DISCHARGE VITAL SIGNS:  Temperature 98.4, heart rate 85, blood pressure

131/90, respirations 18, and saturation 94%.

HEAD/NECK:  Normal.  No JVD.  No thyromegaly.

CHEST:  Midline sternotomy clean.  Clean wound.

LUNGS:  Clear.  No rales.  No crepitations.  No wheezing.

ABDOMEN:  Soft, obese, and nontender.

EXTREMITIES:  No edema.

NEUROLOGIC:  Normal.



LABORATORY DATA:  Last lab was 11/11/2017, which showed white count of

13.3, hemoglobin 9.5, hematocrit 29, and her platelets was 390.  Her

chemistry shows sodium 139, potassium 3.8, chloride 102, bicarbonate 29,

BUN 22, creatinine is 1.  Blood sugar 132 and did go up to 200.  The

patient also had magnesium.  IV access problem.  She was given magnesium

p.o. 400 mg b.i.d.  Could not get IV access in hand.  She refused any

further sticks.



The patient while she is in the hospital, she had physical therapy, seen by

multiple consultants and got IV iron for her anemia.  Dr. Alatorre has seen

her for anemia.  She needs blood transfusion, which will be done as an

outpatient.  The patient is stable.  We discharged home to be followed up

in a week.



Home medications were given and was called in and sent electronically to

[facility] on [address].



DISCHARGE DIAGNOSES:

1.  Status post mitral valve replacement and thoracotomy.

2.  Generalized weakness, deconditioning.

3.  Acute chronic obstructive pulmonary disease exacerbation.

4.  Acute diastolic heart failure.

5.  Paroxysmal atrial fibrillation.

6.  Anemia requiring IV iron and possibly blood transfusion, it will be

scheduled as an outpatient due to IV access problem.

7.  Chronic arthritis, pseudogout.

8.  Hypercholesterolemia.

9.  Hypertension.

10.  Overweight.



PLAN:  Discharged the patient.  Followup as an outpatient.  Continue

Coumadin.  Monitor PT/INR.  Percocet was sent electronically twice a day

for 5 more days.  See her next week.



Medications on discharge is Combivent, artificial tears, aspirin 81,

Lipitor, simvastatin 20, Dulcolax tablet, Coreg 12.5 mg b.i.d., Benadryl,

vitamin D, iron p.o. b.i.d., Lasix 40 b.i.d., Zestril 5 mg daily, magnesium

oxide 400 mg b.i.d., Singulair 10 once a day, multivitamins once a day,

Protonix 40 once a day, K-Dur 20 b.i.d., prednisone was given 10 mg for 2

more days, to be followed up by 5 mg 2 more days, and discontinue.  The

patient was given prescription of prednisone by Dr. Wisdom, however,

advised to follow up with Dr. Wisdom, warfarin and Coumadin 5 mg daily.





__________________________________________

Philip Dinh MD





DD:  11/12/2017 23:06:26

DT:  11/13/2017 2:46:21

Job # [id number]

## 2017-12-11 ENCOUNTER — HOSPITAL ENCOUNTER (INPATIENT)
Dept: HOSPITAL 42 - ED | Age: 62
LOS: 2 days | Discharge: HOME HEALTH | DRG: 918 | End: 2017-12-13
Attending: INTERNAL MEDICINE | Admitting: INTERNAL MEDICINE
Payer: MEDICARE

## 2017-12-11 VITALS — BODY MASS INDEX: 26.2 KG/M2

## 2017-12-11 DIAGNOSIS — Z79.01: ICD-10-CM

## 2017-12-11 DIAGNOSIS — N18.2: ICD-10-CM

## 2017-12-11 DIAGNOSIS — E78.00: ICD-10-CM

## 2017-12-11 DIAGNOSIS — M1A.9XX0: ICD-10-CM

## 2017-12-11 DIAGNOSIS — F41.9: ICD-10-CM

## 2017-12-11 DIAGNOSIS — I13.0: ICD-10-CM

## 2017-12-11 DIAGNOSIS — I50.9: ICD-10-CM

## 2017-12-11 DIAGNOSIS — D50.9: ICD-10-CM

## 2017-12-11 DIAGNOSIS — G47.30: ICD-10-CM

## 2017-12-11 DIAGNOSIS — T45.511A: Primary | ICD-10-CM

## 2017-12-11 DIAGNOSIS — I48.0: ICD-10-CM

## 2017-12-11 DIAGNOSIS — N17.9: ICD-10-CM

## 2017-12-11 DIAGNOSIS — I42.9: ICD-10-CM

## 2017-12-11 DIAGNOSIS — M19.90: ICD-10-CM

## 2017-12-11 DIAGNOSIS — I08.0: ICD-10-CM

## 2017-12-11 DIAGNOSIS — E83.42: ICD-10-CM

## 2017-12-11 DIAGNOSIS — Z79.82: ICD-10-CM

## 2017-12-11 DIAGNOSIS — I25.10: ICD-10-CM

## 2017-12-11 DIAGNOSIS — J44.1: ICD-10-CM

## 2017-12-11 DIAGNOSIS — E86.0: ICD-10-CM

## 2017-12-11 DIAGNOSIS — E87.1: ICD-10-CM

## 2017-12-11 DIAGNOSIS — Z91.14: ICD-10-CM

## 2017-12-11 LAB
ALBUMIN/GLOB SERPL: 1 {RATIO} (ref 1.1–1.8)
ALP SERPL-CCNC: 166 U/L (ref 38–126)
ALT SERPL-CCNC: 22 U/L (ref 7–56)
APTT BLD: 127.6 SECONDS (ref 25.1–36.5)
AST SERPL-CCNC: 33 U/L (ref 14–36)
BASOPHILS # BLD AUTO: 0.01 K/MM3 (ref 0–2)
BASOPHILS NFR BLD: 0.1 % (ref 0–3)
BILIRUB SERPL-MCNC: 0.6 MG/DL (ref 0.2–1.3)
BUN SERPL-MCNC: 10 MG/DL (ref 7–21)
CALCIUM SERPL-MCNC: 9.2 MG/DL (ref 8.4–10.5)
CHLORIDE SERPL-SCNC: 103 MMOL/L (ref 98–107)
CO2 SERPL-SCNC: 25 MMOL/L (ref 21–33)
EOSINOPHIL # BLD: 0.3 10*3/UL (ref 0–0.7)
EOSINOPHIL NFR BLD: 3.6 % (ref 1.5–5)
ERYTHROCYTE [DISTWIDTH] IN BLOOD BY AUTOMATED COUNT: 15.7 % (ref 11.5–14.5)
GLOBULIN SER-MCNC: 3.6 GM/DL
GLUCOSE SERPL-MCNC: 128 MG/DL (ref 70–110)
GRANULOCYTES # BLD: 5.11 10*3/UL (ref 1.4–6.5)
GRANULOCYTES NFR BLD: 70.2 % (ref 50–68)
HCT VFR BLD CALC: 29.1 % (ref 36–48)
INR PPP: 17.64 (ref 0.93–1.08)
LYMPHOCYTES # BLD: 1.2 10*3/UL (ref 1.2–3.4)
LYMPHOCYTES NFR BLD AUTO: 16.3 % (ref 22–35)
MAGNESIUM SERPL-MCNC: 1.6 MG/DL (ref 1.7–2.2)
MCH RBC QN AUTO: 29.1 PG (ref 25–35)
MCHC RBC AUTO-ENTMCNC: 32.6 G/DL (ref 31–37)
MCV RBC AUTO: 89.3 FL (ref 80–105)
MONOCYTES # BLD AUTO: 0.7 10*3/UL (ref 0.1–0.6)
MONOCYTES NFR BLD: 9.8 % (ref 1–6)
PLATELET # BLD: 328 10^3/UL (ref 120–450)
PMV BLD AUTO: 9.2 FL (ref 7–11)
POTASSIUM SERPL-SCNC: 4.3 MMOL/L (ref 3.6–5)
PROT SERPL-MCNC: 7.2 G/DL (ref 5.8–8.3)
SODIUM SERPL-SCNC: 136 MMOL/L (ref 132–148)
TROPONIN I SERPL-MCNC: 0.02 NG/ML
WBC # BLD AUTO: 7.3 10^3/UL (ref 4.5–11)

## 2017-12-11 NOTE — ED PDOC
Arrival/HPI





- General


Chief Complaint: Chest Pain


Time Seen by Provider: 12/11/17 12:41


Historian: Patient





- History of Present Illness


Narrative History of Present Illness (Text): 





12/11/17 12:50


Malu Meeks is a 62 year old female, whose past medical history includes afrib (

on Coumadin) and COPD, who presents to the emergency department complaining of 

chest pain that began two days ago. Patient reports today when she woke up it 

became worse and pain radiates to her left arm. Patient denies any trauma or 

other complaints. 





PMD: Dr. Crowe





Time/Duration: < week


Symptom Onset: Sudden


Symptom Course: Worsening


Activities at Onset: Light


Context: Home





Past Medical History





- Provider Review


Nursing Documentation Reviewed: Yes





- Infectious Disease


Hx of Infectious Diseases: None





- Tetanus Immunization


Tetanus Immunization: Unknown





- Reproductive


Currently Pregnant: No





- Past Medical History


Past Medical History: No Previous





- Cardiac


Hx Cardiac Disorders: Yes


Hx Pacemaker: No


Other/Comment: Mitral Valve Replacement





- Pulmonary


Hx Respiratory Disorders: Yes


Hx Asthma: Yes


Hx Chronic Obstructive Pulmonary Disease (COPD): Yes





- Neurological


Hx Neurological Disorder: Yes


Hx Dizziness: Yes


Hx Migraine: Yes





- HEENT


Hx HEENT Disorder: No





- Renal


Hx Renal Disorder: No





- Endocrine/Metabolic


Hx Endocrine Disorders: No





- Hematological/Oncological


Hx Blood Disorders: No





- Integumentary


Hx Dermatological Disorder: No





- Musculoskeletal/Rheumatological


Hx Falls: No





- Gastrointestinal


Hx Gastrointestinal Disorders: No





- Genitourinary/Gynecological


Hx Genitourinary Disorders: No





- Psychiatric


Hx Emotional Abuse: No


Hx Physical Abuse: No


Hx Substance Use: No





- Surgical History


Other/Comment: Mitral Valve Replacement





- Anesthesia


Hx Anesthesia: Yes


Hx Anesthesia Reactions: No


Hx Malignant Hyperthermia: No





- Suicidal Assessment


Feels Threatened In Home Enviroment: No





Family/Social History





- Physician Review


Nursing Documentation Reviewed: Yes


Family/Social History: Unknown Family HX


Smoking Status: Never Smoked


Hx Alcohol Use: Yes (SOCIALLY)


Amount per day: 6


Hx Substance Use: No


Hx Substance Use Treatment: No





Allergies/Home Meds


Allergies/Adverse Reactions: 


Allergies





cinnamon Allergy (Intermediate, Verified 11/09/17 17:07)


 








Home Medications: 


 Home Meds











 Medication  Instructions  Recorded  Confirmed


 


Pantoprazole [Protonix EC Tab] 40 mg PO DAILY 08/19/17 12/11/17


 


Albuterol HFA [Ventolin HFA 90 1 puff IH BID 09/07/17 12/11/17





mcg/actuation (8 g)]   


 


Aspirin [[place] Aspirin] 81 mg PO DAILY 09/07/17 12/11/17


 


Cetirizine HCl [Zyrtec] 10 mg PO DAILY 09/07/17 12/11/17


 


Cholecalciferol (Vitamin D3) 50,000 unit PO Q7D 09/07/17 12/11/17





[Vitamin D3]   


 


Colchicine [Colcrys] 0.6 mg PO DAILY 09/07/17 12/11/17


 


ALPRAZolam [Xanax] 0.25 mg PO Q8H PRN 11/02/17 12/11/17


 


Acetaminophen/Codeine 1 tab PO Q4H PRN 11/02/17 12/11/17





[Tylenol/Codeine 300 MG/30 MG]   


 


Acetylcysteine 20% 2 ml IH Q6 11/02/17 12/11/17


 


Albuterol 0.083% [Albuterol 0.083% 3 ml IH Q6 11/02/17 12/11/17





Inhal Sol (2.5 mg/3 ml) UD]   


 


Atorvastatin [Lipitor] 10 mg PO HS 11/02/17 12/11/17


 


Benzocaine/Menthol [Cepacol Sore 1 lozenge PO QID 11/02/17 12/11/17





Throat Lozenge]   


 


Bisacodyl [Dulcolax] 10 mg NJ DAILY PRN 11/02/17 12/11/17


 


Carvedilol [Coreg] 6.25 mg PO Q12 11/02/17 12/11/17


 


Dextran 70/Hypromellose/Pf 1 drop BOTHEYES Q4H PRN 11/02/17 12/11/17





[Artificial Tears Drops]   


 


DiphenhydrAMINE [Benadryl] 25 mg PO HS PRN 11/02/17 12/11/17


 


Docusate Sodium [Burns' 200 mg PO HS 11/02/17 12/11/17





Laxative]   


 


Dronedarone [Multaq] 400 mg PO BID 11/02/17 12/11/17


 


Guaifenesin [Expectorant Cough 200 mg PO Q6H PRN 11/02/17 12/11/17





Syrup]   


 


Magnesium Oxide [Mgo] 400 mg PO BID 11/02/17 12/11/17


 


Polyethylene Glycol 3350 [Miralax] 17 gm PO DAILY PRN 11/02/17 12/11/17


 


Warfarin [Coumadin] 3 mg PO DAILY 11/02/17 12/11/17














Review of Systems





- Review of Systems


Constitutional: absent: Fevers


Eyes: absent: Vision Changes


Respiratory: absent: SOB


Cardiovascular: Chest Pain


Gastrointestinal: absent: Abdominal Pain


Genitourinary Female: absent: Dysuria


Musculoskeletal: absent: Back Pain


Neurological: absent: Headache


Endocrine: absent: Polyuria





Physical Exam


Vital Signs Reviewed: Yes


Vital Signs











  Temp Pulse Resp BP Pulse Ox


 


 12/11/17 17:08     98/50 L 


 


 12/11/17 15:26   88  18  91/38 L  100


 


 12/11/17 12:51  97.5 F L  75  18  92/55 L  100











Temperature: Afebrile


Blood Pressure: Hypotensive


Pulse: Regular


Respiratory Rate: Normal


Appearance: Positive for: Well-Appearing, Non-Toxic, Comfortable


Pain Distress: None


Mental Status: Positive for: Alert and Oriented X 3





- Systems Exam


Head: Present: Atraumatic, Normocephalic


Pupils: Present: PERRL


Extroacular Muscles: Present: EOMI


Conjunctiva: Present: Normal


Respiratory/Chest: Present: Wheezes (bilateral wheezing).  No: Clear to 

Auscultation, Good Air Exchange, Respiratory Distress, Accessory Muscle Use


Cardiovascular: Present: Regular Rate and Rhythm, Normal S1, S2.  No: Murmurs


Lower Extremity: Present: Normal Inspection, NORMAL PULSES, Normal ROM, 

Neurovascularly Intact, Capillary Refill < 2 s.  No: Edema, Tenderness, Swelling

, Deformity


Neurological: Present: GCS=15, CN II-XII Intact, Speech Normal


Skin: Present: Warm, Dry, Normal Color.  No: Rashes


Psychiatric: Present: Alert, Oriented x 3, Normal Insight, Normal Concentration





Medical Decision Making


ED Course and Treatment: 





12/11/17 


EKG:


Ordered, reviewed, and independently interpreted the EKG. 


Rate : 81 BPM


Rhythm : NSR


Interpretation : No ST-segment elevations or depressions, no T-wave inversions, 

normal intervals.


Comparison : No changes





12/11/17 13:35


Chest X-ray: Creator : Rodney Ortega MD


COMPARISON: 11/10/2017 


FINDINGS:


LUNGS: No active pulmonary disease.


PLEURA: No significant pleural effusion identified, no pneumothorax apparent.


CARDIOVASCULAR: Moderate cardiomegaly


OSSEOUS STRUCTURES: Sternal wires


VISUALIZED UPPER ABDOMEN: Normal.


OTHER FINDINGS: None.


IMPRESSION: No active disease.





12/11/17 17:23


noted inr, as per dr crowe reports level was 8  last week, but "still took my 

meds"., even though advised to dc. h/h stable no active bleeding, vit k given. 





- Lab Interpretations


Lab Results: 








 12/11/17 14:00 





 12/11/17 14:00 





 Lab Results





12/11/17 14:00: Sodium 136, Potassium 4.3, Chloride 103, Carbon Dioxide 25, 

Anion Gap 12, BUN 10, Creatinine 1.2, Est GFR (African Amer) 55, Est GFR (Non-

Af Amer) 45, Random Glucose 128 H, Calcium 9.2, Magnesium 1.6 L, Total 

Bilirubin 0.6, AST 33, ALT 22, Alkaline Phosphatase 166 H D, Lactate 

Dehydrogenase 902 H, Total Creatine Kinase 137, Troponin I 0.02  D, NT-Pro-B 

Natriuret Pep 1790 H, Total Protein 7.2, Albumin 3.6, Globulin 3.6, Albumin/

Globulin Ratio 1.0 L


12/11/17 14:00: WBC 7.3  D, RBC 3.26 L, Hgb 9.5 L, Hct 29.1 L, MCV 89.3, MCH 

29.1, MCHC 32.6, RDW 15.7 H, Plt Count 328, MPV 9.2, Gran % 70.2 H, Lymph % (

Auto) 16.3 L, Mono % (Auto) 9.8 H, Eos % (Auto) 3.6, Baso % (Auto) 0.1, Gran # 

5.11, Lymph # 1.2, Mono # 0.7 H, Eos # 0.3, Baso # 0.01








I have reviewed the lab results: Yes





- RAD Interpretation


Radiology Orders: 








12/11/17 12:56


CHEST PORTABLE [RAD] Stat 











: Radiologist





- EKG Interpretation


Interpreted by ED Physician: Yes


Type: 12 lead EKG


Comparison: Com.w/previous EKG





- Medication Orders


Current Medication Orders: 








Albuterol/Ipratropium (Duoneb 3 Mg/0.5 Mg (3 Ml) Ud)  3 ml IH [id number] [facility]


   Last Admin: 12/13/17 01:38  Dose: 3 ml





Albuterol/Ipratropium (Duoneb 3 Mg/0.5 Mg (3 Ml) Ud)  3 ml IH Q2H PRN


   PRN Reason: Shortness of Breath


Alprazolam (Xanax)  0.25 mg PO Q8H PRN; Protocol


   PRN Reason: Anxiety


   Stop: 12/19/17 08:11


Aspirin (Ecotrin)  81 mg PO DAILY [facility]


   Last Admin: 12/12/17 10:30  Dose: 81 mg





Atorvastatin Calcium (Lipitor)  10 mg PO HS [facility]


   Last Admin: 12/12/17 21:39  Dose: 10 mg





Azithromycin (Zithromax)  500 mg PO DAILY [facility]


   PRN Reason: Protocol


Bisacodyl (Dulcolax)  10 mg NJ DAILY PRN


   PRN Reason: Constipation


Furosemide (Lasix)  40 mg PO DAILY [facility]


Guaifenesin (Robitussin)  200 mg PO Q6H PRN


   PRN Reason: Cough


   Last Admin: 12/12/17 10:30  Dose: 200 mg





Magnesium Oxide (Mag-Ox)  400 mg PO BID [facility]


   Last Admin: 12/12/17 18:28  Dose: 400 mg





Methylprednisolone (Solu-Medrol)  40 mg IVP Q12 [facility]


   Last Admin: 12/12/17 21:39  Dose: 40 mg





IVP Administration


 Document     12/12/17 21:39  CDL  (Rec: 12/12/17 21:39  CDL  [id number])


     Charges for Administration


      # of IVP Administrations                   1





Montelukast Sodium (Singulair)  10 mg PO HS [facility]


   Last Admin: 12/12/17 21:39  Dose: 10 mg





Pantoprazole Sodium (Protonix Ec Tab)  40 mg PO DAILY [facility]


   Last Admin: 12/12/17 10:30  Dose: 40 mg





Polyethylene Glycol (Miralax)  17 gm PO DAILY PRN


   PRN Reason: Constipation


   Last Admin: 12/12/17 10:30  Dose: 17 gm





Roflumilast (Daliresp)  500 mcg PO DAILY [facility]





Discontinued Medications





Albuterol/Ipratropium (Duoneb 3 Mg/0.5 Mg (3 Ml) Ud)  3 ml IH STAT STA


   Stop: 12/11/17 12:58


   Last Admin: 12/11/17 13:21  Dose: 3 ml





Albuterol/Ipratropium (Duoneb 3 Mg/0.5 Mg (3 Ml) Ud)  3 ml IH STAT STA


   Stop: 12/11/17 12:58


   Last Admin: 12/11/17 13:21  Dose: 3 ml





Benzocaine/Menthol (Cepacol Sore Throat)  1 wade MT STAT STA


   Stop: 12/12/17 03:30


   Last Admin: 12/12/17 03:35  Dose: 1 wade





Furosemide (Lasix)  20 mg IVP STAT STA


   Stop: 12/11/17 16:42


   Last Admin: 12/11/17 17:08 Dose:  Not Given


   Non-Admin Reason: BP Parameters Not Met





MAR Blood Pressure


 Document     12/11/17 17:08  YP  (Rec: 12/11/17 17:08  YP  [id number])


     Blood Pressure


      Blood Pressure (100//90)             98/50





Phytonadione 10 mg/ Sodium (Chloride)  51 mls @ 100 mls/hr IV ONCE ONE


   Stop: 12/11/17 17:52


   Last Admin: 12/11/17 17:48  Dose: 100 mls/hr





eMAR Start Stop


 Document     12/11/17 17:48  YP  (Rec: 12/11/17 17:48  YP  [id number])


     Intravenous Solution


      Start Date                                 12/11/17


      Start Time                                 17:48





Sodium Chloride (Sodium Chloride 0.9%)  1,000 mls @ 50 mls/hr IV .Q20H [facility]


   Stop: 12/12/17 17:00


   Last Admin: 12/12/17 10:30  Dose: 50 mls/hr





eMAR Start Stop


 Document     12/12/17 10:30  DH  (Rec: 12/12/17 10:31  DH  [id number])


     Intravenous Solution


      Start Date                                 12/12/17


      Start Time                                 10:31





Lorazepam (Ativan)  0.5 mg PO ONCE ONE


   PRN Reason: Protocol


   Stop: 12/11/17 13:26


   Last Admin: 12/11/17 14:02  Dose:  





Magnesium Oxide (Mag-Ox)  400 mg PO DAILY [facility]


   Last Admin: 12/12/17 10:30  Dose: 400 mg





Methylprednisolone (Solu-Medrol)  125 mg IVP STAT STA


   Stop: 12/11/17 12:58


   Last Admin: 12/11/17 14:02  Dose: 125 mg





IVP Administration


 Document     12/11/17 14:02  YP  (Rec: 12/11/17 14:02  YP  [id number])


     Charges for Administration


      # of IVP Administrations                   1





Phytonadione (Vitamin K Tab)  5 mg PO ONCE ONE


   Stop: 12/11/17 17:47


   Last Admin: 12/11/17 19:24  Dose: 5 mg











- Scribe Statement


The provider has reviewed the documentation as recorded by the Abneribe


Serena Baker





Provider Scribe Attestation:


All medical record entries made by the Abneribe were at my direction and 

personally dictated by me. I have reviewed the chart and agree that the record 

accurately reflects my personal performance of the history, physical exam, 

medical decision making, and the department course for this patient. I have 

also personally directed, reviewed, and agree with the discharge instructions 

and disposition. 





Disposition/Present on Arrival





- Present on Arrival


Any Indicators Present on Arrival: No


History of DVT/PE: No


History of Uncontrolled Diabetes: No


Urinary Catheter: No


History of Decub. Ulcer: No


History Surgical Site Infection Following: None





- Disposition


Have Diagnosis and Disposition been Completed?: Yes


Diagnosis: 


 COPD exacerbation, CHF (congestive heart failure), Supratherapeutic INR





Disposition: HOSPITALIZED


Disposition Time: 06:00


Condition: STABLE

## 2017-12-11 NOTE — CARD
--------------- APPROVED REPORT --------------





EKG Measurement

Heart [id number]

NC 156P

[id number]

[id number]

[id number]



<Conclusion>

Ectopic Atrial  rhythm

Left axis deviation

Minimal voltage criteria for LVH, may be normal variant

Possible Anterior infarct, age undetermined

T wave abnormality, consider inferolateral ischemia

Abnormal ECG

## 2017-12-11 NOTE — RAD
HISTORY:

cp  



COMPARISON:

11/10/2017 



FINDINGS:



LUNGS:

No active pulmonary disease.



PLEURA:

No significant pleural effusion identified, no pneumothorax apparent.



CARDIOVASCULAR:

Moderate cardiomegaly



OSSEOUS STRUCTURES:

Sternal wires



VISUALIZED UPPER ABDOMEN:

Normal.



OTHER FINDINGS:

None.



IMPRESSION:

No active disease.

## 2017-12-12 LAB
ALBUMIN/GLOB SERPL: 1 {RATIO} (ref 1.1–1.8)
ALP SERPL-CCNC: 146 U/L (ref 38–126)
ALT SERPL-CCNC: 22 U/L (ref 7–56)
ANISOCYTOSIS BLD QL SMEAR: (no result)
APPEARANCE UR: CLEAR
AST SERPL-CCNC: 36 U/L (ref 14–36)
BASOPHILS # BLD AUTO: 0 K/MM3 (ref 0–2)
BASOPHILS NFR BLD: 0 % (ref 0–3)
BILIRUB SERPL-MCNC: 0.7 MG/DL (ref 0.2–1.3)
BILIRUB UR-MCNC: NEGATIVE MG/DL
BUN SERPL-MCNC: 21 MG/DL (ref 7–21)
CALCIUM SERPL-MCNC: 9.6 MG/DL (ref 8.4–10.5)
CHLORIDE SERPL-SCNC: 103 MMOL/L (ref 98–107)
CHOLEST SERPL-MCNC: 217 MG/DL (ref 130–200)
CO2 SERPL-SCNC: 22 MMOL/L (ref 21–33)
COLOR UR: YELLOW
EOSINOPHIL # BLD: 0 10*3/UL (ref 0–0.7)
EOSINOPHIL NFR BLD: 0 % (ref 1.5–5)
ERYTHROCYTE [DISTWIDTH] IN BLOOD BY AUTOMATED COUNT: 15.5 % (ref 11.5–14.5)
GLOBULIN SER-MCNC: 3.8 GM/DL
GLUCOSE SERPL-MCNC: 191 MG/DL (ref 70–110)
GLUCOSE UR STRIP-MCNC: NEGATIVE MG/DL
GRANULOCYTES # BLD: 9.78 10*3/UL (ref 1.4–6.5)
GRANULOCYTES NFR BLD: 93.5 % (ref 50–68)
HCT VFR BLD CALC: 28.7 % (ref 36–48)
HYPOCHROMIA BLD QL SMEAR: (no result)
INR PPP: 1.76 (ref 0.93–1.08)
KETONES UR STRIP-MCNC: (no result) MG/DL
LEUKOCYTE ESTERASE UR-ACNC: NEGATIVE LEU/UL
LYMPHOCYTES # BLD: 0.5 10*3/UL (ref 1.2–3.4)
LYMPHOCYTES NFR BLD AUTO: 4.9 % (ref 22–35)
MAGNESIUM SERPL-MCNC: 1.5 MG/DL (ref 1.7–2.2)
MCH RBC QN AUTO: 28.5 PG (ref 25–35)
MCHC RBC AUTO-ENTMCNC: 32.1 G/DL (ref 31–37)
MCV RBC AUTO: 88.9 FL (ref 80–105)
MONOCYTES # BLD AUTO: 0.2 10*3/UL (ref 0.1–0.6)
MONOCYTES NFR BLD: 1.6 % (ref 1–6)
NEUTROPHILS NFR BLD AUTO: 92 % (ref 50–70)
PH UR STRIP: 5.5 [PH] (ref 4.7–8)
PHOSPHATE SERPL-MCNC: 3.7 MG/DL (ref 2.5–4.5)
PLATELET # BLD: 359 10^3/UL (ref 120–450)
PMV BLD AUTO: 9.8 FL (ref 7–11)
POTASSIUM SERPL-SCNC: 4.7 MMOL/L (ref 3.6–5)
PROT SERPL-MCNC: 7.7 G/DL (ref 5.8–8.3)
PROT UR STRIP-MCNC: NEGATIVE MG/DL
RBC # UR STRIP: NEGATIVE /UL
SODIUM SERPL-SCNC: 137 MMOL/L (ref 132–148)
SP GR UR STRIP: 1.02 (ref 1–1.03)
UROBILINOGEN UR STRIP-ACNC: 0.2 E.U./DL
WBC # BLD AUTO: 10.5 10^3/UL (ref 4.5–11)

## 2017-12-12 RX ADMIN — METHYLPREDNISOLONE SODIUM SUCCINATE SCH MG: 40 INJECTION, POWDER, FOR SOLUTION INTRAMUSCULAR; INTRAVENOUS at 21:39

## 2017-12-12 RX ADMIN — IPRATROPIUM BROMIDE AND ALBUTEROL SULFATE SCH ML: .5; 3 SOLUTION RESPIRATORY (INHALATION) at 13:45

## 2017-12-12 RX ADMIN — IPRATROPIUM BROMIDE AND ALBUTEROL SULFATE SCH ML: .5; 3 SOLUTION RESPIRATORY (INHALATION) at 19:32

## 2017-12-12 RX ADMIN — PANTOPRAZOLE SODIUM SCH MG: 40 TABLET, DELAYED RELEASE ORAL at 10:30

## 2017-12-12 NOTE — HP
REASON FOR ADMISSION:  Chest discomfort and chest pain.



HISTORY OF PRESENT ILLNESS:  This is a 62-year-old female just recently had

cardiac surgery with replacement of mitral valve, on Coumadin for

paroxysmal atrial fibrillation, and came to the emergency room with the

chest pain that has been on for couple of days.  The patient also had noted

that she has been monitored for PT and INR; however, her INR was elevated,

advised to stop Coumadin, but she forget and she continued Coumadin and her

INR is came back to be 8, which is highly elevated.  She did mention she

spit some blood from spitting and _____ little blood came out, but

otherwise no active bleeding and seems stopped.  The patient came to the ER

for the above complaint and will be admitted for observation.



PAST MEDICAL HISTORY:  As I mentioned, she has mitral valve replacement

surgery, open heart surgery, she did have also chronic osteoarthritis, and

chronic gouty arthritis.  She does have history of hypertension, COPD,

chronic anxiety, and hypercholesterolemia.



HOME MEDICATIONS:  Coumadin and warfarin she takes 3 mg daily.  She takes

MiraLax, Protonix 40, magnesium, and cough syrup.  She was getting Multaq

400 mg b.i.d., Colace, Benadryl, colchicine 0.6 mg p.o. daily, carvedilol

6.25, Zyrtec 10 mg, vitamin D, Cepacol, Lipitor 10, aspirin 81,

bronchodilator nebulizer treatment including DuoNeb, Tylenol With Codeine

p.r.n. for pain, and Xanax 0.25 every 8 hours p.r.n.



ALLERGIES:  NO OTHER ALLERGIES.



FAMILY HISTORY:  Noncontributory.



REVIEW OF SYSTEMS:  As in the past, She does complain of throat pain, but

she also complain of recent left chest pain.  She did have open heart

surgery, just midline chest wall discomfort that has been before.  She also

complaining of anxiety.  She is always worry and anxious.



PHYSICAL EXAMINATION:

GENERAL:  On 12/11/2017; the patient is sitting on the bed and is

comfortable.  No distress.  She has no active complaint of chest pain.

VITAL SIGNS:  Her temperature 97.5, heart rate 75, blood pressure is 92/55,

respirations 18, and saturating 100%.

HEAD AND NECK:  Normal.  No JVD.  No thyromegaly.

CHEST:  Clear good air entry.  Her chest wall exam; there is midline scar,

it seems comfortable, it seems healing well, and no infections and little

bit of mild tenderness on palpitation.

CARDIAC:  First sound and second sound normal.

ABDOMEN:  Soft and nontender.

EXTREMITIES:  No edema.

NEUROLOGIC:  Normal.



LABORATORY DATA:  White count 7.3, hemoglobin 9.5, hematocrit 29.1, and

platelets 328.  Chemistry; sodium 136, potassium 4.3, chloride 103,

bicarbonate 25, BUN 10, creatinine 1.2, blood sugar 128, calcium 9.2, and

magnesium is low 1.6.  Liver enzymes are noted for normal AST, ALT and

alkaline phosphatase 166.  The patient also had ProBNP, which is 1790 and

had troponin 0.02.  Her PT was 203.9.  Her INR is 17.64.  Her PTT was

127.6.



IMPRESSION AND PLAN:

1.  This is a 62-year-old female who came in with chest pain and also has

high Coumadin level.  Repeat INR is very extremely high.  The patient

received vitamin K 10 mg in emergency room and we will repeat that Coumadin

in the morning.  I am going to get a Hematology consult to follow up with

Dr. Alatorre, discussed with him already and _____ with vitamin K.

2.  Chest pain.  Cardiology consult, Dr. Gaxiola and will monitor, probably we

will repeat the troponin, although she has recent cardiac surgery, but that

was 2 weeks ago.  Current medications; the patient right now getting is

Xanax 0.25, Robitussin, Protonix, magnesium oxide 400 mg p.o. daily,

Lipitor 10, DuoNeb, aspirin and Dulcolax and we will monitor clinical

status.  Repeat lab in the morning.





__________________________________________

Philip Dinh MD





DD:  12/12/2017 9:58:49

DT:  12/12/2017 12:10:07

Job # [id number]

## 2017-12-12 NOTE — PN
DATE:



REASON FOR THE CONSULTATION AND FOLLOWUP:  Chest pain, COPD exacerbation,

possible incidental finding of severally elevated INR of 17.6, PT of 203,

and APTT of 127.6.



SUBJECTIVE:  Denies any black color stool, melena or obvious bleeding from

anywhere.



OBJECTIVE:

GENERAL  Not in distress.

VITAL SIGNS:  As follows; temperature afebrile, heart rate 95, and blood

pressure 116/76.

HEENT:  PERRLA, intact.

NECK:  Supple.  No carotid bruits.  No thyromegaly.

CHEST:  Clear to auscultation.

LUNGS:  S1 and S2, regular.

ABDOMEN:  Soft.

EXTREMITIES:  Clubbing and cyanosis negative.



LABORATORY DATA:  Blood workup as follows; WBC count 10.5, hemoglobin 9.2,

hematocrit 28.7, and platelet count 359.  Chemistry shows sodium 137,

potassium 4.7, chloride 103, carbon dioxide 22, anion gap of 17, BUN 21,

and creatinine 1.9.  Total protein 7.7, albumin 3.9, albumin and globulin

ratio 3.8, TSH 0.48, and INR 1.76.



IMPRESSION:  Chest pain, atypical; history of cardiac catheterization

twice, recently in 10/2017; mid left anterior descending artery disease

prior two years ago and nonobstructive coronary artery disease; history of

mitral valve bioprosthetic repair in October; history of paroxysmal atrial

fibrillation, was on Coumadin postop for three months, the patient

discontinued recently, but the patient took an extra couple of dose at home

by mixing with other medication, admitted with chest pain, but incidental

finding was severely elevated INR of 17.64, but no obvious bleeding noted. 

H and H remained stable, admitting was 9.2 yesterday, today was 9.2.  IV

was given by ER and I gave 5 mg p.o., now INR is subtherapeutic.



RECOMMENDATIONS:  We will discontinue telemetry.  We will get a gentle

hydration 50 mL an hour.  Probably this acute bump in BUN and creatinine

secondary to steroid as well as Lasix.  Agree to discontinue telemetry. 

Repeat the blood workup.  Possible discharge in a day or two.



Thank you Dr. Dinh, for providing us the opportunity in taking care of

the patient, Malu.  We will follow with you.



__________________________________________

Ajay Gaxiola MD





DD:  12/12/2017 9:55:14

DT:  12/12/2017 11:00:31

Job # [id number]

## 2017-12-12 NOTE — CP.PCM.CON
History of Present Illness





- History of Present Illness


History of Present Illness: 


Heme/Onc consult note for Dr Alatorre's service.





Reason for consult:  supratherapeutic INR








Patient is a 61 y/o with PMH of severe mitral regurgitation s/p open heart 

surgery and repair with MVR biprosthetic, history of paroxysmal afib ( was on 

coumadin), and non obstructing CAD , COPD whom presented initially due to 

dizziness and was found to have supratherapeutic INR. Heme/onc is consulted for 

elevated INR. On admission patient's INR was 17.64. Patient states last week 

when the visiting nurse came to her house, her INR was 7, Dr Dinh told her to 

cut down the Coumadin from 5 to 3, however patient states she already has pre-

organized medications she takes on a daily bases in a pill container thus she 

forgot to adjust the dose. Patient states on Sunday she noticed her gum was 

bleeding when she tried to brush her teeth. She also noticed she was easily 

bruising and had bruising of the left eye. Patient denies blood in the stool, 

or dark stool. 


Denies prior history of bleeding disorder, denies cp or sob. No nausea or 

vomiting. 





PMHx: severe mitral regurgitation s/p open heart surgery and repair with MVR 

biprosthetic, history of paroxysmal afib ( was on Coumadin), and non 

obstructing CAD , COPD.


PSHx: open heart surgery, carpal tunnel, bunion, cholecystectomy.


Social: Denies alcohol, tobacco or illicit drug use.


Allergy: cinnamon


Home meds: Reviewed, please see medical record. 





Review of Systems





- Review of Systems


All systems: reviewed and no additional remarkable complaints except


Review of Systems: 





As per HPI. 





- Hematologic/Lymphatic


Hematologic: Easy Bleeding, Easy Bruising.  absent: Lymphadenopathy





Past Patient History





- Infectious Disease


Hx of Infectious Diseases: None





- Tetanus Immunizations


Tetanus Immunization: Unknown





- Past Social History


Smoking Status: Never Smoked


Alcohol: None


Drugs: Denies


Home Situation {Lives}: With Family





- CARDIAC


Hx Cardiac Disorders: Yes


Hx Congestive Heart Failure: Yes


Hx Hypercholesterolemia: Yes


Hx Hypertension: Yes


Hx Peripheral Edema: Yes


Hx Peripheral Vascular Disease: Yes





- PULMONARY


Hx Respiratory Disorders: Yes


Hx Chronic Obstructive Pulmonary Disease (COPD): Yes





- NEUROLOGICAL


Hx Neurological Disorder: No





- HEENT


Hx HEENT Problems: No





- RENAL


Hx Chronic Kidney Disease: No





- ENDOCRINE/METABOLIC


Hx Endocrine Disorders: No





- HEMATOLOGICAL/ONCOLOGICAL


Hx Blood Disorders: No





- INTEGUMENTARY


Hx Dermatological Problems: No





- MUSCULOSKELETAL/RHEUMATOLOGICAL


Hx Musculoskeletal Disorders: No


Hx Falls: No





- GASTROINTESTINAL


Hx Gastrointestinal Disorders: No





- GENITOURINARY/GYNECOLOGICAL


Hx Genitourinary Disorders: No





- PSYCHIATRIC


Hx Psychophysiologic Disorder: Yes


Hx Anxiety: Yes


Hx Depression: Yes


Hx Substance Use: No





- SURGICAL HISTORY


Hx Surgeries: Yes


Hx Cholecystectomy: Yes


Hx Open Heart Surgery: Yes





- ANESTHESIA


Hx Anesthesia: Yes


Hx Anesthesia Reactions: No


Hx Malignant Hyperthermia: No





Meds


Allergies/Adverse Reactions: 


 Allergies











Allergy/AdvReac Type Severity Reaction Status Date / Time


 


cinnamon Allergy Intermediate  Verified 11/09/17 17:07














- Medications


Medications: 


 Current Medications





Aspirin (Ecotrin)  81 mg PO DAILY OLMAN


Atorvastatin Calcium (Lipitor)  10 mg PO HS OLMAN


   Last Admin: 12/11/17 21:52 Dose:  10 mg











Physical Exam





- Constitutional


Appears: No Acute Distress, Older Than Stated Age





- Head Exam


Head Exam: ATRAUMATIC, NORMAL INSPECTION, NORMOCEPHALIC





- Eye Exam


Eye Exam: EOMI, Normal appearance, PERRL.  absent: Scleral icterus


Pupil Exam: NORMAL ACCOMODATION





- ENT Exam


ENT Exam: Mucous Membranes Moist





- Neck Exam


Neck exam: Positive for: Normal Inspection





- Respiratory Exam


Respiratory Exam: Clear to Auscultation Bilateral, NORMAL BREATHING PATTERN.  

absent: Rales, Rhonchi, Wheezes, Respiratory Distress, Stridor





- Cardiovascular Exam


Cardiovascular Exam: REGULAR RHYTHM, RRR, +S1, +S2





- GI/Abdominal Exam


GI & Abdominal Exam: Normal Bowel Sounds, Soft.  absent: Distended, Firm, 

Guarding, Rigid, Tenderness





- Extremities Exam


Extremities exam: Positive for: normal inspection.  Negative for: pedal edema





- Back Exam


Back exam: NORMAL INSPECTION





- Neurological Exam


Neurological exam: Alert, Oriented x3, Reflexes Normal





- Psychiatric Exam


Psychiatric exam: Normal Affect, Normal Mood





- Skin


Skin Exam: Dry, Intact, Warm


Additional comments: 





small ecchymotic rash on the upper extremities. 





Results





- Vital Signs


Recent Vital Signs: 


 Last Vital Signs











Temp  97.9 F   12/12/17 05:55


 


Pulse  95 H  12/12/17 05:56


 


Resp  72 H  12/12/17 05:55


 


BP  91/60 L  12/12/17 05:55


 


Pulse Ox  98   12/12/17 05:55














- Labs


Result Diagrams: 


 12/12/17 06:20





 12/12/17 06:20


Labs: 


 Laboratory Results - last 24 hr











  12/11/17





  16:48


 


PT  203.9 H


 


INR  17.64 H*


 


APTT  127.6 H*














Assessment & Plan





- Assessment and Plan (Free Text)


Assessment: 


Patient is a 61 y/o with PMH of severe mitral regurgitation s/p open heart 

surgery and repair with MVR biprosthetic, history of paroxysmal afib ( was on 

Coumadin), and non obstructing CAD, COPD whom presented initially presented due 

to dizziness and was found to have supratherapeutic INR.


Supratherapeutic INR likely due to patient taking high dose of Coumadin. 

Patient was giving IV 10mg of vitamin K by ED, patient was also giving 5 mg po 

of vitamin by cardiologist. INR is currently 1.76. As per cardiologist, Dr Gaxiola

, patient was on Coumadin due to paroxysmal afib, and patient currently doesn't 

need Coumadin, thus Coumadin should be discontinued. Patient to only take 

aspirin 81 mg once a day for non obstructing CAD. 


Spoke to patient and made her aware of the plan. Patient understands she needs 

to stop taking Coumadin as recommended by cardiologist, Dr Gaxiola. 





Thank you for consulting Dr Alatorre. 





Patient seen, examined and case discussed with Dr Alatorre. 





- Date & Time


Date: 12/12/17


Time: 07:45

## 2017-12-12 NOTE — CON
DATE:12/11/2017



REASON FOR CONSULTATION AND FOLLOWUP:  Chest pain, COPD exacerbation

possible, incidental finding of elevated INR, severe (INR is 17.64, ,

and APTT 127.6).



BRIEF CLINICAL HISTORY:  This is a 62-year-old female with past medical

history significant for severe mitral regurgitation, status post open heart

surgery, MVR bioprosthetic, history of paroxysmal atrial fibrillation,

multiple admissions prior to open heart surgery with recurrent paroxysmal

atrial fibrillation and severe mitral regurgitation for more than two

years, ultimately finally the patient agreed for mitral valve surgery

status post re-catheterization done, mild nonobstructive coronary artery

disease.  Two years ago, the patient had a cardiac catheterization,

nonobstructive coronary artery disease, who came in today with a complaint

of right-sided chest pain and shortness of breath possibly secondary to

acute exacerbation of COPD, but incidental finding was severely elevated

PT/INR, so the patient was admitted.  The patient denies any chest pain,

now denies any shortness of breath, denies any palpitation now.



PAST MEDICAL HISTORY:  Significant for paroxysmal atrial fibrillation,

mitral valve prolapse, severe mitral regurgitation, and mitral valve

bioprosthetic surgery done in 10/2017, around six weeks ago.



PREVIOUS CARDIAC WORKUP:  As follows:  The patient had a cardiac

catheterization done in 10/2017 by Dr. Meredith, mid LAD 50% of stenosis,

prior two years ago the patient had another cardiac catheterization done

with mild CAD.  At this time, the cath was done because the patient was

being evaluated for surgery and complaining of chest pain, so

re-catheterization was done as suggested by the surgeon.



PAST SURGICAL HISTORY:  Significant for cholecystectomy, carpal tunnel

syndrome, bunionectomy, removal of bunion from the foot and most recently

in the middle 10/2017, the patient had open heart surgery, bioprosthetic

MVR was done in [facility].  Most recent echocardiography done

on 11/03/2017 showed normal size, ejection fraction of 55%, trace aortic

regurgitation, aortic sclerosis, mild aortic stenosis, trace mitral

regurgitation, mitral annular calcification, trace-to-mild tricuspid

regurgitation, RV systolic pressure of 37, trace pericardial effusion,

status post bioprosthetic mitral valve replacement, status post open heart

surgery twp to three weeks ago and bioprosthetic valve was replaced, mitral

valve appears well seated.



ALLERGIES:  NO KNOWN DRUG ALLERGIES.



CURRENT MEDICATIONS:  The patient at home taking Coumadin 3 mg daily,

MiraLax, magnesium, guaifenesin, Colace, colchicine, cholecalciferol,

aspirin, albuterol, and Xanax.



REVIEW OF SYSTEMS:  As per HPI.



PHYSICAL EXAMINATION:

VITAL SIGNS:  As follows:  Temperature afebrile, heart rate 80 and blood

pressure 98/50.

HEENT:  PERRLA.  Extraocular muscles intact.

NECK:  Supple.  No carotid bruits.  No thyromegaly.

CHEST:  Clear to auscultation.

HEART:  S1 and S2, regular.

ABDOMEN:  Soft.

EXTREMITIES:  Clubbing and cyanosis negative.



LABORATORY DATA:  Blood workup as follows:  WBC 7.3, hemoglobin 9.5,

hematocrit 29.1, and platelet count 328,000.  Chemistry shows sodium 132,

potassium 4.3, chloride 103, carbon dioxide 25, anion gap of 12, BUN of 10,

and creatinine 1.2.  BNP 1790.  Total protein 7.2, albumin 3.6.  EKG showed

baseline artifact but normal sinus.  Chest x-ray, no acute changes or CHF

noted.



IMPRESSION:  Chest pain, atypical.  The patient had recent catheterization

done prior to open heart surgery six to eight weeks ago, nonobstructive

coronary artery disease prior to that the patient two year ago negative

nonobstructive coronary artery disease.  The patient multiple admissions

with chest pain prior to that, also history of mitral valve repair, history

of severe mitral prolapse, history of severe mitral regurgitation, status

post open heart surgery eight weeks ago.  Coumadin toxicity, INR severely

elevated at 17.6, , and APTT 127.6, mild anemia.



RECOMMENDATIONS:  We will give p.o. vitamin K, repeat INR/PT in the

morning, keep observing the patient.  We will closely monitor.  Though the

BUN is elevated, but give gentle hydration 15 mL an hour and if needed the

blood pressure remains low.  Continue treatment, aggressive, for COPD.  I

will give baby aspirin from tomorrow and repeat the blood workup.  We are

planning to discontinue Coumadin.  Anyway, we will see further

recommendation as per hospital course.  If the patient remains sinus, we

can discontinue Coumadin.  We will follow with you.



Thank you Dr. Dinh, for providing us the opportunity in taking care of

this patient, Malu Meeks.





__________________________________________

Ajay Gaxiola MD





DD:  12/11/2017 18:59:29

DT:  12/12/2017 0:42:06

Job # [id number]





LAILA

## 2017-12-13 VITALS
TEMPERATURE: 97.8 F | SYSTOLIC BLOOD PRESSURE: 126 MMHG | DIASTOLIC BLOOD PRESSURE: 74 MMHG | RESPIRATION RATE: 20 BRPM | HEART RATE: 104 BPM

## 2017-12-13 VITALS — OXYGEN SATURATION: 100 %

## 2017-12-13 LAB
ALBUMIN/GLOB SERPL: 1 {RATIO} (ref 1.1–1.8)
ALP SERPL-CCNC: 122 U/L (ref 38–126)
ALT SERPL-CCNC: 23 U/L (ref 7–56)
AST SERPL-CCNC: 25 U/L (ref 14–36)
BASOPHILS # BLD AUTO: 0 K/MM3 (ref 0–2)
BASOPHILS NFR BLD: 0 % (ref 0–3)
BILIRUB SERPL-MCNC: 0.7 MG/DL (ref 0.2–1.3)
BUN SERPL-MCNC: 26 MG/DL (ref 7–21)
CALCIUM SERPL-MCNC: 9.2 MG/DL (ref 8.4–10.5)
CHLORIDE SERPL-SCNC: 103 MMOL/L (ref 98–107)
CO2 SERPL-SCNC: 22 MMOL/L (ref 21–33)
EOSINOPHIL # BLD: 0 10*3/UL (ref 0–0.7)
EOSINOPHIL NFR BLD: 0 % (ref 1.5–5)
ERYTHROCYTE [DISTWIDTH] IN BLOOD BY AUTOMATED COUNT: 15.5 % (ref 11.5–14.5)
GLOBULIN SER-MCNC: 3.5 GM/DL
GLUCOSE SERPL-MCNC: 191 MG/DL (ref 70–110)
GRANULOCYTES # BLD: 9.49 10*3/UL (ref 1.4–6.5)
GRANULOCYTES NFR BLD: 92.8 % (ref 50–68)
HCT VFR BLD CALC: 25.7 % (ref 36–48)
INR PPP: 1.55 (ref 0.93–1.08)
IRON SERPL-MCNC: 73 UG/DL (ref 45–180)
LYMPHOCYTES # BLD: 0.6 10*3/UL (ref 1.2–3.4)
LYMPHOCYTES NFR BLD AUTO: 5.7 % (ref 22–35)
MAGNESIUM SERPL-MCNC: 1.4 MG/DL (ref 1.7–2.2)
MCH RBC QN AUTO: 29 PG (ref 25–35)
MCHC RBC AUTO-ENTMCNC: 32.3 G/DL (ref 31–37)
MCV RBC AUTO: 89.9 FL (ref 80–105)
MONOCYTES # BLD AUTO: 0.2 10*3/UL (ref 0.1–0.6)
MONOCYTES NFR BLD: 1.5 % (ref 1–6)
PHOSPHATE SERPL-MCNC: 3.7 MG/DL (ref 2.5–4.5)
PLATELET # BLD: 327 10^3/UL (ref 120–450)
PMV BLD AUTO: 9.8 FL (ref 7–11)
POTASSIUM SERPL-SCNC: 4.7 MMOL/L (ref 3.6–5)
PROT SERPL-MCNC: 6.9 G/DL (ref 5.8–8.3)
SODIUM SERPL-SCNC: 133 MMOL/L (ref 132–148)
WBC # BLD AUTO: 10.2 10^3/UL (ref 4.5–11)

## 2017-12-13 RX ADMIN — IPRATROPIUM BROMIDE AND ALBUTEROL SULFATE SCH ML: .5; 3 SOLUTION RESPIRATORY (INHALATION) at 07:29

## 2017-12-13 RX ADMIN — PANTOPRAZOLE SODIUM SCH MG: 40 TABLET, DELAYED RELEASE ORAL at 09:46

## 2017-12-13 RX ADMIN — METHYLPREDNISOLONE SODIUM SUCCINATE SCH MG: 40 INJECTION, POWDER, FOR SOLUTION INTRAMUSCULAR; INTRAVENOUS at 09:47

## 2017-12-13 RX ADMIN — IPRATROPIUM BROMIDE AND ALBUTEROL SULFATE SCH ML: .5; 3 SOLUTION RESPIRATORY (INHALATION) at 01:38

## 2017-12-13 NOTE — PN
DATE:  12/13/2017



SUBJECTIVE:  The patient is seen sitting in bed.  She is awake, she is

alert, she is comfortable.  She reports that she has no fever at this time.

No chills.  No fatigue.  No shortness of breath.



PHYSICAL EXAMINATION:

GENERAL:  Elderly lady sitting in bed.

VITAL SIGNS:  Blood pressure 126/74, heart rate 104, respiratory rate 20,

temperature 97.8.

HEENT:  Normocephalic, atraumatic, positive pallor.

NECK:  Supple, no JVD.

LUNGS:  Bilateral equal air entry, bilateral scattered rhonchi, no rales.

CARDIAC:  S1, S2.  Regular rate and rhythm, no murmur, no rub.

ABDOMEN:  Obese, distended, soft, nontender, bowel sounds present.

EXTREMITIES:  No lower extremity edema.

INTAKE AND OUTPUT:  1320/not charted.



LABORATORY DATA:  WBC 10, hemoglobin to 8.3, hematocrit 26, platelets 327. 

Sodium 33, potassium 4.7, chloride 103, CO2 22, BUN 26, creatinine 1.3,

glucose 191, calcium 9.2, phosphorus 3.7 magnesium 1.4, saturation iron 32,

iron 73, ferritin pending.  Urine sodium 22.  Urine creatinine 211.



CURRENT MEDICATIONS:  Coreg 6.25 b.i.d., Daliresp, Dulcolax, DuoNeb,

Ecotrin, Lasix 40 p.o. daily, Lipitor, mag oxide, MiraLax, Protonix,

Robitussin, Singulair, Solu-Medrol 40 q. 12 h., Xanax, Zithromax, mag oxide

discontinued yesterday, the patient received 2 g of magnesium sulfate this

morning.



ASSESSMENT:

1.  Acute kidney injury superimposed on chronic kidney disease stage II,

resolving acute kidney injury.

2.  Paroxysmal atrial fibrillation.

3.  Coagulopathy.

4.  Severe anemia.

5.  Chronic obstructive pulmonary disease.

6.  History of acute kidney injury in the past.

7.  Hyponatremia secondary to dehydration.

8.  Anemia.



PLAN:

1.  Continue low-dose Lasix, consider switching to every other day.

2.  Agree with replacement of magnesium.

3.  Restart Coumadin?

4.  Chronic anemia.  Check urine immunofixation and serum protein

electrophoresis.  The patient will require outpatient followup.





__________________________________________

Archana Leong MD





DD:  12/13/2017 12:49:56

DT:  12/13/2017 12:54:05

[facility] # [id number]

## 2017-12-13 NOTE — IP.NPCORE
COPD Progress Note





- COPD Progress Note


Spirometry Assessment Completed:: No


FEV1/FVC<70: No (not documented )


Plan to assess at outpatient follow up: Yes


Symptoms:: Increase in Dyspnea


Initial CXR:: no active pul disease


Date:: 12/11/17


Time:: 12:50


Oxygen Saturation/Pulse Oximetry:: 100


ABG Not Indicated (Symptoms Improved): No


Nebulizers Q2-4 hrs:: Duonebs/Albuterol Therapy


Antibiotics Not Indicated: Yes


Systemic Steroids w/ methylprednisolone Name/Dose/Frequency:: solumedrol 40 q12 


Oxygen Delivery Method: Room Air


Smoking cessation counseling all stages copd exacerbation: Yes

## 2017-12-13 NOTE — PN
PULMONARY PROGRESS NOTE



DATE:  12/13/2017



REFERRING PHYSICIAN:  Philip Dinh MD



SUBJECTIVE:  She is sitting side of the bed.  Night was unremarkable. 

Feels better.  Decreased cough.  Decrease short of breath.  No nausea.  No

vomiting, diarrhea, leg pain or leg swelling.



OBJECTIVE:

GENERAL:  In no acute distress.

VITAL SIGNS:  Temperature is 98, heart rate is 104, respiratory rate is 20,

blood pressure is 126/70, and pulse ox is 100% on room air.

HEENT:  Moist mucous membrane.  Crowded airway.

NECK:  Supple.  No JVD.

LUNGS:  Has a few scattered rhonchi and has much better airflow.

HEART:  S1 and S2.

ABDOMEN:  Soft and nontender.  No organomegaly.

EXTREMITIES:  No edema.

NEUROLOGIC:  Awake and alert.  Follow simple commands.



MEDICATIONS:  She is on Coreg 6.25 mg twice a day, Daliresp 500 mcg daily,

Dulcolax p.r.n. basis, DuoNeb q.2 hours p.r.n. and q.6 hours around the

clock, Ecotrin 81 mg daily, Lasix 40 mg daily, Lipitor 10 mg daily,

magnesium oxide 400 mg twice a day, MiraLax 17 g daily, Protonix 40 mg

daily, Robitussin 200 mg q.6 hours p.r.n. Singulair 10 mg daily,

Solu-Medrol 40 mg q.12 hours, Xanax 0.25 mg q.8 hours p.r.n., and Zithromax

500 mg daily.



LABORATORY DATA:  Shows hemoglobin 8.3, hematocrit 25.7, WBC 10.2, and

platelet is 327.  INR 1.5.  Sodium 133, potassium 4.7, chloride 103,

bicarbonate 22, BUN 26, creatinine 1.3, glucose 191, calcium 9.2,

phosphorus 3.7, magnesium 1.4, iron is 73, AST 25, ALT 23, alkaline

phosphatase 122, and procalcitonin 0.05.



IMPRESSION AND PLAN:  Chronic obstructive lung disease, cardiomyopathy, may

have sleep apnea syndrome, atrial fibrillation, and history of valvular

disease.  Pulmonary point of view, she is doing okay.  Could be discharged

from tapered dose of steroids, antibiotics, and p.o. and inhaled

bronchodilator.  We will recommend outpatient full pulmonary function test

and also attended sleep study.  Does have a loud snoring, daytime sleepy,

and tired.















Thank you and we will follow with you.





__________________________________________

Ajay Wisdom MD





DD:  12/13/2017 14:12:37

DT:  12/13/2017 14:44:25

Job # [id number]

## 2017-12-13 NOTE — PN
DATE:  12/12/2017



SUBJECTIVE:  The patient is comfortable sitting, in no distress, no

nosebleed, no bleeding disorders.  Seen by cardiologist and by Dr. Alatorre,

hematologist.  The patient got vitamin K which revised her PT/INR within 24

hours.  She got more than 10 mg vitamin K.  Also, the patient seems stable,

comfortable, no distress, moving around, and seems doing well.



PHYSICAL EXAMINATION:

VITAL SIGNS:  As follows; temperature 97, heart rate 98, blood pressure

126/68, respirations 19.

HEAD AND NECK:  Normal.  No JVD, no thyromegaly.

CHEST:  Clear.  Good air entry.

CARDIAC:  First sound and second sound normal.

ABDOMEN:  Soft, nontender.

EXTREMITIES:  No edema.

NEUROLOGIC:  Shows following.



LABORATORY DATA:  White count 10.5, hemoglobin 9.2, hematocrit 20.7,

platelets are 359.  Chemistry shows sodium 137, potassium 4.7, chloride

103, bicarb 22, BUN 21, creatinine 1.9, blood sugar 191.  Her magnesium is

low.  I am going to give her IV magnesium before she leaves.  Her AST and

ALT are normal except for alkaline phosphatase slightly elevated.  The

patient had recent sternotomy.  Her BNP is 1790, which is less than before.

The patient also had cholesterol done.  Her .  Her TSH was normal. 

I gave a prescription for cholesterol medication and advised to follow, but

the patient is noncompliant, either with medication for cholesterol or

Coumadin, and her PT/INR came down afterwards; INR 17.64 with vitamin K, it

came back to 1.76.



IMPRESSION AND PLAN:

1.  Coagulopathy secondary to noncompliance with Coumadin.  The patient had

_____ postoperative valve replacement, that is the main reason for Coumadin

_____ Dr. Gaxiola.  Marya, discussed with Dr. Gaxiola, not needed. 

Questionable history of paroxysmal atrial fibrillation, right now she is in

regular sinus rhythm and she should be in regular sinus rhythm more often

than in paroxysmal atrial fibrillation after replacing her mitral valve. 

We will continue aspirin 325 mg once a day as per Dr. Gaxiola, plus proton

pump inhibitors to protect her stomach.

2.  Chronic obstructive pulmonary disease.  Continue inhaled

bronchodilator.  The patient may need a sleep study as an outpatient.

3.  Acute renal failure.  The patient is getting IV fluid 70 mL per hour,

slowly hydration, and we will repeat basic metabolic panel in the morning

to check in her kidney functions.  The patient is seen by Dr. Leong as a

consult.

4.  The patient does have chronic osteoarthritis, chronic anxiety.  We are

going to get her some Xanax p.r.n.  I gave her also some colchicine. 

Allopurinol, at this time, hold off, once the kidney functions improve and

then we will give it p.r.n.  Continue current therapy.  Currently, she is

on Lasix 40 mg p.o. daily, which could be too much for her.  We will

discuss that with Cardiology.  Electrolyte abnormalities, low magnesium; I

am going to give her magnesium more p.o. or IV.







__________________________________________

Philip Dinh MD





DD:  12/13/2017 7:57:52

DT:  12/13/2017 8:49:14

Job # [id number]

## 2017-12-13 NOTE — CON
DATE:  2017



The patient admitted for Dr. Philip Dinh.



REFERRING MD:  Dr. Dinh.



REASON FOR CONSULTATION:  Evaluation of the patient, known to me from her

prior evaluation in 2017, who presents with chest pain and an elevated

creatinine level.



HISTORY OF PRESENT ILLNESS:  The patient is a 62-year-old black female with

a history of paroxysmal atrial fibrillation, currently in normal sinus

rhythm, on chronic anticoagulation, who comes in with a coagulopathy, the

patient presented to the hospital with chest pain.  The patient is status

post mitral valve replacement surgery in 10/2017; history of LVH; history

of aortic stenosis; atrial insufficiency; mitral regurgitation; history of

anemia in part secondary to iron deficiency back in 2017; history of

mild nonobstructive coronary artery disease.  At the time of her last

evaluation, the patient was treated with diuretics and antibiotic therapy

for CHF and possible pneumonia.  At that point in time, her creatinine was

in the mid one range above her baseline of 1 to 1.1.  At the time of

discharge, her creatinine fell down to 0.9-1.0.  When she presented to the

hospital, yesterday creatinine was 1.2, this morning it is 1.9, hence we

were asked to evaluate the patient for the rise in creatinine.  The patient

did receive a dose of steroids and diuretics in the emergency room.  She is

currently on cautious IV fluid hydration.  Her chest x-ray showed no CHF.



PAST MEDICAL HISTORY:  Significant for that of paroxysmal atrial

fibrillation, on chronic anticoagulation; history of mitral valve

replacement surgery in 10/2017; history of LVH; aortic stenosis; aortic

insufficiency; mitral regurgitation; history of anemia with iron deficiency

back in 2017; history of nonobstructive coronary artery disease.



HOME MEDICATIONS:  Include that of Coumadin; MiraLax; Protonix; magnesium

oxide; Multaq; p.r.n. Benadryl; Burns laxative; Artificial Tears;

colchicine p.r.n.; vitamin D; Zyrtec; Coreg; Dulcolax; Lipitor; low-dose

aspirin; albuterol; acetylcysteine p.r.n.; Tylenol with Codeine; Xanax

p.r.n.



ALLERGIES:  THE PATIENT IS ALLERGIC TO CINNAMON.  SHE HAS NO KNOWN

ALLERGIES TO MEDICATION.



Medications presently in hospital include that of Dulcolax, DuoNeb,

Ecotrin, Lipitor, magnesium oxide, MiraLax, Protonix, Robitussin, normal

saline 50 mL an hour started by Cardiology, and Xanax.



SOCIAL HISTORY:  History of occasional alcohol use, none recently.  No

history of cigarette smoking.  She was never a cigarette smoker.  She had

exposure of cigarettes as secondhand smoke from her father.



FAMILY HISTORY:  Father  of brain cancer and diabetes.  Mother  of

complications of heart disease.



REVIEW OF SYSTEMS:  A 10+ systems reviewed with the patient and all

negative except what is noted above.

GENERAL:  The patient states since discharge from the hospital in November,

she has been stable.  No loss of appetite.  No weight loss.

ENT:  Denies any hearing or visual problems.

PULMONARY:  Presently, no shortness of breath.  History of mild asthma in

the past.  No history of COPD.  No history of emphysema or bronchitis.

CARDIAC:  History of valvular heart disease, history of atrial

fibrillation, history of nonobstructive coronary artery disease, history of

chest pain.

GI:  No nausea, vomiting, diarrhea, constipation, abdominal pain.

:  No history of chronic kidney disease.  No history of UTIs.

GYN:  Postmenopausal.

ENDOCRINE:  No history of diabetes.

MUSCULOSKELETAL:  No issues.

NEURO:  No history of CVA, TIA, seizures or syncope.

HEMATOLOGY/ONCOLOGY:  History of anemia as noted above.



PSYCHIATRIC HISTORY:  Negative.



PHYSICAL EXAMINATION:

GENERAL:  The patient is currently seen on telemetry.  She is sitting in

bed, eating her lunch.  She appears to be in no acute distress, her chest

pain had resolved.  She remains on cautious IV fluid hydration.

VITAL SIGNS:  Blood pressure  systolic, diastolic 60-76.  Pulse rate

is 95.  Temperature 97.9.  Respiratory rate is _____ with oxygen saturation

of 98%.

HEENT:  Exam shows her be normocephalic, atraumatic.  Conjunctivae are

pale.  Sclerae are nonicteric.  Pupils are equal and reactive to light and

accommodation.  Extraocular muscles are intact.  Posterior pharynx is

normal.

NECK:  Supple.  No neck vein distention.  No bruits.  No lymphadenopathy. 

No thyromegaly.

CHEST:  Clear to auscultation and percussion.  No rales.  No rhonchi or

wheezing.

CARDIOVASCULAR:  Shows an S1, S2 which are normal.  Positive soft systolic

murmur at the left lower sternal border.  ABDOMEN:  Soft.  Bowel sounds

normal.  No rebound.  No guarding.  No masses.

BACK:  No CVAT.  No spinal tenderness.

EXTREMITIES:  Show no cyanosis, clubbing or edema.  Distal lower extremity

pulses are 2+ bilateral.

NEURO:  Shows her be alert, oriented x3 with no gross focal motor or

sensory deficits noted.



LABORATORY DATA AND IMAGING STUDIES:  Imaging:  Admitting chest x-ray shows

no acute pulmonary disease.  Renal ultrasound done in the spring of 2017

shows a borderline small left kidney, otherwise negative.  Labs:  CBC,

white blood cell count 10.5, hemoglobin 9.2 with a platelet count of

359,000.  Coagulations on admission, .9 with an INR of 17.64. 

Today's PT is 19.6 with an INR of 1.76.  Chemistries, normal electrolytes. 

BUN on admission was 10, today is 21; creatinine was 1.2, today is 1.9. 

Glucose is 191.  Magnesium level is low at 1.5.  Liver enzymes are normal,

slight elevation of alkaline phosphatase of 146.  BNP on admission was

1790.  Albumin was 3.6, today it is 3.9.  LDL cholesterol was 153, total

cholesterol was 217.



ASSESSMENT:

1.  Mild elevation of BUN and creatinine, possibly secondary to diuretic

therapy given in the emergency room.  The patient has no past history of

chronic kidney disease.  On previous admission, she had a mild elevation of

her creatinine.  At the time of discharge, her creatinine was in the 0.9 to

1.0 range.  The patient does have a borderline small left kidney.  Her

urinalysis in the past showed no significant proteinuria or red blood

cells.  In all likelihood with cautious IV fluid hydration, her BUN and

creatinine will drift down to normal.  The patient did receive one dose of

steroids in the emergency room also.

2.  Status post mitral valve replacement approximately 7 weeks ago.  This

appears to be stable.

3.  History of nonobstructive coronary artery disease with mild valvular

heart disease and borderline left ventricular hypertrophy.  The patient has

a history of aortic insufficiency, aortic stenosis, mitral regurgitation.

4.  History of paroxysmal atrial fibrillation.  She appears to be in sinus

rhythm at this point in time.  The patient remains on chronic

anticoagulation.  Apparently, there was some misunderstanding about

removing Coumadin from her pill box, hence her PT/INR was significantly

elevated at the time of presentation to the hospital.

5.  History of anemia.  The patient had a mild iron deficiency in the past.

I will check her iron levels and we could supplement with IV Venofer

accordingly.

6.  Mild hypomagnesemia.  The patient will start on magnesium oxide

supplements.



PLAN:

1.  Discussed with the patient in detail.  I have explained to her that IV

fluid hydration being given cautiously will likely correct her slight

elevation of BUN and creatinine.

2.  I will recheck her iron saturations and ferritin level, IV Venofer as

necessary.

3.  Try and avoid diuretic therapy as well as steroid therapy and avoid any

nephrotoxic agents.

4.  No reason right now to repeat any extensive renal evaluation as I

believe her BUN and creatinine will likely trend back toward normal.

5.  Continue to monitor accurate Is and Os and monitor the patient on

telemetry.

6.  Daily labs over the next several days.

7.  Check phosphorus level, magnesium level on a frequent basis.

8.  Case discussed with the patient in detail.



Thank you for letting me partake and share in the care of your patient.





__________________________________________

Binu Arevalo MD



DD:  2017 12:47:10

DT:  2017 12:51:34

Job # [id number]

## 2017-12-13 NOTE — CON
PULMONARY CONSULTATION



DATE:  12/12/2017



REFERRING PHYSICIAN:  Philip Dinh MD



REASON FOR CONSULTATION:  Cough, shortness of breath, and exacerbation of

chronic lung disease.



HISTORY OF PRESENT ILLNESS:  This is a 62-year-old female known to me from

previous admission.  She has a history of mitral valve disease, requiring

prosthetic valve surgery early this year; paroxysmal atrial fibrillation,

chronic obstructive lung disease, history of passive smoking in the past,

_____ chronic lung disease, comes in with cough and shortness of breath,

found to have a iatrogenic coagulopathy, and admitted for further workup. 

Has cough and sputum production.  No nausea.  No vomiting, diarrhea, leg

pain or leg swelling.



PAST MEDICAL HISTORY:  As per history of present illness.



ALLERGIES:  NONE KNOWN.



SOCIAL HISTORY:  History of secondhand smoking.  No alcohol use.



FAMILY HISTORY:  No significant cardiopulmonary disease reported.



MEDICATIONS:  She is on Dulcolax p.r.n. basis, DuoNeb q.2 hours p.r.n. and

q.6 hours around the clock, Ecotrin 81 mg daily, Lipitor 10 mg daily,

magnesium oxide _____ mg twice a day, MiraLax 17 g daily, Protonix 40 mg

daily, Robitussin 200 mg p.o. q.6 hours p.r.n. for cough, Protonix 40 mg

daily, and Xanax 0.25 mg q.8 hours p.r.n.



REVIEW OF SYSTEMS:  No headache.  No rhinitis.  Has cough and shortness of

breath.  No chest pain.  No nausea.  No vomiting.  No diarrhea.  No neck

pain.  No neck swelling.  Admit to have snoring, daytime sleepy, and tired.



PHYSICAL EXAMINATION:

GENERAL:  Lying in the bed, mild-to-moderate distress secondary to cough

and shortness of breath.

VITAL SIGNS:  Temperature is 98, heart rate is 112, respiratory rate is 20,

blood pressure is 126/68, and pulse ox is 98% on room air.

HEENT:  Moist mucous membrane.  Crowded airway.  Mallampati score is IV.

NECK:  Supple.  No JVD.

LUNGS:  Has a poor airflow with prolonged expiratory phase with wheezing.

HEART:  S1 and S2.

ABDOMEN:  Soft and nontender.  No organomegaly.

EXTREMITIES:  There is trace edema.

NEUROLOGIC:  Awake, alert, and follows simple commands.



LABORATORY DATA:  Shows hemoglobin 9.2, hematocrit 28.7, WBC 10.5, and

platelet is 359.  INR is 1.76 on admission.  INR yesterday was 17.  Sodium

137, potassium 4.7, chloride 103, bicarbonate 22, BUN is 21, creatinine

1.9, glucose 191, calcium 9.6, phosphorous 3.7, and magnesium 1.8.  AST 36,

ALT 22, alkaline phosphatase 146, proBNP 1450, albumin 3.9, and cholesterol

is 217.  TSH is 0.48.  Chest x-ray done in ER shows no significant lung

disease.  EKG done in ER shows ectopic atrial rhythm, left axis deviation,

and possible inferior infarct.



IMPRESSION AND PLAN:  Chronic obstructive lung disease, cardiomyopathy, may

have sleep apnea syndrome, and atrial fibrillation.  We will add IV and

inhaled bronchodilator, Zithromax 500 mg daily, and diuretics.  We will get

procalcitonin in the morning, sleep apnea precaution, and avoid sedation.



Thank you and we will follow with you.





__________________________________________

Ajay Wisdom MD





DD:  12/12/2017 20:24:38

DT:  12/12/2017 22:03:24

Job # [id number]

## 2017-12-13 NOTE — PN
DATE:  12/13/2017



REASON FOR CONSULTATION:  Followup chest pain, COPD exacerbation,

incidental finding severely elevated INR, 17.6,  and .6, not

actively bleeding.



SUBJECTIVE:  Denies any black color stool, melena, any obvious bleeding,

hematemesis or hemoptysis.



OBJECTIVE:

GENERAL:  Not in apparent distress.  IV fluid was given yesterday.

VITAL SIGNS:  As follows, temperature afebrile, heart rate 93, blood

pressure 142/97.

HEENT:  PERRLA, intact.

NECK:  Supple.  No carotid bruits or thyromegaly.

CHEST:  Clear to auscultation.

LUNGS:  S1 and S2, regular.

ABDOMEN:  Soft.

EXTREMITIES:  Clubbing and cyanosis negative.



LABORATORY DATA:  Blood workup as follows; WBC 10.5, hemoglobin 9.2,

hematocrit 28.7 as of yesterday, and platelet count 359.  Chemistry; sodium

133, potassium 4.3, chloride 103, carbon dioxide 22, anion gap of 13, BUN

26, and creatinine 1.3.  INR 1.55.



IMPRESSION:  Acute kidney injury, elevated PT, INR by mistake, the patient

took couple of extra doses of Coumadin.  History of paroxysmal atrial

fibrillation, although normal sinus; history of severe mitral

regurgitation, severe mitral valve prolapse, status post open heart surgery

8 weeks ago.



RECOMMENDATIONS:  Discontinue Coumadin as mentioned in the _____ office. 

The patient is status post bioprosthetic AVR.  Continue aspirin. 

Discontinue IV fluid.  Repeat CBC today.  If remained stable, possible

discharge home.



Continue atorvastatin.  We will resume back Coreg 6.25 b.i.d.  Avoid

nephrotoxic medication.  Avoid ACE and ARB for now.  We will follow.



Thank you Dr. Dinh, for providing us the opportunity in taking care of

the patient, Malu Meeks.







__________________________________________

Ajay Gaxiola MD



cc:  Philip Dinh MD



DD:  12/13/2017 8:53:19

DT:  12/13/2017 10:05:28

Job # [id number]

## 2017-12-14 LAB
BETA1 GLOB FLD ELPH-MCNC: 0.4 G/DL (ref 0.4–0.6)
BETA2 GLOB FLD ELPH-MCNC: 0.4 G/DL (ref 0.2–0.5)
GAMMA GLOB SERPL ELPH-MCNC: 1.3 G/DL (ref 0.8–1.7)
PROT SERPL-MCNC: 6.4 G/DL (ref 6.1–8.1)

## 2017-12-14 NOTE — DS
HISTORY OF PRESENT ILLNESS:  The patient came in with chest pain and found

to have high PT/INR coagulopathy due to Coumadin noncompliance with INR of

8.  The patient got vitamin K, INR normalized.  Cardiology recommended to

discontinue Coumadin, no need, the patient seems stable.  The patient's

laboratory found creatinine went up to 1.9.  Renal consult seen the

patient.  IV fluid was given and her creatinine went down to 1.3 with IV

hydration.  The patient does not have any obvious source of bleeding.  She

has no GI bleed.  She has no rectal bleed.  She denied any nausea or

vomiting.  No hematuria.  No coughing blood.  Seems hemodynamically stable.

Her hemoglobin when she came in was 9.5, that is after surgery and with

hydration it came down to 9.2 and then 8.3.  The patient is otherwise

stable.  Her creatinine level shows BUN 26 and creatinine 1.3.  Magnesium

level 1.4.  Magnesium was given.  The patient advised to continue magnesium

b.i.d. 400 mg, and her iron study was within normal range and iron

saturation normal.  Her ProBNP came down to 1550.  The patient is stable

hemodynamically.  She feels fine, no complaints, and was discharged home to

be followed as an outpatient.



PHYSICAL EXAMINATION:

VITAL SIGNS:  On discharge, temperature 97.8, heart rate 104, blood

pressure 126/74, respirations 20, and saturation is normal.

HEAD AND NECK:  Normal.  No JVD.  No thyromegaly.

CHEST:  Clear with good air entry.

CARDIAC:  First sound and second sound normal.

ABDOMEN:  Soft, nontender.

EXTREMITIES:  No edema.

NEUROLOGIC:  Normal.



LABORATORY DATA:  As I mentioned, sodium 133, potassium 4.7, chloride 102,

bicarbonate 22, BUN 26, and creatinine 1.3.  Blood sugar 191 and magnesium

1.4.  Her CBC, when she came in hemoglobin 9.5, hematocrit 29.1, white

count 7.3, and platelets 328.  On discharge, her white count 10.2,

hemoglobin 8.3, hematocrit 25.7, and platelets 327.



DISCHARGE DIAGNOSES:  As I mentioned before.

1.  Coagulopathy.

2.  Chest pain, noncardiac.

3.  Status post mitral valve replacement.

4.  Acute renal failure, improved.

5.  Chronic obstructive pulmonary disease.

6.  Anemia.

7.  We will discharge the patient and follow up in the office within a

week.



PLAN:  Continue current medications.  The patient will be discharged home

on Lasix 20 instead of 40 gives chance for hydration and will follow up as

an outpatient.  She will continue Coreg, baby aspirin, magnesium 400 b.i.d.

advised, MiraLax, Multaq 400 b.i.d. as Cardiology followup will be seeing

the patient within in a week, Colace, eyedrops, colchicine, vitamin D,

Zyrtec, Dulcolax, Cepacol, Lipitor 10, nebulizer treatment, *------* and

Singulair.  CBC and chemistry was given to the patient to be done on Friday

and will follow up clinically.





__________________________________________

Philip Dinh MD





DD:  12/14/2017 10:03:47

DT:  12/14/2017 10:51:36

Job # [id number]

## 2018-01-01 ENCOUNTER — HOSPITAL ENCOUNTER (INPATIENT)
Dept: HOSPITAL 42 - ED | Age: 63
LOS: 2 days | Discharge: HOME | DRG: 310 | End: 2018-01-03
Attending: INTERNAL MEDICINE | Admitting: INTERNAL MEDICINE
Payer: MEDICARE

## 2018-01-01 VITALS — BODY MASS INDEX: 25.5 KG/M2

## 2018-01-01 DIAGNOSIS — Z79.82: ICD-10-CM

## 2018-01-01 DIAGNOSIS — Z79.899: ICD-10-CM

## 2018-01-01 DIAGNOSIS — M19.90: ICD-10-CM

## 2018-01-01 DIAGNOSIS — I48.92: ICD-10-CM

## 2018-01-01 DIAGNOSIS — K59.09: ICD-10-CM

## 2018-01-01 DIAGNOSIS — G89.29: ICD-10-CM

## 2018-01-01 DIAGNOSIS — I25.10: ICD-10-CM

## 2018-01-01 DIAGNOSIS — M10.9: ICD-10-CM

## 2018-01-01 DIAGNOSIS — E78.00: ICD-10-CM

## 2018-01-01 DIAGNOSIS — M54.9: ICD-10-CM

## 2018-01-01 DIAGNOSIS — J44.9: ICD-10-CM

## 2018-01-01 DIAGNOSIS — E66.9: ICD-10-CM

## 2018-01-01 DIAGNOSIS — E83.42: ICD-10-CM

## 2018-01-01 DIAGNOSIS — I48.0: Primary | ICD-10-CM

## 2018-01-01 DIAGNOSIS — Z95.2: ICD-10-CM

## 2018-01-01 LAB
ALBUMIN SERPL-MCNC: 3.9 G/DL (ref 3–4.8)
ALBUMIN/GLOB SERPL: 1.1 {RATIO} (ref 1.1–1.8)
ALT SERPL-CCNC: 29 U/L (ref 7–56)
AST SERPL-CCNC: 29 U/L (ref 14–36)
BASE EXCESS BLDV CALC-SCNC: -4.4 MMOL/L (ref 0–2)
BASOPHILS # BLD AUTO: 0.01 K/MM3 (ref 0–2)
BASOPHILS NFR BLD: 0.2 % (ref 0–3)
BUN SERPL-MCNC: 12 MG/DL (ref 7–21)
CALCIUM SERPL-MCNC: 9.4 MG/DL (ref 8.4–10.5)
EOSINOPHIL # BLD: 0.1 10*3/UL (ref 0–0.7)
EOSINOPHIL NFR BLD: 1.2 % (ref 1.5–5)
ERYTHROCYTE [DISTWIDTH] IN BLOOD BY AUTOMATED COUNT: 14.7 % (ref 11.5–14.5)
GFR NON-AFRICAN AMERICAN: > 60
GRANULOCYTES # BLD: 2.86 10*3/UL (ref 1.4–6.5)
GRANULOCYTES NFR BLD: 68.3 % (ref 50–68)
HGB BLD-MCNC: 10.1 G/DL (ref 12–16)
LYMPHOCYTES # BLD: 0.9 10*3/UL (ref 1.2–3.4)
LYMPHOCYTES NFR BLD AUTO: 20.3 % (ref 22–35)
MCH RBC QN AUTO: 29.1 PG (ref 25–35)
MCHC RBC AUTO-ENTMCNC: 34.1 G/DL (ref 31–37)
MCV RBC AUTO: 85.3 FL (ref 80–105)
MONOCYTES # BLD AUTO: 0.4 10*3/UL (ref 0.1–0.6)
MONOCYTES NFR BLD: 10 % (ref 1–6)
PH BLDV: 7.42 [PH] (ref 7.32–7.43)
PLATELET # BLD: 301 10^3/UL (ref 120–450)
PMV BLD AUTO: 8.6 FL (ref 7–11)
RBC # BLD AUTO: 3.47 10^6/UL (ref 3.5–6.1)
T4 FREE SERPL-MCNC: 1.24 NG/DL (ref 0.78–2.19)
TROPONIN I SERPL-MCNC: < 0.01 NG/ML
VENOUS BLOOD FIO2: 21 %
VENOUS BLOOD GAS PCO2: 29 (ref 40–60)
VENOUS BLOOD GAS PO2: 131 MM/HG (ref 30–55)
WBC # BLD AUTO: 4.2 10^3/UL (ref 4.5–11)

## 2018-01-01 NOTE — RAD
HISTORY:

cp  



COMPARISON:

Comparison chest 12/11/2017. 



FINDINGS:



LUNGS:

Lungs appear hyperinflated. 



Central pulmonary vasculature is slightly increased which may be due 

to semi-erect patient position.  Rule out mild chronic compensated 

pulmonary edema/ CHF.  Suspect mild bibasilar atelectasis. .



PLEURA:

No significant pleural effusion identified, no pneumothorax apparent.



CARDIOVASCULAR:

Sternotomy wires and CABG clips again noted. Additionally, probable 

prostatic mitral valve



OSSEOUS STRUCTURES:

No significant abnormalities.



VISUALIZED UPPER ABDOMEN:

Normal.



OTHER FINDINGS:

None.



IMPRESSION:

Lungs appear hyperinflated. 



Central pulmonary vasculature is slightly increased which may be due 

to semi-erect patient position.  Rule out mild chronic compensated 

pulmonary edema/ CHF.  Suspect mild bibasilar atelectasis. . 



Cardiomegaly.

## 2018-01-01 NOTE — ED PDOC
Arrival/HPI





- General


Chief Complaint: Chest Pain


Time Seen by Provider: 01/01/18 16:56





- History of Present Illness


Narrative History of Present Illness (Text): 





62F c/o chest pain constant all day today. no exac or reliev fx. feels same as 

chest pain for which she has been seen here in the past. 





Past Medical History





- Infectious Disease


Hx of Infectious Diseases: None





- Tetanus Immunization


Tetanus Immunization: Unknown





- Reproductive


Menopause: Yes





- Past Medical History


Past Medical History: No Previous





- Cardiac


Hx Cardiac Disorders: Yes


Hx Pacemaker: No


Other/Comment: Mitral Valve Replacement





- Pulmonary


Hx Respiratory Disorders: Yes


Hx Asthma: Yes


Hx Chronic Obstructive Pulmonary Disease (COPD): Yes





- Neurological


Hx Neurological Disorder: Yes


Hx Dizziness: Yes


Hx Migraine: Yes





- HEENT


Hx HEENT Disorder: No





- Renal


Hx Renal Disorder: No





- Endocrine/Metabolic


Hx Endocrine Disorders: No





- Hematological/Oncological


Hx Blood Disorders: No





- Integumentary


Hx Dermatological Disorder: No





- Musculoskeletal/Rheumatological


Hx Falls: No





- Gastrointestinal


Hx Gastrointestinal Disorders: No





- Genitourinary/Gynecological


Hx Genitourinary Disorders: No





- Psychiatric


Hx Emotional Abuse: No


Hx Physical Abuse: No


Hx Substance Use: No





- Surgical History


Other/Comment: Mitral Valve Replacement





- Anesthesia


Hx Anesthesia: Yes


Hx Anesthesia Reactions: No


Hx Malignant Hyperthermia: No





- Suicidal Assessment


Feels Threatened In Home Enviroment: No





Family/Social History


Family/Social History: Other (nc)


Smoking Status: Never Smoked


Hx Alcohol Use: Yes (SOCIALLY)


Amount per day: 6


Hx Substance Use: No


Hx Substance Use Treatment: No





Allergies/Home Meds


Allergies/Adverse Reactions: 


Allergies





cinnamon Allergy (Intermediate, Verified 11/09/17 17:07)


 








Home Medications: 


 Home Meds











 Medication  Instructions  Recorded  Confirmed


 


Pantoprazole [Protonix EC Tab] 40 mg PO DAILY 08/19/17 12/13/17


 


Albuterol HFA [Ventolin HFA 90 1 puff IH BID 09/07/17 12/11/17





mcg/actuation (8 g)]   


 


Aspirin [Straughn Aspirin] 81 mg PO DAILY 09/07/17 12/13/17


 


Cetirizine HCl [Zyrtec] 10 mg PO DAILY 09/07/17 12/11/17


 


Cholecalciferol (Vitamin D3) 50,000 unit PO Q7D 09/07/17 12/11/17





[Vitamin D3]   


 


Colchicine [Colcrys] 0.6 mg PO DAILY 09/07/17 12/11/17


 


ALPRAZolam [Xanax] 0.25 mg PO Q8H PRN 11/02/17 12/11/17


 


Acetaminophen/Codeine 1 tab PO Q4H PRN 11/02/17 12/11/17





[Tylenol/Codeine 300 MG/30 MG]   


 


Albuterol 0.083% [Albuterol 0.083% 3 ml IH Q6 11/02/17 12/11/17





Inhal Sol (2.5 mg/3 ml) UD]   


 


Atorvastatin [Lipitor] 10 mg PO HS 11/02/17 12/11/17


 


Benzocaine/Menthol [Cepacol Sore 1 lozenge PO QID 11/02/17 12/11/17





Throat Lozenge]   


 


Bisacodyl [Dulcolax] 10 mg VT DAILY PRN 11/02/17 12/11/17


 


Carvedilol [Coreg] 6.25 mg PO Q12 11/02/17 12/13/17


 


Dextran 70/Hypromellose/Pf 1 drop BOTHEYES Q4H PRN 11/02/17 12/11/17





[Artificial Tears Drops]   


 


DiphenhydrAMINE [Benadryl] 25 mg PO HS PRN 11/02/17 12/11/17


 


Docusate Sodium [Burns' 200 mg PO HS 11/02/17 12/11/17





Laxative]   


 


Dronedarone [Multaq] 400 mg PO BID 11/02/17 12/11/17


 


Guaifenesin [Expectorant Cough 200 mg PO Q6H PRN 11/02/17 12/11/17





Syrup]   


 


Magnesium Oxide [Mgo] 400 mg PO BID 11/02/17 12/13/17


 


Polyethylene Glycol 3350 [Miralax] 17 gm PO DAILY PRN 11/02/17 12/11/17


 


Furosemide [Lasix] 20 mg PO DAILY 12/13/17 12/13/17


 


Methylprednisolone [Medrol Dose  12/13/17 





Pack (21 tabs)]   














Review of Systems





- Review of Systems


Constitutional: absent: Fevers


Respiratory: Cough.  absent: SOB


Cardiovascular: Chest Pain


Gastrointestinal: absent: Abdominal Pain, Nausea, Vomiting





Physical Exam


Vital Signs Reviewed: Yes


Vital Signs











  Temp Pulse Resp BP Pulse Ox


 


 01/01/18 16:48  97.6 F  106 H  18  142/89  100











Appearance: Positive for: Well-Appearing, Non-Toxic, Comfortable


Pain Distress: None


Mental Status: Positive for: Alert and Oriented X 3





- Systems Exam


Head: Present: Atraumatic


Pupils: Present: PERRL


Mouth: Present: Moist Mucous Membranes


Nose (Internal): No: Epistaxis


Neck: Present: Normal Range of Motion


Respiratory/Chest: Present: Clear to Auscultation.  No: Respiratory Distress, 

Accessory Muscle Use


Cardiovascular: Present: Tachycardic


Abdomen: No: Tenderness, Distention


Upper Extremity: Present: NORMAL PULSES


Lower Extremity: No: Edema, CALF TENDERNESS


Neurological: Present: GCS=15, Motor Func Grossly Intact, Normal Sensory 

Function


Skin: Present: Warm, Dry





Medical Decision Making


ED Course and Treatment: 


01/01/18 16:56


Progress Notes:


EKG:


Ordered, reviewed, and independently interpreted the EKG.


Rate :  109 BPM


Rhythm : Sinus Tachycardia 


Interpretation : Left axis deviation, no STEMI.





- RAD Interpretation


Radiology Orders: 








01/01/18 17:34


CHEST PORTABLE [RAD] Stat 














- Scribe Statement


The provider has reviewed the documentation as recorded by the Scribe


Cindy Crouch





Provider Scribe Attestation:


All medical record entries made by the Scribe were at my direction and 

personally dictated by me. I have reviewed the chart and agree that the record 

accurately reflects my personal performance of the history, physical exam, 

medical decision making, and the department course for this patient. I have 

also personally directed, reviewed, and agree with the discharge instructions 

and disposition.








Disposition/Present on Arrival





- Present on Arrival


History of DVT/PE: No


History of Uncontrolled Diabetes: No


Urinary Catheter: No


History of Decub. Ulcer: No


History Surgical Site Infection Following: None





- Disposition


Forms:  [facility] (English)

## 2018-01-02 LAB
ALBUMIN SERPL-MCNC: 3.6 G/DL (ref 3–4.8)
ALBUMIN/GLOB SERPL: 1.1 {RATIO} (ref 1.1–1.8)
ALT SERPL-CCNC: 29 U/L (ref 7–56)
AST SERPL-CCNC: 28 U/L (ref 14–36)
BASE EXCESS BLDV CALC-SCNC: -2.8 MMOL/L (ref 0–2)
BASOPHILS # BLD AUTO: 0.01 K/MM3 (ref 0–2)
BASOPHILS NFR BLD: 0.2 % (ref 0–3)
BNP SERPL-MCNC: 716 PG/ML (ref 0–450)
BUN SERPL-MCNC: 12 MG/DL (ref 7–21)
CALCIUM SERPL-MCNC: 9.4 MG/DL (ref 8.4–10.5)
EOSINOPHIL # BLD: 0 10*3/UL (ref 0–0.7)
EOSINOPHIL NFR BLD: 0.7 % (ref 1.5–5)
ERYTHROCYTE [DISTWIDTH] IN BLOOD BY AUTOMATED COUNT: 14.8 % (ref 11.5–14.5)
GFR NON-AFRICAN AMERICAN: > 60
GRANULOCYTES # BLD: 2.15 10*3/UL (ref 1.4–6.5)
GRANULOCYTES NFR BLD: 52.2 % (ref 50–68)
HGB BLD-MCNC: 9.2 G/DL (ref 12–16)
LYMPHOCYTES # BLD: 1.1 10*3/UL (ref 1.2–3.4)
LYMPHOCYTES NFR BLD AUTO: 27.2 % (ref 22–35)
MAGNESIUM SERPL-MCNC: 1.1 MG/DL (ref 1.7–2.2)
MCH RBC QN AUTO: 29.5 PG (ref 25–35)
MCHC RBC AUTO-ENTMCNC: 34.1 G/DL (ref 31–37)
MCV RBC AUTO: 86.5 FL (ref 80–105)
MONOCYTES # BLD AUTO: 0.8 10*3/UL (ref 0.1–0.6)
MONOCYTES NFR BLD: 19.7 % (ref 1–6)
PH BLDV: 7.42 [PH] (ref 7.32–7.43)
PLATELET # BLD: 296 10^3/UL (ref 120–450)
PMV BLD AUTO: 8.9 FL (ref 7–11)
RBC # BLD AUTO: 3.12 10^6/UL (ref 3.5–6.1)
TROPONIN I SERPL-MCNC: 0.02 NG/ML
VENOUS BLOOD FIO2: 21 %
VENOUS BLOOD GAS PCO2: 32 (ref 40–60)
VENOUS BLOOD GAS PO2: 163 MM/HG (ref 30–55)
WBC # BLD AUTO: 4.1 10^3/UL (ref 4.5–11)

## 2018-01-02 NOTE — CP.PCM.PN
Subjective





- Date & Time of Evaluation


Date of Evaluation: 01/01/18


Time of Evaluation: 20:33





- Subjective


Subjective: 





called by nurse pt,heart rat is 175 ,a-fib ,pt is admitted for cp tonight is 

noted to be in mild sob.chest x-ray shows mild vascular congestion.bp is 170/

100. hr is 175.





Objective





- Vital Signs/Intake and Output


Vital Signs (last 24 hours): 


 











Temp Pulse Resp BP Pulse Ox


 


 98.6 F   136 H  20   144/89   100 


 


 01/01/18 19:49  01/01/18 23:17  01/01/18 19:49  01/01/18 23:17  01/01/18 19:49











- Medications


Medications: 


 Current Medications





Diltiazem HCl 125 mg/ Sodium (Chloride)  125 mls @ 5 mls/hr IV .Q24H OLMAN; 5 MG/

HR


   PRN Reason: Protocol


   Last Admin: 01/01/18 23:17 Dose:  5 mg/hr, 5 mls/hr


Nitroglycerin (Nitrostat Sl Tab)  0.4 mg SL Q5M PRN


   PRN Reason: chest pain


   Last Admin: 01/01/18 21:20 Dose:  0.4 mg











- Constitutional


Appears: Other (pt is in mild sob)





- Head Exam


Head Exam: NORMOCEPHALIC





- Eye Exam


Eye Exam: Normal appearance


Pupil Exam: PERRL





- ENT Exam


ENT Exam: Mucous Membranes Moist





- Neck Exam


Neck Exam: Full ROM





- Respiratory Exam


Respiratory Exam: Clear to Ausculation Bilateral





- Cardiovascular Exam


Cardiovascular Exam: Tachycardia





- GI/Abdominal Exam


GI & Abdominal Exam: Normal Bowel Sounds





- Rectal Exam


Rectal Exam: Deferred





- Extremities Exam


Extremities Exam: Full ROM





- Neurological Exam


Neurological Exam: Awake, Oriented x3





- Psychiatric Exam


Psychiatric exam: Anxious





- Skin


Skin Exam: Normal Color





Assessment and Plan





- Assessment and Plan (Free Text)


Assessment: 





atrial fib with rapid vent rate .


chf.


copd .


Plan: 





cardiazem bolous 10 mg and 5 mg drip , lasix 40 mg x1.

## 2018-01-02 NOTE — CARD
--------------- APPROVED REPORT --------------





EKG Measurement

Heart [id number]

AK [id number]

[id number]

[id number]

[id number]



<Conclusion>

Sinus tachycardia

Possible Left atrial enlargement

Left axis deviation

Septal infarct, age undetermined

Abnormal ECG

## 2018-01-02 NOTE — CON
DATE:  01/02/2018



REASON FOR CONSULTATION:  Chest pain, palpitation, possible atrial flutter.



BRIEF CLINICAL HISTORY:  This is a 62-year-old female with past medical

history significant for paroxysmal atrial fibrillation, severe mitral

regurgitation, mitral valve prolapse.  History goes back to 2 years ago,

the patient had severe mitral valve prolapse and was referred for open

heart surgery, which she kept on deferring.  Ultimately, the patient had

open heart surgery, mitral valve repair done in 10/2017.  Came in yesterday

who says she was brooming and suddenly she felt chest pain and fluttering,

so she came to the emergency room.  Found to be initially read as

tachycardia with atrial flutter with 2:1 conduction.  The patient denies

any chest pain now, on Cardizem drip.



PAST MEDICAL HISTORY:  Significant for paroxysmal atrial fibrillation,

mitral regurgitation, normal coronaries, cardiac catheterization twice

before surgery, most recent one was in 10/2017 by Dr. Meredith at [facility], found to be 50% LAD on 10/2017 before the open heart

surgery.



PAST SURGICAL HISTORY:  Significant for cholecystectomy, carpal tunnel

syndrome, and bunionectomy, removal of the bunion from the foot and most

recently in 08/2017, the patient's MVR bioprosthetic was done at [facility].



ALLERGIES:  NO KNOWN DRUG ALLERGIES.



SOCIAL HISTORY:  Denies any smoking.  Denies any history of alcohol abuse.



CURRENT MEDICATIONS:  The patient is at home taking Daliresp, MiraLax,

Singulair, magnesium, Lasix, dextran, cetirizine, atorvastatin, aspirin,

albuterol.



REVIEW OF SYSTEMS:  As per HPI.



PHYSICAL EXAMINATION:  As follows;

VITAL SIGNS:  Temperature afebrile, heart rate 122, blood pressure 120/74.

HEENT:  PERRLA.  Extraocular muscles intact.

NECK:  Supple.  No carotid bruit or thyromegaly.

CHEST:  Clear to auscultation.

HEART:  S1 and S2 regular.

ABDOMEN:  Soft.

EXTREMITIES:  Clubbing and cyanosis negative.



LABORATORY DATA:  Blood workup as follows:  WBC 4.2, hemoglobin 10.3,

hematocrit 29.6, and platelet count 301.  Chemistry shows sodium 127,

potassium 4.3, chloride 96, carbon dioxide 18, anion gap of 8, BUN 12, and

creatinine 0.9.  Troponin is 0.01.  EKG, initial sinus tachycardia, later

on it shows atrial flutter.



IMPRESSION:  Atrial fibrillation flutter, status post open heart surgery,

history of paroxysmal atrial fibrillation, obesity.



RECOMMENDATION:  We will start Cardizem 20 bolus IV followed by 10 and

start low-dose beta-blocker, start anticoagulation.  The patient was on

anticoagulation recently, but discontinued.  The patient is back to normal

sinus.  Now, the patient back again into atrial fibrillation flutter, so we

will continue long-term anticoagulation with low dose of beta-blocker,

metoprolol and Cardizem.  We will follow with you.  Once the rate is

controlled, we will change to p.o. Cardizem.  We will get lipid profile,

TSH, hemoglobin A1c.



Thank you  _____, for providing us the opportunity in taking care of

Malu Meeks.





__________________________________________

Ajay Gaxiola MD



DD:  01/02/2018 11:23:49

DT:  01/02/2018 12:05:00

Job # [id number]

## 2018-01-03 VITALS — OXYGEN SATURATION: 99 %

## 2018-01-03 VITALS — RESPIRATION RATE: 20 BRPM | TEMPERATURE: 97.6 F

## 2018-01-03 VITALS — SYSTOLIC BLOOD PRESSURE: 135 MMHG | HEART RATE: 105 BPM | DIASTOLIC BLOOD PRESSURE: 89 MMHG

## 2018-01-03 LAB
ALBUMIN SERPL-MCNC: 3.7 G/DL (ref 3–4.8)
ALBUMIN/GLOB SERPL: 1 {RATIO} (ref 1.1–1.8)
ALT SERPL-CCNC: 30 U/L (ref 7–56)
AST SERPL-CCNC: 36 U/L (ref 14–36)
BASOPHILS # BLD AUTO: 0.05 K/MM3 (ref 0–2)
BASOPHILS NFR BLD: 1.2 % (ref 0–3)
BUN SERPL-MCNC: 8 MG/DL (ref 7–21)
CALCIUM SERPL-MCNC: 9.7 MG/DL (ref 8.4–10.5)
EOSINOPHIL # BLD: 0.2 10*3/UL (ref 0–0.7)
EOSINOPHIL NFR BLD: 4 % (ref 1.5–5)
ERYTHROCYTE [DISTWIDTH] IN BLOOD BY AUTOMATED COUNT: 14.8 % (ref 11.5–14.5)
GFR NON-AFRICAN AMERICAN: > 60
GRANULOCYTES # BLD: 1.89 10*3/UL (ref 1.4–6.5)
GRANULOCYTES NFR BLD: 47 % (ref 50–68)
HDLC SERPL-MCNC: 77 MG/DL (ref 29–60)
HGB BLD-MCNC: 9.3 G/DL (ref 12–16)
LDLC SERPL-MCNC: 69 MG/DL (ref 0–129)
LYMPHOCYTES # BLD: 1.2 10*3/UL (ref 1.2–3.4)
LYMPHOCYTES NFR BLD AUTO: 29.9 % (ref 22–35)
MAGNESIUM SERPL-MCNC: 1.7 MG/DL (ref 1.7–2.2)
MCH RBC QN AUTO: 29.4 PG (ref 25–35)
MCHC RBC AUTO-ENTMCNC: 33.7 G/DL (ref 31–37)
MCV RBC AUTO: 87.3 FL (ref 80–105)
MONOCYTES # BLD AUTO: 0.7 10*3/UL (ref 0.1–0.6)
MONOCYTES NFR BLD: 17.9 % (ref 1–6)
PLATELET # BLD: 291 10^3/UL (ref 120–450)
PMV BLD AUTO: 8.7 FL (ref 7–11)
RBC # BLD AUTO: 3.16 10^6/UL (ref 3.5–6.1)
WBC # BLD AUTO: 4 10^3/UL (ref 4.5–11)

## 2018-01-03 RX ADMIN — Medication SCH MG: at 17:19

## 2018-01-03 RX ADMIN — Medication SCH MG: at 09:22

## 2018-01-03 NOTE — PN
DATE:  01/03/2018



REASON FOR CONSULTATION AND FOLLOWUP:  Chest pain, palpitation, atrial

fibrillation and flutter, recurrent.  The patient denies chest pain,

shortness of breath, or any palpitation.



OBJECTIVE:

GENERAL:  Not in apparent distress.  The patient converted to normal sinus,

currently on IV Cardizem.

VITAL SIGNS:  As follows; temperature afebrile, heart rate 90, and blood

pressure 125/70.

HEENT:  PERRLA intact.

NECK:  Supple.  No carotid bruits or thyromegaly.

CHEST:  Clear to auscultation.

HEART: S1 and S2 regular.

ABDOMEN:  Soft.

EXTREMITIES:  Clubbing and cyanosis negative.



LABORATORY DATA:  Blood workup as follows, WBC 14.9, hemoglobin 27.6, and

platelet count 291.  Chemistry showed sodium 131, potassium 3.4, chloride

99, carbon dioxide 21, anion gap of 14, BUN 8, and creatinine 0.9.



IMPRESSION:  Atrial fibrillation and flutter, recurrent paroxysmal

converted to normal sinus on Cardizem, history of nonobstructive coronary

artery disease, status post cardiac cauterization 2 months ago,

nonobstructive coronary artery disease prior to mitral valve replacement,

status post mitral valve replacement, history of severe mitral valve

prolapse, severe mitral regurgitation, no evidence of acute myocardial

infarction.



RECOMMENDATIONS:  Continue beta-blocker, change Cardizem to p.o., continue

atorvastatin, continue Eliquis 5 mg p.o. b.i.d. possible discharge.  No

further cardiac workup as planned.  We will follow with you.



Thank you Dr. Dinh for providing us the opportunity in taking care of the

patient Malu Meeks.





__________________________________________

Ajay Gaxiola MD





DD:  01/03/2018 10:20:56

DT:  01/03/2018 11:59:22

Job # [id number]

## 2018-01-03 NOTE — HP
REASON FOR ADMISSION:  Chest pain, palpation.



HISTORY OF PRESENT ILLNESS:  A 62-year-old female ,she was doing okay.  She

had recent mitral valve repair within six to eight weeks ago.  She came

into the hospital because she felt suddenly a chest pain, discomfort, more

on the left side and felt palpitations with that; no sweating, no

dizziness.  The patient had these symptoms for almost an hour.  She came to

the ER for evaluation.  She denied any short of breath, any fever, chills,

nausea, vomiting or dyspnea with that.



PAST MEDICAL HISTORY:  As I mentioned, she had mitral valve repair,

biologic valve, cholecystectomy, carpal tunnel syndrome, bunion surgery in

her foot, otherwise negative.



ALLERGIES:  CINNAMON, OTHERWISE NO MEDICATION ALLERGY.



HOME MEDICATIONS:  She takes Ventolin.  She takes vitamin D eye drops,

artificial tears, colchicine 0.6 mg p.o. daily, Zyrtec, Coreg 6.25 mg

b.i.d, Lipitor 10 mg, aspirin, Daliresp, Singulair, Protonix, mag oxide,

Lasix 20 mg daily.



REVIEW OF SYSTEMS:  She does have arthritis.  She has back pain, knee pain,

chronic, intermittent.  She does have anxiety, otherwise except for recent

chest surgery with wound midline surgery pain, negative.



PHYSICAL EXAMINATION:

VITAL SIGNS:  On admission here, temperature 98, heart rate was 125, blood

pressure 120/74, respirations 20.

HEAD AND NECK:  Normal.  No JVD.  No thyromegaly.

CHEST:  Clear, good air entry.

CARDIAC:  First sound and second sound normal.  Midline incision is clean.

ABDOMEN:  Soft and nontender.

EXTREMITIES:  No edema.

NEUROLOGIC:  Normal.



LABORATORY DATA:  Laboratory study was noted for white count 4.1,

hemoglobin 9.2, hematocrit 27, and platelets 296.  Chemistry noted for

sodium 128, potassium 4, chloride 20, bicarb 14, BUN 12, creatinine 0.9. 

The patient also had a magnesium which was low 1.1.  She also had liver

function test which is normal except alkaline phosphatase slightly elevated

170.  Troponin was negative.  TSH was 0.21.



DIAGNOSTICS:  Electrocardiogram was done, patient does have sinus

tachycardia on EKG; however, while patient within the first 24 hours, she

developed atrial flutter 2:1 and she was started on Cardizem.  Chest x-ray,

no active pulmonary disease.  There is some central vascular increased

marking.



IMPRESSION AND PLAN:  Chest pain associated with palpitations.  We will

admit the patient to telemetry, cardiology, Dr. Gaxiola.  The patient will be

started on Cardizem, also patient was on Eliquis and we will monitor her

atrial fibrillation.  Also, we will be adding a baby aspirin, magnesium,

Lopressor added 25 mg b.i.d. and we will continue the rest of the

medications.  We will follow up with cardiology.  Repeat cardiac enzyme and

follow up clinically.







__________________________________________

Philip Dinh MD





DD:  01/03/2018 2:25:20

DT:  01/03/2018 3:53:30

Job # [id number]

## 2018-01-05 NOTE — DS
HISTORY OF PRESENT ILLNESS:  The patient is clinically stable.  She has no

new complaints, sitting with no distress.



PHYSICAL EXAMINATION:

VITAL SIGNS:  Temperature 97.6, heart rate 79, blood pressure 112/86,

respirations 20.

HEAD AND NECK:  Normal.  No JVD.  No thyromegaly.

CHEST:  Clear.

CARDIAC:  First sound and second sound normal.

ABDOMEN:   Soft, nontender.

EXTREMITIES:  No edema.

NEUROLOGIC:  Normal.



LABORATORY DATA:  The patient has laboratory study, which shows white count

4, hemoglobin 9.3, hematocrit 27.6, platelets 291.  Chemistry shows sodium

131, potassium 3.4, chloride 99, bicarbonate 21, BUN 8, creatinine 0.9,

blood sugar is 116.  Differential is normal.  Magnesium is up 1.7.



DISCHARGE DIAGNOSES:

1.  Paroxysmal atrial fibrillation, we will continue Cardizem  once a

day, continue metoprolol 25 mg b.i.d., continue Eliquis 5 mg b.i.d.

2.  History of mitral valve repair, biologic valve stable, continue current

treatment.

3.  History of gouty arthritis, continue colchicine.

4.  Hypomagnesemia, continue magnesium oxide b.i.d.

5.  Hypercholesterolemia, continue Lipitor, continue aspirin 81 mg.  As she

has had chronic constipation, the patient given Miralax and Protonix daily.

6.  Chronic back pain, chronic osteoarthritis, continue Tylenol p.r.n. 

Physical therapy saw the patient.



We will discharge the patient, follow up in the office in a week.





__________________________________________

Philip Dinh MD



DD:  01/04/2018 8:25:32

DT:  01/04/2018 10:40:58

Job # [id number]

## 2018-01-13 ENCOUNTER — HOSPITAL ENCOUNTER (EMERGENCY)
Dept: HOSPITAL 42 - ED | Age: 63
Discharge: HOME | End: 2018-01-13
Payer: MEDICARE

## 2018-01-13 VITALS — DIASTOLIC BLOOD PRESSURE: 64 MMHG | SYSTOLIC BLOOD PRESSURE: 105 MMHG

## 2018-01-13 VITALS — BODY MASS INDEX: 25.5 KG/M2

## 2018-01-13 VITALS — TEMPERATURE: 98 F | HEART RATE: 87 BPM

## 2018-01-13 VITALS — OXYGEN SATURATION: 99 % | RESPIRATION RATE: 18 BRPM

## 2018-01-13 DIAGNOSIS — T78.3XXA: Primary | ICD-10-CM

## 2018-01-13 PROCEDURE — 96375 TX/PRO/DX INJ NEW DRUG ADDON: CPT

## 2018-01-13 PROCEDURE — 96374 THER/PROPH/DIAG INJ IV PUSH: CPT

## 2018-01-13 PROCEDURE — 99283 EMERGENCY DEPT VISIT LOW MDM: CPT

## 2018-01-13 NOTE — ED PDOC
Arrival/HPI





- General


Chief Complaint: Allergic Reaction


Time Seen by Provider: 01/13/18 11:01


Historian: Patient





- History of Present Illness


Narrative History of Present Illness (Text): 


01/13/18 11:05


A 62 year old female, whose past medical history includes asthma, COPD, and 

hypertension, presents to the emergency department complaining of swollen 

tongue with bleeding to right side. Patient reports not taking ACE inhibitors. 

Patient denies any difficulty speaking, respiratory compromise, any open wounds

, or any other complaints at this time. Also, patient denies any respiratory 

compromise or any history of smoking. Patient takes Eliquis as an anticoagulent.





No PMD








Past Medical History





- Provider Review


Nursing Documentation Reviewed: Yes





- Infectious Disease


Hx of Infectious Diseases: None





- Tetanus Immunization


Tetanus Immunization: Unknown





- Past Medical History


Past Medical History: No Previous





- Cardiac


Hx Cardiac Disorders: Yes


Hx Hypertension: Yes


Hx Pacemaker: No


Other/Comment: Mitral Valve Replacement





- Pulmonary


Hx Respiratory Disorders: Yes


Hx Asthma: Yes


Hx Chronic Obstructive Pulmonary Disease (COPD): Yes





- Neurological


Hx Neurological Disorder: Yes


Hx Dizziness: Yes


Hx Migraine: Yes





- HEENT


Hx HEENT Disorder: No





- Renal


Hx Renal Disorder: No





- Endocrine/Metabolic


Hx Endocrine Disorders: No





- Hematological/Oncological


Hx Blood Disorders: No





- Integumentary


Hx Dermatological Disorder: No





- Musculoskeletal/Rheumatological


Hx Falls: No





- Gastrointestinal


Hx Gastrointestinal Disorders: No





- Genitourinary/Gynecological


Hx Genitourinary Disorders: No





- Psychiatric


Hx Emotional Abuse: No


Hx Physical Abuse: No


Hx Substance Use: No





- Surgical History


Other/Comment: Mitral Valve Replacement





- Anesthesia


Hx Anesthesia: Yes


Hx Anesthesia Reactions: No


Hx Malignant Hyperthermia: No





- Suicidal Assessment


Feels Threatened In Home Enviroment: No





Family/Social History





- Physician Review


Nursing Documentation Reviewed: Yes


Family/Social History: No Known Family HX


Smoking Status: Never Smoked


Hx Alcohol Use: Yes (SOCIALLY)


Frequency of alcohol use: Socially


Amount per day: 6


Hx Substance Use: No


Hx Substance Use Treatment: No





Allergies/Home Meds


Allergies/Adverse Reactions: 


Allergies





cinnamon Allergy (Intermediate, Verified 01/13/18 10:30)


 ANAPHYLAXIS








Home Medications: 


 Home Meds











 Medication  Instructions  Recorded  Confirmed


 


Pantoprazole [Protonix EC Tab] 40 mg PO DAILY 08/19/17 01/13/18


 


Albuterol HFA [Ventolin HFA 90 1 puff IH BID 09/07/17 01/13/18





mcg/actuation (8 g)]   


 


Aspirin [[place] Aspirin] 81 mg PO DAILY 09/07/17 01/13/18


 


Cetirizine HCl [Zyrtec] 10 mg PO DAILY 09/07/17 01/13/18


 


Cholecalciferol (Vitamin D3) 50,000 unit PO Q7D 09/07/17 01/13/18





[Vitamin D3]   


 


Colchicine [Colcrys] 0.6 mg PO DAILY 09/07/17 01/13/18


 


Atorvastatin [Lipitor] 10 mg PO HS 11/02/17 01/13/18


 


Bisacodyl [Dulcolax] 10 mg MT DAILY PRN 11/02/17 01/13/18


 


Dextran 70/Hypromellose/Pf 1 drop BOTHEYES Q4H PRN 11/02/17 01/13/18





[Artificial Tears Drops]   


 


DiphenhydrAMINE [Benadryl] 25 mg PO HS PRN 11/02/17 01/13/18


 


Docusate Sodium [Burns' 200 mg PO HS 11/02/17 01/13/18





Laxative]   


 


Magnesium Oxide [Mgo] 400 mg PO BID 11/02/17 01/13/18


 


Polyethylene Glycol 3350 [Miralax] 17 gm PO DAILY PRN 11/02/17 01/13/18


 


Furosemide [Lasix] 20 mg PO DAILY 12/13/17 01/13/18














Review of Systems





- Review of Systems


Constitutional: Normal, Other (swollen tongue)


Eyes: Normal


ENT: Normal


Respiratory: Normal


Cardiovascular: Normal


Gastrointestinal: Normal


Genitourinary Female: Normal


Musculoskeletal: Normal


Skin: Normal


Neurological: Normal


Endocrine: Normal


Hemo/Lymphatic: Normal


Psychiatric: Normal





Physical Exam


Vital Signs Reviewed: Yes


Vital Signs











  Temp Pulse Resp BP Pulse Ox


 


 01/13/18 10:39     105/64 


 


 01/13/18 10:31  98.6 F  120 H  16   98











Temperature: Afebrile


Blood Pressure: Normal


Pulse: Regular


Respiratory Rate: Normal


Appearance: Positive for: Well-Appearing


Pain Distress: None


Mental Status: Positive for: Alert and Oriented X 3





- Systems Exam


Mouth: No: Normal Tounge (swollen tongue with no active bleeding to right side; 

no open wounds; ), Normal Teeth (excessive dental and bridgework with many 

sharp edges.)


Neck: Present: Normal Range of Motion.  No: Other (no strider; no palpable edema

; no crepature trachea)


Respiratory/Chest: Present: Clear to Auscultation, Good Air Exchange.  No: 

Respiratory Distress, Accessory Muscle Use


Cardiovascular: Present: Regular Rate and Rhythm, Normal S1, S2.  No: Murmurs


Abdomen: Present: Normal Bowel Sounds.  No: Tenderness, Distention, Peritoneal 

Signs


Neurological: Present: GCS=15, CN II-XII Intact, Speech Normal


Skin: No: Rashes





Medical Decision Making


ED Course and Treatment: 


01/13/18 11:10


Impression: 62 year old female with swollen tongue. 





Plan:


-- Benadryl


-- Pepcid


-- Medrol


-- Reassess and disposition





Prior Visits:


Notes and results from previous visits were reviewed. Patient was last seen in 

the emergency department on 01/01/2018 for chest pain. Patient was admitted.





Progress Notes:











- Medication Orders


Current Medication Orders: 











Discontinued Medications





Diphenhydramine HCl (Benadryl)  25 mg IVP STAT STA


   Stop: 01/13/18 11:08


   Last Admin: 01/13/18 11:45  Dose: 25 mg





IVP Administration


 Document     01/13/18 11:45  [email or web address]  (Rec: 01/13/18 11:46  [email or web address]  [id number])


     Charges for Administration


      # of IVP Administrations                   1





Famotidine (Pepcid)  20 mg IVP STAT STA


   Stop: 01/13/18 11:09


   Last Admin: 01/13/18 11:46  Dose: 20 mg





IVP Administration


 Document     01/13/18 11:46  CASTS1  (Rec: 01/13/18 11:46  CASTS1  [id number])


     Charges for Administration


      # of IVP Administrations                   1





Methylprednisolone (Solu-Medrol)  125 mg IVP STAT STA


   Stop: 01/13/18 11:09


   Last Admin: 01/13/18 11:46  Dose: 125 mg





IVP Administration


 Document     01/13/18 11:46  CASTS1  (Rec: 01/13/18 11:46  CASTS1  [id number])


     Charges for Administration


      # of IVP Administrations                   1














- Scribe Statement


The provider has reviewed the documentation as recorded by the Mark Cabezas





Provider Scribe Attestation:


All medical record entries made by the Scribkarrie were at my direction and 

personally dictated by me. I have reviewed the chart and agree that the record 

accurately reflects my personal performance of the history, physical exam, 

medical decision making, and the department course for this patient. I have 

also personally directed, reviewed, and agree with the discharge instructions 

and disposition.








Disposition/Present on Arrival





- Present on Arrival


Any Indicators Present on Arrival: No


History of DVT/PE: No


History of Uncontrolled Diabetes: No


Urinary Catheter: No


History of Decub. Ulcer: No


History Surgical Site Infection Following: None





- Disposition


Have Diagnosis and Disposition been Completed?: Yes


Diagnosis: 


 Angioedema





Disposition: HOME/ ROUTINE


Disposition Time: 13:30


Condition: IMPROVED


Additional Instructions: 


Benadryl 25mg later today.


Return to ER if symptoms recur.


Referrals[facility] 


[facility] - Follow up with primary


Forms:  [employer] (English)

## 2018-01-14 ENCOUNTER — HOSPITAL ENCOUNTER (EMERGENCY)
Dept: HOSPITAL 42 - ED | Age: 63
Discharge: HOME | End: 2018-01-14
Payer: MEDICARE

## 2018-01-14 VITALS
DIASTOLIC BLOOD PRESSURE: 80 MMHG | OXYGEN SATURATION: 98 % | TEMPERATURE: 98 F | SYSTOLIC BLOOD PRESSURE: 153 MMHG | HEART RATE: 98 BPM

## 2018-01-14 VITALS — RESPIRATION RATE: 18 BRPM

## 2018-01-14 VITALS — BODY MASS INDEX: 25.3 KG/M2
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
## 2018-01-14 DIAGNOSIS — T78.49XA: Primary | ICD-10-CM

## 2018-01-14 NOTE — ED PDOC
Arrival/HPI





- General


Chief Complaint: Allergic Reaction


Time Seen by Provider: 01/14/18 10:29


Historian: Patient





- History of Present Illness


Narrative History of Present Illness (Text): 





01/14/18 13:00


Malu Meeks is a 62 year old female, whose past medical history includes COPD 

and hypertension, who presents to the emergency department complaining of an 

allergic reaction that began a couple days ago. Patient states her lips and 

tongue are swollen and was seen in the emergency department yesterday for the 

same reason but discharged home. Patient notes having new medication Eloquis, 

which she took last night. Patient denies fever, shortness of breath, cough, 

chest pain, headache, or other complaints.


Time/Duration: < week


Symptom Onset: Gradual


Symptom Course: Unchanged


Context: Home





Past Medical History





- Provider Review


Nursing Documentation Reviewed: Yes





- Infectious Disease


Hx of Infectious Diseases: None





- Tetanus Immunization


Tetanus Immunization: Unknown





- Past Medical History


Past Medical History: No Previous





- Cardiac


Hx Cardiac Disorders: Yes


Hx Hypertension: Yes


Hx Pacemaker: No


Other/Comment: Mitral Valve Replacement





- Pulmonary


Hx Respiratory Disorders: Yes


Hx Asthma: Yes


Hx Chronic Obstructive Pulmonary Disease (COPD): Yes





- Neurological


Hx Neurological Disorder: Yes


Hx Dizziness: Yes


Hx Migraine: Yes





- HEENT


Hx HEENT Disorder: No





- Renal


Hx Renal Disorder: No





- Endocrine/Metabolic


Hx Endocrine Disorders: No





- Hematological/Oncological


Hx Blood Disorders: No





- Integumentary


Hx Dermatological Disorder: No





- Musculoskeletal/Rheumatological


Hx Falls: No





- Gastrointestinal


Hx Gastrointestinal Disorders: No





- Genitourinary/Gynecological


Hx Genitourinary Disorders: No





- Psychiatric


Hx Emotional Abuse: No


Hx Physical Abuse: No


Hx Substance Use: No





- Surgical History


Other/Comment: Mitral Valve Replacement





- Anesthesia


Hx Anesthesia: Yes


Hx Anesthesia Reactions: No


Hx Malignant Hyperthermia: No





- Suicidal Assessment


Feels Threatened In Home Enviroment: No





Family/Social History





- Physician Review


Nursing Documentation Reviewed: Yes


Family/Social History: Unknown Family HX


Smoking Status: Never Smoked


Hx Alcohol Use: Yes (SOCIALLY)


Amount per day: 6


Hx Substance Use: No


Hx Substance Use Treatment: No





Allergies/Home Meds


Allergies/Adverse Reactions: 


Allergies





cinnamon Allergy (Intermediate, Verified 01/13/18 10:30)


 ANAPHYLAXIS








Home Medications: 


 Home Meds











 Medication  Instructions  Recorded  Confirmed


 


Pantoprazole [Protonix EC Tab] 40 mg PO DAILY 08/19/17 01/14/18


 


Albuterol HFA [Ventolin HFA 90 1 puff IH BID 09/07/17 01/14/18





mcg/actuation (8 g)]   


 


Aspirin [Chicot Aspirin] 81 mg PO DAILY 09/07/17 01/14/18


 


Cetirizine HCl [Zyrtec] 10 mg PO DAILY 09/07/17 01/14/18


 


Cholecalciferol (Vitamin D3) 50,000 unit PO Q7D 09/07/17 01/14/18





[Vitamin D3]   


 


Colchicine [Colcrys] 0.6 mg PO DAILY 09/07/17 01/14/18


 


Atorvastatin [Lipitor] 10 mg PO HS 11/02/17 01/14/18


 


Bisacodyl [Dulcolax] 10 mg VA DAILY PRN 11/02/17 01/14/18


 


Dextran 70/Hypromellose/Pf 1 drop BOTHEYES Q4H PRN 11/02/17 01/14/18





[Artificial Tears Drops]   


 


DiphenhydrAMINE [Benadryl] 25 mg PO HS PRN 11/02/17 01/14/18


 


Docusate Sodium [Burns' 200 mg PO HS 11/02/17 01/14/18





Laxative]   


 


Magnesium Oxide [Mgo] 400 mg PO BID 11/02/17 01/14/18


 


Polyethylene Glycol 3350 [Miralax] 17 gm PO DAILY PRN 11/02/17 01/14/18


 


Furosemide [Lasix] 20 mg PO DAILY 12/13/17 01/14/18














Review of Systems





- Physician Review


All systems were reviewed & negative as marked: Yes





- Review of Systems


Constitutional: absent: Fevers


Respiratory: absent: SOB


Cardiovascular: absent: Chest Pain


Skin: Other (swollen lips and tongue )





Physical Exam


Vital Signs Reviewed: Yes


Vital Signs











  Temp Pulse Resp BP Pulse Ox


 


 01/14/18 14:12  98 F  98 H  18  153/80 H  98


 


 01/14/18 10:30  98.1 F  115 H  18  133/84  94 L











Temperature: Afebrile


Blood Pressure: Normal


Pulse: Tachycardic


Respiratory Rate: Normal


Appearance: Positive for: Well-Appearing, Non-Toxic, Comfortable


Pain Distress: None


Mental Status: Positive for: Alert and Oriented X 3





- Systems Exam


Head: Present: Atraumatic, Normocephalic


Pupils: Present: PERRL


Extroacular Muscles: Present: EOMI


Conjunctiva: Present: Normal


Mouth: Present: Moist Mucous Membranes.  No: Normal Lips (swollen lips)


Pharnyx: Present: Normal.  No: ERYTHEMA, EXUDATE, TONSILS ENLARGED, 

Peritonsilar Swelling


Nose (External): Present: Atraumatic


Respiratory/Chest: Present: Clear to Auscultation, Good Air Exchange.  No: 

Respiratory Distress, Accessory Muscle Use, Wheezes, Rales


Cardiovascular: Present: Regular Rate and Rhythm, Normal S1, S2.  No: Murmurs


Abdomen: Present: Normal Bowel Sounds.  No: Tenderness, Distention, Peritoneal 

Signs, Rebound, Guarding


Upper Extremity: Present: Normal Inspection, Normal ROM, NORMAL PULSES, 

Neurovascularly Intact, Capillary Refill < 2s.  No: Cyanosis, Edema, Tenderness

, Swelling, Erythema


Lower Extremity: No: Edema


Neurological: Present: GCS=15, CN II-XII Intact, Speech Normal


Skin: Present: Warm, Dry, Normal Color.  No: Rashes


Psychiatric: Present: Alert, Oriented x 3, Normal Insight, Normal Concentration





Medical Decision Making


ED Course and Treatment: 





01/14/18 13:09


Impression:


62 year old female with swollen lips due to a possible allergic reaction





Plan:


-- Decadron, Prednisone


-- Reassess and disposition








Progress Notes:


 





01/14/18 14:04


Reevaluation:


On reevaluation the patient feels better and is in no acute distress. I have 

discussed the results and plan with the patient, who expresses understanding. 

The patient's lip swelling has gone down and is stable. 





- Medication Orders


Current Medication Orders: 











Discontinued Medications





Dexamethasone (Decadron Inj)  10 mg IM STAT STA


   Stop: 01/14/18 11:22


   Last Admin: 01/14/18 11:40 Dose:  Not Given


   Non-Admin Reason: Patient Refused





Famotidine (Pepcid)  20 mg PO STAT STA


   Stop: 01/14/18 11:53


   Last Admin: 01/14/18 12:05  Dose: 20 mg





Prednisone (Prednisone Tab)  60 mg PO STAT ONE


   Stop: 01/14/18 11:53


   Last Admin: 01/14/18 12:05  Dose: 60 mg











- Scribe Statement


The provider has reviewed the documentation as recorded by the Mark Baker





Provider Scribe Attestation:


All medical record entries made by the Scribe were at my direction and 

personally dictated by me. I have reviewed the chart and agree that the record 

accurately reflects my personal performance of the history, physical exam, 

medical decision making, and the department course for this patient. I have 

also personally directed, reviewed, and agree with the discharge instructions 

and disposition. 





Disposition/Present on Arrival





- Present on Arrival


Any Indicators Present on Arrival: No


History of DVT/PE: No


History of Uncontrolled Diabetes: No


Urinary Catheter: No


History of Decub. Ulcer: No


History Surgical Site Infection Following: None





- Disposition


Have Diagnosis and Disposition been Completed?: Yes


Diagnosis: 


 Allergic reaction





Disposition: HOME/ ROUTINE


Disposition Time: 12:15


Condition: IMPROVED


Discharge Instructions (ExitCare):  General Allergic Reaction (ED)


Additional Instructions: 





Thank you for letting us take care of you today. The emergency medical care you 

received today was directed at your acute symptoms. If you were prescribed any 

medication, please fill it and take as directed. It may take several days for 

your symptoms to resolve. Return to the Emergency Department if your symptoms 

worsen, do not improve, or if you have any other problems.





Please contact your doctor or call one of the physicians/clinics you have been 

referred to that are listed on the Patient Visit Information form that is 

included in your discharge packet. Bring any paperwork you were given at 

discharge with you along with any medications you are taking to your follow up 

visit. Our treatment cannot replace ongoing medical care by a primary care 

provider (PCP) outside of the emergency department.





Thank you for allowing the [facility] team to be part of your care today.

















Please call Dr. Gaxiola about your Eliquis dosing when you leave the hospital.


Prescriptions: 


predniSONE [Prednisone] 40 mg PO DAILY #10 tab


Forms:  [employer] (English)

## 2018-02-02 ENCOUNTER — HOSPITAL ENCOUNTER (OUTPATIENT)
Dept: HOSPITAL 42 - ED | Age: 63
Setting detail: OBSERVATION
LOS: 1 days | Discharge: HOME | End: 2018-02-03
Attending: INTERNAL MEDICINE | Admitting: INTERNAL MEDICINE
Payer: MEDICARE

## 2018-02-02 VITALS — BODY MASS INDEX: 25.3 KG/M2

## 2018-02-02 VITALS — RESPIRATION RATE: 20 BRPM

## 2018-02-02 DIAGNOSIS — I50.9: ICD-10-CM

## 2018-02-02 DIAGNOSIS — R13.10: ICD-10-CM

## 2018-02-02 DIAGNOSIS — I48.0: ICD-10-CM

## 2018-02-02 DIAGNOSIS — I34.0: ICD-10-CM

## 2018-02-02 DIAGNOSIS — Z91.018: ICD-10-CM

## 2018-02-02 DIAGNOSIS — R40.2412: ICD-10-CM

## 2018-02-02 DIAGNOSIS — I08.3: ICD-10-CM

## 2018-02-02 DIAGNOSIS — Z95.3: ICD-10-CM

## 2018-02-02 DIAGNOSIS — J44.9: ICD-10-CM

## 2018-02-02 DIAGNOSIS — T78.3XXA: Primary | ICD-10-CM

## 2018-02-02 DIAGNOSIS — M19.90: ICD-10-CM

## 2018-02-02 DIAGNOSIS — Z79.899: ICD-10-CM

## 2018-02-02 DIAGNOSIS — Z87.892: ICD-10-CM

## 2018-02-02 DIAGNOSIS — Z79.01: ICD-10-CM

## 2018-02-02 DIAGNOSIS — I48.2: ICD-10-CM

## 2018-02-02 DIAGNOSIS — E78.00: ICD-10-CM

## 2018-02-02 DIAGNOSIS — T46.4X5A: ICD-10-CM

## 2018-02-02 DIAGNOSIS — G89.29: ICD-10-CM

## 2018-02-02 DIAGNOSIS — Z91.14: ICD-10-CM

## 2018-02-02 DIAGNOSIS — I34.1: ICD-10-CM

## 2018-02-02 DIAGNOSIS — M54.9: ICD-10-CM

## 2018-02-02 DIAGNOSIS — I11.0: ICD-10-CM

## 2018-02-02 LAB
ALBUMIN SERPL-MCNC: 4.1 G/DL (ref 3–4.8)
ALBUMIN/GLOB SERPL: 1.2 {RATIO} (ref 1.1–1.8)
ALT SERPL-CCNC: 38 U/L (ref 7–56)
AST SERPL-CCNC: 41 U/L (ref 14–36)
BASOPHILS # BLD AUTO: 0.01 K/MM3 (ref 0–2)
BASOPHILS NFR BLD: 0.1 % (ref 0–3)
BUN SERPL-MCNC: 11 MG/DL (ref 7–21)
CALCIUM SERPL-MCNC: 9.9 MG/DL (ref 8.4–10.5)
EOSINOPHIL # BLD: 0 10*3/UL (ref 0–0.7)
EOSINOPHIL NFR BLD: 0 % (ref 1.5–5)
ERYTHROCYTE [DISTWIDTH] IN BLOOD BY AUTOMATED COUNT: 14.3 % (ref 11.5–14.5)
GFR NON-AFRICAN AMERICAN: > 60
GRANULOCYTES # BLD: 7.08 10*3/UL (ref 1.4–6.5)
GRANULOCYTES NFR BLD: 97.4 % (ref 50–68)
HGB BLD-MCNC: 11.3 G/DL (ref 12–16)
LYMPHOCYTE: 1 % (ref 22–35)
LYMPHOCYTES # BLD: 0.2 10*3/UL (ref 1.2–3.4)
LYMPHOCYTES NFR BLD AUTO: 2.2 % (ref 22–35)
MCH RBC QN AUTO: 29.5 PG (ref 25–35)
MCHC RBC AUTO-ENTMCNC: 34.2 G/DL (ref 31–37)
MCV RBC AUTO: 86.2 FL (ref 80–105)
MONOCYTE: 2 % (ref 1–6)
MONOCYTES # BLD AUTO: 0 10*3/UL (ref 0.1–0.6)
MONOCYTES NFR BLD: 0.3 % (ref 1–6)
NEUTROPHILS NFR BLD AUTO: 95 % (ref 50–70)
NEUTS BAND NFR BLD: 2 % (ref 0–2)
PLATELET # BLD EST: NORMAL 10*3/UL
PLATELET # BLD: 323 10^3/UL (ref 120–450)
PMV BLD AUTO: 9.1 FL (ref 7–11)
RBC # BLD AUTO: 3.83 10^6/UL (ref 3.5–6.1)
WBC # BLD AUTO: 7.3 10^3/UL (ref 4.5–11)

## 2018-02-02 PROCEDURE — 99284 EMERGENCY DEPT VISIT MOD MDM: CPT

## 2018-02-02 PROCEDURE — 85025 COMPLETE CBC W/AUTO DIFF WBC: CPT

## 2018-02-02 PROCEDURE — 96375 TX/PRO/DX INJ NEW DRUG ADDON: CPT

## 2018-02-02 PROCEDURE — 80053 COMPREHEN METABOLIC PANEL: CPT

## 2018-02-02 PROCEDURE — 96376 TX/PRO/DX INJ SAME DRUG ADON: CPT

## 2018-02-02 PROCEDURE — 96365 THER/PROPH/DIAG IV INF INIT: CPT

## 2018-02-02 PROCEDURE — 94640 AIRWAY INHALATION TREATMENT: CPT

## 2018-02-02 PROCEDURE — 96361 HYDRATE IV INFUSION ADD-ON: CPT

## 2018-02-02 PROCEDURE — 87430 STREP A AG IA: CPT

## 2018-02-02 PROCEDURE — 87070 CULTURE OTHR SPECIMN AEROBIC: CPT

## 2018-02-02 RX ADMIN — METHYLPREDNISOLONE SODIUM SUCCINATE SCH MG: 40 INJECTION, POWDER, FOR SOLUTION INTRAMUSCULAR; INTRAVENOUS at 21:40

## 2018-02-02 NOTE — ED PDOC
Arrival/HPI





- General


Chief Complaint: Allergic Reaction


Time Seen by Provider: 02/02/18 09:22


Historian: Patient





- History of Present Illness


Narrative History of Present Illness (Text): 





02/02/18 09:25


A 62 year old female, whose past medical history includes asthma, COPD, and 

hypertension, presents to the emergency department complaining of swollen 

tongue x 7 hours.  Patient admits similar symptoms in the past.  Patient denies 

sob, cp, abdominal pain, dizziness, recent travel, or abnormal gait.





After reviewing list of medication, patient is taking Enalapril for HTN.  

Patient was recommended to stop taking this medication.


Time/Duration: Other (see hpi)


Context: Home





Past Medical History





- Provider Review


Nursing Documentation Reviewed: Yes





- Infectious Disease


Hx of Infectious Diseases: None





- Tetanus Immunization


Tetanus Immunization: Unknown





- Reproductive


Menopause: Yes





- Past Medical History


Past Medical History: No Previous





- Cardiac


Hx Cardiac Disorders: Yes


Hx Hypertension: Yes


Hx Pacemaker: No


Other/Comment: Mitral Valve Replacement





- Pulmonary


Hx Respiratory Disorders: Yes


Hx Asthma: Yes


Hx Chronic Obstructive Pulmonary Disease (COPD): Yes





- Neurological


Hx Neurological Disorder: Yes


Hx Dizziness: Yes


Hx Migraine: Yes





- HEENT


Hx HEENT Disorder: No





- Renal


Hx Renal Disorder: No





- Endocrine/Metabolic


Hx Endocrine Disorders: No





- Hematological/Oncological


Hx Blood Disorders: No





- Integumentary


Hx Dermatological Disorder: No





- Musculoskeletal/Rheumatological


Hx Falls: No





- Gastrointestinal


Hx Gastrointestinal Disorders: No





- Genitourinary/Gynecological


Hx Genitourinary Disorders: No





- Psychiatric


Hx Substance Use: No





- Surgical History


Other/Comment: Mitral Valve Replacement





- Anesthesia


Hx Anesthesia: Yes


Hx Anesthesia Reactions: No


Hx Malignant Hyperthermia: No





- Suicidal Assessment


Feels Threatened In Home Enviroment: No





Family/Social History





- Physician Review


Nursing Documentation Reviewed: Yes


Family/Social History: Other (noncontributory)


Smoking Status: Never Smoked


Hx Alcohol Use: Yes (SOCIALLY)


Amount per day: 6


Hx Substance Use: No


Hx Substance Use Treatment: No





Allergies/Home Meds


Allergies/Adverse Reactions: 


Allergies





cinnamon Allergy (Intermediate, Verified 01/13/18 10:30)


 ANAPHYLAXIS








Home Medications: 


 Home Meds











 Medication  Instructions  Recorded  Confirmed


 


Atorvastatin [Lipitor] 20 mg PO HS 11/02/17 02/02/18


 


Furosemide [Lasix] 20 mg PO DAILY 12/13/17 02/02/18


 


Apixaban [Eliquis] 5 mg PO DAILY 02/02/18 02/02/18


 


Enalapril Maleate [Vasotec] 5 mg PO DAILY 02/02/18 02/02/18














Review of Systems





- Review of Systems


Constitutional: Normal.  absent: Fatigue, Weight Change, Fevers


Eyes: Normal


ENT: Other (tongue swelling)


Respiratory: Normal


Cardiovascular: Normal


Gastrointestinal: Normal


Genitourinary Female: Normal


Musculoskeletal: Normal


Skin: Normal


Neurological: Normal


Endocrine: Normal


Hemo/Lymphatic: Normal


Psychiatric: Normal





Physical Exam


Vital Signs











  Temp Pulse Resp BP Pulse Ox


 


 02/02/18 13:11   120 H   137/84 


 


 02/02/18 12:54   110 H  16  130/80  99


 


 02/02/18 10:50  98.1 F  115 H  16  137/89  99


 


 02/02/18 09:07  97.9 F  108 H  18  127/81  98











Temperature: Afebrile


Blood Pressure: Normal


Pulse: Regular


Respiratory Rate: Normal


Appearance: Positive for: Well-Appearing, Non-Toxic, Comfortable


Pain Distress: None


Mental Status: Positive for: Alert and Oriented X 3





- Systems Exam


Head: Present: Atraumatic, Normocephalic


Pupils: Present: PERRL


Extroacular Muscles: Present: EOMI


Conjunctiva: Present: Normal


Mouth: Present: Moist Mucous Membranes, Normal Lips, Normal Teeth.  No: Drooling

, Normal Tounge (swollen tongue)


Pharnyx: Present: Normal.  No: ERYTHEMA, EXUDATE, TONSILS ENLARGED


Neck: Present: Normal Range of Motion.  No: Meningeal Signs, MIDLINE TENDERNESS

, Lymphadenopathy


Respiratory/Chest: Present: Clear to Auscultation, Good Air Exchange.  No: 

Respiratory Distress, Accessory Muscle Use


Cardiovascular: Present: Regular Rate and Rhythm, Normal S1, S2.  No: Murmurs


Abdomen: Present: Normal Bowel Sounds.  No: Tenderness, Distention, Peritoneal 

Signs


Back: Present: Normal Inspection


Upper Extremity: Present: Normal Inspection, Normal ROM.  No: Cyanosis, Edema


Lower Extremity: Present: Normal Inspection, Normal ROM.  No: Edema


Neurological: Present: GCS=15, CN II-XII Intact, Speech Normal, Motor Func 

Grossly Intact, Normal Sensory Function, Normal Cerebellar Funct, Gait Normal


Skin: Present: Warm, Dry, Normal Color.  No: Rashes


Psychiatric: Present: Alert, Oriented x 3, Normal Insight, Normal Concentration





Medical Decision Making


ED Course and Treatment: 





02/02/18 13:00


Patient stated she did not take her BP medication this morning.  I will order 

Metoprolol 25 mg PO.


02/02/18 13:42


Dr. Dinh came to ED to examined patient.  He recommended observation for 

angioedema caused by ACE inhibitor   


Re-evaluation Time: 13:43


Reassessment Condition: Re-examined, Improving,but remains with symptoms





- Lab Interpretations


Lab Results: 


 Lab Results





02/02/18 09:40: Grp A Beta Strep Ag Negative











- Medication Orders


Current Medication Orders: 








Sodium Chloride (Sodium Chloride 0.9%)  1,000 mls @ 999 mls/hr IV .Q1H1M STA


   Stop: 02/02/18 14:27





Discontinued Medications





Diphenhydramine HCl (Benadryl)  50 mg IVP STAT STA


   Stop: 02/02/18 09:25


   Last Admin: 02/02/18 09:36  Dose: 50 mg





IVP Administration


 Document     02/02/18 09:36  MS  (Rec: 02/02/18 09:36  MS  [id number]


     Charges for Administration


      # of IVP Administrations                   1





Famotidine (Pepcid 20mg/50ml Premix)  20 mg in 50 mls @ 100 mls/hr IVPB STAT STA


   Stop: 02/02/18 09:53


   Last Admin: 02/02/18 09:35  Dose: 100 mls/hr





eMAR Start Stop


 Document     02/02/18 09:35  MS  (Rec: 02/02/18 09:36  MS  [facility] [id number])


     Intravenous Solution


      Start Date                                 02/02/18


      Start Time                                 09:36


      End Date                                   02/02/18


      End time                                   10:06


      Total Infusion Time                        30





Methylprednisolone (Solu-Medrol)  125 mg IVP STAT STA


   Stop: 02/02/18 09:24


   Last Admin: 02/02/18 09:36  Dose: 125 mg





IVP Administration


 Document     02/02/18 09:36  MS  (Rec: 02/02/18 09:36  MS  [id number]


     Charges for Administration


      # of IVP Administrations                   1





Metoprolol Tartrate (Lopressor)  25 mg PO STAT STA


   Stop: 02/02/18 13:00


   Last Admin: 02/02/18 13:11  Dose: 25 mg





MAR Pulse and Blood Pressure


 Document     02/02/18 13:11  MS  (Rec: 02/02/18 13:12  MS  [facility] [id number])


     Pulse


      Pulse Rate (60-90)                         120


     Blood Pressure


      Blood Pressure (100//90)             137/84














Disposition/Present on Arrival





- Present on Arrival


Any Indicators Present on Arrival: No


History of DVT/PE: No


History of Uncontrolled Diabetes: No


Urinary Catheter: No


History of Decub. Ulcer: No


History Surgical Site Infection Following: None





- Disposition


Have Diagnosis and Disposition been Completed?: Yes


Diagnosis: 


 Angioedema due to angiotensin converting enzyme inhibitor (ACE-I)





Disposition: HOSPITALIZED


Disposition Time: 13:46


Patient Plan: Admission, Observation


Condition: STABLE


Referrals: 


[facility], [Non-Staff] - Follow up with primary


Forms:  [employer] (English)

## 2018-02-03 VITALS — DIASTOLIC BLOOD PRESSURE: 94 MMHG | SYSTOLIC BLOOD PRESSURE: 152 MMHG | HEART RATE: 152 BPM

## 2018-02-03 VITALS — OXYGEN SATURATION: 97 % | TEMPERATURE: 98.3 F

## 2018-02-03 RX ADMIN — DILTIAZEM HYDROCHLORIDE SCH: 120 CAPSULE, COATED, EXTENDED RELEASE ORAL at 09:29

## 2018-02-03 RX ADMIN — ALBUTEROL SULFATE SCH MG: 2.5 SOLUTION RESPIRATORY (INHALATION) at 07:36

## 2018-02-03 RX ADMIN — ALBUTEROL SULFATE SCH MG: 2.5 SOLUTION RESPIRATORY (INHALATION) at 02:17

## 2018-02-03 RX ADMIN — METHYLPREDNISOLONE SODIUM SUCCINATE SCH MG: 40 INJECTION, POWDER, FOR SOLUTION INTRAMUSCULAR; INTRAVENOUS at 09:30

## 2018-02-03 RX ADMIN — DILTIAZEM HYDROCHLORIDE SCH MG: 120 CAPSULE, COATED, EXTENDED RELEASE ORAL at 08:14

## 2018-02-03 NOTE — CON
DATE:02/03/2018



CONSULTING SERVICE:  Cardiology.



CONSULTING PHYSICIAN:  Ajay Gaxiola MD



REASON FOR CONSULTATION AND FOLLOWUP:  Cardiac evaluation, AFib with rapid

ventricular rate, ALLERGIC REACTION TO ACE, history of status post MVR.



BRIEF CLINICAL HISTORY:  This is a 62-year-old female with past medical

history significant to COPD, asthma, hypertension, history of mitral valve

repair because of severe mitral regurgitation, mitral valve prolapse who

came in with tongue swelling after taking enalapril.  Denies any chest

pain.  While patient is on telemetry monitor, heart rate was fast so

Cardiology consult was called.  Patient denies any chest pain, shortness of

breath, any palpitation.



PAST MEDICAL HISTORY:  Significant for paroxysmal atrial fibrillation,

mitral valve prolapse, severe mitral regurgitation and mitral valve

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  Previous cardiac workup as follows,

patient had a cardiac catheterization done at [facility] before

open heart surgery because patient is complaining of chest pain prior. 

Cardiac catheterization shows 50% LAD but it was a year ago so patient had

it just before valve surgery in 10/2017 done at [facility] ([facility]

by Dr. Meredith, found to be mild _____ LAD.  No significant flow-limiting

disease.  Patient had surgery done by Dr. Fritz with MV repair,

bioprosthetic.



PAST SURGICAL HISTORY:  Significant for cholecystectomy, carpal tunnel

syndrome, bunionectomy, removal of bunion from the foot and most recently

the patient had mentioned open heart surgery at [facility] ([facility]

with bioprosthetic MVR.  Most recent echo done on 11/03/2017 that shows a

normal LV size, ejection fraction 55%, trace aortic regurgitation, aortic

sclerosis, mild aortic stenosis, trace mitral regurgitation, mitral annular

calcification,trace-to-mild tricuspid regurgitation, RV systolic pressure

of 37, trace pericardial effusion, status post bioprosthetic mitral valve

replacement, status post open heart surgery.



ALLERGIES:  ALLERGY TO ACE INHIBITORS.



CURRENT MEDICATIONS:  Patient was on Coumadin before then patient stopped

it because of patient being in sinus,  Recently the patient was seen in the

office and found to be in AFib so Eliquis was started.  Current

medications, patient is supposed to take Cardizem  mg daily,

Singulair, metoprolol tartarate 25 b.i.d., ______, Ativan, apixaban.



REVIEW OF SYSTEMS:  As follows _____.



PHYSICAL EXAMINATION:  As follows:

VITAL SIGNS:  Temperature afebrile, heart rate 98 before, patient's heart

rate ______

HEENT:  PERRLA intact.

NECK:  Supple.  No carotid bruits or thyromegaly.

CHEST:  Clear to auscultation.



HEART:  S1 and S2 regular.

ABDOMEN:  Soft.

EXTREMITIES:  Clubbing and cyanosis negative.



IMPRESSION:  Paroxysmal atrial fibrillation, status post mitral valve

repair.  ALLERGIC REACTION TO ANGIOTENSIN CONVERTING ENZYME INHIBITORS.



RECOMMENDATION:  We will resume Cardizem, resume beta-blocker, resume

Eliquis.  We will follow with you.  POSSIBLE ALLERGY TO ACE INHIBITORS.



We will follow with you.





__________________________________________

Ajay Gaxiola MD



DD:  02/03/2018 11:02:00

DT:  02/03/2018 19:05:27

Job # [id number]

## 2018-02-05 NOTE — HP
DATE:  02/02/2018



REASON FOR ADMISSION:  Severe neck swelling and facial swelling overnight.



HISTORY OF PRESENT ILLNESS:  This is a 62-year-old female who came into the

hospital because of the swelling in her neck.  She could not swallow.  She

has also face swelling.  She has history of mitral valve, biologic valve

placement with open heart surgery.  She has been on ACE inhibitors, came in

with the neck swelling, diffuse and difficulty swallowing.  In the

emergency room, the patient was given IV steroids, IV Zantac.  She was

given Claritin and Benadryl with some improvement and she was admitted for

observations overnight.  She has no other complaints.  The patient does

have a history of COPD.  She is using nebulizers treatment.  She does also

have a history of paroxysmal atrial fibrillation.  Seems noncompliant with

her medications.



PAST MEDICAL HISTORY:  As above, the patient has a biological, mitral valve

replacement; has paroxysmal atrial fibrillation; hypertension; COPD;

chronic osteoarthritis, _____ chronic back pain, hypercholesterolemia,

chronic atrial fibrillation, and history of one time congestive heart

failure.



ALLERGIES:  CINNAMON; OTHERWISE, NO MEDICATION ALLERGY EXCEPT CURRENTLY NOW

ACE INHIBITORS ON THIS ADMISSION, ENALAPRIL.



SOCIAL HISTORY:  She does not smoke, does not drink.



FAMILY HISTORY:  Noncontributory.



MEDICATIONS:  She takes at home Lipitor 20 mg at bedtime; Eliquis 5 mg p.o.

b.i.d., never started yet; Cardizem 120 mg once a day; Singulair 10 mg;

Lopressor 25 mg b.i.d.; Lasix 20 mg p.o. daily; baby aspirin and DuoNeb

nebulizer treatment.



REVIEW OF SYSTEMS:  As in the present illness, otherwise negative.



PHYSICAL EXAMINATION

VITAL SIGNS:  Temperature 98, heart rate is 98, blood pressure 160/102.  On

repeat exam, her blood pressure was _____.

HEAD AND NECK:  Normal.  No JVD.  No thyromegaly.  She does have diffuse

neck swelling.  Her lips and her tongue are mildly swollen.

CHEST:  Clear.  Good air entry.

CARDIAC:  First sound and second sound normal.

ABDOMEN:  Soft, nontender, obese.

EXTREMITIES:  No edema.

NEUROLOGIC:  Normal.



LABORATORY DATA:  White count 7.3, hemoglobin 11.3, hematocrit 33,

platelets 323,000.  Chemistry:  Sodium 127, potassium 4.8, chloride 100,

bicarbonate 17, BUN 11, creatinine 0.9, blood sugar 151, alkaline

phosphatase 158, AST 41, ALT 38.  Total protein 7.6, albumin 4.1, globulin

3.5, albumin-globulin ratio 1.2.  The patient also had serology for

beta-streptococcus, which is negative.



IMPRESSION AND PLAN:  This is a 62-year-old female who came in with what

_____angioneurotic edema probably secondary to enalapril.  We will

discontinue enalapril.  We will give her Solu-Medrol IV, Claritin, Benadryl

IV, Pepcid 20 mg b.i.d. and we will follow up here clinically.  Also, we

will resume Eliquis; we will resume Cardizem, and all other medications. 

We will follow up clinically.  We will also consider Cardiology consult for

followup here.







__________________________________________

Philip Dinh MD



DD:  02/04/2018 19:58:02

DT:  02/05/2018 4:57:38

Job # [id number]

## 2018-02-05 NOTE — DS
HISTORY OF PRESENT ILLNESS:  Patient admitted with acute angioneurotic

edema due to ACE inhibitors, did well, seen by Cardiology.  I discussed

with him, okay to discharge.  She does have history of paroxysmal atrial

fibrillation.  We will continue giving beta-blocker and Cardizem plus

Eliquis 5 mg b.i.d.  Patient has no chest pain, no shortness of breath. 

She wants to go home, will be discharged home.



PHYSICAL EXAMINATION:

VITAL SIGNS:  Stable.  Temperature 98, heart rate 110, blood pressure

152/94, respirations 20, saturation 97%.

HEAD AND NECK:  Normal.  No JVD.  No thyromegaly.

CHEST:  Clear.  Good air entry.

CARDIAC:  First and second sounds are normal.

ABDOMEN:  Soft, nontender.

EXTREMITIES:  No edema.

NEUROLOGIC:  Normal.



DISCHARGE DIAGNOSES:

1.  Acute angioneurotic edema secondary to ACE inhibitors most likely.

2.  Paroxysmal atrial fibrillation.

3.  Hypertension.

4.  Chronic obstructive pulmonary disease.

5.  Chronic osteoarthritis.

6.  Chronic back pain.



PLAN:  Discharge home.  Continue the _____ .  Continue Zantac 150 b.i.d. 

Continue Benadryl 50 mg q. 6 hours first 48 hours and will go slowly. 

Follow up in the office within a week.  Continue Eliquis 5 mg b.i.d.,

Cardizem and metoprolol 25 b.i.d.  Continue all other medications at home

and will follow up clinically.  All medicines have been reconciled on

discharge.





__________________________________________

Philip Dinh MD



DD:  02/04/2018 19:51:28

DT:  02/05/2018 11:45:45

Job # [id number]

## 2018-02-13 ENCOUNTER — HOSPITAL ENCOUNTER (EMERGENCY)
Dept: HOSPITAL 42 - ED | Age: 63
Discharge: HOME | End: 2018-02-13
Payer: MEDICARE

## 2018-02-13 VITALS
OXYGEN SATURATION: 99 % | HEART RATE: 88 BPM | DIASTOLIC BLOOD PRESSURE: 73 MMHG | TEMPERATURE: 98 F | SYSTOLIC BLOOD PRESSURE: 138 MMHG

## 2018-02-13 VITALS — RESPIRATION RATE: 18 BRPM

## 2018-02-13 VITALS — BODY MASS INDEX: 25.3 KG/M2

## 2018-02-13 DIAGNOSIS — Z95.2: ICD-10-CM

## 2018-02-13 DIAGNOSIS — E78.5: ICD-10-CM

## 2018-02-13 DIAGNOSIS — I10: ICD-10-CM

## 2018-02-13 DIAGNOSIS — R07.9: Primary | ICD-10-CM

## 2018-02-13 DIAGNOSIS — Z79.01: ICD-10-CM

## 2018-02-13 LAB
ALBUMIN SERPL-MCNC: 3.7 G/DL (ref 3–4.8)
ALBUMIN/GLOB SERPL: 1.2 {RATIO} (ref 1.1–1.8)
ALT SERPL-CCNC: 25 U/L (ref 7–56)
APTT BLD: 33.4 SECONDS (ref 25.1–36.5)
AST SERPL-CCNC: 36 U/L (ref 14–36)
BUN SERPL-MCNC: 14 MG/DL (ref 7–21)
CALCIUM SERPL-MCNC: 9.6 MG/DL (ref 8.4–10.5)
ERYTHROCYTE [DISTWIDTH] IN BLOOD BY AUTOMATED COUNT: 14.8 % (ref 11.5–14.5)
GFR NON-AFRICAN AMERICAN: > 60
HGB BLD-MCNC: 11.3 G/DL (ref 12–16)
INR PPP: 1.14 (ref 0.93–1.08)
MCH RBC QN AUTO: 29.3 PG (ref 25–35)
MCHC RBC AUTO-ENTMCNC: 33.4 G/DL (ref 31–37)
MCV RBC AUTO: 87.6 FL (ref 80–105)
PLATELET # BLD: 270 10^3/UL (ref 120–450)
PMV BLD AUTO: 9.6 FL (ref 7–11)
PROTHROMBIN TIME: 13.2 SECONDS (ref 9.4–12.5)
RBC # BLD AUTO: 3.86 10^6/UL (ref 3.5–6.1)
TROPONIN I SERPL-MCNC: < 0.01 NG/ML
WBC # BLD AUTO: 7 10^3/UL (ref 4.5–11)

## 2018-02-13 NOTE — CARD
--------------- APPROVED REPORT --------------





EKG Measurement

Heart [id number]

UT 134P

[id number]

[id number]

[id number]



<Conclusion>

Sinus tachycardia

Left axis deviation

Moderate voltage criteria for LVH, may be normal variant

No change

## 2018-02-13 NOTE — RAD
HISTORY:

chest pain  



COMPARISON:

1/1/2018 



FINDINGS:



LUNGS:

No active pulmonary disease.



PLEURA:

No significant pleural effusion identified, no pneumothorax apparent.



CARDIOVASCULAR:

Status post CABG.



OSSEOUS STRUCTURES:

No significant abnormalities.



VISUALIZED UPPER ABDOMEN:

Normal.



OTHER FINDINGS:

None.



IMPRESSION:

No active disease.

## 2018-02-13 NOTE — ED PDOC
Arrival/HPI





- General


Chief Complaint: Medical Clearance


Time Seen by Provider: 02/13/18 05:57


Historian: Patient





- History of Present Illness


Narrative History of Present Illness (Text): 





02/13/18 06:10


62 year old female, whose past medical history includes hypertension, 

hyperlipidemia, cardiac valve replacement, and COPD, presents to the emergency 

department complaining of waking from her sleep with a mouth full of blood and 

on her sheets. Patient states she had a pervious history of bleeding secondary 

to Coumadin followed by her cardiac surgery. Patient states she was switched to 

Eliquis. She is now concerned of the reoccurred bleeding. Patient also noted 

intermittent chest pain, but denies any history of any bleeding elsewhere, fever

, chills, shortness of breath, nausea, vomiting, diarrhea, urinary symptoms, 

back pain, neck pain, headache, dizziness, or any other complaints. 








Time/Duration: 1/2 hour


Symptom Onset: Sudden


Activities at Onset: Light


Context: Home, Other (sleeping)





Past Medical History





- Provider Review


Nursing Documentation Reviewed: Yes





- Infectious Disease


Hx of Infectious Diseases: None





- Tetanus Immunization


Tetanus Immunization: Unknown





- Reproductive


Menopause: Yes





- Past Medical History


Past Medical History: No Previous





- Cardiac


Hx Cardiac Disorders: Yes


Hx Hypertension: Yes


Hx Pacemaker: No


Other/Comment: Mitral Valve Replacement  10/17





- Pulmonary


Hx Respiratory Disorders: Yes


Hx Asthma: Yes


Hx Chronic Obstructive Pulmonary Disease (COPD): Yes





- Neurological


Hx Neurological Disorder: Yes


Hx Dizziness: Yes


Hx Migraine: Yes





- HEENT


Hx HEENT Disorder: No





- Renal


Hx Renal Disorder: No





- Endocrine/Metabolic


Hx Endocrine Disorders: No





- Hematological/Oncological


Hx Blood Disorders: No





- Integumentary


Hx Dermatological Disorder: No





- Musculoskeletal/Rheumatological


Hx Musculoskeletal Disorders: No


Hx Falls: No





- Gastrointestinal


Hx Gastrointestinal Disorders: No





- Genitourinary/Gynecological


Hx Genitourinary Disorders: No





- Psychiatric


Hx Psychophysiologic Disorder: No


Hx Emotional Abuse: No


Hx Physical Abuse: No


Hx Substance Use: No





- Surgical History


Other/Comment: Mitral Valve Replacement





- Anesthesia


Hx Anesthesia: Yes


Hx Anesthesia Reactions: No


Hx Malignant Hyperthermia: No





- Suicidal Assessment


Feels Threatened In Home Enviroment: No





Family/Social History





- Physician Review


Nursing Documentation Reviewed: Yes


Family/Social History: No Known Family HX


Smoking Status: Never Smoked


Hx Alcohol Use: Yes (SOCIALLY)


Amount per day: 6


Hx Substance Use: No


Hx Substance Use Treatment: No





Allergies/Home Meds


Allergies/Adverse Reactions: 


Allergies





cinnamon Allergy (Intermediate, Verified 02/13/18 05:55)


 ANAPHYLAXIS








Home Medications: 


 Home Meds











 Medication  Instructions  Recorded  Confirmed


 


Apixaban [Eliquis] 5 mg PO DAILY 02/02/18 02/13/18


 


Ranitidine HCl [Acid Reducer 150] 150 mg PO BID 02/13/18 02/13/18


 


Simvastatin [Zocor] 20 mg PO HS 02/13/18 02/13/18


 


diltiaZEM [Cardizem] 120 mg PO DAILY 02/13/18 02/13/18














Review of Systems





- Physician Review


All systems were reviewed & negative as marked: Yes





- Review of Systems


Constitutional: absent: Fevers, Other (Chills)


ENT: Other (Mouth full of blood)


Respiratory: absent: SOB


Cardiovascular: Chest Pain


Gastrointestinal: absent: Diarrhea, Nausea, Vomiting


Genitourinary Female: absent: Dysuria, Frequency, Hematuria


Musculoskeletal: absent: Back Pain, Neck Pain


Neurological: absent: Headache, Dizziness





Physical Exam


Vital Signs Reviewed: Yes


Vital Signs











  Temp Pulse Resp BP Pulse Ox


 


 02/13/18 05:44  98.0 F  107 H  18  115/72  97











Temperature: Afebrile


Blood Pressure: Normal


Pulse: Tachycardic


Respiratory Rate: Normal


Appearance: Positive for: Well-Appearing, Non-Toxic, Comfortable


Pain Distress: None


Mental Status: Positive for: Alert and Oriented X 3





- Systems Exam


Head: Present: Atraumatic, Normocephalic


Pupils: Present: PERRL


Extroacular Muscles: Present: EOMI


Conjunctiva: Present: Normal


Ears: Present: Normal


Mouth: Present: Moist Mucous Membranes


Neck: Present: Normal Range of Motion.  No: Meningeal Signs


Respiratory/Chest: Present: Clear to Auscultation, Good Air Exchange.  No: 

Respiratory Distress, Accessory Muscle Use


Cardiovascular: Present: Regular Rate and Rhythm, Normal S1, S2.  No: Murmurs


Abdomen: Present: Normal Bowel Sounds.  No: Tenderness, Distention, Peritoneal 

Signs


Back: Present: Normal Inspection


Upper Extremity: Present: Normal Inspection.  No: Cyanosis, Edema


Lower Extremity: Present: Normal Inspection.  No: Edema


Neurological: Present: GCS=15, CN II-XII Intact, Speech Normal


Skin: Present: Warm, Dry, Normal Color.  No: Rashes


Psychiatric: Present: Alert, Oriented x 3, Normal Insight, Normal Concentration





Medical Decision Making


ED Course and Treatment: 





02/13/18 06:10


Impression:


62 year old female presents complaining from waking up with a mouth full of 

flood. Has similar symptoms in the past. 





Plan:


-- Labs 


-- EKG


-- Chest X-ray 


-- Reassess and disposition








Progress Notes:


EKG shows Sinus Tachycardia at 104 BPM with LAD. LVH. Septal infarct. Non-

specific ST/T changes. Interpreted by me. 


02/13/18 06:19





02/13/18 06:23


CXR Impression: As read by me, no acute processes. 


 





- Lab Interpretations


I have reviewed the lab results: Yes





- RAD Interpretation


Radiology Orders: 








02/13/18 06:08


CHEST PORTABLE [RAD] Stat 














- EKG Interpretation


Interpreted by ED Physician: Yes


Type: 12 lead EKG





- Transfer of Care


Patient signed out to Dr:Nohelia Carr


Pending Labs:: Labs/reassess/final disposition





- Scribe Statement


The provider has reviewed the documentation as recorded by the Mark Farah





Provider Scribe Attestation:


All medical record entries made by the Scribe were at my direction and 

personally dictated by me. I have reviewed the chart and agree that the record 

accurately reflects my personal performance of the history, physical exam, 

medical decision making, and the department course for this patient. I have 

also personally directed, reviewed, and agree with the discharge instructions 

and disposition.








Disposition/Present on Arrival





- Present on Arrival


Any Indicators Present on Arrival: No


History of DVT/PE: No


History of Uncontrolled Diabetes: No


Urinary Catheter: No


History of Decub. Ulcer: No


History Surgical Site Infection Following: None





- Disposition


Have Diagnosis and Disposition been Completed?: No


Diagnosis: 


 Chest pain





Disposition Time: 07:00


Condition: STABLE


Discharge Instructions (ExitCare):  Chest Pain (ED)


Forms:  [facility] (English)

## 2018-02-13 NOTE — ED PDOC
Physical Exam


Vital Signs Reviewed: Yes


Vital Signs











  Temp Pulse Resp BP Pulse Ox


 


 02/13/18 10:34   108 H  18  138/95 H  98


 


 02/13/18 08:00  98.4 F  102 H  18  117/82  99


 


 02/13/18 05:44  98.0 F  107 H  18  115/72  97











Temperature: Afebrile


Blood Pressure: Normal


Pulse: Tachycardic


Respiratory Rate: Normal


Appearance: Positive for: Well-Appearing, Non-Toxic, Comfortable


Pain Distress: None


Mental Status: Positive for: Alert and Oriented X 3





Medical Decision Making


ED Course and Treatment: 


02/13/18 07:23


Case signed out to me by Dr. Kinney. Pending labs, reassessment and final 

disposition.





02/13/18 11:34


feels much better; asymptomatic; labs stable.





- Lab Interpretations


Lab Results: 








 02/13/18 08:10 





 02/13/18 08:10 





 Lab Results





02/13/18 08:10: Blood Type O POSITIVE, Antibody Screen Negative, BBK History 

Checked Patient has bt


02/13/18 08:10: WBC 7.0, RBC 3.86, Hgb 11.3 L, Hct 33.8 L, MCV 87.6, MCH 29.3, 

MCHC 33.4, RDW 14.8 H, Plt Count 270, MPV 9.6


02/13/18 08:10: Sodium 133, Potassium 4.2, Chloride 100, Carbon Dioxide 22, 

Anion Gap 15, BUN 14, Creatinine 0.9, Est GFR (African Amer) > 60, Est GFR (Non-

Af Amer) > 60, Random Glucose 150 H, Calcium 9.6, Total Bilirubin 0.3, AST 36, 

ALT 25, Alkaline Phosphatase 140 H, Lactate Dehydrogenase 664, Total Creatine 

Kinase 28 L, Troponin I < 0.01  D, Total Protein 6.9, Albumin 3.7, Globulin 3.2

, Albumin/Globulin Ratio 1.2


02/13/18 08:10: PT 13.2 H, INR 1.14 H, APTT 33.4








I have reviewed the lab results: Yes





- RAD Interpretation


Radiology Orders: 








02/13/18 06:08


CHEST PORTABLE [RAD] Stat 














- Medication Orders


Current Medication Orders: 











Discontinued Medications





Diltiazem HCl (Cardizem)  60 mg PO STAT STA


   Stop: 02/13/18 10:15


   Last Admin: 02/13/18 10:41  Dose: 60 mg





Metoprolol Tartrate (Lopressor)  25 mg PO STAT STA


   Stop: 02/13/18 10:15


   Last Admin: 02/13/18 10:41  Dose: 25 mg











- Scribe Statement


The provider has reviewed the documentation as recorded by the Mark Brown





Provider Scribe Attestation:


All medical record entries made by the Scribkarrie were at my direction and 

personally dictated by me. I have reviewed the chart and agree that the record 

accurately reflects my personal performance of the history, physical exam, 

medical decision making, and the department course for this patient. I have 

also personally directed, reviewed, and agree with the discharge instructions 

and disposition.











Disposition/Present on Arrival





- Present on Arrival


Any Indicators Present on Arrival: No


History of DVT/PE: No


History of Uncontrolled Diabetes: No


Urinary Catheter: No


History of Decub. Ulcer: No


History Surgical Site Infection Following: None





- Disposition


Have Diagnosis and Disposition been Completed?: Yes


Diagnosis: 


 Chest pain, Dizziness





Disposition: HOME/ ROUTINE


Disposition Time: 11:40


Patient Plan: Discharge


Patient Problems: 


 Current Active Problems











Problem Status Onset


 


Chest pain Acute  











Condition: STABLE


Discharge Instructions (ExitCare):  Chest Pain (ED)


Additional Instructions: 


see your doctor this week.


Forms:  [employer] (English)

## 2018-04-24 ENCOUNTER — HOSPITAL ENCOUNTER (INPATIENT)
Dept: HOSPITAL 42 - ED | Age: 63
LOS: 3 days | Discharge: HOME | DRG: 191 | End: 2018-04-27
Attending: INTERNAL MEDICINE | Admitting: INTERNAL MEDICINE
Payer: MEDICARE

## 2018-04-24 VITALS — BODY MASS INDEX: 25 KG/M2

## 2018-04-24 DIAGNOSIS — E87.5: ICD-10-CM

## 2018-04-24 DIAGNOSIS — I11.0: ICD-10-CM

## 2018-04-24 DIAGNOSIS — I34.1: ICD-10-CM

## 2018-04-24 DIAGNOSIS — I42.9: ICD-10-CM

## 2018-04-24 DIAGNOSIS — I48.0: ICD-10-CM

## 2018-04-24 DIAGNOSIS — J44.1: Primary | ICD-10-CM

## 2018-04-24 DIAGNOSIS — E87.2: ICD-10-CM

## 2018-04-24 DIAGNOSIS — E87.1: ICD-10-CM

## 2018-04-24 DIAGNOSIS — Z95.3: ICD-10-CM

## 2018-04-24 DIAGNOSIS — E78.5: ICD-10-CM

## 2018-04-24 DIAGNOSIS — L30.9: ICD-10-CM

## 2018-04-24 DIAGNOSIS — Z79.01: ICD-10-CM

## 2018-04-24 DIAGNOSIS — N17.9: ICD-10-CM

## 2018-04-24 DIAGNOSIS — M19.90: ICD-10-CM

## 2018-04-24 DIAGNOSIS — I25.10: ICD-10-CM

## 2018-04-24 DIAGNOSIS — I50.9: ICD-10-CM

## 2018-04-24 LAB
ALBUMIN SERPL-MCNC: 4.4 G/DL (ref 3–4.8)
ALBUMIN/GLOB SERPL: 1.1 {RATIO} (ref 1.1–1.8)
ALT SERPL-CCNC: 28 U/L (ref 7–56)
APTT BLD: 32.4 SECONDS (ref 25.1–36.5)
AST SERPL-CCNC: 39 U/L (ref 14–36)
BASOPHILS # BLD AUTO: 0.02 K/MM3 (ref 0–2)
BASOPHILS NFR BLD: 0.3 % (ref 0–3)
BUN SERPL-MCNC: 22 MG/DL (ref 7–21)
CALCIUM SERPL-MCNC: 9.8 MG/DL (ref 8.4–10.5)
EOSINOPHIL # BLD: 0.1 10*3/UL (ref 0–0.7)
EOSINOPHIL NFR BLD: 1.3 % (ref 1.5–5)
ERYTHROCYTE [DISTWIDTH] IN BLOOD BY AUTOMATED COUNT: 14.4 % (ref 11.5–14.5)
GFR NON-AFRICAN AMERICAN: 41
GRANULOCYTES # BLD: 4.59 10*3/UL (ref 1.4–6.5)
GRANULOCYTES NFR BLD: 65 % (ref 50–68)
HGB BLD-MCNC: 11 G/DL (ref 12–16)
INR PPP: 1.22 (ref 0.93–1.08)
LYMPHOCYTES # BLD: 1.2 10*3/UL (ref 1.2–3.4)
LYMPHOCYTES NFR BLD AUTO: 16.4 % (ref 22–35)
MCH RBC QN AUTO: 29.1 PG (ref 25–35)
MCHC RBC AUTO-ENTMCNC: 33.8 G/DL (ref 31–37)
MCV RBC AUTO: 86 FL (ref 80–105)
MONOCYTES # BLD AUTO: 1.2 10*3/UL (ref 0.1–0.6)
MONOCYTES NFR BLD: 17 % (ref 1–6)
PLATELET # BLD: 349 10^3/UL (ref 120–450)
PMV BLD AUTO: 9.5 FL (ref 7–11)
PROTHROMBIN TIME: 14 SECONDS (ref 9.4–12.5)
RBC # BLD AUTO: 3.78 10^6/UL (ref 3.5–6.1)
TROPONIN I SERPL-MCNC: 0.04 NG/ML
WBC # BLD AUTO: 7.1 10^3/UL (ref 4.5–11)

## 2018-04-24 RX ADMIN — IPRATROPIUM BROMIDE AND ALBUTEROL SULFATE SCH ML: .5; 3 SOLUTION RESPIRATORY (INHALATION) at 14:29

## 2018-04-24 RX ADMIN — IPRATROPIUM BROMIDE AND ALBUTEROL SULFATE SCH ML: .5; 3 SOLUTION RESPIRATORY (INHALATION) at 16:24

## 2018-04-24 RX ADMIN — IPRATROPIUM BROMIDE AND ALBUTEROL SULFATE SCH ML: .5; 3 SOLUTION RESPIRATORY (INHALATION) at 16:12

## 2018-04-24 NOTE — ED PDOC
Arrival/HPI





- General


Chief Complaint: Chest Pain


Time Seen by Provider: 04/24/18 13:12


Historian: Patient





- History of Present Illness


Narrative History of Present Illness (Text): 


04/24/18 13:32


Patient is a 62 year old female with a past medical history of hypertension, 

hyperlipidemia, atrial fibrillation, CHF, COPD, and valve replacement, who 

presents to the emergency department complaining of left sided chest pain since 

last night.  Patient reports experiencing sharp/tight left sided chest pain 

throughout the night that would occasionally radiate to the right. She notes 

associated shortness of breath, nausea, and two episodes of non-bilious non-

bloody vomiting. Patient mentions her symptoms have slightly improved this 

morning. Patient denies any lower extremity edema, fevers, chills, headache, 

dizziness, cough, abdominal pain, diarrhea, back pain, neck pain, or any other 

complaints. Patient reports not taking her medication for today. 





PMD: Dr. Dinh


Cardiologist: Dr. Gaxiola





Time/Duration: Other (last night)


Symptom Course: Improving (this morning)


Quality: Tightness, Other (sharp)


Context: Home





Past Medical History





- Provider Review


Nursing Documentation Reviewed: Yes





- Infectious Disease


Hx of Infectious Diseases: None





- Tetanus Immunization


Tetanus Immunization: Unknown





- Past Medical History


Past Medical History: No Previous





- Cardiac


Hx Cardiac Disorders: Yes


Hx Hypertension: Yes


Hx Pacemaker: No


Other/Comment: Mitral Valve Replacement  10/17





- Pulmonary


Hx Respiratory Disorders: Yes


Hx Asthma: Yes


Hx Chronic Obstructive Pulmonary Disease (COPD): Yes





- Neurological


Hx Neurological Disorder: Yes


Hx Dizziness: Yes


Hx Migraine: Yes





- HEENT


Hx HEENT Disorder: No





- Renal


Hx Renal Disorder: No





- Endocrine/Metabolic


Hx Endocrine Disorders: No





- Hematological/Oncological


Hx Blood Disorders: No





- Integumentary


Hx Dermatological Disorder: No





- Musculoskeletal/Rheumatological


Hx Musculoskeletal Disorders: No


Hx Falls: No





- Gastrointestinal


Hx Gastrointestinal Disorders: No





- Genitourinary/Gynecological


Hx Genitourinary Disorders: No





- Psychiatric


Hx Psychophysiologic Disorder: No


Hx Emotional Abuse: No


Hx Physical Abuse: No


Hx Substance Use: No





- Surgical History


Other/Comment: Mitral Valve Replacement





- Anesthesia


Hx Anesthesia: Yes


Hx Anesthesia Reactions: No


Hx Malignant Hyperthermia: No





- Suicidal Assessment


Feels Threatened In Home Enviroment: No





Family/Social History





- Physician Review


Nursing Documentation Reviewed: Yes


Family/Social History: No Known Family HX


Smoking Status: Never Smoked


Hx Alcohol Use: Yes (SOCIALLY)


Amount per day: 6


Hx Substance Use: No


Hx Substance Use Treatment: No





Allergies/Home Meds


Allergies/Adverse Reactions: 


Allergies





cinnamon Allergy (Intermediate, Verified 04/24/18 13:23)


 ANAPHYLAXIS








Home Medications: 


 Home Meds











 Medication  Instructions  Recorded  Confirmed


 


Apixaban [Eliquis] 5 mg PO DAILY 02/02/18 02/13/18


 


Ranitidine HCl [Acid Reducer 150] 150 mg PO BID 02/13/18 02/13/18


 


Simvastatin [Zocor] 20 mg PO HS 02/13/18 02/13/18


 


diltiaZEM [Cardizem] 120 mg PO DAILY 02/13/18 02/13/18














Review of Systems





- Physician Review


All systems were reviewed & negative as marked: Yes





- Review of Systems


Constitutional: absent: Fevers, Night Sweats


Respiratory: SOB.  absent: Cough


Cardiovascular: Chest Pain.  absent: Edema


Gastrointestinal: Nausea, Vomiting.  absent: Abdominal Pain, Diarrhea


Musculoskeletal: absent: Back Pain, Neck Pain


Neurological: absent: Headache, Dizziness





Physical Exam


Vital Signs Reviewed: Yes


Vital Signs











  Temp Pulse Resp BP Pulse Ox


 


 04/24/18 15:33  98 F  98 H  19  127/78  100


 


 04/24/18 14:29     139/74 


 


 04/24/18 13:17  98.2 F  88  18  159/73 H  100











Temperature: Afebrile


Blood Pressure: Hypertensive


Pulse: Regular


Respiratory Rate: Normal


Appearance: Positive for: Well-Appearing


Pain Distress: None


Mental Status: Positive for: Alert and Oriented X 3





- Systems Exam


Head: Present: Atraumatic, Normocephalic


Pupils: Present: PERRL


Extroacular Muscles: Present: EOMI


Conjunctiva: Present: Normal


Mouth: Present: Moist Mucous Membranes


Neck: Present: Normal Range of Motion.  No: JVD


Respiratory/Chest: Present: Good Air Exchange, Wheezes (Trace wheezing), Rales (

crackles in right lung field).  No: Clear to Auscultation, Respiratory Distress

, Accessory Muscle Use


Cardiovascular: Present: Regular Rate and Rhythm, Normal S1, S2.  No: Murmurs


Abdomen: Present: Normal Bowel Sounds.  No: Tenderness, Distention, Peritoneal 

Signs


Back: Present: Normal Inspection


Upper Extremity: Present: Normal Inspection, NORMAL PULSES.  No: Cyanosis, Edema


Lower Extremity: Present: Normal Inspection, NORMAL PULSES.  No: Edema, CALF 

TENDERNESS


Neurological: Present: GCS=15, CN II-XII Intact, Speech Normal


Skin: Present: Warm, Dry, Normal Color.  No: Rashes


Psychiatric: Present: Alert, Oriented x 3, Normal Insight, Normal Concentration





Medical Decision Making


ED Course and Treatment: 


04/24/18 13:32


Impression:


Patient is a 62 year old female with chest pain. Patient notes shortness of 

breath.





Differential Diagnosis included but are not limited to:  CHF vs. COPD 

exacerbation





Plan:


-- Chest xray


-- EKG


-- Labs


-- Urinalysis 


-- Lasix, Duoneb and Solumedrol


-- Reassess and disposition





Prior Visits:


Notes and results from previous visits were reviewed. Patient was last seen in 

the emergency department on 2/13/18 complaining of bleeding.





Progress Notes:


EKG shows NSR at 88 BPM with LAFB, LVH, ST wave inversions in inferior and 

lateral leads, changed compared to previous EKG done on 02/13/16. Interpreted 

by me. 





Report Date : 04/24/2018 14:23:09


Procedure: Chest xray


Dictator : Rodney Ortega MD


IMPRESSION: No active disease.








04/24/18 16:09


EKG is different then previous which is different than previous. Case discussed 

with Dr. Gaxiola, who agrees with plan. Aspirin only at this time. Case discussed 

with Dr. Dinh who agrees with admission and requests blood cultures which 

were ordered. 








- Lab Interpretations


Lab Results: 








 04/24/18 14:20 





 04/24/18 14:46 





 Lab Results





04/24/18 14:46: Sodium 131 L, Potassium 4.6, Chloride 99, Carbon Dioxide 19 L, 

Anion Gap 17, BUN 22 H, Creatinine 1.3 H, Est GFR ( Amer) 50, Est GFR (

Non-Af Amer) 41, Random Glucose 112 H, Calcium 9.8, Magnesium 1.7, Total 

Bilirubin 1.1, AST 39 H, ALT 28, Alkaline Phosphatase 189 H, Lactate 

Dehydrogenase 594, Total Creatine Kinase 89, Troponin I 0.04  D, Total Protein 

8.3, Albumin 4.4, Globulin 3.9, Albumin/Globulin Ratio 1.1


04/24/18 14:46: PT 14.0 H, INR 1.22 H, APTT 32.4


04/24/18 14:20: WBC 7.1, RBC 3.78, Hgb 11.0 L, Hct 32.5 L, MCV 86.0, MCH 29.1, 

MCHC 33.8, RDW 14.4, Plt Count 349, MPV 9.5, Gran % 65.0, Lymph % (Auto) 16.4 L

, Mono % (Auto) 17.0 H, Eos % (Auto) 1.3 L, Baso % (Auto) 0.3, Gran # 4.59, 

Lymph # (Auto) 1.2, Mono # (Auto) 1.2 H, Eos # (Auto) 0.1, Baso # (Auto) 0.02








I have reviewed the lab results: Yes





- RAD Interpretation


Radiology Orders: 








04/24/18 13:31


CHEST PORTABLE [RAD] Stat 














- Medication Orders


Current Medication Orders: 











Discontinued Medications





Albuterol/Ipratropium (Duoneb 3 Mg/0.5 Mg (3 Ml) Ud)  3 ml IH Q15M OLMAN


   Stop: 04/24/18 14:16


   Last Admin: 04/24/18 14:29  Dose: 3 ml





Aspirin (Aspirin Chewable)  324 mg PO STAT STA


   Stop: 04/24/18 15:46


Furosemide (Lasix)  40 mg IVP STAT STA


   Stop: 04/24/18 13:33


   Last Admin: 04/24/18 14:29  Dose: 40 mg





MAR Blood Pressure


 Document     04/24/18 14:29  GMD  (Rec: 04/24/18 14:29  GMD  [id number])


     Blood Pressure


      Blood Pressure (100//90)             139/74


IVP Administration


 Document     04/24/18 14:29  GMD  (Rec: 04/24/18 14:29  GMD  [id number])


     Charges for Administration


      # of IVP Administrations                   1





Methylprednisolone (Solu-Medrol)  125 mg IVP STAT STA


   Stop: 04/24/18 13:33


   Last Admin: 04/24/18 14:29  Dose: 125 mg





IVP Administration


 Document     04/24/18 14:29  GMD  (Rec: 04/24/18 14:30  GMD  [id number])


     Charges for Administration


      # of IVP Administrations                   1














- Scribe Statement


The provider has reviewed the documentation as recorded by the [facility] [employer] [facility] Courtney


Provider Scribe Attestation:


All medical record entries made by the Scribe were at my direction and 

personally dictated by me. I have reviewed the chart and agree that the record 

accurately reflects my personal performance of the history, physical exam, 

medical decision making, and the department course for this patient. I have 

also personally directed, reviewed, and agree with the discharge instructions 

and disposition.








Disposition/Present on Arrival





- Present on Arrival


Any Indicators Present on Arrival: No


History of DVT/PE: No


History of Uncontrolled Diabetes: No


Urinary Catheter: No


History of Decub. Ulcer: No


History Surgical Site Infection Following: None





- Disposition


Have Diagnosis and Disposition been Completed?: Yes


Diagnosis: 


 Chest pain, COPD exacerbation, CHF (congestive heart failure)





Disposition: HOSPITALIZED


Disposition Time: 16:16


Patient Plan: Admission


Patient Problems: 


 Current Active Problems











Problem Status Onset


 


CHF (congestive heart failure) Acute  


 


COPD exacerbation Acute  


 


Chest pain Acute  











Condition: FAIR


Discharge Instructions (ExitCare):  Heart Failure (ED), Chest Pain (ED)


Referrals: 


Philip Dinh MD [Primary Care Provider] - Follow up with primary


Forms:  [employer] (English)

## 2018-04-24 NOTE — RAD
HISTORY:

sob  



COMPARISON:

12/13/2018 



FINDINGS:



LUNGS:

No active pulmonary disease.



PLEURA:

No significant pleural effusion identified, no pneumothorax apparent.



CARDIOVASCULAR:

Mild cardiomegaly.



OSSEOUS STRUCTURES:

Sternal wires



VISUALIZED UPPER ABDOMEN:

Normal.



OTHER FINDINGS:

None.



IMPRESSION:

No active disease.

## 2018-04-25 LAB
APPEARANCE UR: CLEAR
BACTERIA #/AREA URNS HPF: (no result) /[HPF]
BILIRUB UR-MCNC: NEGATIVE MG/DL
COLOR UR: YELLOW
ERYTHROCYTE [DISTWIDTH] IN BLOOD BY AUTOMATED COUNT: 14.3 % (ref 11.5–14.5)
GLUCOSE UR STRIP-MCNC: NEGATIVE MG/DL
HDLC SERPL-MCNC: 101 MG/DL (ref 29–60)
HGB BLD-MCNC: 10.4 G/DL (ref 12–16)
LDLC SERPL-MCNC: 105 MG/DL (ref 0–129)
LEUKOCYTE ESTERASE UR-ACNC: (no result) LEU/UL
MCH RBC QN AUTO: 29.1 PG (ref 25–35)
MCHC RBC AUTO-ENTMCNC: 34 G/DL (ref 31–37)
MCV RBC AUTO: 85.7 FL (ref 80–105)
PH UR STRIP: 5.5 [PH] (ref 4.7–8)
PLATELET # BLD: 330 10^3/UL (ref 120–450)
PMV BLD AUTO: 9.1 FL (ref 7–11)
PROT UR STRIP-MCNC: NEGATIVE MG/DL
RBC # BLD AUTO: 3.57 10^6/UL (ref 3.5–6.1)
RBC # UR STRIP: NEGATIVE /UL
RBC #/AREA URNS HPF: (no result) /HPF (ref 0–2)
SP GR UR STRIP: 1.02 (ref 1–1.03)
TROPONIN I SERPL-MCNC: 0.06 NG/ML
UROBILINOGEN UR STRIP-ACNC: 0.2 E.U./DL
WBC # BLD AUTO: 8.3 10^3/UL (ref 4.5–11)

## 2018-04-25 RX ADMIN — ARFORMOTEROL TARTRATE SCH MCG: 15 SOLUTION RESPIRATORY (INHALATION) at 19:55

## 2018-04-25 RX ADMIN — DILTIAZEM HYDROCHLORIDE SCH MG: 120 CAPSULE, COATED, EXTENDED RELEASE ORAL at 10:43

## 2018-04-25 RX ADMIN — BUDESONIDE SCH MG: 0.5 SUSPENSION RESPIRATORY (INHALATION) at 19:55

## 2018-04-25 RX ADMIN — METHYLPREDNISOLONE SODIUM SUCCINATE SCH MG: 40 INJECTION, POWDER, FOR SOLUTION INTRAMUSCULAR; INTRAVENOUS at 11:31

## 2018-04-25 RX ADMIN — METHYLPREDNISOLONE SODIUM SUCCINATE SCH MG: 40 INJECTION, POWDER, FOR SOLUTION INTRAMUSCULAR; INTRAVENOUS at 21:53

## 2018-04-25 NOTE — CP.PCM.PN
Subjective





- Date & Time of Evaluation


Date of Evaluation: 04/25/18


Time of Evaluation: 23:49





- Subjective


Subjective: 





S:Patient requested benadryl for her eczema.


   Has complaint of itching.


   No other complaints.


   Pertinent medical record was reviewed.





O:


Last Vital Signs





 3





Temp  97.9 F   04/25/18 17:31


 


Pulse  92 H  04/25/18 22:00


 


Resp  20   04/25/18 17:31


 


BP  129/82   04/25/18 17:31


 


Pulse Ox  96   04/25/18 05:48








   Awake, alert , not in distress.


   SKIN: Right scapular area has minimal eczematous lesion.


           (Patient was seen in presence of nurse Payal)


   LUNGS:Normal breathing pattern.





A:Itching.


   Hx eczema.





P:Benadryl 25 mg PO x 1.





Objective





- Vital Signs/Intake and Output


Vital Signs (last 24 hours): 


 











Temp Pulse Resp BP Pulse Ox


 


 97.9 F   92 H  20   129/82   96 


 


 04/25/18 17:31  04/25/18 22:00  04/25/18 17:31  04/25/18 17:31  04/25/18 05:48








Intake and Output: 


 











 04/25/18 04/26/18





 18:59 06:59


 


Intake Total 660 


 


Balance 660 














- Medications


Medications: 


 Current Medications





Acetaminophen (Tylenol 325mg Tab)  650 mg PO Q6H PRN


   PRN Reason: Headache


   Last Admin: 04/25/18 23:00 Dose:  650 mg


Albuterol/Ipratropium (Duoneb 3 Mg/0.5 Mg (3 Ml) Ud)  3 ml IH [id number] PRN


   PRN Reason: Shortness of Breath


Apixaban (Eliquis)  5 mg PO BID Replaced by [facility]


   PRN Reason: Protocol


   Last Admin: 04/25/18 17:10 Dose:  5 mg


Arformoterol Tartrate (Brovana)  15 mcg IH [id number] Replaced by [facility]


   Last Admin: 04/25/18 19:55 Dose:  15 mcg


Aspirin (Aspirin Chewable)  81 mg PO DAILY Replaced by [facility]


   Last Admin: 04/25/18 10:43 Dose:  81 mg


Budesonide (Pulmicort Respules)  0.5 mg IH [id number] Replaced by [facility]


   Last Admin: 04/25/18 19:55 Dose:  0.5 mg


Diltiazem HCl (Cardizem Cd)  120 mg PO DAILY Replaced by [facility]


   Last Admin: 04/25/18 10:43 Dose:  120 mg


Diphenhydramine HCl (Benadryl)  25 mg PO STAT STA


   Stop: 04/25/18 23:49


Doxycycline Hyclate (Doryx)  100 mg PO Q12 Replaced by [facility]


   PRN Reason: Protocol


   Last Admin: 04/25/18 21:53 Dose:  100 mg


Famotidine (Pepcid)  20 mg PO BID [facility]


   Last Admin: 04/25/18 17:09 Dose:  20 mg


Methylprednisolone (Solu-Medrol)  40 mg IVP Q12 [facility]


   Last Admin: 04/25/18 21:53 Dose:  40 mg


Metoprolol Tartrate (Lopressor)  25 mg PO BID Replaced by [facility]


   Last Admin: 04/25/18 17:09 Dose:  25 mg











- Labs


Labs: 


 





 04/25/18 10:19 





 











PT  14.0 SECONDS (9.4-12.5)  H  04/24/18  14:46    


 


INR  1.22  (0.93-1.08)  H  04/24/18  14:46    


 


APTT  32.4 Seconds (25.1-36.5)   04/24/18  14:46

## 2018-04-25 NOTE — CARD
--------------- APPROVED REPORT --------------





EKG Measurement

Heart [id number]

ME 136P

[id number]

[id number]

[id number]



<Conclusion>

Normal sinus rhythm

Left anterior fascicular block

Left ventricular hypertrophy with repolarization abnormality

Prolonged QT

ST-T CHANGES. Correlate Clinically.

## 2018-04-25 NOTE — CON
DATE:



REASON FOR CONSULTATION:  Chest pain, cardiac evaluation.



BRIEF CLINICAL HISTORY:  This is a 62-year-old female with past medical

history significant for paroxysmal atrial fibrillation, severe mitral

regurgitation, mitral valve prolapse; history goes back 2 years ago when

the patient had severe mitral valve prolapse and was referred for open

heart surgery _____.  Ultimately, the patient had open heart surgery,

mitral valve repair done in 10/2017, who came yesterday with complaint of

chest pain.  She states that she laid down and felt that she will get

better, drink some water, did not get better, so came to the emergency

room.  Denies any chest pain now.



PAST MEDICAL HISTORY:  Significant for paroxysmal atrial fibrillation,

mitral regurgitation, normal coronaries, status post cardiac

catheterization twice before surgery, most recently, patient had in 10/2017

by Dr. Meredith at [facility] before open heart

surgery, found to be 50% LAD disease on 10/17/2017 just before open heart

surgery.



PAST SURGICAL HISTORY:  Significant for cholecystectomy, carpal tunnel

syndrome, bunionectomy, removal of bunion from the toe of the foot most

recently in 08/2017, history of MVR bioprosthetic was done at [facility].



ALLERGIES:  NO KNOWN DRUG ALLERGIES.



SOCIAL HISTORY:  Denies any smoking.  Denies any history of alcohol abuse.



CURRENT MEDICATIONS:  The patient is taking Cardizem 120 mg daily, Zocor 20

mg daily, ranitidine 150 mg, Singulair 10, metoprolol titrate 25 mg daily,

aspirin 81 mg, Eliquis 5 mg twice, and albuterol.



REVIEW OF SYSTEMS:  History of paroxysmal atrial fibrillation as per HPI.



PHYSICAL EXAMINATION:

VITAL SIGNS:  Temperature afebrile, heart rate 80, blood pressure 130/80.

HEENT:  PERRLA.  Extraocular muscles intact.

NECK:  Supple.  No carotid bruit or thyromegaly.

CHEST:  Clear to auscultation.

HEART:  S1 and S2 regular.

ABDOMEN:  Soft.

EXTREMITIES:  Clubbing and cyanosis negative.



LABORATORY DATA:  Blood workup as follows:  WBC 7.8, hemoglobin 11,

hematocrit 32.5, and platelet count 349.  Chemistry shows sodium 131,

potassium 4.6, chloride 99, carbon dioxide 19, anion gap of 17, BUN 23, and

creatinine 1.3.  Troponin is 0.04.  EKG shows normal sinus, T inversion,

LVH.



IMPRESSION:  Chest pain; history of cardiac catheterization twice, last one

10/2017 before open heart surgery; history of mitral valve repair, mitral

valve prolapse, hypertension, paroxysmal atrial fibrillation.



RECOMMENDATION:  Add on troponin.  If the troponin remains negative, treat

medically.  Resume Cardizem, resume Eliquis.  Lipid profile, TSH,

hemoglobin A1c.



Thank you, Dr. Dinh, for providing us the opportunity in taking care of

the patient, Malu Meeks.  We will follow with you.







__________________________________________

Ajay Gaxiola MD



DD:  04/25/2018 9:35:25

DT:  04/25/2018 14:23:40

Job # [id number]

## 2018-04-25 NOTE — CARD
--------------- APPROVED REPORT --------------





EKG Measurement

Heart [id number]

TN 134P13

[id number]

[id number]

[id number]



<Conclusion>

Normal sinus rhythm

Left axis deviation

Moderate voltage criteria for LVH, may be normal variant

Marked T wave abnormality, consider anterolateral ischemia

Prolonged QT

Abnormal ECG

## 2018-04-26 VITALS — RESPIRATION RATE: 20 BRPM

## 2018-04-26 LAB
ALBUMIN SERPL-MCNC: 3.9 G/DL (ref 3–4.8)
ALBUMIN/GLOB SERPL: 1.2 {RATIO} (ref 1.1–1.8)
ALT SERPL-CCNC: 19 U/L (ref 7–56)
APPEARANCE UR: CLEAR
AST SERPL-CCNC: 22 U/L (ref 14–36)
BACTERIA #/AREA URNS HPF: (no result) /[HPF]
BASOPHILS # BLD AUTO: 0 K/MM3 (ref 0–2)
BASOPHILS NFR BLD: 0 % (ref 0–3)
BILIRUB UR-MCNC: NEGATIVE MG/DL
BUN SERPL-MCNC: 50 MG/DL (ref 7–21)
CALCIUM SERPL-MCNC: 9.2 MG/DL (ref 8.4–10.5)
COLOR UR: YELLOW
CREATININE,RANDOM URINE: 94 MG/DL
EOSINOPHIL # BLD: 0 10*3/UL (ref 0–0.7)
EOSINOPHIL NFR BLD: 0 % (ref 1.5–5)
ERYTHROCYTE [DISTWIDTH] IN BLOOD BY AUTOMATED COUNT: 14.6 % (ref 11.5–14.5)
GFR NON-AFRICAN AMERICAN: 25
GLUCOSE UR STRIP-MCNC: NEGATIVE MG/DL
GRANULOCYTES # BLD: 8.34 10*3/UL (ref 1.4–6.5)
GRANULOCYTES NFR BLD: 92.5 % (ref 50–68)
HGB BLD-MCNC: 9.3 G/DL (ref 12–16)
LEUKOCYTE ESTERASE UR-ACNC: (no result) LEU/UL
LYMPHOCYTE: 8 % (ref 22–35)
LYMPHOCYTES # BLD: 0.4 10*3/UL (ref 1.2–3.4)
LYMPHOCYTES NFR BLD AUTO: 4.5 % (ref 22–35)
MCH RBC QN AUTO: 28.9 PG (ref 25–35)
MCHC RBC AUTO-ENTMCNC: 33.6 G/DL (ref 31–37)
MCV RBC AUTO: 86 FL (ref 80–105)
MONOCYTE: 1 % (ref 1–6)
MONOCYTES # BLD AUTO: 0.3 10*3/UL (ref 0.1–0.6)
MONOCYTES NFR BLD: 3 % (ref 1–6)
NEUTROPHILS NFR BLD AUTO: 91 % (ref 50–70)
OSMOLALITY,URINE: 364 MOSM/KG (ref 300–1000)
PH UR STRIP: 6 [PH] (ref 4.7–8)
PLATELET # BLD EST: NORMAL 10*3/UL
PLATELET # BLD: 284 10^3/UL (ref 120–450)
PMV BLD AUTO: 9.3 FL (ref 7–11)
PROT UR STRIP-MCNC: NEGATIVE MG/DL
RBC # BLD AUTO: 3.22 10^6/UL (ref 3.5–6.1)
RBC # UR STRIP: NEGATIVE /UL
RBC #/AREA URNS HPF: (no result) /HPF (ref 0–2)
SP GR UR STRIP: 1.01 (ref 1–1.03)
TOTAL PROTEIN,RANDOM URINE: 9 MG/L
UROBILINOGEN UR STRIP-ACNC: 0.2 E.U./DL
WBC # BLD AUTO: 9 10^3/UL (ref 4.5–11)

## 2018-04-26 RX ADMIN — ARFORMOTEROL TARTRATE SCH MCG: 15 SOLUTION RESPIRATORY (INHALATION) at 08:20

## 2018-04-26 RX ADMIN — ARFORMOTEROL TARTRATE SCH: 15 SOLUTION RESPIRATORY (INHALATION) at 22:56

## 2018-04-26 RX ADMIN — DILTIAZEM HYDROCHLORIDE SCH MG: 120 CAPSULE, COATED, EXTENDED RELEASE ORAL at 10:30

## 2018-04-26 RX ADMIN — BUDESONIDE SCH MG: 0.5 SUSPENSION RESPIRATORY (INHALATION) at 08:20

## 2018-04-26 RX ADMIN — METHYLPREDNISOLONE SODIUM SUCCINATE SCH MG: 40 INJECTION, POWDER, FOR SOLUTION INTRAMUSCULAR; INTRAVENOUS at 10:30

## 2018-04-26 RX ADMIN — METHYLPREDNISOLONE SODIUM SUCCINATE SCH MG: 40 INJECTION, POWDER, FOR SOLUTION INTRAMUSCULAR; INTRAVENOUS at 22:31

## 2018-04-26 RX ADMIN — BUDESONIDE SCH: 0.5 SUSPENSION RESPIRATORY (INHALATION) at 22:57

## 2018-04-26 RX ADMIN — Medication SCH MG: at 10:31

## 2018-04-26 NOTE — PN
DATE:  04/25/2018



SUBJECTIVE:  The patient seems doing well.  She is comfortable.  No

distress.  No nausea.  No vomiting.  She is feeling better.  She seen by

Cardiology.  There is no any complaint.  The patient seen by pulmonologist,

seems doing well otherwise she is comfortable in no distress.



PHYSICAL EXAMINATION:

VITAL SIGNS:  Temperature 97.9, heart rate 88, blood pressure 129/82,

respirations 20.

HEAD AND NECK:  Normal.  No JVD.  No thyromegaly.

CHEST:  Clear, good air entry.

CARDIAC:  First sound and second sound normal.

ABDOMEN:  Soft, nontender.

EXTREMITIES:  No edema.

NEUROLOGICAL:  Normal.



LABORATORY DATA:  White count 8.3, hemoglobin 10.4, hematocrit 30.6,

platelets 330.  Her sodium 131, her BUN 22, creatinine 1.3.



IMPRESSION:

1.  Hyponatremia, acute renal insufficiency.  We would repeat lab in the

morning.  We will consider Renal Consult.

2.  Chest pain, short of breath.  Cardiology seen her.  We will discuss

with him and we will continue current medications, Cardizem and

beta-blockers and Eliquis, seems doing okay.

3.  Asthma.

4.  Atrial fibrillation, paroxysmal.

5.  Hypertension.

6.  Chronic obstructive pulmonary disease.

7.  Chronic osteoarthritis.



PLAN:  Continue current therapy which is aspirin, Brovana, Cardizem,

Eliquis, Lopressor, Pepcid and currently on IV Solu-Medrol every 12. 

Continue current therapy.





__________________________________________

Philip Dinh MD





DD:  04/26/2018 8:52:47

DT:  04/26/2018 10:02:20

Job # [id number].

## 2018-04-26 NOTE — US
PROCEDURE:  Urinary bladder ultrasound



HISTORY:

check post-void residual volume



COMPARISON:

None



TECHNIQUE:

Standard protocol for this study/examination.



FINDINGS:

Urinary bladder assessment: 



Prevoid volume: 485.5 ml



Postvoid residual: 29.4 ml



Intrinsic, mural, perivesical abnormalities: None



IMPRESSION:

Unremarkable urinary bladder wall.  No focal or diffuse 

abnormalities. Bladder wall thickening on the postvoid component of 

this study likely related to decreased urinary bladder state.

## 2018-04-26 NOTE — CP.PCM.PN
Subjective





- Date & Time of Evaluation


Date of Evaluation: 04/26/18


Time of Evaluation: 09:10





- Subjective


Subjective: 





I feel better after my nebulizer treatment, I want to go home with it, denies 

chest pain





Reason for consult and follow up: Cardiac evaluation, chest pain





Seen and examined by me and Dr. Gaxiola





Objective





- Vital Signs/Intake and Output


Vital Signs (last 24 hours): 


 











Temp Pulse Resp BP Pulse Ox


 


 98.3 F   95 H  18   104/67   100 


 


 04/26/18 06:00  04/26/18 06:00  04/26/18 06:00  04/26/18 06:00  04/26/18 06:00








Intake and Output: 


 











 04/26/18 04/26/18





 06:59 18:59


 


Intake Total 780 


 


Balance 780 














- Medications


Medications: 


 Current Medications





Acetaminophen (Tylenol 325mg Tab)  650 mg PO Q6H PRN


   PRN Reason: Headache


   Last Admin: 04/25/18 23:00 Dose:  650 mg


Albuterol/Ipratropium (Duoneb 3 Mg/0.5 Mg (3 Ml) Ud)  3 ml IH [id number] PRN


   PRN Reason: Shortness of Breath


Apixaban (Eliquis)  5 mg PO BID [facility]


   PRN Reason: Protocol


   Last Admin: 04/25/18 17:10 Dose:  5 mg


Arformoterol Tartrate (Brovana)  15 mcg IH [id number] [facility]


   Last Admin: 04/26/18 08:20 Dose:  15 mcg


Aspirin (Aspirin Chewable)  81 mg PO DAILY [facility]


   Last Admin: 04/25/18 10:43 Dose:  81 mg


Budesonide (Pulmicort Respules)  0.5 mg IH [id number] [facility]


   Last Admin: 04/26/18 08:20 Dose:  0.5 mg


Diltiazem HCl (Cardizem Cd)  120 mg PO DAILY [facility]


   Last Admin: 04/25/18 10:43 Dose:  120 mg


Doxycycline Hyclate (Doryx)  100 mg PO Q12 [facility]


   PRN Reason: Protocol


   Last Admin: 04/25/18 21:53 Dose:  100 mg


Famotidine (Pepcid)  20 mg PO BID [facility]


   Last Admin: 04/25/18 17:09 Dose:  20 mg


Dextrose/Sodium Chloride (Dextrose 5%/0.9% Ns 1000 Ml)  1,000 mls @ 70 mls/hr 

IV .[id number] [facility]


Magnesium Oxide (Mag-Ox)  400 mg PO DAILY [facility]


Methylprednisolone (Solu-Medrol)  40 mg IVP Q12 [facility]


   Last Admin: 04/25/18 21:53 Dose:  40 mg


Metoprolol Tartrate (Lopressor)  25 mg PO BID [facility]


   Last Admin: 04/25/18 17:09 Dose:  25 mg











- Labs


Labs: 


 





 04/26/18 07:30 





 04/26/18 07:30 





 











PT  14.0 SECONDS (9.4-12.5)  H  04/24/18  14:46    


 


INR  1.22  (0.93-1.08)  H  04/24/18  14:46    


 


APTT  32.4 Seconds (25.1-36.5)   04/24/18  14:46    














- Constitutional


Appears: No Acute Distress





- Head Exam


Head Exam: NORMAL INSPECTION





- Eye Exam


Eye Exam: Normal appearance


Pupil Exam: NORMAL ACCOMODATION





- ENT Exam


ENT Exam: Mucous Membranes Moist





- Respiratory Exam


Respiratory Exam: Decreased Breath Sounds, Clear to Ausculation Bilateral, 

NORMAL BREATHING PATTERN





- Cardiovascular Exam


Cardiovascular Exam: REGULAR RHYTHM, +S1, +S2


Additional comments: 





NSR





- GI/Abdominal Exam


GI & Abdominal Exam: Soft, Normal Bowel Sounds





- Extremities Exam


Extremities Exam: Full ROM, Normal Capillary Refill





- Neurological Exam


Neurological Exam: Alert, Awake, Oriented x3





- Psychiatric Exam


Psychiatric exam: Normal Affect, Normal Mood





- Skin


Skin Exam: Intact, Normal Color, Warm





Assessment and Plan





- Assessment and Plan (Free Text)


Assessment: 





Came into the ER due to shortness of breath and chest pain. history of COPD, 

mitral valve rrepair for mitral prolapse, Atrial fibrillation,hypertension,

osteoarthritis,hyperlipidemia and history of  congestive heart failure.


Plan: 





Negative Troponins 


Last cardiac cath 10/27


Symptoms more pulmonary than cardiac


Dr. Wisdom on pulmonary consult


Cardiac status stable,will treat medically


On ASA 81 mg, Cardizem  mg daily, lopressor 25 mg BID


Continue current medications


Continue current treatment





Will follow up





Plan and treatment discussed with Dr. Gaxiola

## 2018-04-26 NOTE — CT
PROCEDURE:  CT Abdomen and Pelvis without intravenous contrast



HISTORY:

r/o kidney stone



COMPARISON:

None.



TECHNIQUE:

Without contrast. 



Contrast Dose: 



Radiation dose: 



Total exam DLP = 



Total exam DLP = 790 mGy-cm.



This CT exam was performed using one or more of the following dose 

reduction techniques: Automated exposure control, adjustment of the 

mA and/or kV according to patient size, and/or use of iterative 

reconstruction technique.



FINDINGS:



LOWER THORAX:

Unremarkable. 



LIVER:

Unremarkable. No gross lesion or ductal dilatation.  



GALLBLADDER AND BILE DUCTS:

Gallbladder removed 



PANCREAS:

Unremarkable. No gross lesion or ductal dilatation.



SPLEEN:

Unremarkable. 



ADRENALS:

Unremarkable. No mass. 



KIDNEYS AND URETERS:

Unremarkable. No hydronephrosis. No solid mass. 



VASCULATURE:

Unremarkable. No aortic aneurysm. 



BOWEL:

Unremarkable. No obstruction. No gross mural thickening. 



APPENDIX:

Unremarkable. Normal appendix. 



PERITONEUM:

Unremarkable. No free fluid. No free air. 



LYMPH NODES:

Unremarkable. No enlarged lymph nodes. 



BLADDER:

Unremarkable. 



REPRODUCTIVE:

Unremarkable. 



BONES:

No acute fracture. 



OTHER FINDINGS:

None.



IMPRESSION:

No acute findings.



No evidence of urolithiasis

## 2018-04-26 NOTE — CP.PCM.CON
<Liu,Weilin - Last Filed: 04/26/18 13:17>





History of Present Illness





- History of Present Illness


History of Present Illness: 





Nephrology consult note for Dr. Ames





62 year old  female with past medical history of asthma, 

paroxysmal Afib, HTN, MR s/p valve replacement in 10/2018 presents to [facility] ED 

With chest pain and shortness of breath. Nephrology service was consulted for 

sudden increase of BUN and creatinine. Patient reports she was never diagnosed 

with renal problems such as stones or infections in her life. She denies family 

history of renal illness. Patient does report of few episodes of nocturia in 

the past 2 months. Patient's PMD had originally started her on Lasix BID but 

discontinued 1 month ago due to an unknown reason. She has been actively trying 

to lose weight for the past few month and denies decreased of appetite. Patient 

further denies having fever, chills, headache, abdominal pain, nausea, vomiting

, or change of urine color.





Review of Systems





- Constitutional


Constitutional: As Per HPI.  absent: Anorexia, Chills, Fever





- EENT


Eyes: As Per HPI.  absent: Blind Spots, Blurred Vision, Change in Vision, 

Decreased Night Vision


Ears: As Per HPI.  absent: Decreased Hearing, Dizziness


Nose/Mouth/Throat: As Per HPI.  absent: Epistaxis, Nasal Trauma, Nose Pain





- Breasts


Breasts: As Per HPI.  absent: Change in Shape, Mass





- Cardiovascular


Cardiovascular: As Per HPI.  absent: Chest Pain, Edema





- Respiratory


Respiratory: As Per HPI.  absent: Cough, Dyspnea, Hemoptysis





- Gastrointestinal


Gastrointestinal: As Per HPI.  absent: Abdominal Pain, Belching, Change in 

Stool Character





- Genitourinary


Genitourinary: As Per HPI, Nocturia.  absent: Dysuria, Hematuria, Freq UTI, Hx 

Renal/Bladder Calculi





- Reproductive: Female


Reproductive:Female: As Per HPI





- Menstruation


Menstruation: As Per HPI





- Musculoskeletal


Musculoskeletal: As Per HPI.  absent: Numbness, Tingling





- Integumentary


Integumentary: As Per HPI.  absent: Acne, Change in Nails, Change in 

Pigmentation





- Neurological


Neurological: As Per HPI.  absent: Abnormal Gait, Abnormal Speech, Behavioral 

Changes





- Psychiatric


Psychiatric: As Per HPI.  absent: Abnormal Sleep Pattern, Auditory 

Hallucinations, Behavioral Changes





- Endocrine


Endocrine: As Per HPI.  absent: Change in Body Appearance





- Hematologic/Lymphatic


Hematologic: As Per HPI





Past Patient History





- Infectious Disease


Hx of Infectious Diseases: None





- Tetanus Immunizations


Tetanus Immunization: Unknown





- Past Social History


Smoking Status: Never Smoked





- CARDIAC


Hx Cardiac Disorders: Yes


Hx Hypertension: Yes


Hx Pacemaker: No


Other/Comment: Mitral Valve Replacement  10/17





- PULMONARY


Hx Respiratory Disorders: Yes


Hx Asthma: Yes


Hx Chronic Obstructive Pulmonary Disease (COPD): Yes





- NEUROLOGICAL


Hx Neurological Disorder: Yes


Hx Dizziness: Yes


Hx Migraine: Yes





- HEENT


Hx HEENT Problems: No





- RENAL


Hx Chronic Kidney Disease: No





- ENDOCRINE/METABOLIC


Hx Endocrine Disorders: No





- HEMATOLOGICAL/ONCOLOGICAL


Hx Blood Disorders: No





- INTEGUMENTARY


Hx Dermatological Problems: No





- MUSCULOSKELETAL/RHEUMATOLOGICAL


Hx Musculoskeletal Disorders: No


Hx Falls: No





- GASTROINTESTINAL


Hx Gastrointestinal Disorders: No





- GENITOURINARY/GYNECOLOGICAL


Hx Genitourinary Disorders: No





- PSYCHIATRIC


Hx Psychophysiologic Disorder: No


Hx Emotional Abuse: No


Hx Physical Abuse: No


Hx Substance Use: No





- SURGICAL HISTORY


Other/Comment: Mitral Valve Replacement





- ANESTHESIA


Hx Anesthesia: Yes


Hx Anesthesia Reactions: No


Hx Malignant Hyperthermia: No





Meds


Allergies/Adverse Reactions: 


 Allergies











Allergy/AdvReac Type Severity Reaction Status Date / Time


 


cinnamon Allergy Intermediate ANAPHYLAXIS Verified 04/24/18 13:23














- Medications


Medications: 


 Current Medications





Acetaminophen (Tylenol 325mg Tab)  650 mg PO Q6H PRN


   PRN Reason: Headache


   Last Admin: 04/25/18 23:00 Dose:  650 mg


Albuterol/Ipratropium (Duoneb 3 Mg/0.5 Mg (3 Ml) Ud)  3 ml IH [id number] PRN


   PRN Reason: Shortness of Breath


Apixaban (Eliquis)  5 mg PO BID [facility]


   PRN Reason: Protocol


   Last Admin: 04/26/18 10:30 Dose:  5 mg


Arformoterol Tartrate (Brovana)  15 mcg IH [id number] [facility]


   Last Admin: 04/26/18 08:20 Dose:  15 mcg


Aspirin (Aspirin Chewable)  81 mg PO DAILY [facility]


   Last Admin: 04/26/18 10:31 Dose:  81 mg


Budesonide (Pulmicort Respules)  0.5 mg IH [id number] [facility]


   Last Admin: 04/26/18 08:20 Dose:  0.5 mg


Diltiazem HCl (Cardizem Cd)  120 mg PO DAILY [facility]


   Last Admin: 04/26/18 10:30 Dose:  120 mg


Doxycycline Hyclate (Doryx)  100 mg PO Q12 [facility]


   PRN Reason: Protocol


   Last Admin: 04/26/18 10:30 Dose:  100 mg


Famotidine (Pepcid)  20 mg PO BID [facility]


   Last Admin: 04/26/18 10:30 Dose:  20 mg


Dextrose/Sodium Chloride (Dextrose 5%/0.9% Ns 1000 Ml)  1,000 mls @ 70 mls/hr 

IV .[id number] [facility]


Magnesium Oxide (Mag-Ox)  400 mg PO DAILY [facility]


   Last Admin: 04/26/18 10:31 Dose:  400 mg


Methylprednisolone (Solu-Medrol)  40 mg IVP Q12 [facility]


   Last Admin: 04/26/18 10:30 Dose:  40 mg


Metoprolol Tartrate (Lopressor)  25 mg PO BID [facility]


   Last Admin: 04/26/18 10:31 Dose:  25 mg











Physical Exam





- Additional Findings


Additional findings: 





- Constitutional


Appears: No Acute Distress





- Head Exam


Head Exam: NORMAL INSPECTION





- Eye Exam


Eye Exam: Normal appearance


Pupil Exam: NORMAL ACCOMODATION





- ENT Exam


ENT Exam: Mucous Membranes Moist





- Respiratory Exam


Respiratory Exam: Clear to Ausculation Bilateral, NORMAL BREATHING PATTERN





- Cardiovascular Exam


Cardiovascular Exam: REGULAR RHYTHM, +S1, +S2





- GI/Abdominal Exam


GI & Abdominal Exam: Soft, Normal Bowel Sounds





- Extremities Exam


Extremities Exam: Full ROM, Normal Capillary Refill, No Edema





- Neurological Exam


Neurological Exam: Alert, Awake, Oriented x3





- Psychiatric Exam


Psychiatric exam: Normal Affect, Normal Mood





- Skin


Skin Exam: Intact, Normal Color, Warm





Results





- Vital Signs


Recent Vital Signs: 


 Last Vital Signs











Temp  97.7 F   04/26/18 12:00


 


Pulse  82   04/26/18 12:00


 


Resp  17   04/26/18 12:00


 


BP  130/72   04/26/18 12:00


 


Pulse Ox  100   04/26/18 06:00














- Labs


Result Diagrams: 


 04/26/18 07:30





 04/26/18 07:30


Labs: 


 Laboratory Results - last 24 hr











  04/25/18 04/26/18 04/26/18





  10:20 07:30 07:30


 


WBC    9.0


 


RBC    3.22 L


 


Hgb    9.3 L


 


Hct    27.7 L


 


MCV    86.0


 


MCH    28.9


 


MCHC    33.6


 


RDW    14.6 H


 


Plt Count    284


 


MPV    9.3


 


Gran %    92.5 H


 


Lymph % (Auto)    4.5 L


 


Mono % (Auto)    3.0


 


Eos % (Auto)    0.0 L


 


Baso % (Auto)    0.0


 


Gran #    8.34 H


 


Lymph # (Auto)    0.4 L


 


Mono # (Auto)    0.3


 


Eos # (Auto)    0.0


 


Baso # (Auto)    0.00


 


Neutrophils % (Manual)    91 H


 


Lymphocytes % (Manual)    8 L


 


Monocytes % (Manual)    1


 


Platelet Evaluation    Normal


 


Sodium   130 L 


 


Potassium   5.1 H 


 


Chloride   101 


 


Carbon Dioxide   17 L 


 


Anion Gap   18 


 


BUN   50 H 


 


Creatinine   2.0 H 


 


Est GFR ( Amer)   30 


 


Est GFR (Non-Af Amer)   25 


 


Random Glucose   159 H 


 


Hemoglobin A1c  6.0  


 


Calcium   9.2 


 


Phosphorus   3.6 


 


Magnesium   1.6 L 


 


Total Bilirubin   0.2 


 


AST   22 


 


ALT   19 


 


Alkaline Phosphatase   141 H D 


 


Troponin I   


 


Total Protein   7.2 


 


Albumin   3.9 


 


Globulin   3.3 


 


Albumin/Globulin Ratio   1.2 














  04/26/18





  08:30


 


WBC 


 


RBC 


 


Hgb 


 


Hct 


 


MCV 


 


MCH 


 


MCHC 


 


RDW 


 


Plt Count 


 


MPV 


 


Gran % 


 


Lymph % (Auto) 


 


Mono % (Auto) 


 


Eos % (Auto) 


 


Baso % (Auto) 


 


Gran # 


 


Lymph # (Auto) 


 


Mono # (Auto) 


 


Eos # (Auto) 


 


Baso # (Auto) 


 


Neutrophils % (Manual) 


 


Lymphocytes % (Manual) 


 


Monocytes % (Manual) 


 


Platelet Evaluation 


 


Sodium 


 


Potassium 


 


Chloride 


 


Carbon Dioxide 


 


Anion Gap 


 


BUN 


 


Creatinine 


 


Est GFR ( Amer) 


 


Est GFR (Non-Af Amer) 


 


Random Glucose 


 


Hemoglobin A1c 


 


Calcium 


 


Phosphorus 


 


Magnesium 


 


Total Bilirubin 


 


AST 


 


ALT 


 


Alkaline Phosphatase 


 


Troponin I  0.04  D


 


Total Protein 


 


Albumin 


 


Globulin 


 


Albumin/Globulin Ratio 














Assessment & Plan





- Assessment and Plan (Free Text)


Assessment: 





Acute renal failure


-Likely secondary recent medication dose changes


-Baseline Cr 0.9, today 2.0


-Bladder ultrasound to check post void residual volume


-Abdomen CT to rule out nephrolithiasis


-Follow up urinalysis and cultures


-Follow up microalbumin with Cr ratio


-Avoid nephrotoxic agents


-Continue IVF





Electrolyte imbalance


-Continue IVF


-Avoid potassium rich food such as banana and orange juice


-Follow up urine osmolality, sodium





Discussed with attending Dr. Ames





<Valeriy Ames - Last Filed: 04/26/18 19:02>





Meds





- Medications


Medications: 


 Current Medications





Acetaminophen (Tylenol 325mg Tab)  650 mg PO Q6H PRN


   PRN Reason: Headache


   Last Admin: 04/25/18 23:00 Dose:  650 mg


Albuterol/Ipratropium (Duoneb 3 Mg/0.5 Mg (3 Ml) Ud)  3 ml IH [id number] PRN


   PRN Reason: Shortness of Breath


Apixaban (Eliquis)  5 mg PO BID [facility]


   PRN Reason: Protocol


   Last Admin: 04/26/18 18:08 Dose:  5 mg


Arformoterol Tartrate (Brovana)  15 mcg IH [id number] [facility]


   Last Admin: 04/26/18 08:20 Dose:  15 mcg


Aspirin (Aspirin Chewable)  81 mg PO DAILY [facility]


   Last Admin: 04/26/18 10:31 Dose:  81 mg


Budesonide (Pulmicort Respules)  0.5 mg IH [id number] [facility]


   Last Admin: 04/26/18 08:20 Dose:  0.5 mg


Diltiazem HCl (Cardizem Cd)  120 mg PO DAILY [facility]


   Last Admin: 04/26/18 10:30 Dose:  120 mg


Doxycycline Hyclate (Doryx)  100 mg PO Q12 [facility]


   PRN Reason: Protocol


   Last Admin: 04/26/18 10:30 Dose:  100 mg


Famotidine (Pepcid)  20 mg PO BID [facility]


   Last Admin: 04/26/18 18:09 Dose:  20 mg


Dextrose/Sodium Chloride (Dextrose 5%/0.9% Ns 1000 Ml)  1,000 mls @ 70 mls/hr 

IV .[id number] [facility]


   Last Admin: 04/26/18 09:22 Dose:  70 mls/hr


Magnesium Oxide (Mag-Ox)  400 mg PO DAILY [facility]


   Last Admin: 04/26/18 10:31 Dose:  400 mg


Methylprednisolone (Solu-Medrol)  40 mg IVP Q12 [facility]


   Last Admin: 04/26/18 10:30 Dose:  40 mg


Metoprolol Tartrate (Lopressor)  25 mg PO BID [facility]


   Last Admin: 04/26/18 18:08 Dose:  25 mg











Results





- Vital Signs


Recent Vital Signs: 


 Last Vital Signs











Temp  97.7 F   04/26/18 12:00


 


Pulse  93 H  04/26/18 18:08


 


Resp  17   04/26/18 12:00


 


BP  142/81   04/26/18 18:08


 


Pulse Ox  100   04/26/18 06:00














- Labs


Result Diagrams: 


 04/26/18 07:30





 04/26/18 07:30


Labs: 


 Laboratory Results - last 24 hr











  04/26/18 04/26/18 04/26/18





  07:30 07:30 08:30


 


WBC   9.0 


 


RBC   3.22 L 


 


Hgb   9.3 L 


 


Hct   27.7 L 


 


MCV   86.0 


 


MCH   28.9 


 


MCHC   33.6 


 


RDW   14.6 H 


 


Plt Count   284 


 


MPV   9.3 


 


Gran %   92.5 H 


 


Lymph % (Auto)   4.5 L 


 


Mono % (Auto)   3.0 


 


Eos % (Auto)   0.0 L 


 


Baso % (Auto)   0.0 


 


Gran #   8.34 H 


 


Lymph # (Auto)   0.4 L 


 


Mono # (Auto)   0.3 


 


Eos # (Auto)   0.0 


 


Baso # (Auto)   0.00 


 


Neutrophils % (Manual)   91 H 


 


Lymphocytes % (Manual)   8 L 


 


Monocytes % (Manual)   1 


 


Platelet Evaluation   Normal 


 


Sodium  130 L  


 


Potassium  5.1 H  


 


Chloride  101  


 


Carbon Dioxide  17 L  


 


Anion Gap  18  


 


BUN  50 H  


 


Creatinine  2.0 H  


 


Est GFR ( Amer)  30  


 


Est GFR (Non-Af Amer)  25  


 


Random Glucose  159 H  


 


Calcium  9.2  


 


Phosphorus  3.6  


 


Magnesium  1.6 L  


 


Total Bilirubin  0.2  


 


AST  22  


 


ALT  19  


 


Alkaline Phosphatase  141 H D  


 


Troponin I    0.04  D


 


Total Protein  7.2  


 


Albumin  3.9  


 


Globulin  3.3  


 


Albumin/Globulin Ratio  1.2  


 


Urine Color   


 


Urine Appearance   


 


Urine pH   


 


Ur Specific Gravity   


 


Urine Protein   


 


Urine Glucose (UA)   


 


Urine Ketones   


 


Urine Blood   


 


Urine Nitrate   


 


Urine Bilirubin   


 


Urine Urobilinogen   


 


Ur Leukocyte Esterase   


 


Urine RBC   


 


Urine WBC   


 


Ur Epithelial Cells   


 


Urine Bacteria   


 


Urine Osmolality   


 


Ur Random Creatinine   


 


U Random Total Protein   


 


Ur Random Sodium   


 


Ur Random Urea Nitrogn   














  04/26/18 04/26/18 04/26/18





  14:18 14:18 14:18


 


WBC   


 


RBC   


 


Hgb   


 


Hct   


 


MCV   


 


MCH   


 


MCHC   


 


RDW   


 


Plt Count   


 


MPV   


 


Gran %   


 


Lymph % (Auto)   


 


Mono % (Auto)   


 


Eos % (Auto)   


 


Baso % (Auto)   


 


Gran #   


 


Lymph # (Auto)   


 


Mono # (Auto)   


 


Eos # (Auto)   


 


Baso # (Auto)   


 


Neutrophils % (Manual)   


 


Lymphocytes % (Manual)   


 


Monocytes % (Manual)   


 


Platelet Evaluation   


 


Sodium   


 


Potassium   


 


Chloride   


 


Carbon Dioxide   


 


Anion Gap   


 


BUN   


 


Creatinine   


 


Est GFR ( Amer)   


 


Est GFR (Non-Af Amer)   


 


Random Glucose   


 


Calcium   


 


Phosphorus   


 


Magnesium   


 


Total Bilirubin   


 


AST   


 


ALT   


 


Alkaline Phosphatase   


 


Troponin I   


 


Total Protein   


 


Albumin   


 


Globulin   


 


Albumin/Globulin Ratio   


 


Urine Color   


 


Urine Appearance   


 


Urine pH   


 


Ur Specific Gravity   


 


Urine Protein   


 


Urine Glucose (UA)   


 


Urine Ketones   


 


Urine Blood   


 


Urine Nitrate   


 


Urine Bilirubin   


 


Urine Urobilinogen   


 


Ur Leukocyte Esterase   


 


Urine RBC   


 


Urine WBC   


 


Ur Epithelial Cells   


 


Urine Bacteria   


 


Urine Osmolality    364


 


Ur Random Creatinine   94 


 


U Random Total Protein    9


 


Ur Random Sodium  < 5  


 


Ur Random Urea Nitrogn   648 














  04/26/18





  14:18


 


WBC 


 


RBC 


 


Hgb 


 


Hct 


 


MCV 


 


MCH 


 


MCHC 


 


RDW 


 


Plt Count 


 


MPV 


 


Gran % 


 


Lymph % (Auto) 


 


Mono % (Auto) 


 


Eos % (Auto) 


 


Baso % (Auto) 


 


Gran # 


 


Lymph # (Auto) 


 


Mono # (Auto) 


 


Eos # (Auto) 


 


Baso # (Auto) 


 


Neutrophils % (Manual) 


 


Lymphocytes % (Manual) 


 


Monocytes % (Manual) 


 


Platelet Evaluation 


 


Sodium 


 


Potassium 


 


Chloride 


 


Carbon Dioxide 


 


Anion Gap 


 


BUN 


 


Creatinine 


 


Est GFR ( Amer) 


 


Est GFR (Non-Af Amer) 


 


Random Glucose 


 


Calcium 


 


Phosphorus 


 


Magnesium 


 


Total Bilirubin 


 


AST 


 


ALT 


 


Alkaline Phosphatase 


 


Troponin I 


 


Total Protein 


 


Albumin 


 


Globulin 


 


Albumin/Globulin Ratio 


 


Urine Color  Yellow


 


Urine Appearance  Clear


 


Urine pH  6.0


 


Ur Specific Gravity  1.010


 


Urine Protein  Negative


 


Urine Glucose (UA)  Negative


 


Urine Ketones  Negative


 


Urine Blood  Negative


 


Urine Nitrate  Negative


 


Urine Bilirubin  Negative


 


Urine Urobilinogen  0.2


 


Ur Leukocyte Esterase  Trace H


 


Urine RBC  0 - 2


 


Urine WBC  1 - 3


 


Ur Epithelial Cells  1 - 3


 


Urine Bacteria  Few


 


Urine Osmolality 


 


Ur Random Creatinine 


 


U Random Total Protein 


 


Ur Random Sodium 


 


Ur Random Urea Nitrogn 














Attending/Attestation





- Attestation


I have personally seen and examined this patient.: Yes


I have fully participated in the care of the patient.: Yes


I have reviewed all pertinent clinical information: Yes


Notes (Text): 


Patient seen and examined; I agree with the resident's note as above with the 

following additions/edits:





63 yo F w/ pmh of asthma, paroxysmal Afib, HTN, non-obstructive CAD, s/p mitral 

valve replacement (10/2018), presented with chest pain and shortness of breath; 

nephrology service being consulted for acute renal failure;





On further perusal of records, patient has had multiple episodes of CRISTINA with 

increase of serum creatinine from baseline of ~0.9 before quickly improving; on 

current admission, serum creat already at 1.3, increased to 2.0 today; of note, 

patient received IV lasix 40 mg x 1 dose in ED yesterday; urine lytes 

consistent with pre-renal etiology with Ur Na < 5; likely very sensitive to 

hemodynamic changes (SBP in low 100's this morning), cannot rule out 

cardiorenal component but appears relatively euvolemic on exam; has some renal 

asymmetry on previous US with smaller L kidney but no vascular calcifications 

seen on CT;


Normal post-void residual volume on bladder US;





-Agree with IVF w/ NS at 70 cc/hr


-continue to hold diuretics;


-will obtain renal artery duplex to look for stenosis (although lack of 

uncontrolled hypertension makes this doubtful);


-mild hyperkalemia, low K diet for now; mild metabolic acidosis; should improve 

as renal function improves


-mild hyponatremia, should improve with volume repletion;





04/26/18 18:47

## 2018-04-26 NOTE — CON
DATE:



PULMONARY CONSULTATION



REFERRING PHYSICIAN:  Philip Dinh MD



REASON FOR CONSULTATION:  Cough, shortness of breath, chronic lung disease.



HISTORY OF PRESENT ILLNESS:  This is a 62-year-old female with past medical

history significant for chronic obstructive lung disease, history of mitral

valve replacement, paroxysmal atrial fibrillation, hypertension, chronic

lung disease, osteoarthritis, hyperlipidemia, congestive heart failure,

comes in with cough, shortness of breath.  Admits to have snoring at night,

and daytime sleepy and tired.  No nausea, no vomiting.  No diarrhea.  No

leg pain, no leg swelling.



PAST MEDICAL HISTORY:  As per history of present illness.



ALLERGIES:  CINNAMON AND ACE INHIBITOR.



SOCIAL HISTORY:  History of secondhand smoking.  Denied any alcohol use.



FAMILY HISTORY:  No significant cardiopulmonary disease reported.



MEDICATIONS:  She is on aspirin 81 mg daily, Cardizem  mg daily,

DuoNeb every 4 hours p.r.n., Eliquis 5 mg twice a day, metoprolol tartrate

25 mg twice a day, Pepcid 20 mg twice a day, Solu-Medrol 40 mg every 12

hours.



REVIEW OF SYSTEMS:  No headache, no rhinitis.  Admit to have snoring,

daytime sleepy and tired, cough, shortness of breath.  No nausea, no

vomiting.  No diarrhea.  No leg pain or leg swelling.



PHYSICAL EXAMINATION:

GENERAL:  Lying in the bed.

VITAL SIGNS:  Temperature is 98, heart rate is 99, respiratory rate is 20,

blood pressure 140/79, pulse ox 96% on room air.

HEENT:  Small oral cavity.  Crowded airway.  Mallampati score is 4.

NECK:  Supple.  No JVD.

LUNGS:  Have a prolonged expiratory phase with some wheezing.

HEART:  S1 and S2.

ABDOMEN:  Soft, nontender, no organomegaly.

EXTREMITIES:  No edema.

NEUROLOGICAL:  Awake, alert and follows simple command.



LABORATORY DATA:  Shows hemoglobin 10.4, hematocrit 30.6, WBC 8.3,

platelets 330.  INR 1.2.  PTT 32.  Sodium 131, potassium 4.6, chloride 99,

bicarbonate is 19, BUN 22, creatinine 1.3, glucose 112, calcium is 9.8, AST

39, ALT 28, alk phos is 189.  .  Troponin 0.06.  Albumin 4.4. 

Cholesterol is 236, triglyceride is 72.  TSH 0.15.  _____ .  Had EKG done

on admission, shows a normal sinus rhythm, left anterior fascicular block. 

Chest x-ray done in ER shows no active pulmonary disease.



IMPRESSION AND PLAN:  Chronic obstructive lung disease, cardiomyopathy,

atrial fibrillation which is paroxysmal valvular heart disease, may have

sleep apnea syndrome.  Had a sleep study done, we will follow on it. 

Continue IV and inhaled bronchodilator.  Keep head at 45 degrees.  May add

doxycycline 100 mg twice a day.  Continue anticoagulation, gastric

prophylaxis.  Fall precaution.



Thank you and we will follow with you.







__________________________________________

Ajay Wisdom MD



DD:  04/25/2018 14:05:52

DT:  04/25/2018 14:12:03

Job # [id number]

## 2018-04-26 NOTE — CP.PCM.PN
Subjective





- Date & Time of Evaluation


Date of Evaluation: 04/26/18


Time of Evaluation: 23:22





- Subjective


Subjective: 


Patient was seen once for insertion for heparin lock.


 Later on nurse calls and tells that patient has itching.


 Has no other complaints.


 Medical record was reviewed.


 This 62 year old woman was admitted for left sided chest pain  , CHF / COPD 

exacerbation.


 Has PMH of HTN,HLD,AFib,CHF, COPD, valve replacement.








Objective





- Vital Signs/Intake and Output


Vital Signs (last 24 hours): 


 











Temp Pulse Resp BP Pulse Ox


 


 97.9 F   93 H  20   142/81   100 


 


 04/26/18 16:00  04/26/18 18:08  04/26/18 16:00  04/26/18 18:08  04/26/18 16:00











- Medications


Medications: 


 Current Medications





Acetaminophen (Tylenol 325mg Tab)  650 mg PO Q6H PRN


   PRN Reason: Headache


   Last Admin: 04/25/18 23:00 Dose:  650 mg


Albuterol/Ipratropium (Duoneb 3 Mg/0.5 Mg (3 Ml) Ud)  3 ml IH [id number] PRN


   PRN Reason: Shortness of Breath


Apixaban (Eliquis)  5 mg PO BID [facility]


   PRN Reason: Protocol


   Last Admin: 04/26/18 18:08 Dose:  5 mg


Arformoterol Tartrate (Brovana)  15 mcg IH [id number] [facility]


   Last Admin: 04/26/18 22:56 Dose:  Not Given


Aspirin (Aspirin Chewable)  81 mg PO DAILY [facility]


   Last Admin: 04/26/18 10:31 Dose:  81 mg


Budesonide (Pulmicort Respules)  0.5 mg IH [id number] [facility]


   Last Admin: 04/26/18 22:57 Dose:  Not Given


Diltiazem HCl (Cardizem Cd)  120 mg PO DAILY [facility]


   Last Admin: 04/26/18 10:30 Dose:  120 mg


Doxycycline Hyclate (Doryx)  100 mg PO Q12 [facility]


   PRN Reason: Protocol


   Last Admin: 04/26/18 22:09 Dose:  100 mg


Famotidine (Pepcid)  20 mg PO BID [facility]


   Last Admin: 04/26/18 18:09 Dose:  20 mg


Dextrose/Sodium Chloride (Dextrose 5%/0.9% Ns 1000 Ml)  1,000 mls @ 70 mls/hr 

IV .[id number] [facility]


   Last Admin: 04/26/18 09:22 Dose:  70 mls/hr


Magnesium Oxide (Mag-Ox)  400 mg PO DAILY [facility]


   Last Admin: 04/26/18 10:31 Dose:  400 mg


Methylprednisolone (Solu-Medrol)  40 mg IVP Q12 [facility]


   Last Admin: 04/26/18 22:31 Dose:  40 mg


Metoprolol Tartrate (Lopressor)  25 mg PO BID [facility]


   Last Admin: 04/26/18 18:08 Dose:  25 mg











- Labs


Labs: 


 





 04/26/18 07:30 





 04/26/18 07:30 





 











PT  14.0 SECONDS (9.4-12.5)  H  04/24/18  14:46    


 


INR  1.22  (0.93-1.08)  H  04/24/18  14:46    


 


APTT  32.4 Seconds (25.1-36.5)   04/24/18  14:46    














- Constitutional


Appears: Well, No Acute Distress





- Head Exam


Head Exam: ATRAUMATIC, NORMAL INSPECTION, NORMOCEPHALIC





- Eye Exam


Eye Exam: Normal appearance





- ENT Exam


ENT Exam: Normal External Ear Exam





- Neck Exam


Neck Exam: Normal Inspection





- Respiratory Exam


Respiratory Exam: NORMAL BREATHING PATTERN





- Cardiovascular Exam


Cardiovascular Exam: absent: JVD





- GI/Abdominal Exam


GI & Abdominal Exam: absent: Distended





- Rectal Exam


Rectal Exam: Deferred





-  Exam


Additional comments: 





Deferred.





- Extremities Exam


Extremities Exam: Normal Inspection





- Back Exam


Back Exam: NORMAL INSPECTION





- Neurological Exam


Neurological Exam: Alert, Awake, Oriented x3





- Psychiatric Exam


Psychiatric exam: Normal Affect, Normal Mood





- Skin


Skin Exam: Normal Color


Additional comments: 





No rash noted.





Assessment and Plan





- Assessment and Plan (Free Text)


Assessment: 


Poor venous access.


 Itching.


 HTN.


 HLD.


 A.Fib.


 CHF.


 COPD.





Plan: 





Benadryl 25 mg PO X 1.


# 24 angiocath was inserted in left forearm.


Continue present management.

## 2018-04-27 VITALS
OXYGEN SATURATION: 99 % | TEMPERATURE: 97.6 F | SYSTOLIC BLOOD PRESSURE: 159 MMHG | HEART RATE: 76 BPM | DIASTOLIC BLOOD PRESSURE: 85 MMHG

## 2018-04-27 LAB
ALBUMIN SERPL-MCNC: 3.8 G/DL (ref 3–4.8)
ALBUMIN/GLOB SERPL: 1.1 {RATIO} (ref 1.1–1.8)
ALT SERPL-CCNC: 15 U/L (ref 7–56)
AST SERPL-CCNC: 24 U/L (ref 14–36)
BUN SERPL-MCNC: 50 MG/DL (ref 7–21)
CALCIUM SERPL-MCNC: 9.4 MG/DL (ref 8.4–10.5)
GFR NON-AFRICAN AMERICAN: 41

## 2018-04-27 RX ADMIN — ARFORMOTEROL TARTRATE SCH MCG: 15 SOLUTION RESPIRATORY (INHALATION) at 09:19

## 2018-04-27 RX ADMIN — ARFORMOTEROL TARTRATE SCH MCG: 15 SOLUTION RESPIRATORY (INHALATION) at 00:26

## 2018-04-27 RX ADMIN — METHYLPREDNISOLONE SODIUM SUCCINATE SCH MG: 40 INJECTION, POWDER, FOR SOLUTION INTRAMUSCULAR; INTRAVENOUS at 09:08

## 2018-04-27 RX ADMIN — BUDESONIDE SCH: 0.5 SUSPENSION RESPIRATORY (INHALATION) at 00:29

## 2018-04-27 RX ADMIN — BUDESONIDE SCH MG: 0.5 SUSPENSION RESPIRATORY (INHALATION) at 09:20

## 2018-04-27 RX ADMIN — BUDESONIDE SCH MG: 0.5 SUSPENSION RESPIRATORY (INHALATION) at 00:28

## 2018-04-27 RX ADMIN — BUDESONIDE SCH: 0.5 SUSPENSION RESPIRATORY (INHALATION) at 08:14

## 2018-04-27 RX ADMIN — Medication SCH MG: at 09:07

## 2018-04-27 RX ADMIN — ARFORMOTEROL TARTRATE SCH: 15 SOLUTION RESPIRATORY (INHALATION) at 08:14

## 2018-04-27 RX ADMIN — DILTIAZEM HYDROCHLORIDE SCH MG: 120 CAPSULE, COATED, EXTENDED RELEASE ORAL at 09:05

## 2018-04-27 NOTE — PN
DATE:  04/26/2018



SUBJECTIVE:  Malu Meeks seems doing clinically fine.  Her creatinine today

went up to 2.



PHYSICAL EXAMINATION:

VITAL SIGNS:  Temperature 97.9, heart rate 93, blood pressure 142/81,

respirations 20, and 100% saturating.

HEAD AND NECK:  Normal.  No JVD.  No thyromegaly.

CHEST:  Clear bilaterally.

CARDIAC:  First sound and second sound normal.

ABDOMEN:  Soft, nontender.

EXTREMITIES:  No edema.

NEUROLOGIC:  Normal.



LABORATORY DATA:  Shows sodium 130, potassium 5.1, chloride _____ 17, BUN

50, creatinine 2, blood sugar 159, calcium 9.2, phosphorus 3.6, magnesium

1.6, and alkaline phosphatase 141.  Total protein 7.2, which is normal. 

The patient has also CBC, which shows white count 9, hemoglobin 9.3,

hematocrit 27.7, platelets 284.  UA was normal.



IMPRESSION AND PLAN:

1.  Acute renal injury.  We will get a Renal consult with Dr. Ames and we

will continue IV fluids and repeat chemistry in the morning, probably due

to poor p.o. intake.

2.  Acute chronic obstructive pulmonary disease exacerbation.  Continue

Solu-Medrol 40 IV every 12 hours.  Continue doxycycline, seems stable

breathing wise.  Continue DuoNeb and seems doing well with Brovana. 

Continue current therapy.

3.  Paroxysmal atrial fibrillation.  Continue Eliquis, Lopressor 25 mg

b.i.d.  The patient also should be getting baby aspirin 81 mg for stroke

prevention.

4.  Status post mitral valve replacement, seems doing well, seen by

cardiologist.  Continue current therapy.



CURRENT MEDICATIONS:  Aspirin 81 mg, Brovana Cardizem , IV fluids,

doxycycline, DuoNeb, Eliquis 5 mg b.i.d., Lopressor 25 b.i.d., mag oxide

400 daily, Pepcid 20 b.i.d., Pulmicort, Solu-Medrol IV, and Tylenol p.r.n.



Continue current therapy.  We will repeat lab in the morning.  The patient

may benefit from TCU for continuation of her therapy for COPD.  We will

discuss with the patient.







__________________________________________

Philip Dinh MD



DD:  04/27/2018 9:24:50

DT:  04/27/2018 10:38:21

Job # [id number]

## 2018-04-27 NOTE — CP.PCM.PN
Subjective





- Date & Time of Evaluation


Date of Evaluation: 04/27/18


Time of Evaluation: 11:40





- Subjective


Subjective: 





Reports no more shortness of breath; urinating well; gets swelling in ankles if 

she doesn't take diuretics;





Objective





- Vital Signs/Intake and Output


Vital Signs (last 24 hours): 


 











Temp Pulse Resp BP Pulse Ox


 


 97.6 F   76   20   159/85 H  99 


 


 04/27/18 08:55  04/27/18 09:06  04/27/18 08:55  04/27/18 09:06  04/27/18 08:55











- Medications


Medications: 


 Current Medications





Acetaminophen (Tylenol 325mg Tab)  650 mg PO Q6H PRN


   PRN Reason: Headache


   Last Admin: 04/25/18 23:00 Dose:  650 mg


Albuterol/Ipratropium (Duoneb 3 Mg/0.5 Mg (3 Ml) Ud)  3 ml IH [id number] PRN


   PRN Reason: Shortness of Breath


Apixaban (Eliquis)  5 mg PO BID [facility]


   PRN Reason: Protocol


   Last Admin: 04/27/18 09:06 Dose:  5 mg


Arformoterol Tartrate (Brovana)  15 mcg IH [id number] [facility]


   Last Admin: 04/27/18 09:19 Dose:  15 mcg


Aspirin (Aspirin Chewable)  81 mg PO DAILY [facility]


   Last Admin: 04/27/18 09:05 Dose:  81 mg


Budesonide (Pulmicort Respules)  0.5 mg IH [id number] [facility]


   Last Admin: 04/27/18 09:20 Dose:  0.5 mg


Diltiazem HCl (Cardizem Cd)  120 mg PO DAILY [facility]


   Last Admin: 04/27/18 09:05 Dose:  120 mg


Doxycycline Hyclate (Doryx)  100 mg PO Q12 [facility]


   PRN Reason: Protocol


   Last Admin: 04/27/18 09:06 Dose:  100 mg


Famotidine (Pepcid)  20 mg PO BID [facility]


   Last Admin: 04/27/18 09:07 Dose:  20 mg


Magnesium Oxide (Mag-Ox)  400 mg PO DAILY [facility]


   Last Admin: 04/27/18 09:07 Dose:  400 mg


Methylprednisolone (Solu-Medrol)  40 mg IVP Q12 [facility]


   Last Admin: 04/27/18 09:08 Dose:  40 mg


Metoprolol Tartrate (Lopressor)  25 mg PO BID [facility]


   Last Admin: 04/27/18 09:06 Dose:  25 mg











- Labs


Labs: 


 





 04/26/18 07:30 





 04/27/18 06:58 





 











PT  14.0 SECONDS (9.4-12.5)  H  04/24/18  14:46    


 


INR  1.22  (0.93-1.08)  H  04/24/18  14:46    


 


APTT  32.4 Seconds (25.1-36.5)   04/24/18  14:46    














- Constitutional


Appears: Non-toxic, No Acute Distress





- Eye Exam


Eye Exam: Normal appearance.  absent: Scleral icterus





- ENT Exam


ENT Exam: Mucous Membranes Moist





- Respiratory Exam


Respiratory Exam: Clear to Ausculation Bilateral


Additional comments: 





fair air movement;





- Cardiovascular Exam


Cardiovascular Exam: RRR, +S1, +S2.  absent: Gallop





- GI/Abdominal Exam


GI & Abdominal Exam: Soft.  absent: Distended, Tenderness





- Extremities Exam


Additional comments: 





no leg edema;





- Neurological Exam


Neurological Exam: Alert, Awake





- Psychiatric Exam


Psychiatric exam: Normal Affect, Normal Mood.  absent: Agitated





- Skin


Skin Exam: Warm.  absent: Cyanosis





Assessment and Plan


(1) Acute kidney injury


Assessment & Plan: 


Due to overdiuresis, improved with IVF; susceptible to hemodynamic changes in 

renal function with diuretics in the setting of likely pulmonary htn; recommend 

to dose diuretics conservatively as outpatient; counseled on low Na diet;


-Will f/u renal artery duplex (but doubt there will be any major findings);


-Otherwise stable for d/c home;


Status: Acute

## 2018-04-27 NOTE — PN
DATE:  04/26/2018



PULMONARY PROGRESS NOTE



REFERRING PHYSICIAN:  Philip Dinh MD



SUBJECTIVE:  She is lying in the bed, feels a little better today.  No

headache, no rhinitis.  Still has some cough.  No nausea, no vomiting, no

diarrhea.  No leg pain or leg swelling.



OBJECTIVE:

GENERAL:  In no acute distress.

VITAL SIGNS:  Temperature is 98, heart rate is 92, respiratory rate is 20,

blood pressure 142/81, pulse ox 100% on nasal cannula.

HEENT:  Moist mucous membrane.  Crowded airway.

NECK:  Supple.  No JVD.

LUNGS:  Have a scattered rhonchi.

HEART:  S1 and S2.

ABDOMEN:  Soft, nontender, no organomegaly.

EXTREMITIES:  No edema.

NEUROLOGIC:  Awake, alert, and follows simple command.



MEDICATIONS:  She is on aspirin 81 mg daily, Brovana inhaled twice a day,

Cardizem  mg daily, IV fluid D5 normal saline 70 mL per hour,

doxycycline 100 mg twice a day, albuterol/Atrovent nebulizer every 4 hour

p.r.n., Eliquis 5 mg twice a day, metoprolol tartrate 25 mg twice a day,

magnesium oxide 400 mg daily, Pepcid 20 mg twice a day, Pulmicort inhaled

twice a day, Solu-Medrol 40 mg twice day, Tylenol p.r.n. basis.



LABORATORY DATA:  Shows hemoglobin 9.3, hematocrit 27.7, WBC 9, platelet is

284.  Sodium 130, potassium 4.1, chloride 101, bicarbonate 17, BUN 50,

creatinine 2, glucose is 159, calcium is 9.2, phosphorus 3.6, magnesium

1.6.  AST 22, ALT 19, alk phos is 141.  Troponin 0.04.  Albumin is 3.9. 

TSH 0.15.  Microbiology:  Blood culture has been negative.  Had a CAT scan

of the abdomen and pelvis done today, which shows no acute finding.



IMPRESSION AND PLAN:  Chronic obstructive lung disease, cardiomyopathy,

atrial fibrillation, valvular heart disease, may have sleep apnea syndrome.

Pulmonary point view, doing okay.  Continue IV and inhaled bronchodilator. 

Keep head at 45 degrees.  Gastric prophylaxis, anticoagulation, fall

precaution.  We would recommend followup PFT, outpatient.  May benefit from

therapy and we will follow with you.







__________________________________________

Ajay Wisdom MD



DD:  04/26/2018 22:37:33

DT:  04/26/2018 22:41:57

[facility] # [id number]

## 2018-04-27 NOTE — CP.PCM.PN
Subjective





- Date & Time of Evaluation


Date of Evaluation: 04/27/18


Time of Evaluation: 06:45





- Subjective


Subjective: 





Lying in bed, 1 pillow, sleeping but easily awaken,I feel better today, denies 

chest pain





Reason for consult and follow up: Cardiac evaluation, chest pain





Seen and examined by me and Dr. Gaxiola








Objective





- Vital Signs/Intake and Output


Vital Signs (last 24 hours): 


 











Temp Pulse Resp BP Pulse Ox


 


 97.9 F   93 H  20   142/81   100 


 


 04/26/18 16:00  04/26/18 18:08  04/26/18 16:00  04/26/18 18:08  04/26/18 16:00











- Medications


Medications: 


 Current Medications





Acetaminophen (Tylenol 325mg Tab)  650 mg PO Q6H PRN


   PRN Reason: Headache


   Last Admin: 04/25/18 23:00 Dose:  650 mg


Albuterol/Ipratropium (Duoneb 3 Mg/0.5 Mg (3 Ml) Ud)  3 ml IH [id number] PRN


   PRN Reason: Shortness of Breath


Apixaban (Eliquis)  5 mg PO BID [facility]


   PRN Reason: Protocol


   Last Admin: 04/26/18 18:08 Dose:  5 mg


Arformoterol Tartrate (Brovana)  15 mcg IH [id number] [facility]


   Last Admin: 04/27/18 00:26 Dose:  15 mcg


Aspirin (Aspirin Chewable)  81 mg PO DAILY [facility]


   Last Admin: 04/26/18 10:31 Dose:  81 mg


Budesonide (Pulmicort Respules)  0.5 mg IH [id number] [facility]


   Last Admin: 04/27/18 00:29 Dose:  Not Given


Diltiazem HCl (Cardizem Cd)  120 mg PO DAILY [facility]


   Last Admin: 04/26/18 10:30 Dose:  120 mg


Doxycycline Hyclate (Doryx)  100 mg PO Q12 [facility]


   PRN Reason: Protocol


   Last Admin: 04/26/18 22:09 Dose:  100 mg


Famotidine (Pepcid)  20 mg PO BID [facility]


   Last Admin: 04/26/18 18:09 Dose:  20 mg


Dextrose/Sodium Chloride (Dextrose 5%/0.9% Ns 1000 Ml)  1,000 mls @ 70 mls/hr 

IV .[id number] [facility]


   Last Admin: 04/26/18 09:22 Dose:  70 mls/hr


Magnesium Oxide (Mag-Ox)  400 mg PO DAILY [facility]


   Last Admin: 04/26/18 10:31 Dose:  400 mg


Methylprednisolone (Solu-Medrol)  40 mg IVP Q12 [facility]


   Last Admin: 04/26/18 22:31 Dose:  40 mg


Metoprolol Tartrate (Lopressor)  25 mg PO BID [facility]


   Last Admin: 04/26/18 18:08 Dose:  25 mg











- Labs


Labs: 


 





 04/26/18 07:30 





 04/27/18 06:58 





 











PT  14.0 SECONDS (9.4-12.5)  H  04/24/18  14:46    


 


INR  1.22  (0.93-1.08)  H  04/24/18  14:46    


 


APTT  32.4 Seconds (25.1-36.5)   04/24/18  14:46    














- Constitutional


Appears: No Acute Distress





- Head Exam


Head Exam: NORMAL INSPECTION





- Eye Exam


Eye Exam: Normal appearance





- ENT Exam


ENT Exam: Mucous Membranes Moist





- Neck Exam


Neck Exam: Full ROM, Normal Inspection





- Respiratory Exam


Respiratory Exam: Decreased Breath Sounds, Clear to Ausculation Bilateral, 

NORMAL BREATHING PATTERN





- Cardiovascular Exam


Cardiovascular Exam: +S1, +S2


Additional comments: 





No JVD





- GI/Abdominal Exam


GI & Abdominal Exam: Soft, Normal Bowel Sounds





- Extremities Exam


Extremities Exam: Full ROM, Normal Capillary Refill





- Neurological Exam


Neurological Exam: Alert, Awake, Oriented x3





- Psychiatric Exam


Psychiatric exam: Normal Affect, Normal Mood





- Skin


Skin Exam: Intact, Normal Color, Warm





Assessment and Plan





- Assessment and Plan (Free Text)


Assessment: 





IMPRESSION: Came into the ER due to shortness of breath and chest pain. history 

of COPD, mitral valve rrepair for mitral prolapse, Atrial fibrillation,

hypertension,osteoarthritis,hyperlipidemia and history of  congestive heart 

failure.





Plan: 





BUN and creatinine elevated- being evaluated/consulted by Renal


Negative Troponins 


Stable cardiac status


Dr. Wisdom on pulmonary consult 


Cardiac status stable,will treat medically


On ASA 81 mg, Cardizem  mg daily, Lopressor 25 mg BID


Continue current medications


Continue current treatment





Will follow up





Plan and treatment discussed with Dr. Gaxiola

## 2018-04-27 NOTE — US
PROCEDURE:  Bilateral renal artery duplex ultrasound.



CLINICAL HISTORY:  Renal artery stenosis. Uncontrolled hypertension. 

Evaluate for renovascular hypertension.



PHYSICIAN(S):  Carlton Caceres M.D.



TECHNIQUE:

Duplex sonography with color-flow Doppler was used to evaluate the 

visualized segments of the main renal arteries. The patient was 

evaluated in a fasting state. Imaging in a supine and decubitus 

position was performed. Limited evaluation of the arcuate waveforms 

and resistive indices were performed.



FINDINGS:

The overall quality of the study is adequate. The kidneys are normal 

in size, shape, and location. The right kidney measures 10.8cm in 

length and the left kidney measures 9.8cm in length. No solid renal 

masses, abnormal calcifications, or hydronephrosis is seen. 



The main right renal artery is well visualized from the aorta to the 

hilum. The peak systolic velocity in the right main renal artery is 

73 cm/sec. This is consistent with a 0 to 49% stenosis in the main 

right renal artery. The arcuate waveforms are normal. The resistive 

index is normal.



The main left renal artery is also well seen from its origin to the 

renal hilum. The peak systolic velocity in the main left renal artery 

is 85cm/sec. This corresponds to a 0 to 49% stenosis in the main left 

renal artery. The arcuate waveforms and resistive indices are normal.



IMPRESSION:

1. The main renal arteries are well visualized.



2. No sonographically significant stenosis is identified.



3. The kidneys are normal and symmetric in size. There are no solid 

renal masses, abnormal calcifications or hydronephrosis noted.

## 2018-05-22 ENCOUNTER — HOSPITAL ENCOUNTER (INPATIENT)
Dept: HOSPITAL 42 - ED | Age: 63
LOS: 7 days | Discharge: NURSING, REHAB OR LONG-TERM CARE | DRG: 378 | End: 2018-05-29
Attending: INTERNAL MEDICINE | Admitting: INTERNAL MEDICINE
Payer: MEDICARE

## 2018-05-22 VITALS — BODY MASS INDEX: 25.3 KG/M2

## 2018-05-22 DIAGNOSIS — E86.9: ICD-10-CM

## 2018-05-22 DIAGNOSIS — M25.462: ICD-10-CM

## 2018-05-22 DIAGNOSIS — K27.3: ICD-10-CM

## 2018-05-22 DIAGNOSIS — R00.0: ICD-10-CM

## 2018-05-22 DIAGNOSIS — M17.0: ICD-10-CM

## 2018-05-22 DIAGNOSIS — K64.8: ICD-10-CM

## 2018-05-22 DIAGNOSIS — Z95.3: ICD-10-CM

## 2018-05-22 DIAGNOSIS — D50.0: ICD-10-CM

## 2018-05-22 DIAGNOSIS — B96.1: ICD-10-CM

## 2018-05-22 DIAGNOSIS — Z91.14: ICD-10-CM

## 2018-05-22 DIAGNOSIS — N17.9: ICD-10-CM

## 2018-05-22 DIAGNOSIS — K44.9: ICD-10-CM

## 2018-05-22 DIAGNOSIS — I48.0: ICD-10-CM

## 2018-05-22 DIAGNOSIS — E78.5: ICD-10-CM

## 2018-05-22 DIAGNOSIS — F32.9: ICD-10-CM

## 2018-05-22 DIAGNOSIS — E78.00: ICD-10-CM

## 2018-05-22 DIAGNOSIS — M1A.9XX0: ICD-10-CM

## 2018-05-22 DIAGNOSIS — I08.3: ICD-10-CM

## 2018-05-22 DIAGNOSIS — R04.0: ICD-10-CM

## 2018-05-22 DIAGNOSIS — K25.9: ICD-10-CM

## 2018-05-22 DIAGNOSIS — N39.0: ICD-10-CM

## 2018-05-22 DIAGNOSIS — J44.1: ICD-10-CM

## 2018-05-22 DIAGNOSIS — Z16.12: ICD-10-CM

## 2018-05-22 DIAGNOSIS — D68.2: ICD-10-CM

## 2018-05-22 DIAGNOSIS — M25.461: ICD-10-CM

## 2018-05-22 DIAGNOSIS — I25.10: ICD-10-CM

## 2018-05-22 DIAGNOSIS — I48.2: ICD-10-CM

## 2018-05-22 DIAGNOSIS — Z79.01: ICD-10-CM

## 2018-05-22 DIAGNOSIS — F41.9: ICD-10-CM

## 2018-05-22 DIAGNOSIS — K92.2: Primary | ICD-10-CM

## 2018-05-22 LAB
ALBUMIN SERPL-MCNC: 3.7 G/DL (ref 3–4.8)
ALBUMIN/GLOB SERPL: 1.3 {RATIO} (ref 1.1–1.8)
ALT SERPL-CCNC: 28 U/L (ref 7–56)
APTT BLD: 29.8 SECONDS (ref 25.1–36.5)
APTT BLD: 30 SECONDS (ref 25.1–36.5)
AST SERPL-CCNC: 27 U/L (ref 14–36)
BASOPHILS # BLD AUTO: 0.04 K/MM3 (ref 0–2)
BASOPHILS NFR BLD: 0.3 % (ref 0–3)
BUN SERPL-MCNC: 41 MG/DL (ref 7–21)
CALCIUM SERPL-MCNC: 9 MG/DL (ref 8.4–10.5)
EOSINOPHIL # BLD: 0.4 10*3/UL (ref 0–0.7)
EOSINOPHIL NFR BLD: 2.9 % (ref 1.5–5)
ERYTHROCYTE [DISTWIDTH] IN BLOOD BY AUTOMATED COUNT: 15.9 % (ref 11.5–14.5)
ERYTHROCYTE [DISTWIDTH] IN BLOOD BY AUTOMATED COUNT: 16 % (ref 11.5–14.5)
GFR NON-AFRICAN AMERICAN: > 60
GRANULOCYTES # BLD: 9.57 10*3/UL (ref 1.4–6.5)
GRANULOCYTES NFR BLD: 72.6 % (ref 50–68)
HGB BLD-MCNC: 6.7 G/DL (ref 12–16)
HGB BLD-MCNC: 8.6 G/DL (ref 12–16)
INR PPP: 1.01 (ref 0.93–1.08)
INR PPP: 1.03 (ref 0.93–1.08)
LYMPHOCYTES # BLD: 2.4 10*3/UL (ref 1.2–3.4)
LYMPHOCYTES NFR BLD AUTO: 18.4 % (ref 22–35)
MCH RBC QN AUTO: 29.9 PG (ref 25–35)
MCH RBC QN AUTO: 30 PG (ref 25–35)
MCHC RBC AUTO-ENTMCNC: 32.4 G/DL (ref 31–37)
MCHC RBC AUTO-ENTMCNC: 34 G/DL (ref 31–37)
MCV RBC AUTO: 87.8 FL (ref 80–105)
MCV RBC AUTO: 92.8 FL (ref 80–105)
MONOCYTES # BLD AUTO: 0.8 10*3/UL (ref 0.1–0.6)
MONOCYTES NFR BLD: 5.8 % (ref 1–6)
PLATELET # BLD: 187 10^3/UL (ref 120–450)
PLATELET # BLD: 236 10^3/UL (ref 120–450)
PMV BLD AUTO: 9.7 FL (ref 7–11)
PMV BLD AUTO: 9.8 FL (ref 7–11)
PROTHROMBIN TIME: 11.5 SECONDS (ref 9.4–12.5)
PROTHROMBIN TIME: 11.7 SECONDS (ref 9.4–12.5)
RBC # BLD AUTO: 2.23 10^6/UL (ref 3.5–6.1)
RBC # BLD AUTO: 2.88 10^6/UL (ref 3.5–6.1)
TROPONIN I SERPL-MCNC: < 0.01 NG/ML
WBC # BLD AUTO: 12.6 10^3/UL (ref 4.5–11)
WBC # BLD AUTO: 13.2 10^3/UL (ref 4.5–11)

## 2018-05-22 PROCEDURE — 30233N1 TRANSFUSION OF NONAUTOLOGOUS RED BLOOD CELLS INTO PERIPHERAL VEIN, PERCUTANEOUS APPROACH: ICD-10-PCS | Performed by: INTERNAL MEDICINE

## 2018-05-22 PROCEDURE — 30233K1 TRANSFUSION OF NONAUTOLOGOUS FROZEN PLASMA INTO PERIPHERAL VEIN, PERCUTANEOUS APPROACH: ICD-10-PCS | Performed by: INTERNAL MEDICINE

## 2018-05-22 RX ADMIN — PANTOPRAZOLE SODIUM SCH MLS/HR: 40 INJECTION, POWDER, FOR SOLUTION INTRAVENOUS at 16:39

## 2018-05-22 RX ADMIN — PANTOPRAZOLE SODIUM SCH MLS/HR: 40 INJECTION, POWDER, FOR SOLUTION INTRAVENOUS at 21:00

## 2018-05-22 NOTE — CP.PCM.CON
History of Present Illness





- History of Present Illness


History of Present Illness: 


MICU Consult Note





HPI


Patient is 63yo male with PMHx of PAF on Eliquis, HTN, COPD, epistaxis, 

presents with SOB for 4-5 days. Pt notes she has chronic SOB, now worsening 

over the last few days. Pt also notes diarrhea, with dark stools,without blood 

or mucus. Denies N/V, abd pain, NSAID abuse, recent travel, fever, chills, 

cough. Pt also endorses chest discomfort. 


In the ER patient noted to be anemic, HH 6.7, to receive 2u PRBC, 2u FFP


Last Eliquis dose yesterday morning.  





PMHx as above


PSHx as above


Meds as per EMR


ROS as per HPI


FHx NC














Review of Systems





- Review of Systems


Review of Systems: 





as per HPI





Past Patient History





- Infectious Disease


Hx of Infectious Diseases: None





- Tetanus Immunizations


Tetanus Immunization: Unknown





- Past Social History


Smoking Status: Never Smoked





- CARDIAC


Hx Cardiac Disorders: Yes


Hx Hypertension: Yes


Hx Pacemaker: No


Other/Comment: Mitral Valve Replacement  10/17





- PULMONARY


Hx Respiratory Disorders: Yes


Hx Asthma: Yes


Hx Chronic Obstructive Pulmonary Disease (COPD): Yes





- NEUROLOGICAL


Hx Neurological Disorder: Yes


Hx Dizziness: Yes


Hx Migraine: Yes





- HEENT


Hx HEENT Problems: No





- RENAL


Hx Chronic Kidney Disease: No





- ENDOCRINE/METABOLIC


Hx Endocrine Disorders: No





- HEMATOLOGICAL/ONCOLOGICAL


Hx Blood Disorders: No





- INTEGUMENTARY


Hx Dermatological Problems: No





- MUSCULOSKELETAL/RHEUMATOLOGICAL


Hx Musculoskeletal Disorders: No


Hx Falls: No





- GASTROINTESTINAL


Hx Gastrointestinal Disorders: No





- GENITOURINARY/GYNECOLOGICAL


Hx Genitourinary Disorders: No





- PSYCHIATRIC


Hx Psychophysiologic Disorder: No


Hx Emotional Abuse: No


Hx Physical Abuse: No


Hx Substance Use: No





- SURGICAL HISTORY


Other/Comment: Mitral Valve Replacement





- ANESTHESIA


Hx Anesthesia: Yes


Hx Anesthesia Reactions: No


Hx Malignant Hyperthermia: No





Meds


Allergies/Adverse Reactions: 


 Allergies











Allergy/AdvReac Type Severity Reaction Status Date / Time


 


cinnamon Allergy Intermediate ANAPHYLAXIS Verified 04/24/18 13:23














Physical Exam





- Constitutional


Appears: Non-toxic, No Acute Distress





- Head Exam


Head Exam: NORMAL INSPECTION





- Eye Exam


Eye Exam: Normal appearance





- ENT Exam


ENT Exam: Mucous Membranes Moist





- Neck Exam


Neck exam: Positive for: Full Rom





- Respiratory Exam


Respiratory Exam: Clear to Auscultation Bilateral, NORMAL BREATHING PATTERN





- Cardiovascular Exam


Cardiovascular Exam: REGULAR RHYTHM, +S1, +S2





- GI/Abdominal Exam


GI & Abdominal Exam: Normal Bowel Sounds, Soft





- Extremities Exam


Extremities exam: Positive for: full ROM, normal inspection





- Neurological Exam


Neurological exam: Alert, CN II-XII Intact, Oriented x3





Results





- Vital Signs


Recent Vital Signs: 


 Last Vital Signs











Temp  98.6 F   05/22/18 12:36


 


Pulse  116 H  05/22/18 15:16


 


Resp  14   05/22/18 15:16


 


BP  105/45 L  05/22/18 15:16


 


Pulse Ox  98   05/22/18 15:16














- Labs


Result Diagrams: 


 05/22/18 13:40





 05/22/18 13:40


Labs: 


 Laboratory Results - last 24 hr











  05/22/18





  15:04


 


Crossmatch  See Detail


 


BBK History Checked  Patient has bt














Assessment & Plan





- Assessment and Plan (Free Text)


Assessment: 





63yo female a/w GIB, Anemia, SOB





GIB


Anemia


SOB


PAF on Eliquis


HTN


Tachycardia


Guiac Positive





- currently afebrile, HD stable, tachycardic Sinus tach 110, benign abd exam, +

guiac 





Recommend:


- supp o2 as needed


- duonebs PRN


- hold BP meds


- Panculture


- hold Eliquis


- 2u PRBC, 2u FFP


- cardiology eval 


- GI eval


- PPI drip


- NPO


- maintain 2 large bore PIVs


- q6hr CBC


- CT abd pelvis


- GI ppx


- DVT ppx, SCDs


- monitor in MICU





critical care time 30 minutes

## 2018-05-22 NOTE — ED PDOC
Arrival/HPI





- General


Chief Complaint: Shortness Of Breath


Time Seen by Provider: 05/22/18 12:22


Historian: Patient





- History of Present Illness


Narrative History of Present Illness (Text): 





05/22/18 15:01


Patient is a 61 yo female with past medical history of COPD, mitral valve 

disease, paroxsymal atrial fibrillation on Eliquis, presents to the Emergency 

Department today complaining of shortness of breath with exertion over the past 

week, worse over the past three days. Patient states that when she stands and 

exerts herself she feels more short of breath. States that she has no chest 

pain currently. Reports having dark stools for the past two weeks. Denies 

abdominal pain. At rest she denies dizziness or lightheadedness, denies 

palpitations.





Symptom Onset: Gradual


Context: Standing, Exertion





Past Medical History





- Infectious Disease


Hx of Infectious Diseases: None





- Tetanus Immunization


Tetanus Immunization: Unknown





- Reproductive


Menopause: Yes





- Past Medical History


Past Medical History: No Previous





- Cardiac


Hx Cardiac Disorders: Yes


Hx Hypertension: Yes


Hx Pacemaker: No


Other/Comment: Mitral Valve Replacement  10/17





- Pulmonary


Hx Respiratory Disorders: Yes


Hx Asthma: Yes


Hx Chronic Obstructive Pulmonary Disease (COPD): Yes





- Neurological


Hx Neurological Disorder: Yes


Hx Dizziness: Yes


Hx Migraine: Yes





- HEENT


Hx HEENT Disorder: No





- Renal


Hx Renal Disorder: No





- Endocrine/Metabolic


Hx Endocrine Disorders: No





- Hematological/Oncological


Hx Blood Disorders: No





- Integumentary


Hx Dermatological Disorder: No





- Musculoskeletal/Rheumatological


Hx Musculoskeletal Disorders: No


Hx Falls: No





- Gastrointestinal


Hx Gastrointestinal Disorders: No





- Genitourinary/Gynecological


Hx Genitourinary Disorders: No





- Psychiatric


Hx Psychophysiologic Disorder: No


Hx Emotional Abuse: No


Hx Physical Abuse: No


Hx Substance Use: No





- Surgical History


Other/Comment: Mitral Valve Replacement





- Anesthesia


Hx Anesthesia: Yes


Hx Anesthesia Reactions: No


Hx Malignant Hyperthermia: No





- Suicidal Assessment


Feels Threatened In Home Enviroment: No





Family/Social History


Family/Social History: Unknown Family HX


Smoking Status: Never Smoked


Hx Alcohol Use: No (social)


Amount per day: 6


Hx Substance Use: No


Hx Substance Use Treatment: No





Allergies/Home Meds


Allergies/Adverse Reactions: 


Allergies





cinnamon Allergy (Intermediate, Verified 04/24/18 13:23)


 ANAPHYLAXIS








Home Medications: 


 Home Meds











 Medication  Instructions  Recorded  Confirmed


 


Simvastatin [Zocor] 20 mg PO HS 02/13/18 05/22/18


 


Budesonide/Formoterol Fumarate 1 aer IH DAILY 05/22/18 05/22/18





[Symbicort]   


 


Cholecalciferol [Vitamin D 1000 IU] 50,000 iu PO QWK 05/22/18 05/22/18


 


Diltiazem HCl [Cartia Xt] 120 mg PO DAILY 05/22/18 05/22/18


 


Famotidine [Pepcid] 40 mg PO BID 05/22/18 05/22/18


 


Ferrous Sulfate [Feosol] 325 mg PO BID 05/22/18 05/22/18


 


Furosemide [Lasix] 20 mg PO DAILY 05/22/18 05/22/18


 


Loratadine [Claritin] 10 mg PO DAILY 05/22/18 05/22/18














Review of Systems





- Review of Systems


Constitutional: Fatigue.  absent: Fevers


Eyes: absent: Vision Changes


ENT: absent: Hearing Changes


Respiratory: SOB.  absent: Wheezing


Cardiovascular: MCLEAN.  absent: Chest Pain, Palpitations, Edema, Calf Pain, 

Orthopnea


Gastrointestinal: Other (dark stools).  absent: Abdominal Pain, Diarrhea, Nausea

, Vomiting


Genitourinary Female: absent: Dysuria, Hematuria


Musculoskeletal: absent: Back Pain


Skin: absent: Rash


Neurological: Dizziness.  absent: Headache, Focal Weakness


Endocrine: absent: Polyuria


Hemo/Lymphatic: absent: Easy Bleeding, Easy Bruising


Psychiatric: absent: Depression





Physical Exam


Vital Signs Reviewed: Yes


Vital Signs











  Temp Pulse Resp BP Pulse Ox


 


 05/22/18 15:16   116 H  14  105/45 L  98


 


 05/22/18 12:36  98.6 F  105 H  16  114/68  97











Temperature: Afebrile


Blood Pressure: Hypotensive


Pulse: Tachycardic


Appearance: Positive for: Ill-Appearing


Pain Distress: Mild


Mental Status: Positive for: Alert and Oriented X 3





- Systems Exam


Head: Present: Atraumatic


Pupils: Present: PERRL


Extroacular Muscles: Present: EOMI


Mouth: Present: Moist Mucous Membranes


Pharnyx: No: ERYTHEMA


Nose (Internal): Present: Normal Inspection, No Active Bleeding


Neck: Present: Normal Range of Motion.  No: Meningeal Signs


Respiratory/Chest: Present: Wheezes


Cardiovascular: Present: Murmurs, Tachycardic


Abdomen: No: Tenderness, Distention, Peritoneal Signs


Rectal: Present: Occult Blood, Melena.  No: Rectal Tenderness, Gross Blood


Genitourinary/Pelvic Exam: No: Vaginal Bleeding


Back: No: CVA Tenderness


Upper Extremity: No: Cyanosis, Edema


Lower Extremity: No: Edema, CALF TENDERNESS


Neurological: Present: Motor Func Grossly Intact, Normal Sensory Function


Skin: Present: Warm


Psychiatric: Present: Alert, Normal Insight, Normal Concentration





Medical Decision Making


ED Course and Treatment: 





Patient is a 61 yo female on eliquis for paroxysmal atrial fibrillation, 

presents with shortness of breath with exertion worse over past several days.


On exam she is tachycardic, hypotensive. IV line established.


Hgb found to be 6. Of concern, she is noted to have melena on exam and is 

reportedly taking a beta blocker as well.


On re-exam she remains tachycardic but is alert, oriented, exhibits no pain or 

respiratory distress.


IV fluids ordered.


Blood transfusion ordered. Risks/benefits reviewed with patient and she has 

signed consent.





She remains alert and oriented and comfortable. Abnormal labs and exam reviewed 

with patient.


Protonix ordered.


After consultations, FFP also ordered as patient hypotensive, tachycardic with 

melena.


Patient evaluated by intensivist in the Emergency department and accepted to 

ICU.


Dr. Dinh updated with treatment plan.








- Critical Care


Critical Care Minutes: 45 minutes





- Lab Interpretations


Lab Results: 








 05/22/18 13:40 





 05/22/18 13:40 





 Lab Results





05/22/18 13:40: PT 11.7, INR 1.03, APTT 30.0


05/22/18 13:40: Sodium 138, Potassium 4.6, Chloride 105, Carbon Dioxide 20 L, 

Anion Gap 18, BUN 41 H, Creatinine 0.9, Est GFR (African Amer) > 60, Est GFR (

Non-Af Amer) > 60, Random Glucose 118 H, Calcium 9.0, Total Bilirubin 0.3, AST 

27, ALT 28, Alkaline Phosphatase 94, Lactate Dehydrogenase 709 H, Total 

Creatine Kinase 29 L, Troponin I < 0.01  D, Total Protein 6.5, Albumin 3.7, 

Globulin 2.8, Albumin/Globulin Ratio 1.3


05/22/18 13:40: WBC 13.2 H D, RBC 2.23 L, Hgb 6.7 L* D, Hct 20.7 L*, MCV 92.8  D

, MCH 30.0, MCHC 32.4, RDW 15.9 H, Plt Count 236, MPV 9.8, Gran % 72.6 H, Lymph 

% (Auto) 18.4 L, Mono % (Auto) 5.8, Eos % (Auto) 2.9, Baso % (Auto) 0.3, Gran # 

9.57 H, Lymph # (Auto) 2.4, Mono # (Auto) 0.8 H, Eos # (Auto) 0.4, Baso # (Auto

) 0.04











- RAD Interpretation


Radiology Orders: 








05/22/18 13:00


CHEST PORTABLE [RAD] Stat 














- EKG Interpretation


EKG Interpretation (Text): 





EKG at 12:30 sinus tachycardia rate of 107 with moderate voltage criteria for 

lvh


EKG at 14:48 sinus tachycardia rate of 113 with premature atrial complexes, 

nonspecific st abnormality





Interpreted by ED Physician: Yes


Type: 12 lead EKG





- Medication Orders


Current Medication Orders: 








Albuterol/Ipratropium (Duoneb 3 Mg/0.5 Mg (3 Ml) Ud)  3 ml IH [id number] PRN


   PRN Reason: Shortness of Breath


Pantoprazole Sodium (Protonix 40mg Ivpb)  40 mg in 100 mls @ 20 mls/hr IVPB 

.Q5H OLMAN


Ondansetron HCl (Zofran Inj)  4 mg IVP Q6H PRN


   PRN Reason: Nausea/Vomiting





Discontinued Medications





Albuterol/Ipratropium (Duoneb 3 Mg/0.5 Mg (3 Ml) Ud)  3 ml IH STAT STA


   Stop: 05/22/18 13:04


   Last Admin: 05/22/18 13:28  Dose: 3 ml





Sodium Chloride (Sodium Chloride 0.9%)  500 mls @ 1,000 mls/hr IV .Q30M STA


   Stop: 05/22/18 15:06


Pantoprazole Sodium (Protonix Inj)  40 mg IVP ONCE STA


   Stop: 05/22/18 14:37











Disposition/Present on Arrival





- Present on Arrival


Any Indicators Present on Arrival: No


History of DVT/PE: No


History of Uncontrolled Diabetes: No


Urinary Catheter: No


History of Decub. Ulcer: No


History Surgical Site Infection Following: None





- Disposition


Have Diagnosis and Disposition been Completed?: Yes


Diagnosis: 


 GI bleed, Anemia, Sinus tachycardia, COPD exacerbation





Disposition: HOSPITALIZED


Disposition Time: 14:30


Patient Plan: Admission, ICU


Patient Problems: 


 Current Active Problems











Problem Status Onset


 


Anemia Acute  


 


COPD exacerbation Acute  


 


GI bleed Acute  


 


Sinus tachycardia Acute  











Condition: CRITICAL

## 2018-05-22 NOTE — CT
PROCEDURE:  CT Abdomen and Pelvis without intravenous contrast



HISTORY:

gi bleed



COMPARISON:

None.



TECHNIQUE:

Without contrast.. 



Contrast dose: 



Radiation dose:



Total exam DLP = 606 mGy-cm.



This CT exam was performed using one or more of the following dose 

reduction techniques: Automated exposure control, adjustment of the 

mA and/or kV according to patient size, and/or use of iterative 

reconstruction technique.



FINDINGS:



LOWER THORAX:

Chronic linear scarring is seen at the right lung base.  There is 

also small focal area of consolidation which is also unchanged. 



LIVER:

Unremarkable. No gross lesion or ductal dilatation.  



GALLBLADDER AND BILE DUCTS:

Gallbladder removed 



PANCREAS:

Unremarkable. No gross lesion or ductal dilatation.



SPLEEN:

Unremarkable. 



ADRENALS:

Unremarkable. No mass. 



KIDNEYS AND URETERS:

Unremarkable. No hydronephrosis. No solid mass. 



VASCULATURE:

Unremarkable. No aortic aneurysm. 



BOWEL:

Unremarkable. No obstruction. No gross mural thickening. 



APPENDIX:

Unremarkable. Normal appendix. 



PERITONEUM:

Unremarkable. No free fluid. No free air. 



LYMPH NODES:

Unremarkable. No enlarged lymph nodes. 



BLADDER:

Unremarkable. 



REPRODUCTIVE:

Unremarkable. 



BONES:

No acute fracture. 



OTHER FINDINGS:

None.



IMPRESSION:

Negative study

## 2018-05-22 NOTE — RAD
HISTORY:

sob  



COMPARISON:

4/24/2018 



FINDINGS:



LUNGS:

No consolidation.  No gross mass seen



Bilateral hyperaeration 



PLEURA:

No significant pleural effusion identified, no pneumothorax apparent.



CARDIOVASCULAR:

Cardiomegaly.  Midline sternotomy. Coronary artery bypass clips The 

discontinuous this most superior sternal wires are similar status. 

Valvular prosthesis 



Pulmonary venous mild congestion possibly chronic 



OSSEOUS STRUCTURES:

Midline sternotomy.  



VISUALIZED UPPER ABDOMEN:

Normal.



OTHER FINDINGS:

None.



IMPRESSION:

Cardiomegaly with mild pulmonary venous congestion - probably 

chronic.  Mild acute on chronic pathology possible 



Midline sternotomy coronary artery bypass clips and valvular 

prosthesis. Possible epicardial lead

## 2018-05-23 LAB
ALBUMIN SERPL-MCNC: 3.6 G/DL (ref 3–4.8)
ALBUMIN/GLOB SERPL: 1.4 {RATIO} (ref 1.1–1.8)
ALT SERPL-CCNC: 18 U/L (ref 7–56)
APTT BLD: 28.2 SECONDS (ref 25.1–36.5)
AST SERPL-CCNC: 28 U/L (ref 14–36)
BASOPHILS # BLD AUTO: 0.02 K/MM3 (ref 0–2)
BASOPHILS # BLD AUTO: 0.03 K/MM3 (ref 0–2)
BASOPHILS NFR BLD: 0.2 % (ref 0–3)
BASOPHILS NFR BLD: 0.3 % (ref 0–3)
BUN SERPL-MCNC: 24 MG/DL (ref 7–21)
CALCIUM SERPL-MCNC: 8.8 MG/DL (ref 8.4–10.5)
EOSINOPHIL # BLD: 0.3 10*3/UL (ref 0–0.7)
EOSINOPHIL # BLD: 0.4 10*3/UL (ref 0–0.7)
EOSINOPHIL NFR BLD: 3.4 % (ref 1.5–5)
EOSINOPHIL NFR BLD: 4.2 % (ref 1.5–5)
ERYTHROCYTE [DISTWIDTH] IN BLOOD BY AUTOMATED COUNT: 16.3 % (ref 11.5–14.5)
ERYTHROCYTE [DISTWIDTH] IN BLOOD BY AUTOMATED COUNT: 16.7 % (ref 11.5–14.5)
GFR NON-AFRICAN AMERICAN: 56
GRANULOCYTES # BLD: 6.09 10*3/UL (ref 1.4–6.5)
GRANULOCYTES # BLD: 6.49 10*3/UL (ref 1.4–6.5)
GRANULOCYTES NFR BLD: 66.4 % (ref 50–68)
GRANULOCYTES NFR BLD: 70.2 % (ref 50–68)
HDLC SERPL-MCNC: 51 MG/DL (ref 29–60)
HGB BLD-MCNC: 7.8 G/DL (ref 12–16)
HGB BLD-MCNC: 9.5 G/DL (ref 12–16)
INR PPP: 0.98 (ref 0.93–1.08)
LDLC SERPL-MCNC: 117 MG/DL (ref 0–129)
LYMPHOCYTES # BLD: 1.1 10*3/UL (ref 1.2–3.4)
LYMPHOCYTES # BLD: 1.9 10*3/UL (ref 1.2–3.4)
LYMPHOCYTES NFR BLD AUTO: 12.3 % (ref 22–35)
LYMPHOCYTES NFR BLD AUTO: 21.2 % (ref 22–35)
MCH RBC QN AUTO: 29.3 PG (ref 25–35)
MCH RBC QN AUTO: 30.4 PG (ref 25–35)
MCHC RBC AUTO-ENTMCNC: 33.8 G/DL (ref 31–37)
MCHC RBC AUTO-ENTMCNC: 34.8 G/DL (ref 31–37)
MCV RBC AUTO: 86.8 FL (ref 80–105)
MCV RBC AUTO: 87.5 FL (ref 80–105)
MONOCYTES # BLD AUTO: 0.8 10*3/UL (ref 0.1–0.6)
MONOCYTES # BLD AUTO: 1.2 10*3/UL (ref 0.1–0.6)
MONOCYTES NFR BLD: 13.1 % (ref 1–6)
MONOCYTES NFR BLD: 8.7 % (ref 1–6)
PLATELET # BLD: 159 10^3/UL (ref 120–450)
PLATELET # BLD: 182 10^3/UL (ref 120–450)
PMV BLD AUTO: 9.2 FL (ref 7–11)
PMV BLD AUTO: 9.9 FL (ref 7–11)
PROTHROMBIN TIME: 11.3 SECONDS (ref 9.4–12.5)
RBC # BLD AUTO: 2.66 10^6/UL (ref 3.5–6.1)
RBC # BLD AUTO: 3.12 10^6/UL (ref 3.5–6.1)
WBC # BLD AUTO: 9.2 10^3/UL (ref 4.5–11)
WBC # BLD AUTO: 9.3 10^3/UL (ref 4.5–11)

## 2018-05-23 RX ADMIN — PANTOPRAZOLE SODIUM SCH MLS/HR: 40 INJECTION, POWDER, FOR SOLUTION INTRAVENOUS at 21:30

## 2018-05-23 RX ADMIN — PANTOPRAZOLE SODIUM SCH MLS/HR: 40 INJECTION, POWDER, FOR SOLUTION INTRAVENOUS at 07:22

## 2018-05-23 RX ADMIN — PANTOPRAZOLE SODIUM SCH MLS/HR: 40 INJECTION, POWDER, FOR SOLUTION INTRAVENOUS at 18:03

## 2018-05-23 RX ADMIN — METOPROLOL TARTRATE SCH: 5 INJECTION INTRAVENOUS at 16:38

## 2018-05-23 RX ADMIN — METOPROLOL TARTRATE SCH MG: 5 INJECTION INTRAVENOUS at 16:36

## 2018-05-23 RX ADMIN — METOPROLOL TARTRATE SCH MG: 5 INJECTION INTRAVENOUS at 21:11

## 2018-05-23 RX ADMIN — DILTIAZEM HYDROCHLORIDE SCH MG: 120 CAPSULE, COATED, EXTENDED RELEASE ORAL at 16:35

## 2018-05-23 RX ADMIN — PANTOPRAZOLE SODIUM SCH MLS/HR: 40 INJECTION, POWDER, FOR SOLUTION INTRAVENOUS at 18:05

## 2018-05-23 NOTE — CON
DATE:  05/23/2018



CARDIOLOGY CONSULTATION



REASON FOR CONSULTATION:  Cardiac evaluation, history of status post open

heart surgery for mitral regurgitation, MVR, paroxysmal atrial

fibrillation, on Eliquis, admitted with GI bleed.



BRIEF CLINICAL HISTORY:  This is a 62-year-old female with past medical

history significant for paroxysmal atrial fibrillation, on Eliquis; COPD;

status post open heart surgery for mitral valve, on Eliquis for paroxysmal

atrial fibrillation, came in with previous history of progressively

worsening shortness of breath and black tarry stool, admitting hemoglobin

6.7, denies any chest pain, status post received 2 units of packed RBC in

the ER.  Denies any chest pain, shortness of breath, any palpitations. 

Patient is n.p.o. for possible endoscopy today.  On IV drip for proton pump

inhibitor.



PAST MEDICAL HISTORY:  Significant for paroxysmal atrial fibrillation,

history of mitral regurgitation, status post cardiac catheterization twice,

nonobstructive coronary artery disease, history of mitral valve repair in

10/2017, hypertension, noncompliance with medication.



PAST SURGICAL HISTORY:  Significant for cholecystectomy; carpal tunnel

syndrome; bunionectomy, removal of bunion from the toe of the foot, and

most recently patient had MVR bioprosthetic done at [facility] in 10/2017; history of cardiac catheterization twice two years ago

where the patient was prepped for valve surgery, the patient canceled the

surgery and does not want to go.  Most recently, patient had a cardiac

catheterization on 10/17/2017 at [facility] just prior to

open heart surgery for mitral valve replacement because patient kept on

complaining of chest pain; history of severe mitral valve prolapse and

multiple times with referral for surgery, patient did not go and was

scared, so had cardiac catheterization twice, most recently just before

surgery in 10/2017 and after that patient had a bioprosthetic mitral valve

replaced, post operation remained in paroxysmal atrial fibrillation, so was

on Eliquis because of this; status post bioprosthetic mitral valve

replacement in 10/2017.



RECENT CARDIAC WORKUP:  As follows:  Patient most recently had

echocardiography done on 11/03/2017 that shows normal size ventricle,

ejection fraction 55%, trace aortic regurgitation, mild aortic stenosis,

trace mitral regurgitation, mitral annular calcification, trace-to-mild

tricuspid regurgitation, RV systolic pressure of 37, trace pericardial

effusion, status post bioprosthetic MV replacement done, status post open

heart surgery in 10/2017.



SOCIAL HISTORY:  Denies any smoking.  Denies any history of alcohol abuse.



ALLERGIES:  NO KNOWN DRUG ALLERGY.  ALLERGY TO CINNAMON.



CURRENT MEDICATIONS:  Prior to coming here, patient was taking Eliquis,

Zocor, metoprolol, furosemide, iron, Cardizem  mg, and baby aspirin.



REVIEW OF SYSTEMS:  As per HPI.



PHYSICAL EXAMINATION:

VITAL SIGNS:  As follows:  Temperature afebrile; heart rate 120, normal

sinus, sinus tachycardia; blood pressure 139/76.

HEENT:  PERRLA, intact.

NECK:  Supple.  No carotid bruit or thyromegaly.

CHEST:  Clear to auscultation.

HEART:  S1 and S2 regular.

ABDOMEN:  Soft.

EXTREMITIES:  Clubbing and cyanosis negative.



LABORATORY DATA:  Blood workup as follows:  WBC 9.8; hemoglobin 7.8,

admitting hemoglobin 6.7, status post two packed RBC units; hematocrit

23.1, platelet count 182.  Chemistry shows sodium 142, potassium _____,

chloride _____, carbon dioxide 24, anion gap of 14, BUN 24, creatinine 1.



IMPRESSION:  Gastrointestinal bleed; was on Eliquis, paroxysmal atrial

fibrillation; severe mitral valve prolapse, status post open heart surgery

for mitral valve bioprosthetic replacement in 10/2017, status post cardiac

catheterization twice, last most recently just prior to valve surgery

because the patient was complaining of chest pain, found to be 50% left

anterior descending artery stenosis; nonobstructive coronary artery

disease; noncompliance with the medication; hypertension; multiple

noncardiac surgery.



RECOMMENDATION:  Hold Eliquis for now, increase beta-blocker, patient is

n.p.o.  We will give IV Lopressor to control the heart rate.  Patient,

because of sinus tachycardia, resumed back on metoprolol.  Patient is okay

to go for endoscopy if needed.  We will also get an echo to assess LV

function.  Last echo was almost a year ago.  We will review if no recent

echo was done less than six months, we will repeat otherwise.  We will

follow up with you.



Thank you, Dr. Dinh, for providing me the opportunity in taking care of

patient, Malu Meeks.







__________________________________________

Ajay Gaxiola MD



DD:  05/23/2018 9:47:16

DT:  05/23/2018 19:38:13

[facility] # [id number]

## 2018-05-23 NOTE — CP.PCM.CON
History of Present Illness





- History of Present Illness


History of Present Illness: 





Consult Note for Dr. Alatorre, Hematology oncology





Consulted for Coagulopathy


62 F with a PMHx of severe mitral regurgitation s/p open heart surgery and 

repair with MVR biprosthetic, history of paroxysmal afib (on eliquis), and non 

obstructing CAD , COPD [place] [facility] with complaints of shortness of 

breath and black tarry stools, loose in nature. Patient stated that she is on 

iron at home as well as Eliquis for PAF. Patient stated that her symptoms began 

roughly 2 weeks ago, and noticed that her loose stools were dark/black in 

color. Then the patient developed worsening shortness of breath, worse than 

usual. Patient was found to be anemic with a hgb of 6.7 mildly hypotensive and 

tachycardic in ED. Patient was admitted to ICU and transfused 2u pRBC and 2u 

FFP. 





Patient was seen and examined at bedside. Patient has mild complaints of 

dizziness and lightheadedness. Patient tolerated transfusions of FFP and pRBCs 

without incident. No acute or adverse events overnight as per nursing staff. 

Patient denied fever, chills, shortness of breath, chest pain, abdominal pains, 

nausea, vomiting. Patient noted small BM that were dark in nature. 





PMHx: severe mitral regurgitation s/p open heart surgery and repair with MVR 

biprosthetic, history of paroxysmal afib (Eliquis), and non obstructing CAD , 

COPD.


PSHx: open heart surgery, carpal tunnel, bunion, cholecystectomy.


Social: Denies alcohol, tobacco or illicit drug use.


Allergy: cinnamon


Home meds: Reviewed, please see medical record. 





Review of Systems





- Review of Systems


Review of Systems: 





as per HPI otherwise negative





Past Patient History





- Infectious Disease


Hx of Infectious Diseases: None





- Tetanus Immunizations


Tetanus Immunization: Unknown





- Past Social History


Smoking Status: Never Smoked





- CARDIAC


Hx Cardiac Disorders: Yes (ACS)


Hx Hypertension: Yes


Hx Pacemaker: No


Other/Comment: Mitral Valve Replacement  10/17





- PULMONARY


Hx Respiratory Disorders: Yes


Hx Asthma: Yes


Hx Chronic Obstructive Pulmonary Disease (COPD): Yes





- NEUROLOGICAL


Hx Neurological Disorder: Yes


Hx Dizziness: Yes


Hx Migraine: Yes





- HEENT


Hx HEENT Problems: No





- RENAL


Hx Chronic Kidney Disease: No





- ENDOCRINE/METABOLIC


Hx Endocrine Disorders: No





- HEMATOLOGICAL/ONCOLOGICAL


Hx Blood Disorders: Yes


Hx Anemia: Yes (BLOOD TRANSFUSION)





- INTEGUMENTARY


Hx Dermatological Problems: No





- MUSCULOSKELETAL/RHEUMATOLOGICAL


Hx Musculoskeletal Disorders: Yes (DAX BUNION SX,LEFT CARPAL TUNNEL SX)


Hx Falls: No





- GASTROINTESTINAL


Hx Gastrointestinal Disorders: Yes


Hx Gall Bladder Disease: Yes (GALLSTONES)





- GENITOURINARY/GYNECOLOGICAL


Hx Genitourinary Disorders: Yes


Hx Urinary Tract Infection: Yes





- PSYCHIATRIC


Hx Psychophysiologic Disorder: No


Hx Emotional Abuse: No


Hx Physical Abuse: No


Hx Substance Use: No





- SURGICAL HISTORY


Hx Surgeries: Yes (BILATERAL BUNION SX,LEFT CARPAL TUNNEL SX,CARD CATH)


Other/Comment: Mitral Valve Replacement





- ANESTHESIA


Hx Anesthesia: Yes


Hx Anesthesia Reactions: No


Hx Malignant Hyperthermia: No





Meds


Allergies/Adverse Reactions: 


 Allergies











Allergy/AdvReac Type Severity Reaction Status Date / Time


 


cinnamon Allergy Intermediate ANAPHYLAXIS Verified 05/22/18 19:37














- Medications


Medications: 


 Current Medications





Albuterol/Ipratropium (Duoneb 3 Mg/0.5 Mg (3 Ml) Ud)  3 ml IH [id number] PRN


   PRN Reason: Shortness of Breath


Diltiazem HCl (Cardizem Cd)  120 mg PO DAILY OLMAN


Diphenhydramine HCl (Benadryl)  25 mg PO Q6 PRN


   PRN Reason: Allergy symptoms


   Last Admin: 05/23/18 00:43 Dose:  25 mg


Pantoprazole Sodium (Protonix 40mg Ivpb)  40 mg in 100 mls @ 20 mls/hr IVPB 

.Q5H OLMAN


   Last Admin: 05/23/18 07:22 Dose:  20 mls/hr


Metoprolol Tartrate (Lopressor)  25 mg PO BID [facility]


Metoprolol Tartrate (Lopressor)  5 mg IV Q6H [facility]


   Stop: 05/24/18 23:59


Ondansetron HCl (Zofran Inj)  4 mg IVP Q6H PRN


   PRN Reason: Nausea/Vomiting











Physical Exam





- Constitutional


Appears: Chronically Ill





- Head Exam


Head Exam: ATRAUMATIC, NORMAL INSPECTION, NORMOCEPHALIC





- Eye Exam


Eye Exam: EOMI, Normal appearance, PERRL


Pupil Exam: NORMAL ACCOMODATION, PERRL





- ENT Exam


ENT Exam: Mucous Membranes Dry





- Respiratory Exam


Respiratory Exam: Clear to Auscultation Bilateral, NORMAL BREATHING PATTERN





- Cardiovascular Exam


Cardiovascular Exam: Irregular Rhythm, +S1, +S2





- GI/Abdominal Exam


GI & Abdominal Exam: Normal Bowel Sounds, Soft.  absent: Tenderness





- Neurological Exam


Neurological exam: Alert, CN II-XII Intact, Oriented x3, Reflexes Normal





- Psychiatric Exam


Psychiatric exam: Normal Affect, Normal Mood





- Skin


Skin Exam: Dry, Intact, Normal Color, Warm





Results





- Vital Signs


Recent Vital Signs: 


 Last Vital Signs











Temp  98.7 F   05/23/18 09:26


 


Pulse  108 H  05/23/18 09:26


 


Resp  16   05/23/18 09:26


 


BP  135/75   05/23/18 09:26


 


Pulse Ox  99   05/22/18 20:50














- Labs


Result Diagrams: 


 05/23/18 13:20





 05/23/18 06:00


Labs: 


 Laboratory Results - last 24 hr











  05/22/18 05/22/18 05/22/18





  15:04 22:55 22:55


 


WBC    12.6 H


 


RBC    2.88 L


 


Hgb    8.6 L


 


Hct    25.3 L


 


MCV    87.8  D


 


MCH    29.9


 


MCHC    34.0


 


RDW    16.0 H


 


Plt Count    187


 


MPV    9.7


 


Gran %   


 


Lymph % (Auto)   


 


Mono % (Auto)   


 


Eos % (Auto)   


 


Baso % (Auto)   


 


Gran #   


 


Lymph # (Auto)   


 


Mono # (Auto)   


 


Eos # (Auto)   


 


Baso # (Auto)   


 


PT   11.5 


 


INR   1.01 


 


APTT   29.8 


 


Sodium   


 


Potassium   


 


Chloride   


 


Carbon Dioxide   


 


Anion Gap   


 


BUN   


 


Creatinine   


 


Est GFR ( Amer)   


 


Est GFR (Non-Af Amer)   


 


Random Glucose   


 


Calcium   


 


Phosphorus   


 


Magnesium   


 


Total Bilirubin   


 


AST   


 


ALT   


 


Alkaline Phosphatase   


 


Total Protein   


 


Albumin   


 


Globulin   


 


Albumin/Globulin Ratio   


 


Triglycerides   


 


Cholesterol   


 


LDL Cholesterol Direct   


 


HDL Cholesterol   


 


Blood Type  O POSITIVE  


 


Antibody Screen  Negative  


 


Crossmatch  See Detail  


 


BBK History Checked  Patient has bt  














  05/23/18 05/23/18 05/23/18





  06:00 06:00 06:00


 


WBC   9.2  D 


 


RBC   2.66 L 


 


Hgb   7.8 L 


 


Hct   23.1 L 


 


MCV   86.8 


 


MCH   29.3 


 


MCHC   33.8 


 


RDW   16.7 H 


 


Plt Count   182 


 


MPV   9.9 


 


Gran %   66.4 


 


Lymph % (Auto)   21.2 L 


 


Mono % (Auto)   8.7 H 


 


Eos % (Auto)   3.4 


 


Baso % (Auto)   0.3 


 


Gran #   6.09 


 


Lymph # (Auto)   1.9 


 


Mono # (Auto)   0.8 H 


 


Eos # (Auto)   0.3 


 


Baso # (Auto)   0.03 


 


PT    11.3


 


INR    0.98


 


APTT    28.2


 


Sodium  142  


 


Potassium  4.2  


 


Chloride  107  


 


Carbon Dioxide  24  


 


Anion Gap  14  


 


BUN  24 H  


 


Creatinine  1.0  


 


Est GFR ( Amer)  > 60  


 


Est GFR (Non-Af Amer)  56  


 


Random Glucose  110  


 


Calcium  8.8  


 


Phosphorus  4.1  


 


Magnesium  2.1  


 


Total Bilirubin  0.3  


 


AST  28  


 


ALT  18  


 


Alkaline Phosphatase  89  


 


Total Protein  6.2  


 


Albumin  3.6  


 


Globulin  2.6  


 


Albumin/Globulin Ratio  1.4  


 


Triglycerides   


 


Cholesterol   


 


LDL Cholesterol Direct   


 


HDL Cholesterol   


 


Blood Type   


 


Antibody Screen   


 


Crossmatch   


 


BBK History Checked   














  05/23/18 05/23/18





  06:00 13:20


 


WBC   9.3


 


RBC   3.12 L


 


Hgb   9.5 L


 


Hct   27.3 L


 


MCV   87.5


 


MCH   30.4


 


MCHC   34.8


 


RDW   16.3 H


 


Plt Count   159


 


MPV   9.2


 


Gran %   70.2 H


 


Lymph % (Auto)   12.3 L


 


Mono % (Auto)   13.1 H


 


Eos % (Auto)   4.2


 


Baso % (Auto)   0.2


 


Gran #   6.49


 


Lymph # (Auto)   1.1 L


 


Mono # (Auto)   1.2 H


 


Eos # (Auto)   0.4


 


Baso # (Auto)   0.02


 


PT  


 


INR  


 


APTT  


 


Sodium  


 


Potassium  


 


Chloride  


 


Carbon Dioxide  


 


Anion Gap  


 


BUN  


 


Creatinine  


 


Est GFR ( Amer)  


 


Est GFR (Non-Af Amer)  


 


Random Glucose  


 


Calcium  


 


Phosphorus  


 


Magnesium  


 


Total Bilirubin  


 


AST  


 


ALT  


 


Alkaline Phosphatase  


 


Total Protein  


 


Albumin  


 


Globulin  


 


Albumin/Globulin Ratio  


 


Triglycerides  119 


 


Cholesterol  194 


 


LDL Cholesterol Direct  117 


 


HDL Cholesterol  51 


 


Blood Type  


 


Antibody Screen  


 


Crossmatch  


 


BBK History Checked  














Assessment & Plan





- Assessment and Plan (Free Text)


Assessment: 





62 F with a PMHx of severe mitral regurgitation s/p open heart surgery and 

repair with MVR biprosthetic, history of paroxysmal afib (on eliquis), and non 

obstructing CAD , COPD [place] [facility] [place] with complaints of shortness of 

breath and black tarry stools, loose in nature.admitted to ICU for symptomatic 

anemia with a Hgb 6.7 likely secondary to GI bleed. Patient is NPO and has been 

transfused 2u pRBC with a 3rd unit ordered on account of Hgb dropping one unit s

/p 2 u last night to this morning. Pt tolerated 2u pRBCs and 2u FFP without 

incident. Eliquis is on hold for PAF. Cardiology Dr Gaxiola consulted. Patient is 

on protonix drip. Pending EGD tomorrow after off Eliquis for 48 hours, unless 

untowards events transpire in the interim. Blood coags wnl. Continue to monitor 

H&H q6.

## 2018-05-23 NOTE — HP
DATE OF EXAM:  05/22/2018



REASON FOR ADMISSION:  Palpitation and dyspnea.



HISTORY OF PRESENT ILLNESS:  A 62-year-old female with history of chronic

paroxysmal atrial fibrillation, status post open heart surgery for mitral

valve replacement.  The patient came into the ER because of dyspnea,

palpitations, and the patient is currently on Eliquis and baby aspirin. 

The patient also seems does not take her Protonix or Pepcid and came to the

ER for palpitations, found to be in sinus rhythm, no atrial fibrillations,

and she has been taken her Eliquis and baby aspirin without any GI

protective medicine, Protonix or Pepcid.  She did have a history of GI

bleed in the past, small peptic ulcers that was like less than a year ago,

was treated by Dr. Frost.  It was negative for H. pylori at that time

and there was no active bleeding.  The patient was given IV iron and

advised to follow up.  The patient came in _____ hemoglobin 6.5 range. 

Also, noted that she has melena while she is in the emergency room.  There

is no nausea, no vomiting, no abdominal pain, no fever, no chills, and no

chest pain.



PAST MEDICAL HISTORY:  As I mentioned above,

1.  She does have chronic paroxysmal atrial fibrillations.

2.  Status post mitral valve repair.

3.  COPD, asthma.

4.  Edema, lower extremities.

5.  Chronic osteoarthritis both knees and chronic low back pain.

6.  Anemia of iron deficiency.



ALLERGIES:  CINNAMON, OTHERWISE NO DRUG ALLERGY.



FAMILY HISTORY:  Cardiac history.



SOCIAL HISTORY:  She lives by herself.  No smoking.  No drugs.  No alcohol.

Never smoked in her life.  She does drink alcohol socially once in a while,

but otherwise no alcohol abuse.



REVIEW OF SYSTEMS:  As in the present illness, palpitations, dyspnea, back

pain, knee arthritis, anxiety, joint pain, wheezing, cough, otherwise

negative.



PHYSICAL EXAMINATION:

VITAL SIGNS:  When she came in on 05/22/2018, her temperature 98.1, heart

rate 116, respiration was fast, and blood pressure was 107/63 initially.

GENERAL:  The patient was seen in the ICU right after her CT abdomen and

pelvis and she seemed stable, no distress, and hemodynamically stable on

the monitor.

HEAD AND NECK:  Normal.  No JVD.  No thyromegaly.

CHEST:  Clear bilaterally.

CARDIAC:  First and second sounds are normal.  No murmur, rub, or gallop.

ABDOMEN:  Soft, nontender.

EXTREMITIES:  No edema.

NEUROLOGIC:  Normal.



LABORATORY DATA:  Noted for white count 13.2, hemoglobin 6.7, hematocrit

20.7, platelets 236.  Her chemistry shows sodium 138, potassium 4.6,

chloride 105, bicarb 20, BUN 41, creatinine 0.9, blood sugar 118.  Liver

function test is normal.  Her troponin is negative.  PT and PTT are normal.

The patient is on Eliquis.  The patient also had chest x-ray, which shows

no active disease, possible atelectasis _____, bilateral hyperaeration of

the lungs, no consolidation, no gross mass seen _____.  The patient had

abdominal and pelvic CT, the result is pending.



IMPRESSION AND PLAN:  This is a 62-year-old female on anticoagulation for

her chronic atrial fibrillation with history of peptic ulcer, noncompliant

with her medicines, came in with melena, tachycardia, found to have

hemoglobin of 6.5 range.  The patient was admitted to ICU because of the

Eliquis half life and active bleeding.  We will monitor her hemoglobin and

hematocrit.  Intravenous fluids, transfuse 2 units of packed red blood

cells.  There is antidote for factor X inhibitors; however, we will discuss

with the intensivist on the case and will continue to transfuse the

patient.  The patient is also getting fresh frozen plasma.  At this time,

we will continue current treatment.  GI consult, Dr. Frost; critical

care team on the case; and also Cardiology, Dr. Gaxiola and also we will

consider Hematology consult, Dr. Alatorre.







__________________________________________

Philip Dinh MD



DD:  05/23/2018 7:44:30

DT:  05/23/2018 16:17:26

Job # [id number]

## 2018-05-23 NOTE — PN
DATE:  05/23/2018



SUBJECTIVE:  The patient in the ICU, is comfortable, stable, no distress. 

Received 2 units of packed RBCs.



PHYSICAL EXAMINATION:  Her physical examination is as follows,

VITAL SIGNS:  Temperature 98, heart rate 96, blood pressure 132/83,

respirations 16, saturating 97% on room air.

HEAD AND NECK:  Normal.  No JVD.  No thyromegaly.

CHEST:  Clear bilaterally.

CARDIAC:  First sound and second sound normal.  There is no murmur, rub or

gallop.

ABDOMEN:  Soft, nontender.

EXTREMITIES:  No edema.

NEUROLOGIC:  Normal.



LABORATORY DATA:  Laboratory study shows white count 9.3, hemoglobin 9.5,

hematocrit 27.3, platelets 139.  Chemistries:  Sodium 142, potassium 4.2,

chloride 107, bicarb 24, BUN 24, creatinine is 1.  Liver function test is

normal, troponin is negative.  PT, PTT are normal.



IMPRESSION AND PLAN:

1.  Acute upper gastrointestinal bleed with melena and dropped hemoglobin

requiring transfusions.

2.  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  The patient has post

24 hours of last dose.  Her effect has been gone.  The patient seems stable

at this time.  Hematology consult on board for evaluation of coagulopathy. 

We will follow his recommendation.

3.  History of peptic ulcer in the past.  She had an endoscopy by Dr. Frost less than a year ago and she has peptic ulceration.  The patient

was noncompliant with the Protonix and Pepcid at home while she was taking

Eliquis and 81 mg of aspirin.  Possibility of recurrent ulceration most

likely the cause of her upper gastrointestinal bleeding is considered.

4.  Mitral valve replacement, stable.  Paroxysmal atrial fibrillation.  The

necessity for anticoagulation will be evaluated in the presence of

gastrointestinal bleeding serous enough to drop hemoglobin to 6.5.  We will

reevaluate.  We will discuss with Dr. Gaxiola.  At this time, we will rescope.

We are going to do upper endoscopy by Dr. Frost tomorrow.  She is stable

hemodynamically.  We will continue current therapy.  Current medication

right now she is getting, Lopressor 25 mg b.i.d. and she is also getting 5

mg IV every 6 hours as scheduled, so we are holding off the p.o. now.  She

is getting Protonix 40, Pepcid 20, diltiazem  and Zofran 4 mg IV

every 6 p.r.n.  Continue current therapy.  Continue to monitor the patient

clinically and repeat labs in the morning for endoscopy tomorrow morning.





__________________________________________

Philip Dinh MD





DD:  05/23/2018 20:05:39

DT:  05/23/2018 20:51:23

Job # [id number]

## 2018-05-23 NOTE — CP.PCM.CON
<Jovita rDiver - Last Filed: 05/23/18 15:57>





History of Present Illness





- History of Present Illness


History of Present Illness: 


PGY-2 Consultnote for Dr. Frost's service





61 yo female with past medical history of COPD, mitral valve disease, 

paroxysmal atrial fibrillation on Eliquis, presented to the Emergency 

Department today complaining of shortness of breath with exertion over the past 

week. Patient states that the sob has been worsening over the past three days, 

adn she is no longer able to walk a block without resting. She also report dark 

black stool for about 1 week. Patient states that she was started on PO iron 

about 3 weeks ago. Patient report previous mucosal bleeding of the mouth and 

nose when she was on warfarin. However she denies any bleeding since starting 

eliquis about 4-5 months ago. States that she has no chest pain 

currently.Denies abdominal pain. She also reports lightheadedness. Previous EGD

  from last year showed gastric ulcer with clean base. Colonoscopy showed 

internal hemorrhoids, redundant colon. Patient states that she did not follow 

up with GI. 





PMH: mitral valve disease, paroxysmal atrial fibrillation on Eliquis


PSH: mitral valve replacement


family history: heart disease, multiple members


social history: social alcohol use, denies smoking or illiict drug use











Review of Systems





- Review of Systems


All systems: reviewed and no additional remarkable complaints except





Past Patient History





- Infectious Disease


Hx of Infectious Diseases: None





- Tetanus Immunizations


Tetanus Immunization: Unknown





- Past Social History


Smoking Status: Never Smoked





- CARDIAC


Hx Cardiac Disorders: Yes (ACS)


Hx Hypertension: Yes


Hx Pacemaker: No


Other/Comment: Mitral Valve Replacement  10/17





- PULMONARY


Hx Respiratory Disorders: Yes


Hx Asthma: Yes


Hx Chronic Obstructive Pulmonary Disease (COPD): Yes





- NEUROLOGICAL


Hx Neurological Disorder: Yes


Hx Dizziness: Yes


Hx Migraine: Yes





- HEENT


Hx HEENT Problems: No





- RENAL


Hx Chronic Kidney Disease: No





- ENDOCRINE/METABOLIC


Hx Endocrine Disorders: No





- HEMATOLOGICAL/ONCOLOGICAL


Hx Blood Disorders: Yes


Hx Anemia: Yes (BLOOD TRANSFUSION)





- INTEGUMENTARY


Hx Dermatological Problems: No





- MUSCULOSKELETAL/RHEUMATOLOGICAL


Hx Musculoskeletal Disorders: Yes (DAX BUNION SX,LEFT CARPAL TUNNEL SX)


Hx Falls: No





- GASTROINTESTINAL


Hx Gastrointestinal Disorders: Yes


Hx Gall Bladder Disease: Yes (GALLSTONES)





- GENITOURINARY/GYNECOLOGICAL


Hx Genitourinary Disorders: Yes


Hx Urinary Tract Infection: Yes





- PSYCHIATRIC


Hx Psychophysiologic Disorder: No


Hx Emotional Abuse: No


Hx Physical Abuse: No


Hx Substance Use: No





- SURGICAL HISTORY


Hx Surgeries: Yes (BILATERAL BUNION SX,LEFT CARPAL TUNNEL SX,CARD CATH)


Other/Comment: Mitral Valve Replacement





- ANESTHESIA


Hx Anesthesia: Yes


Hx Anesthesia Reactions: No


Hx Malignant Hyperthermia: No





Meds


Allergies/Adverse Reactions: 


 Allergies











Allergy/AdvReac Type Severity Reaction Status Date / Time


 


cinnamon Allergy Intermediate ANAPHYLAXIS Verified 05/22/18 19:37














- Medications


Medications: 


 Current Medications





Albuterol/Ipratropium (Duoneb 3 Mg/0.5 Mg (3 Ml) Ud)  3 ml IH [id number] PRN


   PRN Reason: Shortness of Breath


Diltiazem HCl (Cardizem Cd)  120 mg PO DAILY OLMAN


Diphenhydramine HCl (Benadryl)  25 mg PO Q6 PRN


   PRN Reason: Allergy symptoms


   Last Admin: 05/23/18 00:43 Dose:  25 mg


Pantoprazole Sodium (Protonix 40mg Ivpb)  40 mg in 100 mls @ 20 mls/hr IVPB 

.Q5H OLMAN


   Last Admin: 05/23/18 07:22 Dose:  20 mls/hr


Metoprolol Tartrate (Lopressor)  25 mg PO BID OLMAN


Metoprolol Tartrate (Lopressor)  5 mg IV Q6H OLMAN


   Stop: 05/24/18 23:59


Ondansetron HCl (Zofran Inj)  4 mg IVP Q6H PRN


   PRN Reason: Nausea/Vomiting











Physical Exam





- Constitutional


Appears: No Acute Distress





- Head Exam


Head Exam: ATRAUMATIC, NORMOCEPHALIC





- Eye Exam


Eye Exam: EOMI, Normal appearance





- ENT Exam


ENT Exam: Mucous Membranes Moist





- Respiratory Exam


Respiratory Exam: Clear to Auscultation Bilateral, NORMAL BREATHING PATTERN.  

absent: Decreased Breath Sounds, Rales, Rhonchi, Wheezes, Respiratory Distress





- Cardiovascular Exam


Cardiovascular Exam: REGULAR RHYTHM, +S1, +S2.  absent: Bradycardia, Tachycardia

, Systolic Murmur





- GI/Abdominal Exam


GI & Abdominal Exam: Normal Bowel Sounds, Soft.  absent: Diminished Bowel Sounds

, Distended, Firm, Guarding, Tenderness





- Extremities Exam


Extremities exam: Positive for: normal inspection.  Negative for: pedal edema, 

tenderness





- Neurological Exam


Neurological exam: Alert, Oriented x3





- Psychiatric Exam


Psychiatric exam: Normal Affect, Normal Mood





- Skin


Skin Exam: Dry, Intact, Normal Color, Warm





Results





- Vital Signs


Recent Vital Signs: 


 Last Vital Signs











Temp  98.7 F   05/23/18 09:26


 


Pulse  108 H  05/23/18 09:26


 


Resp  16   05/23/18 09:26


 


BP  135/75   05/23/18 09:26


 


Pulse Ox  99   05/22/18 20:50














- Labs


Result Diagrams: 


 05/23/18 13:20





 05/23/18 06:00


Labs: 


 Laboratory Results - last 24 hr











  05/22/18 05/22/18 05/22/18





  15:04 22:55 22:55


 


WBC    12.6 H


 


RBC    2.88 L


 


Hgb    8.6 L


 


Hct    25.3 L


 


MCV    87.8  D


 


MCH    29.9


 


MCHC    34.0


 


RDW    16.0 H


 


Plt Count    187


 


MPV    9.7


 


Gran %   


 


Lymph % (Auto)   


 


Mono % (Auto)   


 


Eos % (Auto)   


 


Baso % (Auto)   


 


Gran #   


 


Lymph # (Auto)   


 


Mono # (Auto)   


 


Eos # (Auto)   


 


Baso # (Auto)   


 


PT   11.5 


 


INR   1.01 


 


APTT   29.8 


 


Sodium   


 


Potassium   


 


Chloride   


 


Carbon Dioxide   


 


Anion Gap   


 


BUN   


 


Creatinine   


 


Est GFR ( Amer)   


 


Est GFR (Non-Af Amer)   


 


Random Glucose   


 


Calcium   


 


Phosphorus   


 


Magnesium   


 


Total Bilirubin   


 


AST   


 


ALT   


 


Alkaline Phosphatase   


 


Total Protein   


 


Albumin   


 


Globulin   


 


Albumin/Globulin Ratio   


 


Triglycerides   


 


Cholesterol   


 


LDL Cholesterol Direct   


 


HDL Cholesterol   


 


Blood Type  O POSITIVE  


 


Antibody Screen  Negative  


 


Crossmatch  See Detail  


 


BBK History Checked  Patient has bt  














  05/23/18 05/23/18 05/23/18





  06:00 06:00 06:00


 


WBC   9.2  D 


 


RBC   2.66 L 


 


Hgb   7.8 L 


 


Hct   23.1 L 


 


MCV   86.8 


 


MCH   29.3 


 


MCHC   33.8 


 


RDW   16.7 H 


 


Plt Count   182 


 


MPV   9.9 


 


Gran %   66.4 


 


Lymph % (Auto)   21.2 L 


 


Mono % (Auto)   8.7 H 


 


Eos % (Auto)   3.4 


 


Baso % (Auto)   0.3 


 


Gran #   6.09 


 


Lymph # (Auto)   1.9 


 


Mono # (Auto)   0.8 H 


 


Eos # (Auto)   0.3 


 


Baso # (Auto)   0.03 


 


PT    11.3


 


INR    0.98


 


APTT    28.2


 


Sodium  142  


 


Potassium  4.2  


 


Chloride  107  


 


Carbon Dioxide  24  


 


Anion Gap  14  


 


BUN  24 H  


 


Creatinine  1.0  


 


Est GFR ( Amer)  > 60  


 


Est GFR (Non-Af Amer)  56  


 


Random Glucose  110  


 


Calcium  8.8  


 


Phosphorus  4.1  


 


Magnesium  2.1  


 


Total Bilirubin  0.3  


 


AST  28  


 


ALT  18  


 


Alkaline Phosphatase  89  


 


Total Protein  6.2  


 


Albumin  3.6  


 


Globulin  2.6  


 


Albumin/Globulin Ratio  1.4  


 


Triglycerides   


 


Cholesterol   


 


LDL Cholesterol Direct   


 


HDL Cholesterol   


 


Blood Type   


 


Antibody Screen   


 


Crossmatch   


 


BBK History Checked   














  05/23/18





  06:00


 


WBC 


 


RBC 


 


Hgb 


 


Hct 


 


MCV 


 


MCH 


 


MCHC 


 


RDW 


 


Plt Count 


 


MPV 


 


Gran % 


 


Lymph % (Auto) 


 


Mono % (Auto) 


 


Eos % (Auto) 


 


Baso % (Auto) 


 


Gran # 


 


Lymph # (Auto) 


 


Mono # (Auto) 


 


Eos # (Auto) 


 


Baso # (Auto) 


 


PT 


 


INR 


 


APTT 


 


Sodium 


 


Potassium 


 


Chloride 


 


Carbon Dioxide 


 


Anion Gap 


 


BUN 


 


Creatinine 


 


Est GFR ( Amer) 


 


Est GFR (Non-Af Amer) 


 


Random Glucose 


 


Calcium 


 


Phosphorus 


 


Magnesium 


 


Total Bilirubin 


 


AST 


 


ALT 


 


Alkaline Phosphatase 


 


Total Protein 


 


Albumin 


 


Globulin 


 


Albumin/Globulin Ratio 


 


Triglycerides  119


 


Cholesterol  194


 


LDL Cholesterol Direct  117


 


HDL Cholesterol  51


 


Blood Type 


 


Antibody Screen 


 


Crossmatch 


 


BBK History Checked 














Assessment & Plan





- Assessment and Plan (Free Text)


Assessment: 





61 yo female with past medical history of COPD, mitral valve disease, 

paroxsymal atrial fibrillation on Eliquis, presented with shortness of breath 

with exertion, found to have low hgb of 6.7, s/p 3 units pRBC. 


GI bleed


anemia


mitral valve disease


paroxsymal atrial fibrillation





Plan: 





-EGD from June 2017  showed gastric ulcer with clean base. Colonscopy showed 

internal hemorrhiods, redundant colon


- CT abd/pelvis without contrast did not show any acute findings


- patient received total 3 units pRBC


- continue to monitor H&H for acute bleeding


- Hold eliquis will hole for 48 hours before endoscopy if patient is stable


- clear liquid diet, NPO after midnight for endoscopy tomorrow





case seen and discussed with Dr. Frost








<Jonathan Frost - Last Filed: 05/23/18 23:47>





Meds





- Medications


Medications: 


 Current Medications





Albuterol/Ipratropium (Duoneb 3 Mg/0.5 Mg (3 Ml) Ud)  3 ml IH [id number] PRN


   PRN Reason: Shortness of Breath


Diltiazem HCl (Cardizem Cd)  120 mg PO DAILY [facility]


   Last Admin: 05/23/18 16:35 Dose:  120 mg


Diphenhydramine HCl (Benadryl)  25 mg PO Q6 PRN


   PRN Reason: Allergy symptoms


   Last Admin: 05/23/18 00:43 Dose:  25 mg


Pantoprazole Sodium (Protonix 40mg Ivpb)  40 mg in 100 mls @ 20 mls/hr IVPB 

.Q5H OLMAN


   Last Admin: 05/23/18 18:05 Dose:  20 mls/hr


Metoprolol Tartrate (Lopressor)  25 mg PO BID OLMAN


Metoprolol Tartrate (Lopressor)  5 mg IV Q6H OLMAN


   Stop: 05/24/18 23:59


   Last Admin: 05/23/18 21:11 Dose:  5 mg


Ondansetron HCl (Zofran Inj)  4 mg IVP Q6H PRN


   PRN Reason: Nausea/Vomiting











Results





- Vital Signs


Recent Vital Signs: 


 Last Vital Signs











Temp  98.7 F   05/23/18 10:11


 


Pulse  103 H  05/23/18 21:11


 


Resp  16   05/23/18 18:00


 


BP  153/79 H  05/23/18 21:11


 


Pulse Ox  96   05/23/18 18:10














- Labs


Result Diagrams: 


 05/23/18 13:20





 05/23/18 06:00


Labs: 


 Laboratory Results - last 24 hr











  05/22/18 05/23/18 05/23/18





  15:04 06:00 06:00


 


WBC    9.2  D


 


RBC    2.66 L


 


Hgb    7.8 L


 


Hct    23.1 L


 


MCV    86.8


 


MCH    29.3


 


MCHC    33.8


 


RDW    16.7 H


 


Plt Count    182


 


MPV    9.9


 


Gran %    66.4


 


Lymph % (Auto)    21.2 L


 


Mono % (Auto)    8.7 H


 


Eos % (Auto)    3.4


 


Baso % (Auto)    0.3


 


Gran #    6.09


 


Lymph # (Auto)    1.9


 


Mono # (Auto)    0.8 H


 


Eos # (Auto)    0.3


 


Baso # (Auto)    0.03


 


PT   


 


INR   


 


APTT   


 


Sodium   142 


 


Potassium   4.2 


 


Chloride   107 


 


Carbon Dioxide   24 


 


Anion Gap   14 


 


BUN   24 H 


 


Creatinine   1.0 


 


Est GFR ( Amer)   > 60 


 


Est GFR (Non-Af Amer)   56 


 


Random Glucose   110 


 


Calcium   8.8 


 


Phosphorus   4.1 


 


Magnesium   2.1 


 


Total Bilirubin   0.3 


 


AST   28 


 


ALT   18 


 


Alkaline Phosphatase   89 


 


Total Protein   6.2 


 


Albumin   3.6 


 


Globulin   2.6 


 


Albumin/Globulin Ratio   1.4 


 


Triglycerides   


 


Cholesterol   


 


LDL Cholesterol Direct   


 


HDL Cholesterol   


 


Blood Type  O POSITIVE  


 


Antibody Screen  Negative  


 


Crossmatch  See Detail  


 


BBK History Checked  Patient has bt  














  05/23/18 05/23/18 05/23/18





  06:00 06:00 13:20


 


WBC    9.3


 


RBC    3.12 L


 


Hgb    9.5 L


 


Hct    27.3 L


 


MCV    87.5


 


MCH    30.4


 


MCHC    34.8


 


RDW    16.3 H


 


Plt Count    159


 


MPV    9.2


 


Gran %    70.2 H


 


Lymph % (Auto)    12.3 L


 


Mono % (Auto)    13.1 H


 


Eos % (Auto)    4.2


 


Baso % (Auto)    0.2


 


Gran #    6.49


 


Lymph # (Auto)    1.1 L


 


Mono # (Auto)    1.2 H


 


Eos # (Auto)    0.4


 


Baso # (Auto)    0.02


 


PT  11.3  


 


INR  0.98  


 


APTT  28.2  


 


Sodium   


 


Potassium   


 


Chloride   


 


Carbon Dioxide   


 


Anion Gap   


 


BUN   


 


Creatinine   


 


Est GFR ( Amer)   


 


Est GFR (Non-Af Amer)   


 


Random Glucose   


 


Calcium   


 


Phosphorus   


 


Magnesium   


 


Total Bilirubin   


 


AST   


 


ALT   


 


Alkaline Phosphatase   


 


Total Protein   


 


Albumin   


 


Globulin   


 


Albumin/Globulin Ratio   


 


Triglycerides   119 


 


Cholesterol   194 


 


LDL Cholesterol Direct   117 


 


HDL Cholesterol   51 


 


Blood Type   


 


Antibody Screen   


 


Crossmatch   


 


BBK History Checked   














Attending/Attestation





- Attestation


I have personally seen and examined this patient.: Yes


I have fully participated in the care of the patient.: Yes


I have reviewed all pertinent clinical information: Yes


Notes (Text): 


This is an addendum to GI progress  report dictated by the Medical Resident.The 

patient was seen and examined earlier.  Medical records, lab studies, imagings 

were reviewed.  Last 24 hours events reviewed.  Agreed with the above treatment 

plan as outlined in Medical Resident 's notes the with the addition of the 

following 


05/23/18 23:47

## 2018-05-23 NOTE — CARD
--------------- APPROVED REPORT --------------





EKG Measurement

Heart [id number]

[id number]

[id number]

[id number]

[id number]



<Conclusion>

Sinus tachycardia with premature atrial complexes

Moderate voltage criteria for LVH, may be normal variant

Nonspecific ST abnormality

Abnormal ECG

## 2018-05-23 NOTE — CARD
--------------- APPROVED REPORT --------------





EKG Measurement

Heart [id number]

[id number]

[id number]

[id number]

[id number]



<Conclusion>

Sinus tachycardia

Moderate voltage criteria for LVH, may be normal variant

Borderline ECG

## 2018-05-23 NOTE — CP.CCUPN
<Braden Watkins - Last Filed: 05/23/18 10:26>





CCU Subjective





- Physician Review


Subjective (Free Text): 





05/23/18 09:06


Patient seen and examined at bedside in ICU.  Reports still some weakness and 

lightheadedness, but otherwise feeling much better.  No shortness of breath 

since admission and transfusion.  Denies chest pain, shortness of breath, 

wheezing, room-spinning sensation at rest, syncope/near-syncope.  As per nursing

, no acute events overnight, but 2 small tarry BM, and Hgb today decreased from 

8.6 to 7.8.


Critical Care Time Spent (in minutes): 35





CCU Objective





- Vital Signs / Intake & Output


Vital Signs (Last 4 hours): 


Vital Signs











  Pulse


 


 05/23/18 06:00  108 H











Intake and Output (Last 8hrs): 


 Intake & Output











 05/22/18 05/23/18 05/23/18





 22:59 06:59 14:59


 


Intake Total 520  


 


Balance 520  


 


Weight 73.482 kg  


 


Intake:   


 


    


 


    Right Forearm 120  


 


  Oral 60  


 


  Blood Product 325  


 


    Red Blood Cells Cpd As1 325  





    Lr  Unit [id number]   


 


  Other 15  


 


    Red Blood Cells Cpd As1 15  





    Lr  Unit [id number]   


 


Other:   


 


  Voiding Method Toilet  


 


  # Voids   


 


    Urine, Voided 0  


 


  # Bowel Movements 0  














- Physical Exam


Head: Positive for: Atraumatic, Normocephalic


Pupils: Positive for: PERRL.  Negative for: Non-Reactive, Pinpoint


Extroacular Muscles: Positive for: EOMI.  Negative for: Gaze Palsy, Entrapment


Conjunctiva: Positive for: Other (mild conjunctival pallor).  Negative for: 

Injected, Icteric


Mouth: Positive for: Moist Mucous Membranes, Normal Lips, Normal Tounge, Normal 

Teeth.  Negative for: Dry, Drooling


Pharnyx: Positive for: Normal.  Negative for: ERYTHEMA, EXUDATE, TONSILS 

ENLARGED, Uvular Deviation, Muffled/Hoarse Voice


Nose (External): Positive for: Atraumatic.  Negative for: Abrasion, Laceration


Nose (Internal): Positive for: Normal Inspection, No Active Bleeding.  Negative 

for: Epistaxis


Neck: Positive for: Normal Range of Motion, Trachea Midline.  Negative for: JVD


Respiratory/Chest: Positive for: Clear to Auscultation, Good Air Exchange.  

Negative for: Respiratory Distress, Accessory Muscle Use, Wheezes


Cardiovascular: Positive for: Murmurs, Normal S1, S2, Tachycardic (tachy but 

regular rhythm).  Negative for: Irregular Rhythm, Bradycardic


Abdomen: Positive for: Normal Bowel Sounds.  Negative for: Tenderness, 

Distention, Peritoneal Signs


Genitourinary/Pelvic Exam: Negative for: Vaginal Bleeding


Back: Negative for: CVA Tenderness


Upper Extremity: Positive for: Normal Inspection, Normal ROM, NORMAL PULSES.  

Negative for: Cyanosis, Edema, Tenderness, Swelling, Erythema, Deformity


Lower Extremity: Positive for: Normal Inspection, NORMAL PULSES, Normal ROM.  

Negative for: Edema, CALF TENDERNESS, Tenderness, Swelling, Erythema, Deformity


Neurological: Positive for: GCS=15, Speech Normal, Motor Func Grossly Intact, 

Normal Sensory Function, Other (moving all extremities spontaneously)


Skin: Positive for: Warm, Dry, Normal Color.  Negative for: Rashes


Psychiatric: Positive for: Alert, Oriented x 3 (x4 (self, location, year, 

president)), Normal Insight, Normal Concentration, Normal Affect, Normal Mood.  

Negative for: Anxious, Agitated, Lethargic





- Medications


Active Medications: 


Active Medications











Generic Name Dose Route Start Last Admin





  Trade Name Freq  PRN Reason Stop Dose Admin


 


Albuterol/Ipratropium  3 ml  05/22/18 15:35  





  Duoneb 3 Mg/0.5 Mg (3 Ml) Ud  IH   





  [id number] PRN   





  Shortness of Breath   


 


Diphenhydramine HCl  25 mg  05/22/18 17:49  05/23/18 00:43





  Benadryl  PO   25 mg





  Q6 PRN   Administration





  Allergy symptoms   


 


Pantoprazole Sodium  40 mg in 100 mls @ 20 mls/hr  05/22/18 15:30  05/23/18 07:

22





  Protonix 40mg Ivpb  IVPB   20 mls/hr





  .Q5H OLMAN   Administration


 


Ondansetron HCl  4 mg  05/22/18 15:29  





  Zofran Inj  IVP   





  Q6H PRN   





  Nausea/Vomiting   














- Patient Studies


Lab Studies: 


 Lab Studies











  05/23/18 05/23/18 05/23/18 Range/Units





  06:00 06:00 06:00 


 


WBC   9.2  D   (4.5-11.0)  10^3/ul


 


RBC   2.66 L   (3.5-6.1)  10^6/uL


 


Hgb   7.8 L   (12.0-16.0)  g/dL


 


Hct   23.1 L   (36.0-48.0)  %


 


MCV   86.8   (80.0-105.0)  fl


 


MCH   29.3   (25.0-35.0)  pg


 


MCHC   33.8   (31.0-37.0)  g/dl


 


RDW   16.7 H   (11.5-14.5)  %


 


Plt Count   182   (120.0-450.0)  10^3/uL


 


MPV   9.9   (7.0-11.0)  fl


 


Gran %   66.4   (50.0-68.0)  %


 


Lymph % (Auto)   21.2 L   (22.0-35.0)  %


 


Mono % (Auto)   8.7 H   (1.0-6.0)  %


 


Eos % (Auto)   3.4   (1.5-5.0)  %


 


Baso % (Auto)   0.3   (0.0-3.0)  %


 


Gran #   6.09   (1.4-6.5)  


 


Lymph # (Auto)   1.9   (1.2-3.4)  


 


Mono # (Auto)   0.8 H   (0.1-0.6)  


 


Eos # (Auto)   0.3   (0.0-0.7)  


 


Baso # (Auto)   0.03   (0.0-2.0)  K/mm3


 


PT  11.3    (9.4-12.5)  SECONDS


 


INR  0.98    (0.93-1.08)  


 


APTT  28.2    (25.1-36.5)  Seconds


 


Sodium    142  (132-148)  mmol/L


 


Potassium    4.2  (3.6-5.0)  mmol/L


 


Chloride    107  ()  mmol/L


 


Carbon Dioxide    24  (21-33)  mmol/L


 


Anion Gap    14  (10-20)  


 


BUN    24 H  (7-21)  mg/dL


 


Creatinine    1.0  (0.7-1.2)  mg/dl


 


Est GFR (African Amer)    > 60  


 


Est GFR (Non-Af Amer)    56  


 


Random Glucose    110  ()  mg/dL


 


Calcium    8.8  (8.4-10.5)  mg/dL


 


Phosphorus    4.1  (2.5-4.5)  mg/dL


 


Magnesium    2.1  (1.7-2.2)  mg/dL


 


Total Bilirubin    0.3  (0.2-1.3)  mg/dL


 


AST    28  (14-36)  U/L


 


ALT    18  (7-56)  U/L


 


Alkaline Phosphatase    89  ()  U/L


 


Total Protein    6.2  (5.8-8.3)  g/dL


 


Albumin    3.6  (3.0-4.8)  g/dL


 


Globulin    2.6  gm/dL


 


Albumin/Globulin Ratio    1.4  (1.1-1.8)  


 


Blood Type     


 


Antibody Screen     


 


Crossmatch     


 


BBK History Checked     














  05/22/18 05/22/18 05/22/18 Range/Units





  22:55 22:55 15:04 


 


WBC  12.6 H    (4.5-11.0)  10^3/ul


 


RBC  2.88 L    (3.5-6.1)  10^6/uL


 


Hgb  8.6 L    (12.0-16.0)  g/dL


 


Hct  25.3 L    (36.0-48.0)  %


 


MCV  87.8  D    (80.0-105.0)  fl


 


MCH  29.9    (25.0-35.0)  pg


 


MCHC  34.0    (31.0-37.0)  g/dl


 


RDW  16.0 H    (11.5-14.5)  %


 


Plt Count  187    (120.0-450.0)  10^3/uL


 


MPV  9.7    (7.0-11.0)  fl


 


Gran %     (50.0-68.0)  %


 


Lymph % (Auto)     (22.0-35.0)  %


 


Mono % (Auto)     (1.0-6.0)  %


 


Eos % (Auto)     (1.5-5.0)  %


 


Baso % (Auto)     (0.0-3.0)  %


 


Gran #     (1.4-6.5)  


 


Lymph # (Auto)     (1.2-3.4)  


 


Mono # (Auto)     (0.1-0.6)  


 


Eos # (Auto)     (0.0-0.7)  


 


Baso # (Auto)     (0.0-2.0)  K/mm3


 


PT   11.5   (9.4-12.5)  SECONDS


 


INR   1.01   (0.93-1.08)  


 


APTT   29.8   (25.1-36.5)  Seconds


 


Sodium     (132-148)  mmol/L


 


Potassium     (3.6-5.0)  mmol/L


 


Chloride     ()  mmol/L


 


Carbon Dioxide     (21-33)  mmol/L


 


Anion Gap     (10-20)  


 


BUN     (7-21)  mg/dL


 


Creatinine     (0.7-1.2)  mg/dl


 


Est GFR (African Amer)     


 


Est GFR (Non-Af Amer)     


 


Random Glucose     ()  mg/dL


 


Calcium     (8.4-10.5)  mg/dL


 


Phosphorus     (2.5-4.5)  mg/dL


 


Magnesium     (1.7-2.2)  mg/dL


 


Total Bilirubin     (0.2-1.3)  mg/dL


 


AST     (14-36)  U/L


 


ALT     (7-56)  U/L


 


Alkaline Phosphatase     ()  U/L


 


Total Protein     (5.8-8.3)  g/dL


 


Albumin     (3.0-4.8)  g/dL


 


Globulin     gm/dL


 


Albumin/Globulin Ratio     (1.1-1.8)  


 


Blood Type    O POSITIVE  


 


Antibody Screen    Negative  


 


Crossmatch    See Detail  


 


BBK History Checked    Patient has bt  








 Laboratory Results - last 24 hr











  05/22/18 05/22/18 05/22/18





  15:04 22:55 22:55


 


WBC    12.6 H


 


RBC    2.88 L


 


Hgb    8.6 L


 


Hct    25.3 L


 


MCV    87.8  D


 


MCH    29.9


 


MCHC    34.0


 


RDW    16.0 H


 


Plt Count    187


 


MPV    9.7


 


Gran %   


 


Lymph % (Auto)   


 


Mono % (Auto)   


 


Eos % (Auto)   


 


Baso % (Auto)   


 


Gran #   


 


Lymph # (Auto)   


 


Mono # (Auto)   


 


Eos # (Auto)   


 


Baso # (Auto)   


 


PT   11.5 


 


INR   1.01 


 


APTT   29.8 


 


Sodium   


 


Potassium   


 


Chloride   


 


Carbon Dioxide   


 


Anion Gap   


 


BUN   


 


Creatinine   


 


Est GFR ( Amer)   


 


Est GFR (Non-Af Amer)   


 


Random Glucose   


 


Calcium   


 


Phosphorus   


 


Magnesium   


 


Total Bilirubin   


 


AST   


 


ALT   


 


Alkaline Phosphatase   


 


Total Protein   


 


Albumin   


 


Globulin   


 


Albumin/Globulin Ratio   


 


Blood Type  O POSITIVE  


 


Antibody Screen  Negative  


 


Crossmatch  See Detail  


 


BBK History Checked  Patient has bt  














  05/23/18 05/23/18 05/23/18





  06:00 06:00 06:00


 


WBC   9.2  D 


 


RBC   2.66 L 


 


Hgb   7.8 L 


 


Hct   23.1 L 


 


MCV   86.8 


 


MCH   29.3 


 


MCHC   33.8 


 


RDW   16.7 H 


 


Plt Count   182 


 


MPV   9.9 


 


Gran %   66.4 


 


Lymph % (Auto)   21.2 L 


 


Mono % (Auto)   8.7 H 


 


Eos % (Auto)   3.4 


 


Baso % (Auto)   0.3 


 


Gran #   6.09 


 


Lymph # (Auto)   1.9 


 


Mono # (Auto)   0.8 H 


 


Eos # (Auto)   0.3 


 


Baso # (Auto)   0.03 


 


PT    11.3


 


INR    0.98


 


APTT    28.2


 


Sodium  142  


 


Potassium  4.2  


 


Chloride  107  


 


Carbon Dioxide  24  


 


Anion Gap  14  


 


BUN  24 H  


 


Creatinine  1.0  


 


Est GFR ( Amer)  > 60  


 


Est GFR (Non-Af Amer)  56  


 


Random Glucose  110  


 


Calcium  8.8  


 


Phosphorus  4.1  


 


Magnesium  2.1  


 


Total Bilirubin  0.3  


 


AST  28  


 


ALT  18  


 


Alkaline Phosphatase  89  


 


Total Protein  6.2  


 


Albumin  3.6  


 


Globulin  2.6  


 


Albumin/Globulin Ratio  1.4  


 


Blood Type   


 


Antibody Screen   


 


Crossmatch   


 


BBK History Checked   











EKG/Cardiology Studies: 


Cardiology / EKG Studies





05/22/18 14:48


EKG [ELECTROCARDIOGRAM] Stat 


   Comment: 


   Reason For Exam: SOB











Fingerstick Blood Sugar Results: 114





Review of Systems





- Review of Systems


Review of Systems: 





as per [facility]





Critical Care Progress Note





- Nutrition


Nutrition: 


 Nutrition











 Category Date Time Status


 


 NPO Diet [DIET] Diets  05/22/18 Dinner Ordered














Assessment/Plan





- Assessment and Plan (Free Text)


Assessment: 





This is a 63 yo AA F with PMH of severe MR s/p mitral valve bioprosthesis, 

paroxysmal AFib on Eliquis, non-obstructing CAD, and COPD.  She presented with 

progressively worsening shortness of breath and 2 weeks tarry stools/diarrhea, 

and was admitted to ICU for symptomatic anemia (Hgb 6.7) suspected to be 2/2 GI 

bleed.


Plan: 





Neuro:


-AAOx3, moving all extremities spontaneously


-maintain normothermia


-weakness likely 2/2 anemia, should be evaluated by PT/OT after Hgb stabilized 

and transferred to floors





Cardio:


-persistently tachycardic to 110's, but hemodynamically stable, maintaining MAP 

> 65 and 's-150's


-cardiac meds held overnight due to NPO for GI bleed, will restart home 

Lopressor and Diltiazem, continue to hold Lasix/Aspirin/Eliquis


-s/p 2 units pRBCs and FFP each, pending 3rd unit of pRBCs due to overnight Hgb 

decrease from 8.6 (post-transfusion) to 7.8 (this AM)


-holding AC for AFib due to suspected GI bleed


-Cardio consulted, appreciate their recs





Pulm:


-resume home Singulair


-continue PRN Duonebs given hx COPD


-Supplemental O2 as needed, satting well on 2L NC currently





GI:


-NPO except meds given suspected GI bleed


-continue Protonix drip


-GI following, appreciate all recs; pending EGD tomorrow after off Eliquis for 

48 hours, if starts actively bleeding can do tonight





Renal:


-monitor and replete electrolytes as needed


-monitor I's and O's


-BUN improved to 24 from 41, Cr stable at 1.0 (was 0.9), continue to monitor





Heme


-coags wnl again today


-s/p 2 units FFP, 2 units pRBCs, receiving 3rd unit pRBCs now


-will recheck CBC after 3rd unit to confirm appropriate Hgb response to 

transfusion





ID:


-afebrile, no leukocytosis


-no indication for abx at this time





Dispo: ICU, pending EGD tomorrow, tonight if emergently needed, pending CBC 

recheck after 3rd unit pRBCs


FEN: NPO pending scope, possible GI bleed


Access: Peripheral IV x3


Consults: GI, Cardio


Ppx: Protonix drip covers GI, SCDs for DVT (avoid AC in setting of suspected GI 

bleed)





Patient seen, reviewed, and discussed with attending, Dr. Mendiola





<Milton Mendiola - Last Filed: 05/23/18 12:02>





CCU Objective





- Vital Signs / Intake & Output


Vital Signs (Last 4 hours): 


Vital Signs











  Temp Pulse Resp BP


 


 05/23/18 09:26  98.7 F  108 H  16  135/75


 


 05/23/18 09:06  98.6 F  102 H  16  131/70











Intake and Output (Last 8hrs): 


 Intake & Output











 05/22/18 05/23/18 05/23/18





 22:59 06:59 14:59


 


Intake Total 520  0


 


Balance 520  0


 


Weight 162 lb  


 


Intake:   


 


    


 


    Right Forearm 120  


 


  Oral 60  


 


  Blood Product 325  0


 


    Red Blood Cells Cpd As1   0





    Lr  Unit [id number]   


 


    Red Blood Cells Cpd As1 325  





    Lr  Unit [id number]   


 


  Other 15  


 


    Red Blood Cells Cpd As1 15  





    Lr  Unit [id number]   


 


Other:   


 


  Voiding Method Toilet  


 


  # Voids   


 


    Urine, Voided 0  


 


  # Bowel Movements 0  














- Medications


Active Medications: 


Active Medications











Generic Name Dose Route Start Last Admin





  Trade Name Freq  PRN Reason Stop Dose Admin


 


Albuterol/Ipratropium  3 ml  05/22/18 15:35  





  Duoneb 3 Mg/0.5 Mg (3 Ml) Ud  IH   





  [id number] PRN   





  Shortness of Breath   


 


Diltiazem HCl  120 mg  05/23/18 10:00  





  Cardizem Cd  PO   





  DAILY OLMAN   


 


Diphenhydramine HCl  25 mg  05/22/18 17:49  05/23/18 00:43





  Benadryl  PO   25 mg





  Q6 PRN   Administration





  Allergy symptoms   


 


Pantoprazole Sodium  40 mg in 100 mls @ 20 mls/hr  05/22/18 15:30  05/23/18 07:

22





  Protonix 40mg Ivpb  IVPB   20 mls/hr





  .Q5H OLMAN   Administration


 


Metoprolol Tartrate  25 mg  05/23/18 10:00  





  Lopressor  PO   





  BID OLMAN   


 


Metoprolol Tartrate  5 mg  05/23/18 09:45  





  Lopressor  IV  05/24/18 23:59  





  Q6H OLMAN   


 


Ondansetron HCl  4 mg  05/22/18 15:29  





  Zofran Inj  IVP   





  Q6H PRN   





  Nausea/Vomiting   














- Patient Studies


Lab Studies: 


 Lab Studies











  05/23/18 05/23/18 05/23/18 Range/Units





  06:00 06:00 06:00 


 


WBC    9.2  D  (4.5-11.0)  10^3/ul


 


RBC    2.66 L  (3.5-6.1)  10^6/uL


 


Hgb    7.8 L  (12.0-16.0)  g/dL


 


Hct    23.1 L  (36.0-48.0)  %


 


MCV    86.8  (80.0-105.0)  fl


 


MCH    29.3  (25.0-35.0)  pg


 


MCHC    33.8  (31.0-37.0)  g/dl


 


RDW    16.7 H  (11.5-14.5)  %


 


Plt Count    182  (120.0-450.0)  10^3/uL


 


MPV    9.9  (7.0-11.0)  fl


 


Gran %    66.4  (50.0-68.0)  %


 


Lymph % (Auto)    21.2 L  (22.0-35.0)  %


 


Mono % (Auto)    8.7 H  (1.0-6.0)  %


 


Eos % (Auto)    3.4  (1.5-5.0)  %


 


Baso % (Auto)    0.3  (0.0-3.0)  %


 


Gran #    6.09  (1.4-6.5)  


 


Lymph # (Auto)    1.9  (1.2-3.4)  


 


Mono # (Auto)    0.8 H  (0.1-0.6)  


 


Eos # (Auto)    0.3  (0.0-0.7)  


 


Baso # (Auto)    0.03  (0.0-2.0)  K/mm3


 


PT   11.3   (9.4-12.5)  SECONDS


 


INR   0.98   (0.93-1.08)  


 


APTT   28.2   (25.1-36.5)  Seconds


 


Sodium     (132-148)  mmol/L


 


Potassium     (3.6-5.0)  mmol/L


 


Chloride     ()  mmol/L


 


Carbon Dioxide     (21-33)  mmol/L


 


Anion Gap     (10-20)  


 


BUN     (7-21)  mg/dL


 


Creatinine     (0.7-1.2)  mg/dl


 


Est GFR (African Amer)     


 


Est GFR (Non-Af Amer)     


 


Random Glucose     ()  mg/dL


 


Calcium     (8.4-10.5)  mg/dL


 


Phosphorus     (2.5-4.5)  mg/dL


 


Magnesium     (1.7-2.2)  mg/dL


 


Total Bilirubin     (0.2-1.3)  mg/dL


 


AST     (14-36)  U/L


 


ALT     (7-56)  U/L


 


Alkaline Phosphatase     ()  U/L


 


Total Protein     (5.8-8.3)  g/dL


 


Albumin     (3.0-4.8)  g/dL


 


Globulin     gm/dL


 


Albumin/Globulin Ratio     (1.1-1.8)  


 


Triglycerides  119    ()  mg/dL


 


Cholesterol  194    (130-200)  mg/dL


 


LDL Cholesterol Direct  117    (0-129)  mg/dL


 


HDL Cholesterol  51    (29-60)  mg/dL


 


Blood Type     


 


Antibody Screen     


 


Crossmatch     


 


BBK History Checked     














  05/23/18 05/22/18 05/22/18 Range/Units





  06:00 22:55 22:55 


 


WBC   12.6 H   (4.5-11.0)  10^3/ul


 


RBC   2.88 L   (3.5-6.1)  10^6/uL


 


Hgb   8.6 L   (12.0-16.0)  g/dL


 


Hct   25.3 L   (36.0-48.0)  %


 


MCV   87.8  D   (80.0-105.0)  fl


 


MCH   29.9   (25.0-35.0)  pg


 


MCHC   34.0   (31.0-37.0)  g/dl


 


RDW   16.0 H   (11.5-14.5)  %


 


Plt Count   187   (120.0-450.0)  10^3/uL


 


MPV   9.7   (7.0-11.0)  fl


 


Gran %     (50.0-68.0)  %


 


Lymph % (Auto)     (22.0-35.0)  %


 


Mono % (Auto)     (1.0-6.0)  %


 


Eos % (Auto)     (1.5-5.0)  %


 


Baso % (Auto)     (0.0-3.0)  %


 


Gran #     (1.4-6.5)  


 


Lymph # (Auto)     (1.2-3.4)  


 


Mono # (Auto)     (0.1-0.6)  


 


Eos # (Auto)     (0.0-0.7)  


 


Baso # (Auto)     (0.0-2.0)  K/mm3


 


PT    11.5  (9.4-12.5)  SECONDS


 


INR    1.01  (0.93-1.08)  


 


APTT    29.8  (25.1-36.5)  Seconds


 


Sodium  142    (132-148)  mmol/L


 


Potassium  4.2    (3.6-5.0)  mmol/L


 


Chloride  107    ()  mmol/L


 


Carbon Dioxide  24    (21-33)  mmol/L


 


Anion Gap  14    (10-20)  


 


BUN  24 H    (7-21)  mg/dL


 


Creatinine  1.0    (0.7-1.2)  mg/dl


 


Est GFR (African Amer)  > 60    


 


Est GFR (Non-Af Amer)  56    


 


Random Glucose  110    ()  mg/dL


 


Calcium  8.8    (8.4-10.5)  mg/dL


 


Phosphorus  4.1    (2.5-4.5)  mg/dL


 


Magnesium  2.1    (1.7-2.2)  mg/dL


 


Total Bilirubin  0.3    (0.2-1.3)  mg/dL


 


AST  28    (14-36)  U/L


 


ALT  18    (7-56)  U/L


 


Alkaline Phosphatase  89    ()  U/L


 


Total Protein  6.2    (5.8-8.3)  g/dL


 


Albumin  3.6    (3.0-4.8)  g/dL


 


Globulin  2.6    gm/dL


 


Albumin/Globulin Ratio  1.4    (1.1-1.8)  


 


Triglycerides     ()  mg/dL


 


Cholesterol     (130-200)  mg/dL


 


LDL Cholesterol Direct     (0-129)  mg/dL


 


HDL Cholesterol     (29-60)  mg/dL


 


Blood Type     


 


Antibody Screen     


 


Crossmatch     


 


BBK History Checked     














  05/22/18 Range/Units





  15:04 


 


WBC   (4.5-11.0)  10^3/ul


 


RBC   (3.5-6.1)  10^6/uL


 


Hgb   (12.0-16.0)  g/dL


 


Hct   (36.0-48.0)  %


 


MCV   (80.0-105.0)  fl


 


MCH   (25.0-35.0)  pg


 


MCHC   (31.0-37.0)  g/dl


 


RDW   (11.5-14.5)  %


 


Plt Count   (120.0-450.0)  10^3/uL


 


MPV   (7.0-11.0)  fl


 


Gran %   (50.0-68.0)  %


 


Lymph % (Auto)   (22.0-35.0)  %


 


Mono % (Auto)   (1.0-6.0)  %


 


Eos % (Auto)   (1.5-5.0)  %


 


Baso % (Auto)   (0.0-3.0)  %


 


Gran #   (1.4-6.5)  


 


Lymph # (Auto)   (1.2-3.4)  


 


Mono # (Auto)   (0.1-0.6)  


 


Eos # (Auto)   (0.0-0.7)  


 


Baso # (Auto)   (0.0-2.0)  K/mm3


 


PT   (9.4-12.5)  SECONDS


 


INR   (0.93-1.08)  


 


APTT   (25.1-36.5)  Seconds


 


Sodium   (132-148)  mmol/L


 


Potassium   (3.6-5.0)  mmol/L


 


Chloride   ()  mmol/L


 


Carbon Dioxide   (21-33)  mmol/L


 


Anion Gap   (10-20)  


 


BUN   (7-21)  mg/dL


 


Creatinine   (0.7-1.2)  mg/dl


 


Est GFR (African Amer)   


 


Est GFR (Non-Af Amer)   


 


Random Glucose   ()  mg/dL


 


Calcium   (8.4-10.5)  mg/dL


 


Phosphorus   (2.5-4.5)  mg/dL


 


Magnesium   (1.7-2.2)  mg/dL


 


Total Bilirubin   (0.2-1.3)  mg/dL


 


AST   (14-36)  U/L


 


ALT   (7-56)  U/L


 


Alkaline Phosphatase   ()  U/L


 


Total Protein   (5.8-8.3)  g/dL


 


Albumin   (3.0-4.8)  g/dL


 


Globulin   gm/dL


 


Albumin/Globulin Ratio   (1.1-1.8)  


 


Triglycerides   ()  mg/dL


 


Cholesterol   (130-200)  mg/dL


 


LDL Cholesterol Direct   (0-129)  mg/dL


 


HDL Cholesterol   (29-60)  mg/dL


 


Blood Type  O POSITIVE  


 


Antibody Screen  Negative  


 


Crossmatch  See Detail  


 


BBK History Checked  Patient has bt  








 Laboratory Results - last 24 hr











  05/22/18 05/22/18 05/22/18





  15:04 22:55 22:55


 


WBC    12.6 H


 


RBC    2.88 L


 


Hgb    8.6 L


 


Hct    25.3 L


 


MCV    87.8  D


 


MCH    29.9


 


MCHC    34.0


 


RDW    16.0 H


 


Plt Count    187


 


MPV    9.7


 


Gran %   


 


Lymph % (Auto)   


 


Mono % (Auto)   


 


Eos % (Auto)   


 


Baso % (Auto)   


 


Gran #   


 


Lymph # (Auto)   


 


Mono # (Auto)   


 


Eos # (Auto)   


 


Baso # (Auto)   


 


PT   11.5 


 


INR   1.01 


 


APTT   29.8 


 


Sodium   


 


Potassium   


 


Chloride   


 


Carbon Dioxide   


 


Anion Gap   


 


BUN   


 


Creatinine   


 


Est GFR ( Amer)   


 


Est GFR (Non-Af Amer)   


 


Random Glucose   


 


Calcium   


 


Phosphorus   


 


Magnesium   


 


Total Bilirubin   


 


AST   


 


ALT   


 


Alkaline Phosphatase   


 


Total Protein   


 


Albumin   


 


Globulin   


 


Albumin/Globulin Ratio   


 


Triglycerides   


 


Cholesterol   


 


LDL Cholesterol Direct   


 


HDL Cholesterol   


 


Blood Type  O POSITIVE  


 


Antibody Screen  Negative  


 


Crossmatch  See Detail  


 


BBK History Checked  Patient has bt  














  05/23/18 05/23/18 05/23/18





  06:00 06:00 06:00


 


WBC   9.2  D 


 


RBC   2.66 L 


 


Hgb   7.8 L 


 


Hct   23.1 L 


 


MCV   86.8 


 


MCH   29.3 


 


MCHC   33.8 


 


RDW   16.7 H 


 


Plt Count   182 


 


MPV   9.9 


 


Gran %   66.4 


 


Lymph % (Auto)   21.2 L 


 


Mono % (Auto)   8.7 H 


 


Eos % (Auto)   3.4 


 


Baso % (Auto)   0.3 


 


Gran #   6.09 


 


Lymph # (Auto)   1.9 


 


Mono # (Auto)   0.8 H 


 


Eos # (Auto)   0.3 


 


Baso # (Auto)   0.03 


 


PT    11.3


 


INR    0.98


 


APTT    28.2


 


Sodium  142  


 


Potassium  4.2  


 


Chloride  107  


 


Carbon Dioxide  24  


 


Anion Gap  14  


 


BUN  24 H  


 


Creatinine  1.0  


 


Est GFR ( Amer)  > 60  


 


Est GFR (Non-Af Amer)  56  


 


Random Glucose  110  


 


Calcium  8.8  


 


Phosphorus  4.1  


 


Magnesium  2.1  


 


Total Bilirubin  0.3  


 


AST  28  


 


ALT  18  


 


Alkaline Phosphatase  89  


 


Total Protein  6.2  


 


Albumin  3.6  


 


Globulin  2.6  


 


Albumin/Globulin Ratio  1.4  


 


Triglycerides   


 


Cholesterol   


 


LDL Cholesterol Direct   


 


HDL Cholesterol   


 


Blood Type   


 


Antibody Screen   


 


Crossmatch   


 


BBK History Checked   














  05/23/18





  06:00


 


WBC 


 


RBC 


 


Hgb 


 


Hct 


 


MCV 


 


MCH 


 


MCHC 


 


RDW 


 


Plt Count 


 


MPV 


 


Gran % 


 


Lymph % (Auto) 


 


Mono % (Auto) 


 


Eos % (Auto) 


 


Baso % (Auto) 


 


Gran # 


 


Lymph # (Auto) 


 


Mono # (Auto) 


 


Eos # (Auto) 


 


Baso # (Auto) 


 


PT 


 


INR 


 


APTT 


 


Sodium 


 


Potassium 


 


Chloride 


 


Carbon Dioxide 


 


Anion Gap 


 


BUN 


 


Creatinine 


 


Est GFR ( Amer) 


 


Est GFR (Non-Af Amer) 


 


Random Glucose 


 


Calcium 


 


Phosphorus 


 


Magnesium 


 


Total Bilirubin 


 


AST 


 


ALT 


 


Alkaline Phosphatase 


 


Total Protein 


 


Albumin 


 


Globulin 


 


Albumin/Globulin Ratio 


 


Triglycerides  119


 


Cholesterol  194


 


LDL Cholesterol Direct  117


 


HDL Cholesterol  51


 


Blood Type 


 


Antibody Screen 


 


Crossmatch 


 


BBK History Checked 











EKG/Cardiology Studies: 


Cardiology / EKG Studies





05/22/18 14:48


EKG [ELECTROCARDIOGRAM] Stat 


   Comment: 


   Reason For Exam: SOB














Critical Care Progress Note





- Nutrition


Nutrition: 


 Nutrition











 Category Date Time Status


 


 NPO Diet [DIET] Diets  05/22/18 Dinner Ordered














Assessment/Plan





- Assessment and Plan (Free Text)


Plan: 


Patient seen and examined on rounds with resident, agree with note with 

following additions/exceptions:


Patient is 61yo female a/w GIB, Anemia, SOB, found to have HH 6.7, given 2u PRBC

, 2u FFP, and additional 1u PRBC today. Currently afebrile, HD stable, 

comfortable in NAD, doing well, reports NO major complaints. GI consulted, plan 

for EGD tomorrow. Cardiology consulted as well. Eliquis being held. 


CT abd/pelvis neg for acute pathology





GIB


Anemia


SOB


PAF on Eliquis


HTN


Tachycardia


Guiac Positive





 





Recommend:


- supp o2 as needed


- duonebs PRN


- resume Cardizem PO


- Panculture


- hold Eliquis


- 1u PRBC, repeat HH


- cardiology eval 


- GI eval


- PPI drip


- NPO


- maintain 2 large bore PIVs


- q6hr CBC


- GI ppx


- DVT ppx, SCDs


- monitor in MICU

## 2018-05-24 LAB
ALBUMIN SERPL-MCNC: 3.6 G/DL (ref 3–4.8)
ALBUMIN/GLOB SERPL: 1.2 {RATIO} (ref 1.1–1.8)
ALT SERPL-CCNC: 33 U/L (ref 7–56)
APTT BLD: 28.6 SECONDS (ref 25.1–36.5)
AST SERPL-CCNC: 27 U/L (ref 14–36)
BASOPHILS # BLD AUTO: 0.01 K/MM3 (ref 0–2)
BASOPHILS NFR BLD: 0.1 % (ref 0–3)
BUN SERPL-MCNC: 12 MG/DL (ref 7–21)
CALCIUM SERPL-MCNC: 9 MG/DL (ref 8.4–10.5)
EOSINOPHIL # BLD: 0.5 10*3/UL (ref 0–0.7)
EOSINOPHIL NFR BLD: 5.1 % (ref 1.5–5)
ERYTHROCYTE [DISTWIDTH] IN BLOOD BY AUTOMATED COUNT: 16.8 % (ref 11.5–14.5)
GFR NON-AFRICAN AMERICAN: > 60
GRANULOCYTES # BLD: 6.26 10*3/UL (ref 1.4–6.5)
GRANULOCYTES NFR BLD: 68.1 % (ref 50–68)
HGB BLD-MCNC: 9.6 G/DL (ref 12–16)
INR PPP: 0.99 (ref 0.93–1.08)
LYMPHOCYTES # BLD: 1.6 10*3/UL (ref 1.2–3.4)
LYMPHOCYTES NFR BLD AUTO: 17 % (ref 22–35)
MCH RBC QN AUTO: 29.5 PG (ref 25–35)
MCHC RBC AUTO-ENTMCNC: 33.6 G/DL (ref 31–37)
MCV RBC AUTO: 88 FL (ref 80–105)
MONOCYTES # BLD AUTO: 0.9 10*3/UL (ref 0.1–0.6)
MONOCYTES NFR BLD: 9.7 % (ref 1–6)
PLATELET # BLD: 174 10^3/UL (ref 120–450)
PMV BLD AUTO: 9.5 FL (ref 7–11)
PROTHROMBIN TIME: 11.3 SECONDS (ref 9.4–12.5)
RBC # BLD AUTO: 3.25 10^6/UL (ref 3.5–6.1)
WBC # BLD AUTO: 9.2 10^3/UL (ref 4.5–11)

## 2018-05-24 PROCEDURE — 0DB68ZX EXCISION OF STOMACH, VIA NATURAL OR ARTIFICIAL OPENING ENDOSCOPIC, DIAGNOSTIC: ICD-10-PCS | Performed by: INTERNAL MEDICINE

## 2018-05-24 RX ADMIN — OXYCODONE HYDROCHLORIDE AND ACETAMINOPHEN PRN TAB: 5; 325 TABLET ORAL at 13:06

## 2018-05-24 RX ADMIN — PANTOPRAZOLE SODIUM SCH MLS/HR: 40 INJECTION, POWDER, FOR SOLUTION INTRAVENOUS at 09:10

## 2018-05-24 RX ADMIN — METOPROLOL TARTRATE SCH MG: 5 INJECTION INTRAVENOUS at 10:53

## 2018-05-24 RX ADMIN — PANTOPRAZOLE SODIUM SCH MLS/HR: 40 INJECTION, POWDER, FOR SOLUTION INTRAVENOUS at 06:54

## 2018-05-24 RX ADMIN — OXYCODONE HYDROCHLORIDE AND ACETAMINOPHEN PRN TAB: 5; 325 TABLET ORAL at 21:25

## 2018-05-24 RX ADMIN — METOPROLOL TARTRATE SCH: 5 INJECTION INTRAVENOUS at 03:00

## 2018-05-24 RX ADMIN — DILTIAZEM HYDROCHLORIDE SCH MG: 120 CAPSULE, COATED, EXTENDED RELEASE ORAL at 10:52

## 2018-05-24 RX ADMIN — PANTOPRAZOLE SODIUM SCH MLS/HR: 40 INJECTION, POWDER, FOR SOLUTION INTRAVENOUS at 22:48

## 2018-05-24 RX ADMIN — PANTOPRAZOLE SODIUM SCH MLS/HR: 40 INJECTION, POWDER, FOR SOLUTION INTRAVENOUS at 16:03

## 2018-05-24 NOTE — PN
DATE:  05/24/2018



This is [facility]'s hospital visit on the telemetry floor.



For Dr. Alatorre.



SUBJECTIVE:  The patient is a 62-year-old female, seen lying awake in bed,

now transferred from the Intensive Care Unit to the telemetry floor for

continued monitoring.  It should be noted that the patient did have a

significant drop in her hemoglobin with a hemoglobin of 6.7 on admission. 

Post transfusions, her hemoglobin today is 9.6 with the patient known to

suffer from paroxysmal atrial fibrillation _____ Eliquis in the past with

the patient now suspected to have a GI bleed.  At present, she is in no

acute distress with the patient reporting some discomfort to her joints and

her knees and her wrists with her narcotic analgesics helping her pain.



OBJECTIVE PHYSICAL EXAMINATION:

VITAL SIGNS:  Temperature was 98.6, pulse 111, respirations 22, blood

pressure 141/81 with a pulse ox of 100%.

HEENT:  Unremarkable.

NECK:  Supple.

HEART:  Regular rate.

LUNGS:  Clear.

ABDOMEN:  Soft, obese, nontender.

EXTREMITIES:  No edema.

SKIN:  Warm and dry.

NEUROLOGIC:  Awake, alert and oriented.



LABORATORY DATA:  The patient's labs were done.  White blood cell count of

9.2, hemoglobin 9.6, up from 6.7 two days prior; hemoglobin of 28.6;

platelet count of 174,000 with the chem metabolic panel completely within

normal range this visit with an INR of 0.99.



ASSESSMENT:  The assessment for this patient is that of gastrointestinal

bleed with severe symptomatic anemia, status post transfusion of 3 units of

packed red blood cells with EGD done earlier today with Dr. Frost noting

a nonbleeding antral ulcer that was biopsied on EGD earlier today.  The

patient also suffers from hypertension; chronic obstructive pulmonary

disease; paroxysmal atrial fibrillation, on Eliquis in the past; epistaxis;

obesity.



PLAN:  The plan for this patient after conversation with Dr. Alatorre is to

continue with present medical regimen as per consultants' recommendations

with her anticoagulation to be started once her GI bleed is satisfactorily

controlled as per gastrointestinal consultant's recommendations and as per

Dr. Gaxiola's recommendation.



Prognosis for this patient is guarded.  This is a complex patient with a

comprehensive medically necessary and appropriate visit carried out in

excess of 30 minutes' face-to-face time with the patient's labs reviewed. 

Medications reviewed and questions answered to her satisfaction.





__________________________________________

Jah Wright MD





DD:  05/24/2018 21:32:05

DT:  05/24/2018 21:33:08

Job # [id number]

## 2018-05-24 NOTE — CARD
--------------- APPROVED REPORT --------------





EXAM: Two-dimensional and M-mode echocardiogram with Doppler and 

color Doppler.



INDICATION

S/P MVR BIOPROSTHETIC



2D DIMENSIONS 

Left Atrium (2D)4.6   (1.6-4.0cm)IVSd1.1   (0.7-1.1cm)

LVDd5.4   (3.9-5.9cm)PWd1.3   (0.7-1.1cm)

LVDs3.4   (2.5-4.0cm)FS (%) 36.2   %

LVEF (%)65.4   (>50%)



M-Mode DIMENSIONS 

Aortic Root2.30   (2.2-3.7cm)Aortic Cusp Exc.1.80   (1.5-2.0cm)



Aortic Valve

AoV Peak Apatevro232.0cm/Jose Peak GR.15mmHg



Mitral Valve

MV E Qtldooti148.0cm/sMV A Hjddgkwk391.0cm/sE/A ratio1.1



TDI

E/Lateral E'0.0E/Medial E'0.0



Tricuspid Valve

TR Peak [id number]/sRAP [id number] Peak Gr.9mmHg

[id number]



 LEFT VENTRICLE 

The left ventricle is normal size. There is borderline concentric 

left ventricular hypertrophy. The left ventricular function is 

normal.EF-65% There is normal LV segmental wall motion. Transmitral 

Doppler flow pattern is Grade II-pseudonormal filling dynamics. No 

left ventricle thrombus noted on this study. There is no ventricular 

septal defect visualized. There is no left ventricular aneurysm. 

There is no mass noted in the left ventricle.



 RIGHT VENTRICLE 

The right ventricle is normal size. There is normal right ventricular 

wall thickness. The right ventricular systolic function is normal.



 ATRIA 

The left atrium is mildly dilated. The right atrium size is normal. 

The interatrial septum is intact with no evidence for an atrial 

septal defect.



 AORTIC VALVE 

The aortic valve is thickened but opens well. There is trace aortic 

regurgitation. There is no aortic valvular stenosis. There is no 

aortic valvular vegetation.



 MITRAL VALVE 

Mitral regurgitation is trace. There is a bioprosthetic mitral 

valve.S/p MVR normal functioning.



 TRICUSPID VALVE 

The tricuspid valve leaflets are thickened , but open well. There is 

trace tricuspid regurgitation.RVSP_19 mmof Hg. There is no tricuspid 

valve stenosis. There is no tricuspid valve prolapse or vegetation.



 PULMONIC VALVE 

The pulmonary valve is normal in structure.



 GREAT VESSELS 

The aortic root is normal in size. The ascending aorta is normal in 

size. The pulmonary artery is normal. The IVC is normal in size and 

collapses >50% with inspiration.



 PERICARDIAL EFFUSION 

There is no pleural effusion. There is no pericardial effusion.



<Conclusion>

The left ventricle is normal size.

There is borderline concentric left ventricular hypertrophy.

The left ventricular function is normal.EF-65%

There is trace aortic regurgitation.

Mitral regurgitation is trace.

There is a bioprosthetic mitral valve.S/p  MVR normal functioning.

There is trace tricuspid regurgitation.RVSP_19 mmof Hg.

The IVC is normal in size and collapses >50% with inspiration.

There is no pericardial effusion.

No vegetation or thrombus noted.

## 2018-05-24 NOTE — PN
DATE:  05/24/2018



REASON FOR CONSULTATION:  Followup cardiac evaluation, history of open

heart surgery, status post mitral valve repair, paroxysmal atrial

fibrillation, on Eliquis.  Admitted with GI bleed, black tarry stool. 

Hemoglobin 6.7.



SUBJECTIVE:  The patient denies any chest pain, shortness of breath or any

palpitation.



OBJECTIVE:

GENERAL:  Not in any apparent distress, lying flat on the bed.  Had

endoscopy done, found to be ulcer, nonbleeding.  No blood clot.

VITAL SIGNS:  Temperature afebrile, heart rate 100, blood pressure 128/53.

HEENT:  PERRLA.  Extraocular muscles intact.

NECK:  Supple.  No carotid bruit.  No thyromegaly.

CHEST:  Clear to auscultation.

HEART:  S1 and S2 regular.

ABDOMEN:  Soft.

EXTREMITIES:  Clubbing and cyanosis negative.



LABORATORY DATA:  Blood workup as follows; WBC 9.2, hemoglobin 9.6,

hematocrit 28.6, platelet count 174.  Chemistry shows sodium 141, potassium

3.8, chloride 107, carbon dioxide 24, anion gap of 14, BUN 12, creatinine

0.9, total bilirubin 0.5, phosphorus 3.7, magnesium 2.  TSH 0.86,

triglyceride _____, cholesterol 194, , HDL 51.



IMPRESSION:  Status post gastrointestinal bleed, black tarry stool. 

Admitting hemoglobin 6.7.  Status post endoscopy, large ulcer noted. 

History of peptic ulcer in the past, possibly is recurrence.  No blood clot

noted.



History of paroxysmal atrial fibrillation, history of severe mitral regurg,

mitral valve prolapse, status post open heart surgery and mitral valve

repair, hypertension, obesity.



RECOMMENDATIONS:  Eliquis is on hold.  Continue Cardizem CD p.o.  We will

start p.o. beta-blocker to control the heart rate and going back into

arrhythmia.  Once ulcer healing was started, trial of heparin and if the

patient can tolerate, we will go back to Eliquis if not bleeding.  We will

follow with you.  We will discontinue IV metoprolol and start p.o.  Repeat

the blood workup in the morning.  Okay to be transferred to telemetry. 

Discussed with intensivist, Dr. Milton Mendiola.  We will discontinue IV

Lopressor and start p.o.



Thank you, Dr. Dinh, for providing us the opportunity in taking care of

the patient, Malu Meeks.





__________________________________________

Ajay Gaxiola MD







DD:  05/24/2018 11:58:10

DT:  05/24/2018 12:16:12

[facility] # [id number]

## 2018-05-24 NOTE — CP.CCUPN
<Braden Watkins - Last Filed: 05/24/18 11:30>





CCU Subjective





- Physician Review


Subjective (Free Text): 





05/24/18 09:03


Patient seen and examined at bedside in ICU.  Reports overall feeling better, 

only complains of headache today.  Denies chest pain, shortness of breath, room 

spinning, syncope/near-syncope.  Reports 1 episode of diarrhea overnight, dark 

colored, but no reena blood.  Hgb improved to 9.6 yesterday after transfused 

3rd unit pRBC, remained 9.5 this AM.  Pending EGD today as per GI.





05/24/18 10:46


S/p EGD, as per GI, large based non-bleeding antral ulcer, site biopsied so 

primary team will wait until tomorrow to start anticoagulation.  Patient 

cleared for transfer to telemetry by GI.





CCU Objective





- Vital Signs / Intake & Output


Intake and Output (Last 8hrs): 


 Intake & Output











 05/23/18 05/24/18 05/24/18





 22:59 06:59 14:59


 


Intake Total 720  


 


Output Total 350  


 


Balance 370  


 


Intake:   


 


    


 


    Right Forearm 240  


 


  Oral 480  


 


Output:   


 


  Urine 350  


 


    Urine, Voided 350  


 


Other:   


 


  # Bowel Movements 1  














- Physical Exam


Head: Positive for: Atraumatic, Normocephalic


Pupils: Positive for: PERRL.  Negative for: Non-Reactive, Pinpoint


Extroacular Muscles: Positive for: EOMI.  Negative for: Gaze Palsy, Entrapment


Conjunctiva: Positive for: Normal (conjunctival pallor resolved).  Negative for

: Injected, Icteric


Mouth: Positive for: Moist Mucous Membranes, Normal Lips, Normal Tounge, Normal 

Teeth.  Negative for: Dry, Drooling


Pharnyx: Positive for: Normal.  Negative for: ERYTHEMA, EXUDATE, Uvular 

Deviation, Muffled/Hoarse Voice


Nose (External): Positive for: Atraumatic.  Negative for: Abrasion, Laceration


Nose (Internal): Positive for: Normal Inspection, No Active Bleeding.  Negative 

for: Epistaxis


Neck: Positive for: Normal Range of Motion, Trachea Midline.  Negative for: JVD


Respiratory/Chest: Positive for: Clear to Auscultation, Good Air Exchange.  

Negative for: Respiratory Distress, Accessory Muscle Use, Wheezes


Cardiovascular: Positive for: Regular Rate and Rhythm, Murmurs, Normal S1, S2, 

Peripheal Pulses Present (+2 radial and dorsalis pedis bilaterally).  Negative 

for: Irregular Rhythm, Tachycardic, Bradycardic


Abdomen: Positive for: Normal Bowel Sounds.  Negative for: Tenderness, 

Distention, Peritoneal Signs


Genitourinary/Pelvic Exam: Negative for: Vaginal Bleeding


Back: Negative for: CVA Tenderness


Upper Extremity: Positive for: Normal Inspection, Normal ROM, NORMAL PULSES.  

Negative for: Cyanosis, Edema, Tenderness, Swelling, Erythema, Deformity


Lower Extremity: Positive for: Normal Inspection, NORMAL PULSES, Normal ROM.  

Negative for: Edema, CALF TENDERNESS, Tenderness, Swelling, Erythema, Deformity


Neurological: Positive for: GCS=15, Speech Normal, Motor Func Grossly Intact, 

Normal Sensory Function, Other (moving all extremities spontaneously)


Skin: Positive for: Warm, Dry, Normal Color.  Negative for: Rashes, Diaphoretic

, Cold, Pale


Psychiatric: Positive for: Alert, Oriented x 3 (self, location, year), Normal 

Insight, Normal Concentration, Normal Affect, Normal Mood.  Negative for: 

Anxious, Agitated, Lethargic





- Medications


Active Medications: 


Active Medications











Generic Name Dose Route Start Last Admin





  Trade Name Freq  PRN Reason Stop Dose Admin


 


Albuterol/Ipratropium  3 ml  05/22/18 15:35  





  Duoneb 3 Mg/0.5 Mg (3 Ml) Ud  IH   





  [id number] PRN   





  Shortness of Breath   


 


Diltiazem HCl  120 mg  05/23/18 10:00  05/23/18 16:35





  Cardizem Cd  PO   120 mg





  DAILY OLMAN   Administration


 


Diphenhydramine HCl  25 mg  05/22/18 17:49  05/23/18 00:43





  Benadryl  PO   25 mg





  Q6 PRN   Administration





  Allergy symptoms   


 


Pantoprazole Sodium  40 mg in 100 mls @ 20 mls/hr  05/22/18 15:30  05/24/18 06:

54





  Protonix 40mg Ivpb  IVPB   20 mls/hr





  .Q5H OLMAN   Administration


 


Metoprolol Tartrate  25 mg  05/23/18 10:00  





  Lopressor  PO   





  BID OLMAN   


 


Metoprolol Tartrate  5 mg  05/23/18 09:45  05/24/18 03:00





  Lopressor  IV  05/24/18 23:59  Not Given





  Q6H OLMAN   


 


Ondansetron HCl  4 mg  05/22/18 15:29  





  Zofran Inj  IVP   





  Q6H PRN   





  Nausea/Vomiting   














- Patient Studies


Lab Studies: 


 Microbiology Studies











 05/22/18 16:01 MRSA Culture (Admit) - Final





 Naris    MRSA NOT DETECTED








 Lab Studies











  05/24/18 05/24/18 05/24/18 Range/Units





  04:20 04:20 04:20 


 


WBC     (4.5-11.0)  10^3/ul


 


RBC     (3.5-6.1)  10^6/uL


 


Hgb     (12.0-16.0)  g/dL


 


Hct     (36.0-48.0)  %


 


MCV     (80.0-105.0)  fl


 


MCH     (25.0-35.0)  pg


 


MCHC     (31.0-37.0)  g/dl


 


RDW     (11.5-14.5)  %


 


Plt Count     (120.0-450.0)  10^3/uL


 


MPV     (7.0-11.0)  fl


 


Gran %     (50.0-68.0)  %


 


Lymph % (Auto)     (22.0-35.0)  %


 


Mono % (Auto)     (1.0-6.0)  %


 


Eos % (Auto)     (1.5-5.0)  %


 


Baso % (Auto)     (0.0-3.0)  %


 


Gran #     (1.4-6.5)  


 


Lymph # (Auto)     (1.2-3.4)  


 


Mono # (Auto)     (0.1-0.6)  


 


Eos # (Auto)     (0.0-0.7)  


 


Baso # (Auto)     (0.0-2.0)  K/mm3


 


PT   11.3   (9.4-12.5)  SECONDS


 


INR   0.99   (0.93-1.08)  


 


APTT   28.6   (25.1-36.5)  Seconds


 


Sodium    141  (132-148)  mmol/L


 


Potassium    3.8  (3.6-5.0)  mmol/L


 


Chloride    107  ()  mmol/L


 


Carbon Dioxide    24  (21-33)  mmol/L


 


Anion Gap    14  (10-20)  


 


BUN    12  (7-21)  mg/dL


 


Creatinine    0.9  (0.7-1.2)  mg/dl


 


Est GFR (African Amer)    > 60  


 


Est GFR (Non-Af Amer)    > 60  


 


Random Glucose    107  ()  mg/dL


 


Calcium    9.0  (8.4-10.5)  mg/dL


 


Phosphorus    3.7  (2.5-4.5)  mg/dL


 


Magnesium    2.0  (1.7-2.2)  mg/dL


 


Total Bilirubin    0.5  (0.2-1.3)  mg/dL


 


AST    27  (14-36)  U/L


 


ALT    33  (7-56)  U/L


 


Alkaline Phosphatase    96  ()  U/L


 


Total Protein    6.4  (5.8-8.3)  g/dL


 


Albumin    3.6  (3.0-4.8)  g/dL


 


Globulin    2.9  gm/dL


 


Albumin/Globulin Ratio    1.2  (1.1-1.8)  


 


Triglycerides     ()  mg/dL


 


Cholesterol     (130-200)  mg/dL


 


LDL Cholesterol Direct     (0-129)  mg/dL


 


HDL Cholesterol     (29-60)  mg/dL


 


TSH 3rd Generation  0.86    (0.46-4.68)  mIU/mL


 


Blood Type     


 


Antibody Screen     


 


Crossmatch     


 


BBK History Checked     














  05/24/18 05/23/18 05/23/18 Range/Units





  04:20 13:20 06:00 


 


WBC  9.2  9.3   (4.5-11.0)  10^3/ul


 


RBC  3.25 L  3.12 L   (3.5-6.1)  10^6/uL


 


Hgb  9.6 L  9.5 L   (12.0-16.0)  g/dL


 


Hct  28.6 L  27.3 L   (36.0-48.0)  %


 


MCV  88.0  87.5   (80.0-105.0)  fl


 


MCH  29.5  30.4   (25.0-35.0)  pg


 


MCHC  33.6  34.8   (31.0-37.0)  g/dl


 


RDW  16.8 H  16.3 H   (11.5-14.5)  %


 


Plt Count  174  159   (120.0-450.0)  10^3/uL


 


MPV  9.5  9.2   (7.0-11.0)  fl


 


Gran %  68.1 H  70.2 H   (50.0-68.0)  %


 


Lymph % (Auto)  17.0 L  12.3 L   (22.0-35.0)  %


 


Mono % (Auto)  9.7 H  13.1 H   (1.0-6.0)  %


 


Eos % (Auto)  5.1 H  4.2   (1.5-5.0)  %


 


Baso % (Auto)  0.1  0.2   (0.0-3.0)  %


 


Gran #  6.26  6.49   (1.4-6.5)  


 


Lymph # (Auto)  1.6  1.1 L   (1.2-3.4)  


 


Mono # (Auto)  0.9 H  1.2 H   (0.1-0.6)  


 


Eos # (Auto)  0.5  0.4   (0.0-0.7)  


 


Baso # (Auto)  0.01  0.02   (0.0-2.0)  K/mm3


 


PT     (9.4-12.5)  SECONDS


 


INR     (0.93-1.08)  


 


APTT     (25.1-36.5)  Seconds


 


Sodium     (132-148)  mmol/L


 


Potassium     (3.6-5.0)  mmol/L


 


Chloride     ()  mmol/L


 


Carbon Dioxide     (21-33)  mmol/L


 


Anion Gap     (10-20)  


 


BUN     (7-21)  mg/dL


 


Creatinine     (0.7-1.2)  mg/dl


 


Est GFR (African Amer)     


 


Est GFR (Non-Af Amer)     


 


Random Glucose     ()  mg/dL


 


Calcium     (8.4-10.5)  mg/dL


 


Phosphorus     (2.5-4.5)  mg/dL


 


Magnesium     (1.7-2.2)  mg/dL


 


Total Bilirubin     (0.2-1.3)  mg/dL


 


AST     (14-36)  U/L


 


ALT     (7-56)  U/L


 


Alkaline Phosphatase     ()  U/L


 


Total Protein     (5.8-8.3)  g/dL


 


Albumin     (3.0-4.8)  g/dL


 


Globulin     gm/dL


 


Albumin/Globulin Ratio     (1.1-1.8)  


 


Triglycerides    119  ()  mg/dL


 


Cholesterol    194  (130-200)  mg/dL


 


LDL Cholesterol Direct    117  (0-129)  mg/dL


 


HDL Cholesterol    51  (29-60)  mg/dL


 


TSH 3rd Generation     (0.46-4.68)  mIU/mL


 


Blood Type     


 


Antibody Screen     


 


Crossmatch     


 


BBK History Checked     














  05/22/18 Range/Units





  15:04 


 


WBC   (4.5-11.0)  10^3/ul


 


RBC   (3.5-6.1)  10^6/uL


 


Hgb   (12.0-16.0)  g/dL


 


Hct   (36.0-48.0)  %


 


MCV   (80.0-105.0)  fl


 


MCH   (25.0-35.0)  pg


 


MCHC   (31.0-37.0)  g/dl


 


RDW   (11.5-14.5)  %


 


Plt Count   (120.0-450.0)  10^3/uL


 


MPV   (7.0-11.0)  fl


 


Gran %   (50.0-68.0)  %


 


Lymph % (Auto)   (22.0-35.0)  %


 


Mono % (Auto)   (1.0-6.0)  %


 


Eos % (Auto)   (1.5-5.0)  %


 


Baso % (Auto)   (0.0-3.0)  %


 


Gran #   (1.4-6.5)  


 


Lymph # (Auto)   (1.2-3.4)  


 


Mono # (Auto)   (0.1-0.6)  


 


Eos # (Auto)   (0.0-0.7)  


 


Baso # (Auto)   (0.0-2.0)  K/mm3


 


PT   (9.4-12.5)  SECONDS


 


INR   (0.93-1.08)  


 


APTT   (25.1-36.5)  Seconds


 


Sodium   (132-148)  mmol/L


 


Potassium   (3.6-5.0)  mmol/L


 


Chloride   ()  mmol/L


 


Carbon Dioxide   (21-33)  mmol/L


 


Anion Gap   (10-20)  


 


BUN   (7-21)  mg/dL


 


Creatinine   (0.7-1.2)  mg/dl


 


Est GFR (African Amer)   


 


Est GFR (Non-Af Amer)   


 


Random Glucose   ()  mg/dL


 


Calcium   (8.4-10.5)  mg/dL


 


Phosphorus   (2.5-4.5)  mg/dL


 


Magnesium   (1.7-2.2)  mg/dL


 


Total Bilirubin   (0.2-1.3)  mg/dL


 


AST   (14-36)  U/L


 


ALT   (7-56)  U/L


 


Alkaline Phosphatase   ()  U/L


 


Total Protein   (5.8-8.3)  g/dL


 


Albumin   (3.0-4.8)  g/dL


 


Globulin   gm/dL


 


Albumin/Globulin Ratio   (1.1-1.8)  


 


Triglycerides   ()  mg/dL


 


Cholesterol   (130-200)  mg/dL


 


LDL Cholesterol Direct   (0-129)  mg/dL


 


HDL Cholesterol   (29-60)  mg/dL


 


TSH 3rd Generation   (0.46-4.68)  mIU/mL


 


Blood Type  O POSITIVE  


 


Antibody Screen  Negative  


 


Crossmatch  See Detail  


 


BBK History Checked  Patient has bt  








 Laboratory Results - last 24 hr











  05/22/18 05/23/18 05/23/18





  15:04 06:00 13:20


 


WBC    9.3


 


RBC    3.12 L


 


Hgb    9.5 L


 


Hct    27.3 L


 


MCV    87.5


 


MCH    30.4


 


MCHC    34.8


 


RDW    16.3 H


 


Plt Count    159


 


MPV    9.2


 


Gran %    70.2 H


 


Lymph % (Auto)    12.3 L


 


Mono % (Auto)    13.1 H


 


Eos % (Auto)    4.2


 


Baso % (Auto)    0.2


 


Gran #    6.49


 


Lymph # (Auto)    1.1 L


 


Mono # (Auto)    1.2 H


 


Eos # (Auto)    0.4


 


Baso # (Auto)    0.02


 


PT   


 


INR   


 


APTT   


 


Sodium   


 


Potassium   


 


Chloride   


 


Carbon Dioxide   


 


Anion Gap   


 


BUN   


 


Creatinine   


 


Est GFR ( Amer)   


 


Est GFR (Non-Af Amer)   


 


Random Glucose   


 


Calcium   


 


Phosphorus   


 


Magnesium   


 


Total Bilirubin   


 


AST   


 


ALT   


 


Alkaline Phosphatase   


 


Total Protein   


 


Albumin   


 


Globulin   


 


Albumin/Globulin Ratio   


 


Triglycerides   119 


 


Cholesterol   194 


 


LDL Cholesterol Direct   117 


 


HDL Cholesterol   51 


 


TSH 3rd Generation   


 


Blood Type  O POSITIVE  


 


Antibody Screen  Negative  


 


Crossmatch  See Detail  


 


BBK History Checked  Patient has bt  














  05/24/18 05/24/18 05/24/18





  04:20 04:20 04:20


 


WBC  9.2  


 


RBC  3.25 L  


 


Hgb  9.6 L  


 


Hct  28.6 L  


 


MCV  88.0  


 


MCH  29.5  


 


MCHC  33.6  


 


RDW  16.8 H  


 


Plt Count  174  


 


MPV  9.5  


 


Gran %  68.1 H  


 


Lymph % (Auto)  17.0 L  


 


Mono % (Auto)  9.7 H  


 


Eos % (Auto)  5.1 H  


 


Baso % (Auto)  0.1  


 


Gran #  6.26  


 


Lymph # (Auto)  1.6  


 


Mono # (Auto)  0.9 H  


 


Eos # (Auto)  0.5  


 


Baso # (Auto)  0.01  


 


PT    11.3


 


INR    0.99


 


APTT    28.6


 


Sodium   141 


 


Potassium   3.8 


 


Chloride   107 


 


Carbon Dioxide   24 


 


Anion Gap   14 


 


BUN   12 


 


Creatinine   0.9 


 


Est GFR ( Amer)   > 60 


 


Est GFR (Non-Af Amer)   > 60 


 


Random Glucose   107 


 


Calcium   9.0 


 


Phosphorus   3.7 


 


Magnesium   2.0 


 


Total Bilirubin   0.5 


 


AST   27 


 


ALT   33 


 


Alkaline Phosphatase   96 


 


Total Protein   6.4 


 


Albumin   3.6 


 


Globulin   2.9 


 


Albumin/Globulin Ratio   1.2 


 


Triglycerides   


 


Cholesterol   


 


LDL Cholesterol Direct   


 


HDL Cholesterol   


 


TSH 3rd Generation   


 


Blood Type   


 


Antibody Screen   


 


Crossmatch   


 


BBK History Checked   














  05/24/18





  04:20


 


WBC 


 


RBC 


 


Hgb 


 


Hct 


 


MCV 


 


MCH 


 


MCHC 


 


RDW 


 


Plt Count 


 


MPV 


 


Gran % 


 


Lymph % (Auto) 


 


Mono % (Auto) 


 


Eos % (Auto) 


 


Baso % (Auto) 


 


Gran # 


 


Lymph # (Auto) 


 


Mono # (Auto) 


 


Eos # (Auto) 


 


Baso # (Auto) 


 


PT 


 


INR 


 


APTT 


 


Sodium 


 


Potassium 


 


Chloride 


 


Carbon Dioxide 


 


Anion Gap 


 


BUN 


 


Creatinine 


 


Est GFR ( Amer) 


 


Est GFR (Non-Af Amer) 


 


Random Glucose 


 


Calcium 


 


Phosphorus 


 


Magnesium 


 


Total Bilirubin 


 


AST 


 


ALT 


 


Alkaline Phosphatase 


 


Total Protein 


 


Albumin 


 


Globulin 


 


Albumin/Globulin Ratio 


 


Triglycerides 


 


Cholesterol 


 


LDL Cholesterol Direct 


 


HDL Cholesterol 


 


TSH 3rd Generation  0.86


 


Blood Type 


 


Antibody Screen 


 


Crossmatch 


 


BBK History Checked 











Fingerstick Blood Sugar Results: 114





Review of Systems





- Review of Systems


All systems: reviewed and no additional remarkable complaints except (as per HPI

)





Critical Care Progress Note





- Nutrition


Nutrition: 


 Nutrition











 Category Date Time Status


 


 Liquid Diet [DIET] Diets  05/23/18 Dinner Ordered














Assessment/Plan





- Assessment and Plan (Free Text)


Assessment: 





This is a 63 yo AA F with PMH of severe MR s/p mitral valve bioprosthesis, 

paroxysmal AFib on Eliquis, non-obstructing CAD, and COPD.  She presented with 

progressively worsening shortness of breath and 2 weeks tarry stools/diarrhea, 

and was admitted to ICU for symptomatic anemia (Hgb 6.7) suspected to be 2/2 GI 

bleed.  Her Hgb has improved after transfusion of 3 units pRBCs, and she is 

pending EGD today by GI.


Plan: 





Neuro:


-AAOx3, moving all extremities spontaneously


-maintain normothermia


-weakness likely 2/2 anemia, should be evaluated by PT/OT after Hgb stabilized 

and transferred to floors





Cardio:


-remains hemodynamically stable s/p 3 units pRBCs, tachycardia resolved, Hgb 

9.5 (was 9.6 overnight)


-continue Lopressor and Diltiazem, continue to hold Lasix/Aspirin/Eliquis


-s/p 3 units pRBCs and 2 units FFP


-holding AC due to GI biopsy, to start heparin tomorrow as per GI


-Cardio consulted, appreciate their recs





Pulm:


-resume home Singulair


-continue PRN Duonebs given hx COPD


-Supplemental O2 as needed, satting well on 2L NC currently





GI:


-NPO for EGD, defer to GI for diet advancement


-transition from Protonix drip to scheduled protonix as per GI


-GI following, appreciate all recs; EGD notable for broad non-bleeding antral 

ulcer, biopsy taken, wait 24 hours prior to starting 





Renal:


-monitor and replete electrolytes as needed


-monitor I's and O's


-BUN improved to 12 from 24, Cr stable at 0.9 (was 1), continue to monitor





Heme


-coags wnl again today


-s/p 2 units FFP, 3 units pRBCs, Hgb stable at 9.5 (was 9.6 overnight)


-avoid AC for 24 hrs due to GI biopsy





ID:


-afebrile, no leukocytosis


-no indication for abx at this time





Dispo: s/p EGD, transferred to telemetry


FEN: NPO 


Access: Peripheral IV x3


Consults: GI, Cardio


Ppx: Protonix drip covers GI, SCDs for DVT (avoid AC in setting of suspected GI 

bleed)





Patient seen, reviewed, and discussed with attending, Dr. Mendiola





<Milton Mendiola - Last Filed: 05/24/18 12:26>





CCU Objective





- Vital Signs / Intake & Output


Vital Signs (Last 4 hours): 


Vital Signs











  Temp Pulse Resp BP Pulse Ox


 


 05/24/18 12:00   85  67 H  118/73 


 


 05/24/18 11:30   103 H  25 H  


 


 05/24/18 11:00   100 H  23  126/80 


 


 05/24/18 10:53   101 H   128/53 L 


 


 05/24/18 10:52   100 H   128/58 L 


 


 05/24/18 10:30   92 H  43 H  


 


 05/24/18 10:09   101 H   128/58 L 


 


 05/24/18 10:08    43 H  


 


 05/24/18 10:00   100 H   


 


 05/24/18 09:38  98.6 F  91 H  16  136/80  100


 


 05/24/18 09:36      100


 


 05/24/18 09:00   94 H  16  127/67  96


 


 05/24/18 08:30   105 H    96











Intake and Output (Last 8hrs): 


 Intake & Output











 05/23/18 05/24/18 05/24/18





 22:59 06:59 14:59


 


Intake Total 720  


 


Output Total 350  


 


Balance 370  


 


Intake:   


 


    


 


    Right Forearm 240  


 


  Oral 480  


 


Output:   


 


  Urine 350  


 


    Urine, Voided 350  


 


Other:   


 


  # Bowel Movements 1  














- Medications


Active Medications: 


Active Medications











Generic Name Dose Route Start Last Admin





  Trade Name Freq  PRN Reason Stop Dose Admin


 


Albuterol/Ipratropium  3 ml  05/22/18 15:35  





  Duoneb 3 Mg/0.5 Mg (3 Ml) Ud  IH   





  [id number] PRN   





  Shortness of Breath   


 


Diltiazem HCl  120 mg  05/23/18 10:00  05/24/18 10:52





  Cardizem Cd  PO   120 mg





  DAILY OLMAN   Administration


 


Diphenhydramine HCl  25 mg  05/22/18 17:49  05/23/18 00:43





  Benadryl  PO   25 mg





  Q6 PRN   Administration





  Allergy symptoms   


 


Pantoprazole Sodium  40 mg in 100 mls @ 20 mls/hr  05/22/18 15:30  05/24/18 09:

10





  Protonix 40mg Ivpb  IVPB   20 mls/hr





  .Q5H OLMAN   Administration


 


Metoprolol Tartrate  25 mg  05/24/18 18:00  





  Lopressor  PO   





  BID OLMAN   


 


Montelukast Sodium  10 mg  05/24/18 22:00  





  Singulair  PO   





  HS OLMAN   


 


Ondansetron HCl  4 mg  05/22/18 15:29  





  Zofran Inj  IVP   





  Q6H PRN   





  Nausea/Vomiting   














- Patient Studies


Lab Studies: 


 Microbiology Studies











 05/22/18 16:01 MRSA Culture (Admit) - Final





 Naris    MRSA NOT DETECTED








 Lab Studies











  05/24/18 05/24/18 05/24/18 Range/Units





  04:20 04:20 04:20 


 


WBC     (4.5-11.0)  10^3/ul


 


RBC     (3.5-6.1)  10^6/uL


 


Hgb     (12.0-16.0)  g/dL


 


Hct     (36.0-48.0)  %


 


MCV     (80.0-105.0)  fl


 


MCH     (25.0-35.0)  pg


 


MCHC     (31.0-37.0)  g/dl


 


RDW     (11.5-14.5)  %


 


Plt Count     (120.0-450.0)  10^3/uL


 


MPV     (7.0-11.0)  fl


 


Gran %     (50.0-68.0)  %


 


Lymph % (Auto)     (22.0-35.0)  %


 


Mono % (Auto)     (1.0-6.0)  %


 


Eos % (Auto)     (1.5-5.0)  %


 


Baso % (Auto)     (0.0-3.0)  %


 


Gran #     (1.4-6.5)  


 


Lymph # (Auto)     (1.2-3.4)  


 


Mono # (Auto)     (0.1-0.6)  


 


Eos # (Auto)     (0.0-0.7)  


 


Baso # (Auto)     (0.0-2.0)  K/mm3


 


PT   11.3   (9.4-12.5)  SECONDS


 


INR   0.99   (0.93-1.08)  


 


APTT   28.6   (25.1-36.5)  Seconds


 


Sodium    141  (132-148)  mmol/L


 


Potassium    3.8  (3.6-5.0)  mmol/L


 


Chloride    107  ()  mmol/L


 


Carbon Dioxide    24  (21-33)  mmol/L


 


Anion Gap    14  (10-20)  


 


BUN    12  (7-21)  mg/dL


 


Creatinine    0.9  (0.7-1.2)  mg/dl


 


Est GFR (African Amer)    > 60  


 


Est GFR (Non-Af Amer)    > 60  


 


Random Glucose    107  ()  mg/dL


 


Calcium    9.0  (8.4-10.5)  mg/dL


 


Phosphorus    3.7  (2.5-4.5)  mg/dL


 


Magnesium    2.0  (1.7-2.2)  mg/dL


 


Total Bilirubin    0.5  (0.2-1.3)  mg/dL


 


AST    27  (14-36)  U/L


 


ALT    33  (7-56)  U/L


 


Alkaline Phosphatase    96  ()  U/L


 


Total Protein    6.4  (5.8-8.3)  g/dL


 


Albumin    3.6  (3.0-4.8)  g/dL


 


Globulin    2.9  gm/dL


 


Albumin/Globulin Ratio    1.2  (1.1-1.8)  


 


TSH 3rd Generation  0.86    (0.46-4.68)  mIU/mL


 


Crossmatch     














  05/24/18 05/23/18 05/22/18 Range/Units





  04:20 13:20 15:04 


 


WBC  9.2  9.3   (4.5-11.0)  10^3/ul


 


RBC  3.25 L  3.12 L   (3.5-6.1)  10^6/uL


 


Hgb  9.6 L  9.5 L   (12.0-16.0)  g/dL


 


Hct  28.6 L  27.3 L   (36.0-48.0)  %


 


MCV  88.0  87.5   (80.0-105.0)  fl


 


MCH  29.5  30.4   (25.0-35.0)  pg


 


MCHC  33.6  34.8   (31.0-37.0)  g/dl


 


RDW  16.8 H  16.3 H   (11.5-14.5)  %


 


Plt Count  174  159   (120.0-450.0)  10^3/uL


 


MPV  9.5  9.2   (7.0-11.0)  fl


 


Gran %  68.1 H  70.2 H   (50.0-68.0)  %


 


Lymph % (Auto)  17.0 L  12.3 L   (22.0-35.0)  %


 


Mono % (Auto)  9.7 H  13.1 H   (1.0-6.0)  %


 


Eos % (Auto)  5.1 H  4.2   (1.5-5.0)  %


 


Baso % (Auto)  0.1  0.2   (0.0-3.0)  %


 


Gran #  6.26  6.49   (1.4-6.5)  


 


Lymph # (Auto)  1.6  1.1 L   (1.2-3.4)  


 


Mono # (Auto)  0.9 H  1.2 H   (0.1-0.6)  


 


Eos # (Auto)  0.5  0.4   (0.0-0.7)  


 


Baso # (Auto)  0.01  0.02   (0.0-2.0)  K/mm3


 


PT     (9.4-12.5)  SECONDS


 


INR     (0.93-1.08)  


 


APTT     (25.1-36.5)  Seconds


 


Sodium     (132-148)  mmol/L


 


Potassium     (3.6-5.0)  mmol/L


 


Chloride     ()  mmol/L


 


Carbon Dioxide     (21-33)  mmol/L


 


Anion Gap     (10-20)  


 


BUN     (7-21)  mg/dL


 


Creatinine     (0.7-1.2)  mg/dl


 


Est GFR (African Amer)     


 


Est GFR (Non-Af Amer)     


 


Random Glucose     ()  mg/dL


 


Calcium     (8.4-10.5)  mg/dL


 


Phosphorus     (2.5-4.5)  mg/dL


 


Magnesium     (1.7-2.2)  mg/dL


 


Total Bilirubin     (0.2-1.3)  mg/dL


 


AST     (14-36)  U/L


 


ALT     (7-56)  U/L


 


Alkaline Phosphatase     ()  U/L


 


Total Protein     (5.8-8.3)  g/dL


 


Albumin     (3.0-4.8)  g/dL


 


Globulin     gm/dL


 


Albumin/Globulin Ratio     (1.1-1.8)  


 


TSH 3rd Generation     (0.46-4.68)  mIU/mL


 


Crossmatch    See Detail  








 Laboratory Results - last 24 hr











  05/22/18 05/23/18 05/24/18





  15:04 13:20 04:20


 


WBC   9.3  9.2


 


RBC   3.12 L  3.25 L


 


Hgb   9.5 L  9.6 L


 


Hct   27.3 L  28.6 L


 


MCV   87.5  88.0


 


MCH   30.4  29.5


 


MCHC   34.8  33.6


 


RDW   16.3 H  16.8 H


 


Plt Count   159  174


 


MPV   9.2  9.5


 


Gran %   70.2 H  68.1 H


 


Lymph % (Auto)   12.3 L  17.0 L


 


Mono % (Auto)   13.1 H  9.7 H


 


Eos % (Auto)   4.2  5.1 H


 


Baso % (Auto)   0.2  0.1


 


Gran #   6.49  6.26


 


Lymph # (Auto)   1.1 L  1.6


 


Mono # (Auto)   1.2 H  0.9 H


 


Eos # (Auto)   0.4  0.5


 


Baso # (Auto)   0.02  0.01


 


PT   


 


INR   


 


APTT   


 


Sodium   


 


Potassium   


 


Chloride   


 


Carbon Dioxide   


 


Anion Gap   


 


BUN   


 


Creatinine   


 


Est GFR ( Amer)   


 


Est GFR (Non-Af Amer)   


 


Random Glucose   


 


Calcium   


 


Phosphorus   


 


Magnesium   


 


Total Bilirubin   


 


AST   


 


ALT   


 


Alkaline Phosphatase   


 


Total Protein   


 


Albumin   


 


Globulin   


 


Albumin/Globulin Ratio   


 


TSH 3rd Generation   


 


Crossmatch  See Detail  














  05/24/18 05/24/18 05/24/18





  04:20 04:20 04:20


 


WBC   


 


RBC   


 


Hgb   


 


Hct   


 


MCV   


 


MCH   


 


MCHC   


 


RDW   


 


Plt Count   


 


MPV   


 


Gran %   


 


Lymph % (Auto)   


 


Mono % (Auto)   


 


Eos % (Auto)   


 


Baso % (Auto)   


 


Gran #   


 


Lymph # (Auto)   


 


Mono # (Auto)   


 


Eos # (Auto)   


 


Baso # (Auto)   


 


PT   11.3 


 


INR   0.99 


 


APTT   28.6 


 


Sodium  141  


 


Potassium  3.8  


 


Chloride  107  


 


Carbon Dioxide  24  


 


Anion Gap  14  


 


BUN  12  


 


Creatinine  0.9  


 


Est GFR ( Amer)  > 60  


 


Est GFR (Non-Af Amer)  > 60  


 


Random Glucose  107  


 


Calcium  9.0  


 


Phosphorus  3.7  


 


Magnesium  2.0  


 


Total Bilirubin  0.5  


 


AST  27  


 


ALT  33  


 


Alkaline Phosphatase  96  


 


Total Protein  6.4  


 


Albumin  3.6  


 


Globulin  2.9  


 


Albumin/Globulin Ratio  1.2  


 


TSH 3rd Generation    0.86


 


Crossmatch   














Critical Care Progress Note





- Nutrition


Nutrition: 


 Nutrition











 Category Date Time Status


 


 Liquid Diet [DIET] Diets  05/23/18 Dinner Ordered














Assessment/Plan





- Assessment and Plan (Free Text)


Plan: 


Patient seen and examined on rounds with resident, agree with note with 

following additions/exceptions:


Patient is 63yo female a/w GIB, Anemia, SOB, found to have HH 6.7, given 3u PRBC

, 2u FFP


Currently afebrile, HD stable, comfortable in NAD, doing well, reports NO major 

complaints. 


CT abd/pelvis neg for acute pathology


EGD today with large non bleeding ulcer, biopsied


HH sequentially stable








GIB


Anemia


SOB


PAF on Eliquis


HTN


Tachycardia


Guiac Positive





 





Recommend:


- supp o2 as needed


- duonebs PRN


- resume Cardizem PO, Lopressor


- Panculture


- hold Eliquis for now


- start Heparin drip tomorrow, OK by GI


- cardiology follow up


- GI follow up


- PPI BID


- resume diet


- maintain 2 large bore PIVs


- GI ppx


- DVT ppx, HSQ


- stable, transfer to telemetry

## 2018-05-25 LAB
ALBUMIN SERPL-MCNC: 3.5 G/DL (ref 3–4.8)
ALBUMIN/GLOB SERPL: 1.3 {RATIO} (ref 1.1–1.8)
ALT SERPL-CCNC: 29 U/L (ref 7–56)
APTT BLD: 29.6 SECONDS (ref 25.1–36.5)
AST SERPL-CCNC: 22 U/L (ref 14–36)
BASOPHILS # BLD AUTO: 0.01 K/MM3 (ref 0–2)
BASOPHILS NFR BLD: 0.1 % (ref 0–3)
BUN SERPL-MCNC: 9 MG/DL (ref 7–21)
CALCIUM SERPL-MCNC: 8.7 MG/DL (ref 8.4–10.5)
EOSINOPHIL # BLD: 0.4 10*3/UL (ref 0–0.7)
EOSINOPHIL NFR BLD: 3.5 % (ref 1.5–5)
ERYTHROCYTE [DISTWIDTH] IN BLOOD BY AUTOMATED COUNT: 16.7 % (ref 11.5–14.5)
GFR NON-AFRICAN AMERICAN: 56
GRANULOCYTES # BLD: 7.42 10*3/UL (ref 1.4–6.5)
GRANULOCYTES NFR BLD: 68.4 % (ref 50–68)
HGB BLD-MCNC: 9.6 G/DL (ref 12–16)
INR PPP: 1.06 (ref 0.93–1.08)
LYMPHOCYTES # BLD: 1.6 10*3/UL (ref 1.2–3.4)
LYMPHOCYTES NFR BLD AUTO: 14.8 % (ref 22–35)
MCH RBC QN AUTO: 30 PG (ref 25–35)
MCHC RBC AUTO-ENTMCNC: 33.1 G/DL (ref 31–37)
MCV RBC AUTO: 90.6 FL (ref 80–105)
MONOCYTES # BLD AUTO: 1.4 10*3/UL (ref 0.1–0.6)
MONOCYTES NFR BLD: 13.2 % (ref 1–6)
PLATELET # BLD: 175 10^3/UL (ref 120–450)
PMV BLD AUTO: 9.7 FL (ref 7–11)
PROTHROMBIN TIME: 12.2 SECONDS (ref 9.4–12.5)
RBC # BLD AUTO: 3.2 10^6/UL (ref 3.5–6.1)
WBC # BLD AUTO: 10.8 10^3/UL (ref 4.5–11)

## 2018-05-25 PROCEDURE — 0S9C3ZX DRAINAGE OF RIGHT KNEE JOINT, PERCUTANEOUS APPROACH, DIAGNOSTIC: ICD-10-PCS | Performed by: ORTHOPAEDIC SURGERY

## 2018-05-25 PROCEDURE — 0S9D3ZX DRAINAGE OF LEFT KNEE JOINT, PERCUTANEOUS APPROACH, DIAGNOSTIC: ICD-10-PCS | Performed by: ORTHOPAEDIC SURGERY

## 2018-05-25 RX ADMIN — PANTOPRAZOLE SODIUM SCH MLS/HR: 40 INJECTION, POWDER, FOR SOLUTION INTRAVENOUS at 04:06

## 2018-05-25 RX ADMIN — OXYCODONE HYDROCHLORIDE AND ACETAMINOPHEN PRN TAB: 5; 325 TABLET ORAL at 10:38

## 2018-05-25 RX ADMIN — PANTOPRAZOLE SODIUM SCH MLS/HR: 40 INJECTION, POWDER, FOR SOLUTION INTRAVENOUS at 10:34

## 2018-05-25 RX ADMIN — OXYCODONE HYDROCHLORIDE AND ACETAMINOPHEN PRN TAB: 5; 325 TABLET ORAL at 05:24

## 2018-05-25 RX ADMIN — DILTIAZEM HYDROCHLORIDE SCH MG: 120 CAPSULE, COATED, EXTENDED RELEASE ORAL at 10:37

## 2018-05-25 NOTE — CP.PCM.PN
<Jovita Driver - Last Filed: 05/25/18 14:08>





Subjective





- Date & Time of Evaluation


Date of Evaluation: 05/25/18


Time of Evaluation: 10:00





- Subjective


Subjective: 





PGY -2 Progress note for Dr. Frost's service 





Patient was seen and examined at bedside. No acute distress. Patients states 

that she is feeling better, denies abd pain, n/v, diarrhea. She states that she 

would like to have solid food. She denies any bleeding. 








Objective





- Vital Signs/Intake and Output


Vital Signs (last 24 hours): 


 











Temp Pulse Resp BP Pulse Ox


 


 98.5 F   88   20   125/85   99 


 


 05/25/18 12:00  05/25/18 12:00  05/25/18 12:00  05/25/18 12:00  05/25/18 06:00








Intake and Output: 


 











 05/25/18 05/25/18





 06:59 18:59


 


Intake Total 180 


 


Balance 180 














- Medications


Medications: 


 Current Medications





Albuterol/Ipratropium (Duoneb 3 Mg/0.5 Mg (3 Ml) Ud)  3 ml IH [id number] PRN


   PRN Reason: Shortness of Breath


Diltiazem HCl (Cardizem Cd)  120 mg PO DAILY [facility]


   Last Admin: 05/25/18 10:37 Dose:  120 mg


Diphenhydramine HCl (Benadryl)  25 mg PO Q6 PRN


   PRN Reason: Allergy symptoms


   Last Admin: 05/23/18 00:43 Dose:  25 mg


Pantoprazole Sodium (Protonix 40mg Ivpb)  40 mg in 100 mls @ 20 mls/hr IVPB 

.Q5H [facility]


   Last Admin: 05/25/18 10:34 Dose:  20 mls/hr


Metoprolol Tartrate (Lopressor)  25 mg PO BID [facility]


   Last Admin: 05/25/18 10:37 Dose:  25 mg


Montelukast Sodium (Singulair)  10 mg PO HS [facility]


   Last Admin: 05/24/18 21:25 Dose:  10 mg


Ondansetron HCl (Zofran Inj)  4 mg IVP Q6H PRN


   PRN Reason: Nausea/Vomiting


Oxycodone/Acetaminophen (Percocet 5/325 Mg Tab)  1 tab PO Q6H PRN


   PRN Reason: Pain, moderate (4-7)


   Stop: 05/27/18 12:51


   Last Admin: 05/25/18 10:38 Dose:  1 tab











- Labs


Labs: 


 





 05/25/18 07:10 





 05/25/18 07:10 





 











PT  12.2 SECONDS (9.4-12.5)   05/25/18  07:10    


 


INR  1.06  (0.93-1.08)   05/25/18  07:10    


 


APTT  29.6 Seconds (25.1-36.5)   05/25/18  07:10    














- Constitutional


Appears: Well, No Acute Distress





- Head Exam


Head Exam: ATRAUMATIC, NORMOCEPHALIC





- Eye Exam


Eye Exam: EOMI, Normal appearance





- ENT Exam


ENT Exam: Mucous Membranes Moist





- Respiratory Exam


Respiratory Exam: Clear to Ausculation Bilateral, NORMAL BREATHING PATTERN.  

absent: Accessory Muscle Use, Decreased Breath Sounds, Rales, Rhonchi, Wheezes, 

Respiratory Distress, Stridor





- Cardiovascular Exam


Cardiovascular Exam: REGULAR RHYTHM, +S1, +S2.  absent: Bradycardia, Tachycardia

, Murmur





- GI/Abdominal Exam


GI & Abdominal Exam: Soft, Normal Bowel Sounds.  absent: Distended, Firm, 

Guarding, Tenderness





- Extremities Exam


Extremities Exam: Normal Inspection.  absent: Pedal Edema, Tenderness





- Neurological Exam


Neurological Exam: Alert, Awake, Oriented x3





- Skin


Skin Exam: Dry, Intact, Normal Color, Warm





Assessment and Plan





- Assessment and Plan (Free Text)


Assessment: 





63 yo female with past medical history of COPD, mitral valve disease, 

paroxsymal atrial fibrillation on Eliquis, presented with shortness of breath 

with exertion, found to have low hgb of 6.7, s/p 3 units pRBC adn 2 units FFP. 


GI bleed


anemia


mitral valve disease


paroxsymal atrial fibrillation





Plan: 


-EGD showed non bleeding gastric ulcer with clean base (zeny clase III) 


- follow up biopsy 


- CT abd/pelvis without contrast on 5/22/18 did not show any acute findings


- patient received total 3 units pRBC, and 2 units FFP


- continue to monitor H&H for acute bleeding


- hgb stable at 9.6


- will discuss with primary care about restarting anticoagulation, may need to 

be on heparin 


- will advance diet to full liquid diet, consider advancing diet if tolerating 


- discontinue protonix drip, protonix bid





case seen and discussed with Dr. Santosh








<Santosh,Kovil V - Last Filed: 05/26/18 00:45>





Objective





- Vital Signs/Intake and Output


Vital Signs (last 24 hours): 


 











Temp Pulse Resp BP Pulse Ox


 


 98.9 F   102 H  20   146/85   96 


 


 05/25/18 18:00  05/25/18 22:00  05/25/18 18:00  05/25/18 18:00  05/25/18 18:00








Intake and Output: 


 











 05/25/18 05/26/18





 18:59 06:59


 


Intake Total  100


 


Balance  100














- Medications


Medications: 


 Current Medications





Albuterol/Ipratropium (Duoneb 3 Mg/0.5 Mg (3 Ml) Ud)  3 ml IH [id number] PRN


   PRN Reason: Shortness of Breath


Diltiazem HCl (Cardizem Cd)  120 mg PO DAILY [facility]


   Last Admin: 05/25/18 10:37 Dose:  120 mg


Diphenhydramine HCl (Benadryl)  25 mg PO Q6 PRN


   PRN Reason: Allergy symptoms


   Last Admin: 05/23/18 00:43 Dose:  25 mg


Heparin Sodium/Sodium Chloride (Heparin 27803 Units/250ml 1/2 Normal Saline)  25

,000 units in 250 mls @ 13.227 mls/hr IV .[id number] PRN; Protocol; 18 UNITS/KG/HR


   PRN Reason: ADJUST RATE PER PROTOCOL


   Last Titration: 05/25/18 22:10 Dose:  15 units/kg/hr, 11.022 mls/hr


Metoprolol Tartrate (Lopressor)  25 mg PO BID [facility]


   Last Admin: 05/25/18 17:23 Dose:  25 mg


Montelukast Sodium (Singulair)  10 mg PO HS [facility]


   Last Admin: 05/25/18 21:58 Dose:  10 mg


Ondansetron HCl (Zofran Inj)  4 mg IVP Q6H PRN


   PRN Reason: Nausea/Vomiting


Oxycodone/Acetaminophen (Percocet 5/325 Mg Tab)  1 tab PO Q6H PRN


   PRN Reason: Pain, moderate (4-7)


   Stop: 05/27/18 12:51


   Last Admin: 05/25/18 10:38 Dose:  1 tab


Pantoprazole Sodium (Protonix Inj)  40 mg IVP Q12 [facility]


   Last Admin: 05/25/18 21:58 Dose:  40 mg











- Labs


Labs: 


 





 05/25/18 07:10 





 05/25/18 07:10 





 











PT  12.2 SECONDS (9.4-12.5)   05/25/18  07:10    


 


INR  1.06  (0.93-1.08)   05/25/18  07:10    


 


APTT  120.6 Seconds (25.1-36.5)  H*  05/25/18  20:48    














Attending/Attestation





- Attestation


I have personally seen and examined this patient.: Yes


I have fully participated in the care of the patient.: Yes


I have reviewed all pertinent clinical information, including history, physical 

exam and plan: Yes


Notes (Text): 


This is an addendum to GI progress  report dictated by the Medical Resident.The 

patient was seen and examined earlier.  Medical records, lab studies, imagings 

were reviewed.  Last 24 hours events reviewed.  Agreed with the above treatment 

plan as outlined in Medical Resident 's notes the with the addition of the 

following 


05/26/18 00:45

## 2018-05-25 NOTE — RAD
PROCEDURE:  Bilateral Knee Radiographs.



HISTORY:

pain



COMPARISON:

None.



FINDINGS:



BONES:

Right Knee:  Normal. No fracture. 



Left Knee:  Normal. No fracture. 



JOINTS:

Right Knee: There is joint space narrowing in the medial compartment. 

Minimal bony sclerosis.  No osteophytes 



Left knee: Normal. No osteoarthritis. 



SOFT TISSUES:

Right Knee: Normal.



Left Knee: Normal.



JOINT EFFUSION:

Right Knee: None. 



Left Knee: None.



OTHER FINDINGS:

None.



IMPRESSION:

Right Knee: There is joint space narrowing in the medial compartment. 

Minimal bony sclerosis.  No osteophytes 



Left knee unremarkable

## 2018-05-25 NOTE — CON
DATE:  05/25/2018

REASONREASON FOR CONSULT:  Bilateral knee pain and swelling.



HISTORY OF PRESENT ILLNESS:  This is a 62-year-old female with known

history of gout, who presents with bilateral knee pain and swelling.  The

patient denies any specific issue of trauma.  The patient states that she

has had previous aspirations of her knee for fluid over the years.  She

denies any history of any fevers or chills.



PHYSICAL EXAMINATION:

GENERAL:  This is a female, in no apparent distress.  She is awake, alert,

and oriented x3.

EXTREMITIES:  Evaluation of the left knee shows that she does have a

moderate effusion.  She is able to actively move the knee from

approximately just above 0 to 100 degrees of flexion.  Her skin is intact. 

She is grossly stable with varus valgus stress.  She is able to do a

straight leg raise.  Her thigh and calf are soft and nontender. 

Neurovascularly, she is intact distally.



Evaluation of the right knee again shows a moderate effusion.  Her skin is

intact.  She has active range of motion from 0 to 105 degrees or so of

flexion.  Grossly stable with varus valgus stress.  Her thigh and calf are

soft and nontender.  Neurovascularly, she is intact distally.



She has x-ray done today.  AP x-ray of the right knee shows no obvious

fracture or dislocation.  She has significant medial compartment narrowing.

X-rays of the left knee again show no acute fracture or dislocation with

some moderate medial compartment narrowing.  No acute fractures or

dislocation are appreciated in either knee.  On lateral of the left knee,

there appears to be a calcification in the suprapatellar region.  She also

has bilateral effusions appreciated.



IMPRESSION:  Bilateral knee effusions and arthritis.



PLAN:  We discussed treatment options.  I recommended that we try

aspiration of bilateral knees and sent the fluid for specimen including

cell count, crystals, Gram stain and culture.  However, the patient refused

because she is scared of needles.  At this point, this may most likely be

secondary to gout.  For now, recommend medical management and we will

follow orthopedically as needed.  The patient understands the definitive

diagnosis of effusion and it is not possible without a fluid specimen and

she understands this.  We will follow as needed.







__________________________________________

Moise Crabtree MD



DD:  05/25/2018 13:09:47

DT:  05/25/2018 14:06:55

Job # [id number]

## 2018-05-25 NOTE — CP.PCM.PN
Subjective





- Date & Time of Evaluation


Date of Evaluation: 05/25/18


Time of Evaluation: 09:00





- Subjective


Subjective: 





Heme/Onc Progress Note for Dr. Alatorre





Patient was seen and examined at bedside. Patient would like to eat regular food

, however upon further discussion understood current diet. Patient had 

complaints of pain overnight/morning as per nursing staff, meds administered 

with successful effect. Patient denied fever, chills, shortness of breath, 

chest pains, abdominal pains, nausea, vomiting, diarrhea, constipation, or 

bleeding.





Objective





- Vital Signs/Intake and Output


Vital Signs (last 24 hours): 


 











Temp Pulse Resp BP Pulse Ox


 


 98.5 F   80   20   125/85   99 


 


 05/25/18 12:00  05/25/18 14:00  05/25/18 12:00  05/25/18 12:00  05/25/18 06:00








Intake and Output: 


 











 05/25/18 05/25/18





 06:59 18:59


 


Intake Total 180 


 


Balance 180 














- Medications


Medications: 


 Current Medications





Albuterol/Ipratropium (Duoneb 3 Mg/0.5 Mg (3 Ml) Ud)  3 ml IH [id number] PRN


   PRN Reason: Shortness of Breath


Diltiazem HCl (Cardizem Cd)  120 mg PO DAILY [facility]


   Last Admin: 05/25/18 10:37 Dose:  120 mg


Diphenhydramine HCl (Benadryl)  25 mg PO Q6 PRN


   PRN Reason: Allergy symptoms


   Last Admin: 05/23/18 00:43 Dose:  25 mg


Heparin Sodium/Sodium Chloride (Heparin 17971 Units/250ml 1/2 Normal Saline)  25

,000 units in 250 mls @ 13.227 mls/hr IV .[id number] PRN; Protocol; 18 UNITS/KG/HR


   PRN Reason: ADJUST RATE PER PROTOCOL


   Last Admin: 05/25/18 14:27 Dose:  18 units/kg/hr, 13.227 mls/hr


Metoprolol Tartrate (Lopressor)  25 mg PO BID [facility]


   Last Admin: 05/25/18 10:37 Dose:  25 mg


Montelukast Sodium (Singulair)  10 mg PO HS [facility]


   Last Admin: 05/24/18 21:25 Dose:  10 mg


Ondansetron HCl (Zofran Inj)  4 mg IVP Q6H PRN


   PRN Reason: Nausea/Vomiting


Oxycodone/Acetaminophen (Percocet 5/325 Mg Tab)  1 tab PO Q6H PRN


   PRN Reason: Pain, moderate (4-7)


   Stop: 05/27/18 12:51


   Last Admin: 05/25/18 10:38 Dose:  1 tab


Pantoprazole Sodium (Protonix Inj)  40 mg IVP Q12 [facility]











- Labs


Labs: 


 





 05/25/18 07:10 





 05/25/18 07:10 





 











PT  12.2 SECONDS (9.4-12.5)   05/25/18  07:10    


 


INR  1.06  (0.93-1.08)   05/25/18  07:10    


 


APTT  29.6 Seconds (25.1-36.5)   05/25/18  07:10    














- Constitutional


Appears: No Acute Distress





- Head Exam


Head Exam: ATRAUMATIC, NORMAL INSPECTION, NORMOCEPHALIC





- Eye Exam


Eye Exam: EOMI, Normal appearance, PERRL


Pupil Exam: NORMAL ACCOMODATION, PERRL





- ENT Exam


ENT Exam: Mucous Membranes Moist, Normal Exam





- Respiratory Exam


Respiratory Exam: Clear to Ausculation Bilateral, NORMAL BREATHING PATTERN





- Cardiovascular Exam


Cardiovascular Exam: REGULAR RHYTHM, +S1, +S2.  absent: Murmur





- GI/Abdominal Exam


GI & Abdominal Exam: Soft, Normal Bowel Sounds.  absent: Tenderness





- Neurological Exam


Neurological Exam: Alert, Awake, CN II-XII Intact, Oriented x3





- Psychiatric Exam


Psychiatric exam: Normal Affect, Normal Mood





- Skin


Skin Exam: Dry, Intact, Normal Color, Warm





Assessment and Plan





- Assessment and Plan (Free Text)


Assessment: 


62 F with a PMHx of severe mitral regurgitation s/p open heart surgery and 

repair with MVR biprosthetic, history of paroxysmal afib (on eliquis), and non 

obstructing CAD , COPD presenting to [facility] ED with complaints of shortness of 

breath and black tarry stools, loose in nature admitted to ICU for symptomatic 

anemia with a Hgb 6.7 likely secondary to GI bleed. Patient is NPO and has been 

transfused 3u pRBC and 2 u FFP. Repeat hemoglobin, 9.7. endoscopy done and 

large ulcer noted. Held Eliquis, will restart when stable.Eliquis still on 

hold. Cardiology Dr Gaxiola consulted. Patient is on protonix drip. As per GI, 

will continue full liquid diet, start heparin IV drip, NO boluses, monitor 

coags and titrate as needed without boluses. No oral AC at this time.

## 2018-05-25 NOTE — CP.PCM.PN
Subjective





- Date & Time of Evaluation


Date of Evaluation: 05/25/18


Time of Evaluation: 06:35





- Subjective


Subjective: 





Awake,Alert, complaining of headache and knee pain, no distress





Reason for consultation and follow up: cardiac evaluation,shortness of breath 

with exertion over the past week. History of COPD,post  mitral valve repair, 

paroxsymal atrial fibrillation on Eliquis,








Seen and examined by me and Dr. Gaxiola





Objective





- Vital Signs/Intake and Output


Vital Signs (last 24 hours): 


 











Temp Pulse Resp BP Pulse Ox


 


 98.9 F   94 H  20   126/71   99 


 


 05/25/18 06:00  05/25/18 06:00  05/25/18 06:00  05/25/18 06:00  05/25/18 06:00








Intake and Output: 


 











 05/25/18 05/25/18





 06:59 18:59


 


Intake Total 180 


 


Balance 180 














- Medications


Medications: 


 Current Medications





Albuterol/Ipratropium (Duoneb 3 Mg/0.5 Mg (3 Ml) Ud)  3 ml IH [id number] PRN


   PRN Reason: Shortness of Breath


Diltiazem HCl (Cardizem Cd)  120 mg PO DAILY [facility]


   Last Admin: 05/24/18 10:52 Dose:  120 mg


Diphenhydramine HCl (Benadryl)  25 mg PO Q6 PRN


   PRN Reason: Allergy symptoms


   Last Admin: 05/23/18 00:43 Dose:  25 mg


Pantoprazole Sodium (Protonix 40mg Ivpb)  40 mg in 100 mls @ 20 mls/hr IVPB 

.Q5H [facility]


   Last Admin: 05/25/18 04:06 Dose:  20 mls/hr


Metoprolol Tartrate (Lopressor)  25 mg PO BID [facility]


   Last Admin: 05/24/18 18:14 Dose:  25 mg


Montelukast Sodium (Singulair)  10 mg PO HS [facility]


   Last Admin: 05/24/18 21:25 Dose:  10 mg


Ondansetron HCl (Zofran Inj)  4 mg IVP Q6H PRN


   PRN Reason: Nausea/Vomiting


Oxycodone/Acetaminophen (Percocet 5/325 Mg Tab)  1 tab PO Q6H PRN


   PRN Reason: Pain, moderate (4-7)


   Stop: 05/27/18 12:51


   Last Admin: 05/25/18 05:24 Dose:  1 tab











- Labs


Labs: 


 





 05/25/18 07:10 





 05/25/18 07:10 





 











PT  12.2 SECONDS (9.4-12.5)   05/25/18  07:10    


 


INR  1.06  (0.93-1.08)   05/25/18  07:10    


 


APTT  29.6 Seconds (25.1-36.5)   05/25/18  07:10    














- Constitutional


Appears: No Acute Distress





- Eye Exam


Eye Exam: Normal appearance





- ENT Exam


ENT Exam: Mucous Membranes Moist


Additional comments: 





headache





- Respiratory Exam


Respiratory Exam: Clear to Ausculation Bilateral, NORMAL BREATHING PATTERN





- Cardiovascular Exam


Cardiovascular Exam: REGULAR RHYTHM, +S1, +S2


Additional comments: 





NSR telemetry





- GI/Abdominal Exam


GI & Abdominal Exam: Soft, Normal Bowel Sounds





- Extremities Exam


Extremities Exam: Normal Capillary Refill


Additional comments: 





knee swelling





- Neurological Exam


Neurological Exam: Alert, Awake, Oriented x3





- Psychiatric Exam


Psychiatric exam: Normal Affect, Normal Mood





- Skin


Skin Exam: Intact, Normal Color, Warm





Assessment and Plan





- Assessment and Plan (Free Text)


Assessment: 





A 62 year old female who came to the ER due to shortness of breath with 

exertion over the past week. History of COPD,post mitral valve repair , 

paroxsymal atrial fibrillation on Eliquis. Claimed to have black tarry stools. 

Admitting hemoglobin 6.7, transfused PRBC, repeat hemoglobin, 9.7. endoscopy 

done and large ulcer noted. Held Eliquis, will restart when stable.











Plan: 





Eliquis still on hold


Hemoglobin 9.6


Stable cardiac status


Telemetry NSR, monitor for Afib, if needed will start Heparin


Complaining of knee pain,swollen, X ray ordered


Percocet given for knee pain and headache


Blood pressure and heart rate stable


Continue current medications


Continue current treatment





Will follow up





Plan and treatment discussed with Dr. Gaxiola

## 2018-05-26 LAB
ALBUMIN SERPL-MCNC: 3.6 G/DL (ref 3–4.8)
ALBUMIN/GLOB SERPL: 1.1 {RATIO} (ref 1.1–1.8)
ALT SERPL-CCNC: 28 U/L (ref 7–56)
APTT BLD: 151.8 SECONDS (ref 25.1–36.5)
AST SERPL-CCNC: 25 U/L (ref 14–36)
BASOPHILS # BLD AUTO: 0.02 K/MM3 (ref 0–2)
BASOPHILS NFR BLD: 0.2 % (ref 0–3)
BUN SERPL-MCNC: 12 MG/DL (ref 7–21)
CALCIUM SERPL-MCNC: 9.1 MG/DL (ref 8.4–10.5)
EOSINOPHIL # BLD: 0.1 10*3/UL (ref 0–0.7)
EOSINOPHIL NFR BLD: 1.2 % (ref 1.5–5)
ERYTHROCYTE [DISTWIDTH] IN BLOOD BY AUTOMATED COUNT: 15.9 % (ref 11.5–14.5)
GFR NON-AFRICAN AMERICAN: 56
GRANULOCYTES # BLD: 8.44 10*3/UL (ref 1.4–6.5)
GRANULOCYTES NFR BLD: 73.2 % (ref 50–68)
HGB BLD-MCNC: 9.7 G/DL (ref 12–16)
INR PPP: 1.21 (ref 0.93–1.08)
LYMPHOCYTES # BLD: 1.1 10*3/UL (ref 1.2–3.4)
LYMPHOCYTES NFR BLD AUTO: 9.4 % (ref 22–35)
MCH RBC QN AUTO: 29.3 PG (ref 25–35)
MCHC RBC AUTO-ENTMCNC: 32.1 G/DL (ref 31–37)
MCV RBC AUTO: 91.2 FL (ref 80–105)
MONOCYTES # BLD AUTO: 1.9 10*3/UL (ref 0.1–0.6)
MONOCYTES NFR BLD: 16 % (ref 1–6)
PLATELET # BLD: 212 10^3/UL (ref 120–450)
PMV BLD AUTO: 9.6 FL (ref 7–11)
PROTHROMBIN TIME: 14 SECONDS (ref 9.4–12.5)
RBC # BLD AUTO: 3.31 10^6/UL (ref 3.5–6.1)
WBC # BLD AUTO: 11.5 10^3/UL (ref 4.5–11)

## 2018-05-26 RX ADMIN — OXYCODONE HYDROCHLORIDE AND ACETAMINOPHEN PRN TAB: 5; 325 TABLET ORAL at 09:55

## 2018-05-26 RX ADMIN — DILTIAZEM HYDROCHLORIDE SCH MG: 120 CAPSULE, COATED, EXTENDED RELEASE ORAL at 09:53

## 2018-05-26 RX ADMIN — OXYCODONE HYDROCHLORIDE AND ACETAMINOPHEN PRN TAB: 5; 325 TABLET ORAL at 20:07

## 2018-05-26 RX ADMIN — OXYCODONE HYDROCHLORIDE AND ACETAMINOPHEN PRN TAB: 5; 325 TABLET ORAL at 01:05

## 2018-05-26 NOTE — CP.PCM.PN
Subjective





- Date & Time of Evaluation


Date of Evaluation: 05/26/18


Time of Evaluation: 06:25





- Subjective


Subjective: 





Awake,Alert, complaining of  knee pain, no distress





Reason for consultation and follow up: cardiac evaluation,shortness of breath 

with exertion over the past week. History of COPD,post  mitral valve repair, 

paroxsymal atrial fibrillation on Eliquis,








Seen and examined by me and Dr. Myers





Objective





- Vital Signs/Intake and Output


Vital Signs (last 24 hours): 


 











Temp Pulse Resp BP Pulse Ox


 


 98.3 F   102 H  98 H  133/79   96 


 


 05/26/18 06:00  05/26/18 06:00  05/26/18 06:00  05/26/18 06:00  05/26/18 00:19








Intake and Output: 


 











 05/26/18 05/26/18





 06:59 18:59


 


Intake Total 656 


 


Output Total 700 


 


Balance -44 














- Medications


Medications: 


 Current Medications





Albuterol/Ipratropium (Duoneb 3 Mg/0.5 Mg (3 Ml) Ud)  3 ml IH [id number] PRN


   PRN Reason: Shortness of Breath


Diltiazem HCl (Cardizem Cd)  120 mg PO DAILY [facility]


   Last Admin: 05/25/18 10:37 Dose:  120 mg


Diphenhydramine HCl (Benadryl)  25 mg PO Q6 PRN


   PRN Reason: Allergy symptoms


   Last Admin: 05/23/18 00:43 Dose:  25 mg


Heparin Sodium/Sodium Chloride (Heparin 77387 Units/250ml 1/2 Normal Saline)  25

,000 units in 250 mls @ 13.227 mls/hr IV .[id number] PRN; Protocol; 18 UNITS/KG/HR


   PRN Reason: ADJUST RATE PER PROTOCOL


   Last Titration: 05/25/18 22:10 Dose:  15 units/kg/hr, 11.022 mls/hr


Metoprolol Tartrate (Lopressor)  25 mg PO BID [facility]


   Last Admin: 05/25/18 17:23 Dose:  25 mg


Montelukast Sodium (Singulair)  10 mg PO HS [facility]


   Last Admin: 05/25/18 21:58 Dose:  10 mg


Ondansetron HCl (Zofran Inj)  4 mg IVP Q6H PRN


   PRN Reason: Nausea/Vomiting


Oxycodone/Acetaminophen (Percocet 5/325 Mg Tab)  1 tab PO Q6H PRN


   PRN Reason: Pain, moderate (4-7)


   Stop: 05/27/18 12:51


   Last Admin: 05/26/18 01:05 Dose:  1 tab


Pantoprazole Sodium (Protonix Inj)  40 mg IVP Q12 [facility]


   Last Admin: 05/25/18 21:58 Dose:  40 mg











- Labs


Labs: 


 





 05/26/18 06:30 





 05/26/18 06:30 





 











PT  12.2 SECONDS (9.4-12.5)   05/25/18  07:10    


 


INR  1.06  (0.93-1.08)   05/25/18  07:10    


 


APTT  120.6 Seconds (25.1-36.5)  H*  05/25/18  20:48    














- Constitutional


Appears: No Acute Distress





- Head Exam


Head Exam: NORMOCEPHALIC





- Eye Exam


Eye Exam: Normal appearance





- ENT Exam


ENT Exam: Mucous Membranes Moist





- Respiratory Exam


Respiratory Exam: Clear to Ausculation Bilateral, NORMAL BREATHING PATTERN





- Cardiovascular Exam


Cardiovascular Exam: +S1, +S2


Additional comments: 





Telemetry -110/min





- GI/Abdominal Exam


GI & Abdominal Exam: Soft, Normal Bowel Sounds





- Extremities Exam


Extremities Exam: Normal Capillary Refill


Additional comments: 





knee swollen with pain





- Neurological Exam


Neurological Exam: Alert, Awake, Oriented x3





- Psychiatric Exam


Psychiatric exam: Normal Affect, Normal Mood





- Skin


Skin Exam: Intact, Normal Color, Warm





Assessment and Plan





- Assessment and Plan (Free Text)


Assessment: 





A 62 year old female who came to the ER due to shortness of breath with 

exertion over the past week. History of COPD,post mitral valve repair , 

paroxsymal atrial fibrillation on Eliquis. Claimed to have black tarry stools. 

Admitting hemoglobin 6.7, transfused PRBC and FFP, repeat hemoglobin, 9.7. 

endoscopy done and large ulcer noted. Held Eliquis, will restart when stable. 

Started on heparin drip.











Plan: 





EGD showed non bleeding antral gastric ulcer


On Protonix IV


Post transfusion of PRBC, Stable Hemoglobin 9.6


Stable cardiac status


Started Heparin drip yesterday, PTT per protocol


Still complaining of knee pain with swelling,


 Knee X -ray  showed left knee normal, right knee showed


joint space narrowing in the medial compartment


Seen by Dr. Crabtree, impression for knee swelling-gout


Percocet given for knee pain 


On Cardizem  mg daily, Lopressor 25 mg BID


Telemetry Sinus tachycardia 110/min


Will increase Lopressor


Continue current medications


Continue current treatment





Will follow up





Plan and treatment discussed with Dr. Myers

## 2018-05-26 NOTE — PN
DATE:  05/25/2018



SUBJECTIVE:  The patient is otherwise stable.  No chest pain.  No short of

breath.  No nausea.  No vomiting.  Currently, she is getting IV Protonix

and will be starting soon IV heparin.  She has no new complaint.  Her knee

_____ 0.19 we will consider injections.  At this time, we will try to get

her knee x-ray.



PHYSICAL EXAMINATION: On 05/25/2018;

GENERAL:  She is on medical floor to the remote telemetry.

VITAL SIGNS:  As follows; temperature 98.3, heart rate 102, blood pressure

133/79, respirations 18, and saturation 98% on room air.

HEAD AND NECK:  Normal.  No JVD.  No thyromegaly.

CHEST:  Clear.  Good air entry.

CARDIAC:  First sound and second sound normal.  Systolic murmur.

ABDOMEN:  Soft, nontender.

EXTREMITIES:  No edema.

NEUROLOGIC:  Normal.



LABORATORY DATA:  PT 12.2, INR 1, and PTT is 29.6.  We will be starting

heparin soon.



IMPRESSION AND PLAN:

1.  Acute gastrointestinal bleed, iron deficiency anemia, status post blood

transfusions, hemoglobin now 9 to 10 range, stable.  We will monitor her

hemoglobin and hematocrit.

2.  Chronic paroxysmal atrial fibrillations, status post mitral valve

repair due to degenerative mitral valve disease.  Currently, she is off any

anticoagulation.  We will start the patient on intravenous heparin to see

how she will do with that.  Any bleeding, we will stop it, but we will

continue monitor PTT.  Dr. Gaxiola's consultation and recommendations were

appreciated.

3.  Acute peptic ulcer.  Continue intravenous Protonix.  Continue

intravenous Pepcid.

4.  Chronic obstructive pulmonary disease, chronic gouty arthritis,

osteoarthritis, back pain.  Continue inhaled bronchodilators.  Continue

Percocet p.r.n.  We will consider steroids; however, because of the risk of

steroid induced ulcerations, we will hold off from that to see how the

patient do and will see what the Orthopedic recommendation by Dr. Crabtree.







__________________________________________

Philip Dinh MD



DD:  05/26/2018 10:29:52

DT:  05/26/2018 12:26:07

Job # [id number]

## 2018-05-26 NOTE — PN
DATE:  05/24/2018



SUBJECTIVE:  Status post endoscopies.  She had an ulcer.  There was no

active bleeding.  No intervention.  Patient is stable hemodynamically in

the ICU.



PHYSICAL EXAMINATION:  On 05/24/2018 as follows;

VITAL SIGNS:  She had a temperature of 98, heart rate 101, respirations 18,

blood pressure 132/55, saturating 97% on 2 L.

HEAD AND NECK:  Normal.  No JVD.  No thyromegaly.

CHEST:  Clear.  Good air entry bilaterally.

CARDIAC:  First sound and second sound normal.  There is systolic murmur.

ABDOMEN:  Soft and nontender.

EXTREMITIES:  No edema.

NEUROLOGIC:  Normal.

EXTREMITIES:  Bilateral knees, there is knee pain, knee swelling, and some

effusion in both knees.



LABORATORY STUDY:  Shows white count 9.2, hemoglobin 9.6, hematocrit 28.5,

and platelets 174.  Chemistry showed sodium 141, potassium 3.8, chloride

107, bicarb 24.  BUN 12, creatinine 0.9.  Liver function test is normal. 

Alk phos is normal.  Albumin-globulin normal.  Patient has a TSH of 0.86,

which is normal.



IMPRESSION AND PLAN:

1.  Acute upper gastrointestinal bleed.  Currently, she is off any

anticoagulation, status post endoscopy, clean based ulcers, no active

bleeding.  We will continue Protonix IV, Pepcid.  Patient is stable. 

Monitor hemoglobin and hematocrit.  Plan is to give another day 24-hour

post endoscopy, we need to try heparin, see how the patient do before we

switched it to the Eliquis.

2.  Chronic paroxysmal atrial fibrillations, stable at this time.  Continue

current recommendations.  We will consider putting patient back on aspirin,

Eliquis when Cardiology clear her.

3.  Hypertension, hypercholesterolemia, chronic osteoarthritis, gouty

arthritis, status post mitral valve replacement, chronic anxiety, iron

deficiency anemia, chronic asthma, chronic obstructive pulmonary disease. 

Continue inhaled bronchodilators.  Continue beta-blocker for rate control,

diltiazem 120 once daily, DuoNeb, Singulair, and Protonix IV.  Patient is

also getting Percocet for knee pain, seems doing well.  Continue current

treatment.







__________________________________________

Philip Dinh MD



DD:  05/26/2018 10:26:51

DT:  05/26/2018 11:22:25

Job # [id number]

## 2018-05-26 NOTE — PN
DATE:  05/26/2018



This is [facility] visit on the telemetry floor.



For Dr. Alatorre.



SUBJECTIVE:  The patient is a 62-year-old female, patient of Dr. Dinh

with known cardiovascular compromise with GI bleed secondary to recently

diagnosed peptic ulcer disease.  Her hemoglobin was 6.7 on admission with

the hemoglobin 9.7 today.  She also has paroxysmal atrial fibrillation, was

on Eliquis in the past and is now recommended not to have an anticoagulant

that cannot be monitored as per Dr. Frost.  Therefore we began low-dose

heparin with no bolus yesterday; however, despite being on low dose with no

bolus, PTT was significantly elevated and it has been discontinued.  We

will restart the heparin after a PTT value within normal range is recovered

at a low dose of 800 units of heparin per hour with a PTT to be checked 4

hours later to keep below or equal to 50 for her PTT value.  The patient is

otherwise complaining of knee pain; however, she refused an injection by

Dr. Crabtree because the needle was too big she said; however, she may

reconsider this as this is her only complaint at present.



OBJECTIVE PHYSICAL EXAMINATION:

VITAL SIGNS:  Temperature 98, pulse 97, respirations 18, blood pressure

129/66 with a pulse ox of 96%.

HEENT:  Unremarkable.

NECK:  Supple.

HEART:  Regular rate.  Occasional ectopic beat.

LUNGS:  Clear.

ABDOMEN:  Soft, nontender.

EXTREMITIES:  Decreased range of motion of left knee with minimal

tenderness to gentle palpation there.

NEUROLOGIC:  Awake, alert.

SKIN:  Otherwise, warm, dry and clear.



LABORATORY DATA:  The patient's labs were done.  White blood cell count of

11.5, hemoglobin 9.7, hematocrit 30.2, platelet count of 212,000 with a

chem metabolic panel completely within normal range from today.  Her PTT,

however, yesterday was 120 with held as per protocol with repeat today with

PTT of 151.8.  With this, the heparin was discontinued.



ASSESSMENT:  The assessment for this patient is that of gastrointestinal

bleed with severe symptomatic anemia, status post transfusion of 3 units of

packed cells.  EGD showing a nonbleeding antral ulcer, biopsied by Dr. Frost.  The patient also suffers from hypertension, chronic obstructive

pulmonary disease, paroxysmal atrial fibrillation, epistaxis and obesity.



PLAN:  The plan for this patient after conservation with Dr. Alatorre and

Dr. Frost is to stop the heparin, which was done.  We will restart it at

a lower dose with no boluses.  This was also spoken to and documented with

the _____ physician as communication with the patient to eventually be

restarted on Coumadin in the future.  We will monitor clinically with labs

as indicated.  We will also ask for a reconsult with Dr. Crabtree to

consider injection of her knee as the patient is now satisfied that this

may indeed help her.



This is a complex patient with a comprehensive medically necessary and

appropriate visit carried out in excess of 30 minutes with the patient's

questions answered to her satisfaction, conversations held with Dr. Frost and Dr. Alatorre regarding her care, also the nurse and the

pharmacy were also contacted regarding care for this patient.





__________________________________________

Jah Wright MD





DD:  05/26/2018 15:00:40

DT:  05/26/2018 15:36:33

Job # [id number]

## 2018-05-27 LAB
ALBUMIN SERPL-MCNC: 3.6 G/DL (ref 3–4.8)
ALBUMIN/GLOB SERPL: 1.1 {RATIO} (ref 1.1–1.8)
ALT SERPL-CCNC: 23 U/L (ref 7–56)
AST SERPL-CCNC: 20 U/L (ref 14–36)
BASOPHILS # BLD AUTO: 0.01 K/MM3 (ref 0–2)
BASOPHILS NFR BLD: 0 % (ref 0–3)
BUN SERPL-MCNC: 16 MG/DL (ref 7–21)
CALCIUM SERPL-MCNC: 9.2 MG/DL (ref 8.4–10.5)
EOSINOPHIL # BLD: 0 10*3/UL (ref 0–0.7)
EOSINOPHIL NFR BLD: 0 % (ref 1.5–5)
ERYTHROCYTE [DISTWIDTH] IN BLOOD BY AUTOMATED COUNT: 15.3 % (ref 11.5–14.5)
GFR NON-AFRICAN AMERICAN: 38
GRANULOCYTES # BLD: 20.53 10*3/UL (ref 1.4–6.5)
GRANULOCYTES NFR BLD: 96.1 % (ref 50–68)
HGB BLD-MCNC: 9.5 G/DL (ref 12–16)
LYMPHOCYTE: 2 % (ref 22–35)
LYMPHOCYTES # BLD: 0.3 10*3/UL (ref 1.2–3.4)
LYMPHOCYTES NFR BLD AUTO: 1.4 % (ref 22–35)
MCH RBC QN AUTO: 29.2 PG (ref 25–35)
MCHC RBC AUTO-ENTMCNC: 32.2 G/DL (ref 31–37)
MCV RBC AUTO: 90.8 FL (ref 80–105)
MONOCYTE: 3 % (ref 1–6)
MONOCYTES # BLD AUTO: 0.5 10*3/UL (ref 0.1–0.6)
MONOCYTES NFR BLD: 2.5 % (ref 1–6)
NEUTROPHILS NFR BLD AUTO: 86 % (ref 50–70)
NEUTS BAND NFR BLD: 9 % (ref 0–2)
PLATELET # BLD EST: NORMAL 10*3/UL
PLATELET # BLD: 227 10^3/UL (ref 120–450)
PMV BLD AUTO: 10.4 FL (ref 7–11)
RBC # BLD AUTO: 3.25 10^6/UL (ref 3.5–6.1)
WBC # BLD AUTO: 21.4 10^3/UL (ref 4.5–11)

## 2018-05-27 RX ADMIN — OXYCODONE HYDROCHLORIDE AND ACETAMINOPHEN PRN TAB: 5; 325 TABLET ORAL at 17:22

## 2018-05-27 RX ADMIN — HEPARIN SODIUM SCH MLS/HR: 10000 INJECTION, SOLUTION INTRAVENOUS at 10:59

## 2018-05-27 RX ADMIN — DILTIAZEM HYDROCHLORIDE SCH MG: 120 CAPSULE, COATED, EXTENDED RELEASE ORAL at 10:27

## 2018-05-27 RX ADMIN — SUCRALFATE SCH GM: 1 SUSPENSION ORAL at 17:22

## 2018-05-27 RX ADMIN — OXYCODONE HYDROCHLORIDE AND ACETAMINOPHEN PRN TAB: 5; 325 TABLET ORAL at 12:44

## 2018-05-27 NOTE — PN
DATE:  05/26/2018



SUBJECTIVE:  This patient was seen and evaluated earlier today.  The

patient was on liquid diet.  The patient's heparin is on hold because of

the elevated PTT.  The patient was started on heparin with no bolus. 

However, in view of the highly elevated PTT of 151, it has been on hold. 

The patient denies any bleeding and no abdominal pain.  Feels hungry. 

Wants to eat food.



PHYSICAL EXAMINATION:

VITAL SIGNS:  Temperature 97.8, pulse 95, blood pressure is 131/87,

respirations 18.

HEENT:  Atraumatic and anicteric.

NECK:  Supple.

HEART:  S1 and S2 heard.

LUNGS:  Bilateral air entry present.

ABDOMEN:  Soft.  There is no tenderness.

EXTREMITIES:  No edema.  No cyanosis.



LABORATORY DATA:  .8.  Chemistry is normal.  Hemoglobin 9.7,

hematocrit 30.2, WBC is 11.5, platelets 212.



IMPRESSION:  This 62-year-old patient with a history of paroxysmal atrial

fibrillation; history of mitral valve replacement, bioprosthetic valve. 

The patient was on Eliquis.  The patient was found to be severely anemic on

admission with hemoglobin of 6.7, admitted, status post 3 units transfusion

with 2 units of FFP.  Hemoglobin is now stable.  The

patient did have an endoscopy done, found to have a large ulceration in the

antrum.  Biopsies were taken again.



RECOMMENDATIONS:  Continue IV heparin.  If the patient has no active

bleeding for 48 hours, we will start the patient on Coumadin.  We will

prefer heparin and Coumadin regimen to titrate the dose and also

reversibility in view of her acute bleeding, We would avoid newer

anticoagulation and also Lovenox.



The patient would benefit from long-time PPI therapy and followup in our

office.  Would recommend:

1.  Follow up of the hemoglobin and hematocrit.

2.  Continue the high dose PPI.

3.  Repeat EGD in about 2 months' time and evaluate the healing of the

ulcer.



I did have a detailed discussion with Dr. Jah Wright.



The plan is to start the patient back on heparin after 6 hours at a reduced

dose of 800 units rather than following the AF protocol.



Thank you very much for allowing us to participate in the care of the

patient.







__________________________________________

Kovil Santosh, MD



DD:  05/26/2018 19:54:56

DT:  05/26/2018 23:51:03

Job # [id number]

[email or web address]

## 2018-05-27 NOTE — CP.PCM.PN
Subjective





- Date & Time of Evaluation


Date of Evaluation: 05/27/18


Time of Evaluation: 06:40





- Subjective


Subjective: 





Awake,Alert, no distress,feels much better today,less swelling on knees





Reason for consultation and follow up: cardiac evaluation,shortness of breath 

with exertion over the past week. History of COPD,post  mitral valve repair, 

paroxsymal atrial fibrillation on Eliquis,








Seen and examined by me and Dr. Myers





Objective





- Vital Signs/Intake and Output


Vital Signs (last 24 hours): 


 











Temp Pulse Resp BP Pulse Ox


 


 98.5 F   91 H  20   110/70   93 L


 


 05/27/18 06:00  05/27/18 08:40  05/27/18 06:00  05/27/18 08:40  05/27/18 06:00








Intake and Output: 


 











 05/27/18 05/27/18





 06:59 18:59


 


Intake Total 72 


 


Balance 72 














- Medications


Medications: 


 Current Medications





Albuterol/Ipratropium (Duoneb 3 Mg/0.5 Mg (3 Ml) Ud)  3 ml IH [id number] PRN


   PRN Reason: Shortness of Breath


Diltiazem HCl (Cardizem Cd)  120 mg PO DAILY OLMAN


   Last Admin: 05/26/18 09:53 Dose:  120 mg


Diphenhydramine HCl (Benadryl)  25 mg PO Q6 PRN


   PRN Reason: Allergy symptoms


   Last Admin: 05/26/18 09:58 Dose:  25 mg


Heparin Sodium/Sodium Chloride (Heparin 21574 Units/250ml 1/2 Normal Saline)  25

,000 units in 250 mls @ 8 mls/hr IV .Q24H OLMAN; 800 UNITS/HR


   PRN Reason: Protocol


   Last Admin: 05/26/18 22:00 Dose:  800 units/hr, 8 mls/hr


Methylprednisolone (Solu-Medrol)  30 mg IVP Q12 OLMAN


Metoprolol Tartrate (Lopressor)  50 mg PO BRKDIN OLMAN


   Last Admin: 05/27/18 08:40 Dose:  50 mg


Montelukast Sodium (Singulair)  10 mg PO HS OLMAN


   Last Admin: 05/26/18 21:36 Dose:  10 mg


Ondansetron HCl (Zofran Inj)  4 mg IVP Q6H PRN


   PRN Reason: Nausea/Vomiting


Oxycodone/Acetaminophen (Percocet 5/325 Mg Tab)  1 tab PO Q6H PRN


   PRN Reason: Pain, moderate (4-7)


   Stop: 05/27/18 12:51


   Last Admin: 05/26/18 20:07 Dose:  1 tab


Pantoprazole Sodium (Protonix Inj)  40 mg IVP Q12 OLMAN


   Last Admin: 05/26/18 21:36 Dose:  40 mg











- Labs


Labs: 


 





 05/27/18 04:00 





 05/27/18 04:00 





 











PT  14.0 SECONDS (9.4-12.5)  H  05/26/18  08:45    


 


INR  1.21  (0.93-1.08)  H  05/26/18  08:45    


 


APTT  34.0 Seconds (25.1-36.5)   05/27/18  02:40    














- Constitutional


Appears: No Acute Distress





- Head Exam


Head Exam: NORMOCEPHALIC





- Eye Exam


Eye Exam: Normal appearance





- ENT Exam


ENT Exam: Mucous Membranes Moist





- Respiratory Exam


Respiratory Exam: Clear to Ausculation Bilateral, NORMAL BREATHING PATTERN





- Cardiovascular Exam


Cardiovascular Exam: +S1, +S2


Additional comments: 





Telemetry NSR





- GI/Abdominal Exam


GI & Abdominal Exam: Soft





- Extremities Exam


Extremities Exam: Normal Capillary Refill


Additional comments: 





less swelling on knees 1-2+





- Neurological Exam


Neurological Exam: Alert, Awake, Oriented x3





- Psychiatric Exam


Psychiatric exam: Normal Affect, Normal Mood





- Skin


Skin Exam: Intact, Normal Color, Warm





Assessment and Plan





- Assessment and Plan (Free Text)


Assessment: 





A 62 year old female who came to the ER due to shortness of breath with 

exertion over the past week. History of COPD,post mitral valve repair , 

paroxsymal atrial fibrillation on Eliquis. Claimed to have black tarry stools. 

Admitting hemoglobin 6.7, transfused PRBC and FFP, repeat hemoglobin, 9.7. 

endoscopy done and large ulcer noted. Held Eliquis, will restart when stable. 

Started on heparin drip.























Plan: 





Stable cardiac status


Telemetry NSR 80-90's


Heparin held off for few hours due to elevated PTT then


 restarted at low dose without boluses


On Cardizem  mg daily, Lopressor 50 mg BID


Knee pain less with less swelling, gout


 started on low dose prednisone


EGD showed non bleeding antral gastric ulcer


On Protonix IV


Post transfusion of PRBC, Stable Hemoglobin 9.6


Continue current medications


Continue current treatment





Will follow up





Plan and treatment discussed with Dr. Myers

## 2018-05-27 NOTE — PN
DATE:  05/27/2018



This is [facility] hospital visit on the telemetry floor.



For Dr. Alatorre.



SUBJECTIVE:  The patient is a 62-year-old female seen sitting up in bed,

reports she has not been out of bed for a few days now, with

recommendations to do so at least from bed to chair in a sitting position. 

We also recommended a spirometer which will be ordered for.  With this, the

patient's hemoglobin was 6.7 on admission; with transfusion of 3 units of

packed cells, the hemoglobin is 9.5 today.  Her white blood cell count

however did jump from 11.5 to 21.4 after steroids were begun yesterday, IV

solu-medrol, with the patient noted had a recent GI bleed with a

nonbleeding ulcer noted on EGD, with patient now on low-dose heparin as her

Eliquis has been on hold with recommendations as per Dr. Frost to keep

the heparin low flow for a few days until oral anticoagulation may be

restarted, possibly with Coumadin as the values of the INR may be checked

to make sure it is not over the values for therapeutic range.  With this,

the patient is now reporting that she had some dark stool earlier today;

however, her diet was just advanced with the patient otherwise in no acute

distress.  She does not want any injection at her knee because the needle

was too big, and therefore IV steroids were begun which as per Dr. Alatorre's recommendation will be discontinued in favor of Percocet for

severe pain, tramadol for moderate pain and Tylenol for mild pain, with the

patient to be out of bed to the chair as indicated.



PHYSICAL EXAMINATION

VITAL SIGNS:  Temperature 97, pulse 74, respirations 16, blood pressure

126/78, with a pulse ox of 93%.

HEENT:  Unremarkable.

NECK:  Supple.

HEART:  Regular rate, occasional ectopic beat.

LUNGS:  Clear.

ABDOMEN:  Soft and nontender.

EXTREMITIES:  Decreased range of motion of the left knee with tenderness to

gentle palpation.

NEUROLOGIC:  Awake, alert and oriented.

SKIN: Otherwise warm, dry and clear.



LABORATORY DATA:  The patient's labs were done, white blood cell count of

21.4, up from 11.5 yesterday after steroids were begun; hemoglobin of 9.5,

9.7 yesterday; hematocrit of 29.5, platelet count of 227,000, with a normal

chem metabolic panel except for creatinine of 1.4, up from 1 yesterday. 

Her PTT was noted to be 35.9, with recommended increase from 800 units per

hour to 900 units per hour by Dr. Frost with the PTT value to be noted

and within therapeutic range and not supratherapeutic, with no boluses

recommended for this patient.



ASSESSMENT:  The assessment for this patient is that of gastrointestinal

bleed with severe symptomatic anemia status post transfusion,

esophagogastroduodenoscopy with nonbleeding antral ulcer, hypertension,

chronic obstructive pulmonary disease, paroxysmal atrial fibrillation,

degenerative join disease with knee pain, gouty arthritis.



PLAN:  The plan for this patient is for her to be out of bed to the chair,

spirometer at the bedside.  We will also stop the IV steroids in favor of

pain regimen as listed above.  We will continue the heparin drip with no

boluses as per Dr. Frost's recommendation with eventually starting

possibly of Coumadin as the INR may be monitored to avoid excessive

anticoagulation in this sensitive patient.  In the interm, we will also

recommend Carafate 1 g twice a day to be begun.  The prognosis for this

patient is guarded.



This is a complex patient with a comprehensive medically necessary and

appropriate visit carried out in excess of 40 minutes in face-to-face time

and in speaking with other doctors regarding her care, nursing staff with

adjustment of her medications as listed above.  Her questions were answered

to her satisfaction.





__________________________________________

Jah Wright MD



DD:  05/27/2018 14:40:55

DT:  05/27/2018 19:27:33

Job # [id number]

## 2018-05-27 NOTE — PN
DATE:  05/27/2018



SUBJECTIVE:  This patient was seen and evaluated earlier.  Patient is

tolerating the diet.  No bleeding per rectum.  No abdominal pain.  Patient

was on 800 units of heparin.  The last PTT was subtherapeutic.



PHYSICAL EXAMINATION:

VITAL SIGNS:  Temperature 98.5, pulse 82, blood pressure 110/70.

HEENT:  Atraumatic and anicteric.

NECK:  Supple.

HEART:  S1 and S2 heard.

LUNGS:  Bilateral air entry present.

ABDOMEN:  Soft.  No tenderness.

EXTREMITIES:  No cyanosis, clubbing.



LABORATORY DATA:  Hemoglobin 9.5, hematocrit 29.5, WBC 21.4, platelets 227.

Creatinine is 1.4, .



IMPRESSION AND PLAN:

1.  This is a 62-year-old patient with a history of paroxysmal atrial

fibrillation; mitral valve replacement, bioprosthetic valve on Eliquis, was

admitted with severe anemia with the hemoglobin of 6.7.  Patient did

receive 2 units of packed red blood cells and 2 units of fresh frozen

plasma.  Patient did have an upper gastrointestinal endoscopy, which showed

clean based _____ large antral ulceration, biopsies were taken.  Patient

was started on heparin and patient after 1300 units, which is an

appropriate dose further weight based, the PTT was over 150.  Subsequently,

there is a fixed dose at night, 100 unit was started, but the PTT was on a

lower range.  Now we would recommend to increase the 800 units to 900 units

and follow up the PT, PTT.

2.  Patient does have significant elevation of the leukocytosis.  White

cell count is elevated to 21,000.  Abdomen soft, the etiology is unclear. 

Presently, patient is on prednisone, partly this could contribute to this. 

However, we need to rule out Clostridium difficile in this patient.  We

will request stool for Clostridium difficile and also empirically start the

patient on p.o. Flagyl.

3.  Acute kidney injury and acute elevation of the BUN and creatinine.  We

will increase the fluid intake.  Patient is presently on pantoprazole 40 mg

every 12 hours.  Close followup of the hemoglobin, hematocrit.

4.  We will continue the intravenous heparin.  If there is no active

bleeding for 48 hours, we will start on p.o. Coumadin; however, the heparin

is subtherapeutic dose.  We will increase the dose, adjust the dose.  In

view of this active bleeding, it is reasonable to consider anticoagulation,

which can be more controlled.  It is reasonable to start on Coumadin, the

present regimen _____ starting on newer anticoagulant or her Lovenox.



Thank you very much for allowing us to participate in the care of the

patient.





__________________________________________

Jonathan Frost MD





DD:  05/27/2018 11:56:55

DT:  05/27/2018 14:56:23

Job # [id number]

## 2018-05-27 NOTE — CT
EXAM:

  CT Head Without Intravenous Contrast



EXAM DATE/TIME:

  5/27/2018 6:27 PM



CLINICAL HISTORY:

  The patient age is 62 years old and is female; Injury or trauma; Fall; 

Initial encounter; Blunt trauma (contusions or hematomas); Consciousness not 

specified; Injury date: 5-27-18; Additional info: Unwitnessed fall Facility 

exam id and description: Ct heads head w/o contrast



TECHNIQUE:

  Axial computed tomography images of the head/brain without intravenous 

contrast.  All CT scans at this facility use one or more dose reduction 

techniques, viz.: automated exposure control; ma/kV adjustment per patient size 

(including targeted exams where dose is matched to indication; i.e. head); or 

iterative reconstruction technique.

  Coronal and sagittal reformatted images were created and reviewed.



COMPARISON:

  No relevant prior studies available.



FINDINGS:

  Brain:  There are scattered foci of hypodensity within the cerebral white 

matter, likely representing small vessel ischemic disease in a patient this 

age.  The acuity of the white matter disease is indeterminate.  The white-gray 

differentiation is preserved demonstrating no acute territorial type infarct.  

No acute intracranial hemorrhage is seen.  A small cavum septum pellucidum 

variant is visualized.

  Midline shift:  There is no midline shift.

  Ventricles:  There is mild prominence of the ventricles and sulci, compatible 

with atrophy.

  Bones/joints:  The calvarium demonstrates no evidence for a depressed 

fracture.  There is a small nonspecific sclerotic lesion within the left 

frontal skull.

  Soft tissues:  A few hyperdense scalp calcifications are visualized.

  Vasculature:  There is atherosclerotic calcification of the intracranial 

internal carotid arteries and distal vertebral arteries.

  Sinuses:  A small mucous retention cyst or polyp is visualized within the 

right maxillary sinus.

  Mastoid air cells:  There is minimal mucosal thickening of the left mastoid 

air cells.



IMPRESSION:     

1.  No acute intracranial hemorrhage or acute territorial type infarct.

2.  There are scattered foci of hypodensity within the cerebral white matter, 

likely representing small vessel ischemic disease in a patient this age.

3.  Mild atrophy.

4.  Incidental/non-acute findings are described above.

## 2018-05-27 NOTE — CP.PCM.PN
Subjective





- Date & Time of Evaluation


Date of Evaluation: 05/27/18


Time of Evaluation: 18:30





- Subjective


Subjective: 


[facility] Star called for patient








Objective





- Vital Signs/Intake and Output


Vital Signs (last 24 hours): 


 











Temp Pulse Resp BP Pulse Ox


 


 98 F   74   18   133/76   99 


 


 05/27/18 17:05  05/27/18 17:22  05/27/18 17:05  05/27/18 17:22  05/27/18 17:05








Intake and Output: 


 











 05/27/18 05/28/18





 18:59 06:59


 


Intake Total 300 


 


Output Total 300 


 


Balance 0 














- Medications


Medications: 


 Current Medications





Acetaminophen (Tylenol 325mg Tab)  650 mg PO Q4H PRN


   PRN Reason: Pain, Mild (1-3)


Albuterol/Ipratropium (Duoneb 3 Mg/0.5 Mg (3 Ml) Ud)  3 ml IH [id number] PRN


   PRN Reason: Shortness of Breath


Diltiazem HCl (Cardizem Cd)  120 mg PO DAILY [facility]


   Last Admin: 05/27/18 10:27 Dose:  120 mg


Diphenhydramine HCl (Benadryl)  25 mg PO Q6 PRN


   PRN Reason: Allergy symptoms


   Last Admin: 05/26/18 09:58 Dose:  25 mg


Heparin Sodium/Sodium Chloride (Heparin 75735 Units/250ml 1/2 Normal Saline)  25

,000 units in 250 mls @ 9 mls/hr IV .Q24H [facility]; 900 UNITS/HR


   PRN Reason: Protocol


   Last Admin: 05/27/18 10:59 Dose:  900 units/hr, 9 mls/hr


Metoprolol Tartrate (Lopressor)  50 mg PO BRKDIN [facility]


   Last Admin: 05/27/18 17:22 Dose:  50 mg


Metronidazole (Flagyl)  500 mg PO Q8 [facility]


   PRN Reason: Protocol


   Last Admin: 05/27/18 14:13 Dose:  500 mg


Montelukast Sodium (Singulair)  10 mg PO HS [facility]


   Last Admin: 05/26/18 21:36 Dose:  10 mg


Ondansetron HCl (Zofran Inj)  4 mg IVP Q6H PRN


   PRN Reason: Nausea/Vomiting


Oxycodone/Acetaminophen (Percocet 5/325 Mg Tab)  1 tab PO Q4H PRN


   PRN Reason: Pain, severe (8-10)


   Stop: 05/30/18 12:55


   Last Admin: 05/27/18 17:22 Dose:  1 tab


Pantoprazole Sodium (Protonix Inj)  40 mg IVP Q12 [facility]


   Last Admin: 05/27/18 10:26 Dose:  40 mg


Sucralfate (Carafate Oral Susp)  1 gm PO BID [facility]


   Last Admin: 05/27/18 17:22 Dose:  1 gm


Tramadol HCl (Ultram)  50 mg PO TID PRN


   PRN Reason: Pain, moderate (4-7)











- Labs


Labs: 


 





 05/27/18 04:00 





 05/27/18 04:00 





 











PT  14.0 SECONDS (9.4-12.5)  H  05/26/18  08:45    


 


INR  1.21  (0.93-1.08)  H  05/26/18  08:45    


 


APTT  35.7 Seconds (25.1-36.5)   05/27/18  15:05    














- Constitutional


Appears: Non-toxic, No Acute Distress





- Head Exam


Head Exam: ATRAUMATIC, NORMAL INSPECTION, NORMOCEPHALIC





- Eye Exam


Eye Exam: EOMI, Normal appearance





- ENT Exam


ENT Exam: Mucous Membranes Moist





- Respiratory Exam


Respiratory Exam: Clear to Ausculation Bilateral, NORMAL BREATHING PATTERN





- Cardiovascular Exam


Cardiovascular Exam: REGULAR RHYTHM, +S1, +S2





- GI/Abdominal Exam


GI & Abdominal Exam: Soft.  absent: Tenderness





- Extremities Exam


Extremities Exam: Tenderness.  absent: Pedal Edema


Additional comments: 


difficulty flexing knees bilaterally. unable to stand up on her own 








- Neurological Exam


Neurological Exam: Alert, Awake, Oriented x3





Assessment and Plan





- Assessment and Plan (Free Text)


Plan: 


Code star called by nurses for unwitnessed fall. Patient was seen evaluated. 

Patient states she was in chair and tried ot get back in to bed when she fell. 

She said she fell non her knees, denied hitting her head on the floor. Before 

the fall she stated she felt dizziness. Denied any chest pain, SOB, palpitations

, diaphoresis. Patient did not lose consciousness. AAOx3 and responding 

appropriately, no focal weakness. 





Plan:


-CT head


-LS xray


-orthostatics


-bilateral knee xrays


-review labs from today


-continue to monitor


-vital signs

## 2018-05-28 LAB
ALBUMIN SERPL-MCNC: 3.7 G/DL (ref 3–4.8)
ALBUMIN/GLOB SERPL: 1.1 {RATIO} (ref 1.1–1.8)
ALT SERPL-CCNC: 25 U/L (ref 7–56)
APTT BLD: 76.4 SECONDS (ref 25.1–36.5)
AST SERPL-CCNC: 24 U/L (ref 14–36)
BASOPHILS # BLD AUTO: 0 K/MM3 (ref 0–2)
BASOPHILS NFR BLD: 0 % (ref 0–3)
BUN SERPL-MCNC: 31 MG/DL (ref 7–21)
CALCIUM SERPL-MCNC: 9.5 MG/DL (ref 8.4–10.5)
EOSINOPHIL # BLD: 0 10*3/UL (ref 0–0.7)
EOSINOPHIL NFR BLD: 0 % (ref 1.5–5)
ERYTHROCYTE [DISTWIDTH] IN BLOOD BY AUTOMATED COUNT: 15 % (ref 11.5–14.5)
GFR NON-AFRICAN AMERICAN: 30
GRANULOCYTES # BLD: 16.4 10*3/UL (ref 1.4–6.5)
GRANULOCYTES NFR BLD: 91.7 % (ref 50–68)
HGB BLD-MCNC: 9.4 G/DL (ref 12–16)
INR PPP: 1.09 (ref 0.93–1.08)
LYMPHOCYTES # BLD: 0.4 10*3/UL (ref 1.2–3.4)
LYMPHOCYTES NFR BLD AUTO: 2.5 % (ref 22–35)
MCH RBC QN AUTO: 30.2 PG (ref 25–35)
MCHC RBC AUTO-ENTMCNC: 34.2 G/DL (ref 31–37)
MCV RBC AUTO: 88.4 FL (ref 80–105)
MONOCYTES # BLD AUTO: 1 10*3/UL (ref 0.1–0.6)
MONOCYTES NFR BLD: 5.8 % (ref 1–6)
PLATELET # BLD: 286 10^3/UL (ref 120–450)
PMV BLD AUTO: 10.2 FL (ref 7–11)
PROTHROMBIN TIME: 12.6 SECONDS (ref 9.4–12.5)
RBC # BLD AUTO: 3.11 10^6/UL (ref 3.5–6.1)
WBC # BLD AUTO: 17.9 10^3/UL (ref 4.5–11)

## 2018-05-28 RX ADMIN — DILTIAZEM HYDROCHLORIDE SCH MG: 120 CAPSULE, COATED, EXTENDED RELEASE ORAL at 09:32

## 2018-05-28 RX ADMIN — HEPARIN SODIUM SCH: 10000 INJECTION, SOLUTION INTRAVENOUS at 11:37

## 2018-05-28 RX ADMIN — OXYCODONE HYDROCHLORIDE AND ACETAMINOPHEN PRN TAB: 5; 325 TABLET ORAL at 09:36

## 2018-05-28 RX ADMIN — SUCRALFATE SCH GM: 1 SUSPENSION ORAL at 17:34

## 2018-05-28 RX ADMIN — SUCRALFATE SCH GM: 1 SUSPENSION ORAL at 09:32

## 2018-05-28 NOTE — PN
DATE:  05/28/2018



This is [facility] visit on the telemetry floor.



For Dr. Alatorre.



SUBJECTIVE:  The patient is a 62-year-old female seen sitting up in bed,

reporting that she had tried to be out of bed to the chair yesterday;

however, her _____ was not within her reach.  When she reached for it, she

slipped out of the chair and was found on the floor with no acute injury. 

She reports that her knee is still painful despite having IV steroids given

yesterday.  The steroids have been discontinued as the patient recently had

peptic ulcer findings as per EGD and is now on anticoagulants including

heparin.  At present, the patient is in no acute distress, resting

comfortably, tolerating her advanced diet now; however, her liquid intake

is not that good she reports with possible mild dehydration accompanying

this.



After conversation with Dr. Frost and Dr. Dihn, it was recommended to

continue her heparin low flow with no boluses also as per reconsult with

Dr. Crabtree for an injection to her knee.  We will also hold aspirin for

the time being until her injection is done.



OBJECTIVE PHYSICAL EXAMINATION:

VITAL SIGNS:  Temperature 97.1, pulse 72, respirations 19, blood pressure

119/75, pulse ox 99%.

HEENT:  Unremarkable.

NECK:  Supple.

HEART:  Regular rate.  Occasional ectopic beat.

LUNGS:  Minimal decreased breath sounds at the bases.

ABDOMEN:  Soft, nontender.

EXTREMITIES:  Decreased range of motion to the knee on the left.

NEUROLOGIC:  Awake and alert.

SKIN:  Otherwise, warm, dry and clear.



LABORATORY DATA:  The patient's labs were done.  White blood cell count of

17.9 after IV steroids were recently given and discontinued.  Hemoglobin of

9.4, hematocrit of 27.5, platelet count of 286,000.  Her PTT was 76 with

adjustment made to the IV heparin she is receiving.  Chem metabolic panel

showed a BUN of 31, creatinine of 1.7 with IV fluids to be restarted with

Renal consult recommended.



The patient did have an x-ray of her lumbar spine done yesterday.  It was

read as no acute fractures.  Moderate levoscoliosis.  She also had an x-ray

of the knees bilateral showing no acute fracture, degenerative arthritis of

both knees, right greater than left, moderately large bilateral effusions,

left greater than right.  She had a CAT scan of her head done yesterday

showing no acute intracranial hemorrhage or infarct.



ASSESSMENT:  The assessment for this patient is that of gastrointestinal

bleed with nonbleeding antral ulcer on esophagogastroduodenoscopy; severe

symptomatic anemia, status post transfusion, 3 units of packed cells;

hypertension; chronic obstructive pulmonary disease; paroxysmal atrial

fibrillation; degenerative joint disease with knee pain; gouty arthritis;

status post fall with no acute trauma.



PLAN:  Plan for this patient after conversation with Dr. Alatorre is to

continue the present medical regimen with the heparin low flow to continue

with considerations for beginning Coumadin once her injection with Dr. Crabtree is completed.  We will continue present medical regimen as per Dr. Frost and Dr. Dinh with IV fluids being begun along with consult with

Dr. Arevalo, Renal for her elevated BUN and creatinine.



This is a complex patient with a comprehensive medically necessary and

appropriate visit carried out in excess of 40 minutes with at least half

face-to-face time also with conversations held with Dr. Dinh, Dr. Frost regarding her care, also the patient's nurse, also case was

discussed with her.





__________________________________________

Jah Wright MD



DD:  05/28/2018 14:46:32

DT:  05/28/2018 15:48:59

Job # [id number]

## 2018-05-28 NOTE — CP.PCM.PN
Subjective





- Date & Time of Evaluation


Date of Evaluation: 05/28/18


Time of Evaluation: 06:20





- Subjective


Subjective: 





Awake,Alert,claimed to have tarry stools last night, no distress, feels much 

better today,less swelling on knees





Reason for consultation and follow up: cardiac evaluation,shortness of breath 

with exertion over the past week. History of COPD,post  mitral valve repair, 

paroxsymal atrial fibrillation on Eliquis,








Seen and examined by me and Dr. Myers








Objective





- Vital Signs/Intake and Output


Vital Signs (last 24 hours): 


 











Temp Pulse Resp BP Pulse Ox


 


 97.7 F   84   19   126/85   99 


 


 05/28/18 06:00  05/28/18 06:00  05/28/18 06:00  05/28/18 06:00  05/28/18 06:00








Intake and Output: 


 











 05/28/18 05/28/18





 06:59 18:59


 


Intake Total 317 50


 


Output Total 2 


 


Balance 315 50














- Medications


Medications: 


 Current Medications





Acetaminophen (Tylenol 325mg Tab)  650 mg PO Q4H PRN


   PRN Reason: Pain, Mild (1-3)


Albuterol/Ipratropium (Duoneb 3 Mg/0.5 Mg (3 Ml) Ud)  3 ml IH [id number] PRN


   PRN Reason: Shortness of Breath


Diltiazem HCl (Cardizem Cd)  120 mg PO DAILY [facility]


   Last Admin: 05/27/18 10:27 Dose:  120 mg


Diphenhydramine HCl (Benadryl)  25 mg PO Q6 PRN


   PRN Reason: Allergy symptoms


   Last Admin: 05/26/18 09:58 Dose:  25 mg


Heparin Sodium/Sodium Chloride (Heparin 86573 Units/250ml 1/2 Normal Saline)  25

,000 units in 250 mls @ 9 mls/hr IV .Q24H OLMAN; 900 UNITS/HR


   PRN Reason: Protocol


   Last Titration: 05/28/18 07:54 Dose:  800 units/hr, 8 mls/hr


Metoprolol Tartrate (Lopressor)  50 mg PO BRKDIN [facility]


   Last Admin: 05/28/18 07:50 Dose:  50 mg


Metronidazole (Flagyl)  500 mg PO Q8 [facility]


   PRN Reason: Protocol


   Last Admin: 05/28/18 06:23 Dose:  500 mg


Montelukast Sodium (Singulair)  10 mg PO HS [facility]


   Last Admin: 05/27/18 22:40 Dose:  10 mg


Ondansetron HCl (Zofran Inj)  4 mg IVP Q6H PRN


   PRN Reason: Nausea/Vomiting


Oxycodone/Acetaminophen (Percocet 5/325 Mg Tab)  1 tab PO Q4H PRN


   PRN Reason: Pain, severe (8-10)


   Stop: 05/30/18 12:55


   Last Admin: 05/27/18 17:22 Dose:  1 tab


Pantoprazole Sodium (Protonix Inj)  40 mg IVP Q12 [facility]


   Last Admin: 05/27/18 22:41 Dose:  40 mg


Sucralfate (Carafate Oral Susp)  1 gm PO BID [facility]


   Last Admin: 05/27/18 17:22 Dose:  1 gm


Tramadol HCl (Ultram)  50 mg PO TID PRN


   PRN Reason: Pain, moderate (4-7)











- Labs


Labs: 


 





 05/28/18 04:40 





 05/28/18 04:40 





 











PT  12.6 SECONDS (9.4-12.5)  H  05/28/18  04:40    


 


INR  1.09  (0.93-1.08)  H  05/28/18  04:40    


 


APTT  76.4 Seconds (25.1-36.5)  H  05/28/18  04:40    














- Constitutional


Appears: No Acute Distress





- Head Exam


Head Exam: NORMOCEPHALIC





- Eye Exam


Eye Exam: Normal appearance





- ENT Exam


ENT Exam: Mucous Membranes Moist





- Respiratory Exam


Respiratory Exam: Clear to Ausculation Bilateral, NORMAL BREATHING PATTERN





- Cardiovascular Exam


Cardiovascular Exam: REGULAR RHYTHM, +S1, +S2


Additional comments: 





Telemetry NSR





- GI/Abdominal Exam


GI & Abdominal Exam: Soft, Normal Bowel Sounds





- Extremities Exam


Extremities Exam: Normal Capillary Refill





- Neurological Exam


Neurological Exam: Alert, Awake, Oriented x3





- Psychiatric Exam


Psychiatric exam: Normal Affect, Normal Mood





- Skin


Skin Exam: Intact, Normal Color, Warm





Assessment and Plan





- Assessment and Plan (Free Text)


Assessment: 





A 62 year old female who came to the ER due to shortness of breath with 

exertion over the past week. History of COPD,post mitral valve repair , 

paroxsymal atrial fibrillation on Eliquis. Claimed to have black tarry stools. 

Admitting hemoglobin 6.7, transfused PRBC and FFP, repeat hemoglobin, 9.7. 

endoscopy done and large ulcer noted. Held Eliquis, will restart when stable. 

Started on heparin drip.


























Plan: 





Claimed to have tarry stools last night


Hemoglobin/hematocrit stable


Patient was just started on regular diet (probably residual)


Will monitor closely, GI on consult


Had an incident of fall yesterday, trying to get up and knees gave up


 some lightheadedness, CT of head negative for bleeding


Will order orthostatic BP


Heparin at 800 units/hour and PTT- 76.4 therapeutic level


Stable cardiac status


Telemetry NSR 80-90's


On Cardizem  mg daily, Lopressor 50 mg BID


Knee pain less with less swelling, gout


 started on low dose prednisone


EGD showed non bleeding antral gastric ulcer


On Protonix IV and Carafate


Continue current medications


Continue current treatment





Will follow up





Plan and treatment discussed with Dr. Myers

## 2018-05-28 NOTE — RAD
PROCEDURE:  Radiographs of the Lumbar Spine. 



HISTORY:

Status post fall 



COMPARISON:

No prior.



FINDINGS:



BONES:

No acute compression fractures nor retropulsed fragments.  Vertebral 

bodies exhibit normal stature.  There is a moderate levoscoliosis 

centered at the L3-L4 level.  Vertebral bodies otherwise exhibit 

normal alignment.  Facets normally aligned. 



DISC SPACES:

Maintained disc space heights maintained. 



OTHER FINDINGS:

Metallic clips right upper quadrant of the abdomen consistent with 

prior cholecystectomy



IMPRESSION:

No acute fractures. .  Moderate levoscoliosis as detailed above.

## 2018-05-28 NOTE — RAD
PROCEDURE:  Bilateral Knee Radiographs.



HISTORY:

Status post fall 



COMPARISON:

None.



FINDINGS:



BONES:

No evidence of acute displaced fracture nor dislocation.  The osseous 

structures appear intact. No cortical destructive changes. Next



JOINTS:

Degenerative osteoarthritis right knee most notably affecting the 

medial and patellofemoral compartments.  



Minor degenerative osteoarthritis medial and patellofemoral 

compartments left main 



SOFT TISSUES:

There are several us mall soft tissue calcifications within the 

anterior soft tissues at the level of the left distal femur and 

proximal tibia as well as and right pretibial soft tissues 



JOINT EFFUSION:

Moderate to large-sized bilateral effusions left greater than right. 



.



OTHER FINDINGS:

None.



IMPRESSION:

No acute fractures. Degenerative osteoarthritis both knees right 

greater than left. Moderately large bilateral effusions left greater 

than right.

## 2018-05-29 ENCOUNTER — HOSPITAL ENCOUNTER (INPATIENT)
Dept: HOSPITAL 42 - TRCU | Age: 63
LOS: 5 days | Discharge: ANOTHER HOSPITAL | DRG: 378 | End: 2018-06-03
Attending: INTERNAL MEDICINE | Admitting: INTERNAL MEDICINE
Payer: COMMERCIAL

## 2018-05-29 VITALS — DIASTOLIC BLOOD PRESSURE: 75 MMHG | SYSTOLIC BLOOD PRESSURE: 114 MMHG | TEMPERATURE: 98.7 F | HEART RATE: 79 BPM

## 2018-05-29 VITALS — RESPIRATION RATE: 18 BRPM

## 2018-05-29 VITALS — BODY MASS INDEX: 25.3 KG/M2

## 2018-05-29 VITALS — OXYGEN SATURATION: 100 %

## 2018-05-29 DIAGNOSIS — M17.0: ICD-10-CM

## 2018-05-29 DIAGNOSIS — B96.1: ICD-10-CM

## 2018-05-29 DIAGNOSIS — N39.0: ICD-10-CM

## 2018-05-29 DIAGNOSIS — Z87.11: ICD-10-CM

## 2018-05-29 DIAGNOSIS — I05.9: ICD-10-CM

## 2018-05-29 DIAGNOSIS — Z79.82: ICD-10-CM

## 2018-05-29 DIAGNOSIS — K25.9: ICD-10-CM

## 2018-05-29 DIAGNOSIS — E78.00: ICD-10-CM

## 2018-05-29 DIAGNOSIS — B96.20: ICD-10-CM

## 2018-05-29 DIAGNOSIS — Z16.12: ICD-10-CM

## 2018-05-29 DIAGNOSIS — I48.2: ICD-10-CM

## 2018-05-29 DIAGNOSIS — G56.00: ICD-10-CM

## 2018-05-29 DIAGNOSIS — N17.9: ICD-10-CM

## 2018-05-29 DIAGNOSIS — I48.0: ICD-10-CM

## 2018-05-29 DIAGNOSIS — I50.9: ICD-10-CM

## 2018-05-29 DIAGNOSIS — Z91.14: ICD-10-CM
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
## 2018-05-29 DIAGNOSIS — K92.2: Primary | ICD-10-CM

## 2018-05-29 DIAGNOSIS — R79.1: ICD-10-CM

## 2018-05-29 DIAGNOSIS — D64.9: ICD-10-CM

## 2018-05-29 DIAGNOSIS — M10.9: ICD-10-CM

## 2018-05-29 DIAGNOSIS — Z95.3: ICD-10-CM

## 2018-05-29 DIAGNOSIS — Z79.01: ICD-10-CM

## 2018-05-29 DIAGNOSIS — E78.5: ICD-10-CM

## 2018-05-29 DIAGNOSIS — I11.0: ICD-10-CM

## 2018-05-29 DIAGNOSIS — Z90.49: ICD-10-CM

## 2018-05-29 DIAGNOSIS — K22.10: ICD-10-CM

## 2018-05-29 DIAGNOSIS — I73.9: ICD-10-CM

## 2018-05-29 DIAGNOSIS — J44.9: ICD-10-CM

## 2018-05-29 LAB
ALBUMIN SERPL-MCNC: 3.3 G/DL (ref 3–4.8)
ALBUMIN/GLOB SERPL: 1.1 {RATIO} (ref 1.1–1.8)
ALT SERPL-CCNC: 24 U/L (ref 7–56)
APPEARANCE UR: (no result)
AST SERPL-CCNC: 22 U/L (ref 14–36)
BASOPHILS # BLD AUTO: 0.01 K/MM3 (ref 0–2)
BASOPHILS NFR BLD: 0.1 % (ref 0–3)
BILIRUB UR-MCNC: NEGATIVE MG/DL
BODY FLD TYPE: (no result)
BUN SERPL-MCNC: 35 MG/DL (ref 7–21)
CALCIUM SERPL-MCNC: 8.9 MG/DL (ref 8.4–10.5)
COLOR UR: YELLOW
EOSINOPHIL # BLD: 0.1 10*3/UL (ref 0–0.7)
EOSINOPHIL NFR BLD: 0.4 % (ref 1.5–5)
ERYTHROCYTE [DISTWIDTH] IN BLOOD BY AUTOMATED COUNT: 15.2 % (ref 11.5–14.5)
GFR NON-AFRICAN AMERICAN: 35
GLUCOSE UR STRIP-MCNC: NEGATIVE MG/DL
GRANULOCYTES # BLD: 9.08 10*3/UL (ref 1.4–6.5)
GRANULOCYTES NFR BLD: 81.1 % (ref 50–68)
HGB BLD-MCNC: 9.1 G/DL (ref 12–16)
LEUKOCYTE ESTERASE UR-ACNC: (no result) LEU/UL
LYMPHOCYTES # BLD: 1.3 10*3/UL (ref 1.2–3.4)
LYMPHOCYTES NFR BLD AUTO: 11.6 % (ref 22–35)
MCH RBC QN AUTO: 29.5 PG (ref 25–35)
MCHC RBC AUTO-ENTMCNC: 33.6 G/DL (ref 31–37)
MCV RBC AUTO: 88 FL (ref 80–105)
MONOCYTES # BLD AUTO: 0.8 10*3/UL (ref 0.1–0.6)
MONOCYTES NFR BLD: 6.8 % (ref 1–6)
PH UR STRIP: 6 [PH] (ref 4.7–8)
PLATELET # BLD: 324 10^3/UL (ref 120–450)
PMV BLD AUTO: 9.8 FL (ref 7–11)
PROT UR STRIP-MCNC: (no result) MG/DL
RBC # BLD AUTO: 3.08 10^6/UL (ref 3.5–6.1)
RBC # UR STRIP: (no result) /UL
SF GROSS APPEARANCE: (no result)
SF GROSS APPEARANCE: (no result)
SP GR UR STRIP: 1.01 (ref 1–1.03)
UROBILINOGEN UR STRIP-ACNC: 0.2 E.U./DL
WBC # BLD AUTO: 11.2 10^3/UL (ref 4.5–11)
WBC #/AREA URNS HPF: (no result) /HPF (ref 0–6)

## 2018-05-29 RX ADMIN — VANCOMYCIN HYDROCHLORIDE SCH MG: 500 INJECTION, POWDER, LYOPHILIZED, FOR SOLUTION INTRAVENOUS at 22:20

## 2018-05-29 RX ADMIN — HEPARIN SODIUM SCH MLS/HR: 10000 INJECTION, SOLUTION INTRAVENOUS at 15:19

## 2018-05-29 RX ADMIN — PANTOPRAZOLE SODIUM SCH MLS/HR: 40 INJECTION, POWDER, FOR SOLUTION INTRAVENOUS at 22:22

## 2018-05-29 RX ADMIN — DILTIAZEM HYDROCHLORIDE SCH MG: 120 CAPSULE, COATED, EXTENDED RELEASE ORAL at 09:12

## 2018-05-29 RX ADMIN — HEPARIN SODIUM SCH: 10000 INJECTION, SOLUTION INTRAVENOUS at 12:39

## 2018-05-29 RX ADMIN — OXYCODONE HYDROCHLORIDE AND ACETAMINOPHEN PRN TAB: 5; 325 TABLET ORAL at 09:19

## 2018-05-29 RX ADMIN — SUCRALFATE SCH GM: 1 SUSPENSION ORAL at 18:04

## 2018-05-29 RX ADMIN — SUCRALFATE SCH GM: 1 SUSPENSION ORAL at 09:09

## 2018-05-29 NOTE — PN
DATE:  05/29/2018



NEUROLOGY FOLLOWUP



CHIEF COMPLAINT:  Left hand weakness after anesthesia of the procedure

endoscopy.



SUBJECTIVE:  The patient is seen and examined at bedside, doing much

better.  She initially came for shortness of breath and black tarry stools

and was admitted in the ICU initially for symptomatic anemia with

hemoglobin of 6.6, it was likely secondary to GI bleed, had an endoscopy

and a large ulcer noted and was intertwined.  After the procedure, she had

some left hand weakness.  CAT scan of the head showed no acute intracranial

abnormality.  It is likely mostly peripheral in nature and also anesthesia

related.  No focal weakness at this time.  No pronator drift seen.  She is

stable.



PAST MEDICAL HISTORY:  Severe mitral regurgitation; status post open heart

surgery repair with MVR _____ bioprosthetic; history of paroxysmal atrial

fibrillation, on Eliquis; nonobstructive coronary artery disease; COPD.



REVIEW OF SYSTEMS:  A 14-point review of systems negative except as per the

HPI.



ALLERGIES:  ALLERGIC TO CINNAMON.



MEDICATIONS:  Reviewed by nurse per reconciliation sheet.



SOCIAL HISTORY:  No illicit drug use, smoking, or EtOH abuse.



FAMILY HISTORY:  Noncontributory.



LABORATORY DATA:  Hemoglobin 9.1, hematocrit 27.5, platelet count _____. 

Sodium is 137, potassium 3.5, chloride 104, carbon dioxide 22.  BUN of 35,

creatinine of 1.5.  Random glucose of 126.



PHYSICAL EXAMINATION:

VITAL SIGNS:  Temperature 98.7, pulse rate is 79, blood pressure 114/75,

respiratory rate of 18.

GENERAL:  The patient sitting up in bed, in no acute distress.

HEENT:  Atraumatic and normocephalic.  PERRLA.  Extraocular muscles intact.

NECK:  Supple.  No JVD.  No adenopathy noted.

LUNGS:  Clear to auscultation.  No adventitious sounds.

HEART:  S1 and S2, normal rate and rhythm.  No murmurs, rubs, or gallops.

ABDOMEN:  Soft, nontender, nondistended.  Bowel sounds are present.

EXTREMITIES:  No clubbing.  No cyanosis.  Peripheral pulses 2+ felt

bilaterally.

NEUROLOGIC:  The patient is alert and oriented to person, place, month and

year.  Speech is fluent without any errors.  Cranial nerves II through XII

intact.  Motor exam:  Moves all extremities equally.  No pronator drift

seen.  Toes are downgoing bilaterally.  Sensory:  Light touch, pinprick,

proprioception, vibration intact.  DTRs are 2+ throughout.  Coordination: 

Finger-to-nose intact.  No dysmetria noted.  Gait is deferred for now.



ASSESSMENT AND PLAN:  This is a 62-year-old woman with past medical history

significant for mitral regurgitation; status post open heart surgery and

repair with mitral valve replacement bioprosthetic; history of paroxysmal

atrial fibrillation, on Eliquis; nonobstructive coronary artery disease;

chronic obstructive pulmonary disease, presenting to the [facility] with complaints of shortness of breath and black tarry stools, loose

in nature, admitted to intensive care unit for symptomatic anemia with

hemoglobin of 6.7, likely secondary to gastrointestinal bleed.  The patient

was transfused 3 units of packed red blood cells and 2 units of fresh

frozen plasma.  Repeat hemoglobin is 9.7 with stable hemoglobin and

hematocrit and had an endoscopy done, which a large ulcer was noted and was

intertwined.  The Eliquis was on hold and as per GI, will be restarted for

paroxysmal atrial fibrillation.  She had recently consulted due to left

hand weakness _____ peripheral in nature from underlying prolonged

immobilization of the hand during anesthesia of the procedure and unlikely

a transient ischemic attack.  At this time, we recommended:

1.  To follow up with Cardiology and GI recommendation is going to resume

the Eliquis for paroxysmal atrial fibrillation, stroke prevention. 

Continue aspirin 81 mg p.o. daily for stroke prevention.

2.  Keep blood pressures between 120s to 130s systolic and diastolic

between 70s to 80s.

3.  Avoid overuse of nonsteroidal anti-inflammatory drugs given that her

recent gastrointestinal bleed and ulcer and continue current present

medical management.  No further neurological recommendation at this time.



Thank you for this followup.





__________________________________________

Panchito Boswell MD



DD:  05/29/2018 18:56:30

DT:  05/29/2018 19:09:48

Job # [id number]

## 2018-05-29 NOTE — CP.PCM.PN
<Xi Casey - Last Filed: 05/29/18 15:38>





Subjective





- Date & Time of Evaluation


Date of Evaluation: 05/29/18


Time of Evaluation: 10:10





- Subjective


Subjective: 





Seen and examined at the bedside earlier today, chart review.  Patient 

complains of explosive diarrhea yesterday and this morning.  Patient has 

already been placed on empiric Flagyl.  Patient denies nausea, vomiting, or 

abdominal pain.  Status post fluid aspiration of knees this morning.  Patient 

tolerating oral intake.  No acute of overnight events reported.  On heparin 

drip per protocol.








Objective





- Vital Signs/Intake and Output


Vital Signs (last 24 hours): 


 











Temp Pulse Resp BP Pulse Ox


 


 97.8 F   62   18   115/75   100 


 


 05/29/18 12:00  05/29/18 12:00  05/29/18 12:00  05/29/18 12:00  05/29/18 06:00








Intake and Output: 


 











 05/29/18 05/29/18





 06:59 18:59


 


Intake Total 1436 200


 


Output Total 3 


 


Balance 1433 200














- Medications


Medications: 


 Current Medications





Acetaminophen (Tylenol 325mg Tab)  650 mg PO Q4H PRN


   PRN Reason: Pain, Mild (1-3)


Albuterol/Ipratropium (Duoneb 3 Mg/0.5 Mg (3 Ml) Ud)  3 ml IH [id number] PRN


   PRN Reason: Shortness of Breath


Aspirin (Ecotrin)  81 mg PO DAILY [facility]


   Last Admin: 05/29/18 09:12 Dose:  81 mg


Diltiazem HCl (Cardizem Cd)  120 mg PO DAILY [facility]


   Last Admin: 05/29/18 09:12 Dose:  120 mg


Diphenhydramine HCl (Benadryl)  25 mg PO Q6 PRN


   PRN Reason: Allergy symptoms


   Last Admin: 05/26/18 09:58 Dose:  25 mg


Heparin Sodium/Sodium Chloride (Heparin 68509 Units/250ml 1/2 Normal Saline)  25

,000 units in 250 mls @ 9 mls/hr IV .Q24H OLMAN; 900 UNITS/HR


   PRN Reason: Protocol


   Last Admin: 05/29/18 15:19 Dose:  800 units/hr, 8 mls/hr


Potassium Chloride 20 meq/ (Sodium Chloride)  1,010 mls @ 70 mls/hr IV .[id number] 

[facility]MAN


   Last Admin: 05/29/18 15:21 Dose:  70 mls/hr


Metoprolol Tartrate (Lopressor)  50 mg PO BRKDIN [facility]


   Last Admin: 05/29/18 09:11 Dose:  50 mg


Metronidazole (Flagyl)  500 mg PO Q8 [facility]


   PRN Reason: Protocol


   Last Admin: 05/29/18 15:08 Dose:  500 mg


Montelukast Sodium (Singulair)  10 mg PO HS [facility]


   Last Admin: 05/28/18 22:26 Dose:  10 mg


Ondansetron HCl (Zofran Inj)  4 mg IVP Q6H PRN


   PRN Reason: Nausea/Vomiting


Oxycodone/Acetaminophen (Percocet 5/325 Mg Tab)  1 tab PO Q4H PRN


   PRN Reason: Pain, severe (8-10)


   Stop: 05/30/18 12:55


   Last Admin: 05/29/18 09:19 Dose:  1 tab


Pantoprazole Sodium (Protonix Inj)  40 mg IVP Q12 [facility]


   Last Admin: 05/29/18 09:12 Dose:  40 mg


Sucralfate (Carafate Oral Susp)  1 gm PO BID [facility]


   Last Admin: 05/29/18 09:09 Dose:  1 gm


Tramadol HCl (Ultram)  50 mg PO TID PRN


   PRN Reason: Pain, moderate (4-7)


Vancomycin HCl (Vancocin 25 Mg/Ml (Oral Use))  125 mg PO QID [facility]


   PRN Reason: Protocol


   Stop: 06/08/18 18:01











- Labs


Labs: 


 





 05/29/18 04:15 





 05/29/18 04:15 





 











PT  12.6 SECONDS (9.4-12.5)  H  05/28/18  04:40    


 


INR  1.09  (0.93-1.08)  H  05/28/18  04:40    


 


APTT  57.9 Seconds (25.1-36.5)  H  05/29/18  04:15    














- Constitutional


Appears: No Acute Distress





- Eye Exam


Eye Exam: Normal appearance.  absent: Scleral icterus





- ENT Exam


ENT Exam: Mucous Membranes Moist





- Respiratory Exam


Respiratory Exam: NORMAL BREATHING PATTERN.  absent: Respiratory Distress





- Cardiovascular Exam


Cardiovascular Exam: +S1, +S2





- GI/Abdominal Exam


GI & Abdominal Exam: Soft, Normal Bowel Sounds.  absent: Guarding, Tenderness, 

Organomegaly, Rebound





- Extremities Exam


Extremities Exam: absent: Calf Tenderness, Pedal Edema





- Neurological Exam


Neurological Exam: Alert, Awake, Oriented x3





- Skin


Skin Exam: Dry, Warm





Assessment and Plan





- Assessment and Plan (Free Text)


Assessment: 





Assessment: 


GI bleed, status post endoscopy found to have non bleeding gastric ulcer with 

clean base (zeny clase III)


Anemia,CT abd/pelvis without contrast on 5/22/18 did not show any acute findings

, patient received total 3 units pRBC, and 2 units FFP


Diarrhea rule out C. difficile


Mitral valve disease


Paroxsymal atrial fibrillation





Plan:





On heparin drip per protocol, may start anticoagulation since no bleeding after 

48 hours being on heparin, recommend Coumadin, anticoagulant as per PCP/

hematology, hemoglobin stable.


Continue PPI currently on Protonix twice a day, and view of acute renal failure 

would recommend to DC on PPI daily and on Carafate.


Patient would need repeat endoscopy in about 8 weeks to follow up healing of 

ulcers, discussed with patient


Monitor H&H and monitor for overt GI bleed.


Diet as tolerated


Requests again stool for C. difficile


Continue Flagyl, will start on oral vancomycin





Seen and discussed with Dr. Frost














<Jonathan Frost V - Last Filed: 05/29/18 21:53>





Objective





- Vital Signs/Intake and Output


Vital Signs (last 24 hours): 


 











Temp Pulse Resp BP Pulse Ox


 


 98.7 F   79   18   114/75   100 


 


 05/29/18 17:44  05/29/18 17:44  05/29/18 17:44  05/29/18 17:44  05/29/18 06:00








Intake and Output: 


 











 05/29/18 05/30/18





 18:59 06:59


 


Intake Total 200 


 


Balance 200 














- Labs


Labs: 


 





 05/29/18 04:15 





 05/29/18 04:15 





 











PT  12.6 SECONDS (9.4-12.5)  H  05/28/18  04:40    


 


INR  1.09  (0.93-1.08)  H  05/28/18  04:40    


 


APTT  57.9 Seconds (25.1-36.5)  H  05/29/18  04:15    














Attending/Attestation





- Attestation


I have personally seen and examined this patient.: Yes


I have fully participated in the care of the patient.: Yes


I have reviewed all pertinent clinical information, including history, physical 

exam and plan: Yes


Notes (Text): 


This is an addendum to GI progress report dictated by BACILIO Sparks.The 

patient was seen and examined earlier.  Medical records, lab studies, imagings 

were reviewed.  Last 24 hours events reviewed.  Agreed with the above treatment 

plan as outlined in  BACILIO Sparks's notes  with the addition of the 

following 


diarrhea is slowing down


On by mouth Flagyl and vancomy empirically for possible C. difficile associated 

diarrhea


On examination abdomen soft no tenderness


On IV heparin


Renal function improving on IVF


Consider starting on Coumadin if crit remains sta


Large gastric ulcer, follow-up biopsy reduce Protonix to once  after 3 days


 patient would need a repeat EGD to evaluate hof gastric ulcer


bioprosthetic mitral valveEDIL fib need long-term anticoagula


05/29/18 21:50

## 2018-05-29 NOTE — CP.PCM.PN
Subjective





- Date & Time of Evaluation


Date of Evaluation: 05/29/18


Time of Evaluation: 06:20





- Subjective


Subjective: 





Awake,Alert,claimed to loose tarry stools last night , no distress, feels much 

better today,less swelling on knees





Reason for consultation and follow up: cardiac evaluation,shortness of breath 

with exertion over the past week. History of COPD,post  mitral valve repair, 

paroxsymal atrial fibrillation on Eliquis,








Seen and examined by me and Dr. Gaxiola





Objective





- Vital Signs/Intake and Output


Vital Signs (last 24 hours): 


 











Temp Pulse Resp BP Pulse Ox


 


 97.9 F   77   20   144/81   100 


 


 05/29/18 06:00  05/29/18 06:00  05/29/18 06:00  05/29/18 06:00  05/29/18 06:00








Intake and Output: 


 











 05/29/18 05/29/18





 06:59 18:59


 


Intake Total 1436 


 


Output Total 3 


 


Balance 1433 














- Medications


Medications: 


 Current Medications





Acetaminophen (Tylenol 325mg Tab)  650 mg PO Q4H PRN


   PRN Reason: Pain, Mild (1-3)


Albuterol/Ipratropium (Duoneb 3 Mg/0.5 Mg (3 Ml) Ud)  3 ml IH [id number] PRN


   PRN Reason: Shortness of Breath


Aspirin (Ecotrin)  81 mg PO DAILY [facility]


   Last Admin: 05/28/18 17:34 Dose:  81 mg


Diltiazem HCl (Cardizem Cd)  120 mg PO DAILY [facility]


   Last Admin: 05/28/18 09:32 Dose:  120 mg


Diphenhydramine HCl (Benadryl)  25 mg PO Q6 PRN


   PRN Reason: Allergy symptoms


   Last Admin: 05/26/18 09:58 Dose:  25 mg


Heparin Sodium/Sodium Chloride (Heparin 67846 Units/250ml 1/2 Normal Saline)  25

,000 units in 250 mls @ 9 mls/hr IV .Q24H [facility]MAN; 900 UNITS/HR


   PRN Reason: Protocol


   Last Admin: 05/28/18 11:37 Dose:  Not Given


Sodium Chloride (Sodium Chloride 0.45%)  1,000 mls @ 70 mls/hr IV .[id number] [facility]


   Last Admin: 05/29/18 03:14 Dose:  70 mls/hr


Metoprolol Tartrate (Lopressor)  50 mg PO BRKDIN [facility]


   Last Admin: 05/28/18 17:35 Dose:  50 mg


Metronidazole (Flagyl)  500 mg PO Q8 [facility]


   PRN Reason: Protocol


   Last Admin: 05/29/18 05:49 Dose:  500 mg


Montelukast Sodium (Singulair)  10 mg PO HS [facility]


   Last Admin: 05/28/18 22:26 Dose:  10 mg


Ondansetron HCl (Zofran Inj)  4 mg IVP Q6H PRN


   PRN Reason: Nausea/Vomiting


Oxycodone/Acetaminophen (Percocet 5/325 Mg Tab)  1 tab PO Q4H PRN


   PRN Reason: Pain, severe (8-10)


   Stop: 05/30/18 12:55


   Last Admin: 05/28/18 09:36 Dose:  1 tab


Pantoprazole Sodium (Protonix Inj)  40 mg IVP Q12 [facility]


   Last Admin: 05/28/18 22:26 Dose:  40 mg


Sucralfate (Carafate Oral Susp)  1 gm PO BID [facility]


   Last Admin: 05/28/18 17:34 Dose:  1 gm


Tramadol HCl (Ultram)  50 mg PO TID PRN


   PRN Reason: Pain, moderate (4-7)











- Labs


Labs: 


 





 05/29/18 04:15 





 05/29/18 04:15 





 











PT  12.6 SECONDS (9.4-12.5)  H  05/28/18  04:40    


 


INR  1.09  (0.93-1.08)  H  05/28/18  04:40    


 


APTT  57.9 Seconds (25.1-36.5)  H  05/29/18  04:15    














- Constitutional


Appears: No Acute Distress





- Head Exam


Head Exam: NORMOCEPHALIC





- Eye Exam


Eye Exam: Normal appearance





- ENT Exam


ENT Exam: Mucous Membranes Moist





- Respiratory Exam


Respiratory Exam: Clear to Ausculation Bilateral, NORMAL BREATHING PATTERN





- Cardiovascular Exam


Cardiovascular Exam: REGULAR RHYTHM, +S1, +S2


Additional comments: 





NSR telemetry





- GI/Abdominal Exam


GI & Abdominal Exam: Soft, Normal Bowel Sounds





- Extremities Exam


Extremities Exam: Normal Capillary Refill


Additional comments: 





knees less swelling 1+





- Neurological Exam


Neurological Exam: Alert, Awake, Oriented x3





- Psychiatric Exam


Psychiatric exam: Normal Affect, Normal Mood





- Skin


Skin Exam: Intact, Normal Color, Warm





Assessment and Plan





- Assessment and Plan (Free Text)


Assessment: 





A 62 year old female who came to the ER due to shortness of breath with 

exertion over the past week. History of COPD,post mitral valve repair , 

paroxsymal atrial fibrillation on Eliquis. Claimed to have black tarry stools. 

Admitting hemoglobin 6.7, transfused PRBC and FFP, repeat hemoglobin, 9.7. 

endoscopy done and large ulcer noted. Held Eliquis, will restart when stable. 

Started on heparin drip.





























Plan: 





Hemoglobin/hematocrit stable today 9.1


Claimed to have loose tarry stools last night


GI on consult


Heparin at low dose, convert to Coumadin once knee injection completed


Stable cardiac status


Telemetry NSR 80-90's


On Cardizem  mg daily, Lopressor 50 mg BID


Continue current medications


Continue current treatment





Will follow up





Plan and treatment discussed with Dr. Gaxiola

## 2018-05-29 NOTE — PN
DATE:  05/28/2018



SUBJECTIVE:  She seems stable.  Her knee pain has resolved.  She is able to

move her knee and flex it much better.  Also, history of a fall day before

that and CT of the head was negative.  The patient seems stable.  No

injuries.  The fall happened due to pain in her knee, could not stand up,

but _____, the patient feels much better about her knee, swelling has gone

and redness and hotness of the knee is much better.  She complained also of

left hand kind of  is weak versus pain from squeezing her left hand. 

This happened after endoscopy, she said.  We will evaluate that and we will

get a neuro consult to evaluate that.



PHYSICAL EXAMINATION:

VITAL SIGNS:  Temperature 97.7, heart rate 73, blood pressure 126/85,

respirations 19, saturation 99%.

HEAD AND NECK:  Normal.  No JVD.  No thyromegaly.

CHEST:  Clear bilaterally.

CARDIAC:  First sound and second sound normal.  Regular.

ABDOMEN:  Soft, nontender.

EXTREMITIES:  Normal.  Her knee exam seems swelling is more than 50% down

and able to move and flex her knee without any pain and decreased hotness

and swelling.  Her left upper extremity, she was pushing _____ normal

strength, but however, her wrist is a kind of _____ is weak, possibly due

to pain.



IMPRESSION:

1.  Acute gouty arthritis or pseudogout arthritis.  The patient is better

with Solu-Medrol, she is off right now and she is doing better.

2.  Left hand weakness or pain on squeezing.  We will get a neuro consult

to evaluate that.  The patient has a CT which is negative.  She did mention

that after endoscopy; however, it is unclear if there is any weakness.  We

will give the patient on baby aspirin that she was getting before. 

Continue IV heparin for now.  Get a neuro consult for evaluations for left

upper extremity.

3.  Acute gastrointestinal bleed, stable, no more bleeding.  Continue to

follow up hemoglobin and hematocrit.  Continue IV Protonix and Pepcid.

4.  Paroxysmal atrial fibrillation.  Continue heparin.  Continue Cardizem

and metoprolol.



PLAN:  Continue current therapy.  Follow up clinically.  The patient has

also underlying COPD.  We will continue nebulizer treatment, Combivent or

DuoNeb four times a day and every 4 hours p.r.n.  Continue current therapy.

She will need physical therapy, probably TCU is better for her before she

goes home.  I discussed the case with the Hematology and GI on that day.





__________________________________________

Philip Dinh MD



DD:  05/29/2018 9:32:07

DT:  05/29/2018 12:54:48

Job # [id number]

## 2018-05-29 NOTE — PN
DATE:  05/26/2018



SUBJECTIVE:  The patient complained of knee pain.  The Orthopedic tried to

tap the knee, she refused.  She was so panicky and she does not want it. 

Otherwise, the patient has no new complaints.



PHYSICAL EXAMINATION:

VITAL SIGNS:  Her temperature 97.8, heart rate 95, blood pressure 141/87,

respirations 18, sat 97%.

HEAD AND NECK:  Normal.  No JVD.  No thyromegaly.

CHEST:  Clear bilaterally.

CARDIAC:  First sound and second sound normal.

ABDOMEN:  Soft and nontender.

EXTREMITIES:  No edema.

NEUROLOGIC:  Normal.

EXTREMITIES:  Bilateral knees, left knee is worse than right, it is warm,

swollen, tender.  There is effusion consistent with acute arthritis of both

knees.



LABORATORY STUDY:  Shows white count 11.5, hemoglobin 9.7, hematocrit 30.2,

platelets 212.  Chemistry shows sodium 138, potassium 3.8, chloride 103,

bicarb 23.  BUN 12, creatinine 1.  Liver function test is normal.  The

patient is started on heparin IV.



IMPRESSION AND PLAN:

1.  Acute gouty or pseudogout arthritis, cannot do tapping.  The patient

has peptic ulcer disease, which lowered her hemoglobin and was bleeding. 

At this time, we will give the patient IV Solu-Medrol 40 now and 30 every

12 hours to help her joint arthritis, which is most likely pseudogout

arthritis as in the past.

2.  Acute gastrointestinal bleed, resolved; peptic ulcer disease, no active

bleeding.  Continue Protonix and Pepcid.

3.  History of cardiac disease, mitral valve replacement.  We will follow

recommendation by Cardiology, probably should go back on Coumadin plus baby

aspirin as she was taking before for underlying chronic paroxysmal atrial

fibrillation.

4.  Chronic obstructive pulmonary disease.  Continue Singulair, continue

nebulizer treatment.



PLAN:  Continue current therapy for rate control, Cardizem CD and Lopressor

50 b.i.d.  Continue these medications for rate control and we will follow

up clinically.







__________________________________________

Philip Dinh MD



DD:  05/29/2018 9:27:31

DT:  05/29/2018 11:12:42

Job # [id number]

## 2018-05-29 NOTE — CP.PCM.PN
Subjective





- Date & Time of Evaluation


Date of Evaluation: 05/29/18


Time of Evaluation: 08:40





- Subjective


Subjective: 





Heme/Onc Progress Note for Dr. Alatorre





Patient was seen and examined at bedside. Patient has mild complaints of knee 

discomfort and noted loose bm that were black in nature.  Patient denied fever, 

chills, shortness of breath, chest pains, abdominal pains, nausea, vomiting, 

diarrhea, constipation, or bleeding.





Objective





- Vital Signs/Intake and Output


Vital Signs (last 24 hours): 


 











Temp Pulse Resp BP Pulse Ox


 


 97.9 F   100 H  20   129/65   100 


 


 05/29/18 06:00  05/29/18 10:00  05/29/18 06:00  05/29/18 09:12  05/29/18 06:00








Intake and Output: 


 











 05/29/18 05/29/18





 06:59 18:59


 


Intake Total 1436 


 


Output Total 3 


 


Balance 1433 














- Medications


Medications: 


 Current Medications





Acetaminophen (Tylenol 325mg Tab)  650 mg PO Q4H PRN


   PRN Reason: Pain, Mild (1-3)


Albuterol/Ipratropium (Duoneb 3 Mg/0.5 Mg (3 Ml) Ud)  3 ml IH [id number] PRN


   PRN Reason: Shortness of Breath


Aspirin (Ecotrin)  81 mg PO DAILY [facility]


   Last Admin: 05/29/18 09:12 Dose:  81 mg


Diltiazem HCl (Cardizem Cd)  120 mg PO DAILY [facility]


   Last Admin: 05/29/18 09:12 Dose:  120 mg


Diphenhydramine HCl (Benadryl)  25 mg PO Q6 PRN


   PRN Reason: Allergy symptoms


   Last Admin: 05/26/18 09:58 Dose:  25 mg


Heparin Sodium/Sodium Chloride (Heparin 38650 Units/250ml 1/2 Normal Saline)  25

,000 units in 250 mls @ 9 mls/hr IV .Q24H [facility]MAN; 900 UNITS/HR


   PRN Reason: Protocol


   Last Admin: 05/29/18 12:39 Dose:  Not Given


Sodium Chloride (Sodium Chloride 0.45%)  1,000 mls @ 70 mls/hr IV .[id number] [facility]


   Last Admin: 05/29/18 03:14 Dose:  70 mls/hr


Metoprolol Tartrate (Lopressor)  50 mg PO BRKDIN [facility]


   Last Admin: 05/29/18 09:11 Dose:  50 mg


Metronidazole (Flagyl)  500 mg PO Q8 [facility]


   PRN Reason: Protocol


   Last Admin: 05/29/18 05:49 Dose:  500 mg


Montelukast Sodium (Singulair)  10 mg PO HS [facility]


   Last Admin: 05/28/18 22:26 Dose:  10 mg


Ondansetron HCl (Zofran Inj)  4 mg IVP Q6H PRN


   PRN Reason: Nausea/Vomiting


Oxycodone/Acetaminophen (Percocet 5/325 Mg Tab)  1 tab PO Q4H PRN


   PRN Reason: Pain, severe (8-10)


   Stop: 05/30/18 12:55


   Last Admin: 05/29/18 09:19 Dose:  1 tab


Pantoprazole Sodium (Protonix Inj)  40 mg IVP Q12 [facility]


   Last Admin: 05/29/18 09:12 Dose:  40 mg


Sucralfate (Carafate Oral Susp)  1 gm PO BID [facility]


   Last Admin: 05/29/18 09:09 Dose:  1 gm


Tramadol HCl (Ultram)  50 mg PO TID PRN


   PRN Reason: Pain, moderate (4-7)











- Labs


Labs: 


 





 05/29/18 04:15 





 05/29/18 04:15 





 











PT  12.6 SECONDS (9.4-12.5)  H  05/28/18  04:40    


 


INR  1.09  (0.93-1.08)  H  05/28/18  04:40    


 


APTT  57.9 Seconds (25.1-36.5)  H  05/29/18  04:15    














- Constitutional


Appears: No Acute Distress





- Head Exam


Head Exam: ATRAUMATIC, NORMAL INSPECTION, NORMOCEPHALIC





- Eye Exam


Eye Exam: EOMI, Normal appearance, PERRL


Pupil Exam: NORMAL ACCOMODATION, PERRL





- ENT Exam


ENT Exam: Mucous Membranes Moist, Normal Exam





- Respiratory Exam


Respiratory Exam: Clear to Ausculation Bilateral, NORMAL BREATHING PATTERN





- Cardiovascular Exam


Cardiovascular Exam: REGULAR RHYTHM, +S1, +S2.  absent: Murmur





- GI/Abdominal Exam


GI & Abdominal Exam: Soft, Normal Bowel Sounds.  absent: Tenderness





- Extremities Exam


Extremities Exam: Full ROM, Normal Capillary Refill, Normal Inspection.  absent

: Joint Swelling, Pedal Edema





- Back Exam


Back Exam: NORMAL INSPECTION





- Neurological Exam


Neurological Exam: Alert, Awake, CN II-XII Intact, Normal Gait, Oriented x3





- Psychiatric Exam


Psychiatric exam: Normal Affect, Normal Mood





- Skin


Skin Exam: Dry, Intact, Normal Color, Warm





Assessment and Plan





- Assessment and Plan (Free Text)


Assessment: 





62 F with a PMHx of severe mitral regurgitation s/p open heart surgery and 

repair with MVR biprosthetic, history of paroxysmal afib (on eliquis), and non 

obstructing CAD , COPD presenting to [facility] ED with complaints of shortness of 

breath and black tarry stools, loose in nature admitted to ICU for symptomatic 

anemia with a Hgb 6.7 likely secondary to GI bleed. Patient was transfused 3u 

pRBC and 2 u FFP. Repeat hemoglobin, 9.7, stable H&H currently. endoscopy done 

and large ulcer noted. Held Eliquis, will restart when stable. Cardiology Dr Gaxiola consulted. Patient is on protonix drip. As per GI, will continue full 

liquid diet, start heparin IV drip, NO boluses, monitor coags and titrate as 

needed without boluses. No oral AC at this time will discuss with Dr. Gaxiola 

considering AC at home and possible watchman device.

## 2018-05-29 NOTE — CON
DATE:  05/28/2018



HISTORY OF PRESENT ILLNESS:  This is a 62-year-old black female with a past

medical history of COPD; AFib, on Eliquis; came to the hospital with

shortness of breath, which was worsening over 3 to 4 days and was unable to

walk for a block and the patient also reports of a dark stool and mucosal

bleeding in the mouth and nose and the patient went for colonoscopy. 

Called to evaluate her because of her right-hand weakness going on for 2

weeks.  Called to evaluate the patient.



PAST MEDICAL HISTORY:  Mitral valve disease; paroxysmal atrial

fibrillation, on Eliquis.



PAST SURGICAL HISTORY:  Mitral valve replacement.



REVIEW OF SYSTEMS:  A 10-point review of systems was negative.



ALLERGIES:  CINNAMON.



PHYSICAL EXAMINATION:

HEENT:  Normocephalic, atraumatic.

NECK:  Supple.

NEUROLOGIC:  Alert, awake, orientated x3.  No aphasia.  Cranial nerves II

through XII are tested.  Pupils reactive.  EOM intact.  Visual fields full.

No facial asymmetry.  Tongue midline.  Motor:  Moves all the extremities

equally.  Tone normal.  Deep tendon reflexes are1+.  Both plantars are

downgoing.  Sensory appears intact.  Cerebellar, gait deferred.



IMPRESSION:  Right-hand weakness and numbness for 2 weeks and possibly had

a transient ischemic attack and symptoms are better.  The patient was

already on Eliquis and gastrointestinal workup in progress.  Continue

present management.  We will follow up.







__________________________________________

Kannan Boswell MD



DD:  05/28/2018 17:07:28

DT:  05/28/2018 20:32:43

Job # [id number]

## 2018-05-30 LAB
ALBUMIN SERPL-MCNC: 3.2 G/DL (ref 3–4.8)
ALBUMIN/GLOB SERPL: 1 {RATIO} (ref 1.1–1.8)
ALT SERPL-CCNC: 23 U/L (ref 7–56)
APTT BLD: 69.5 SECONDS (ref 25.1–36.5)
AST SERPL-CCNC: 22 U/L (ref 14–36)
BASOPHILS # BLD AUTO: 0 K/MM3 (ref 0–2)
BASOPHILS NFR BLD: 0 % (ref 0–3)
BUN SERPL-MCNC: 23 MG/DL (ref 7–21)
CALCIUM SERPL-MCNC: 9 MG/DL (ref 8.4–10.5)
EOSINOPHIL # BLD: 0 10*3/UL (ref 0–0.7)
EOSINOPHIL NFR BLD: 0 % (ref 1.5–5)
ERYTHROCYTE [DISTWIDTH] IN BLOOD BY AUTOMATED COUNT: 15.2 % (ref 11.5–14.5)
GFR NON-AFRICAN AMERICAN: > 60
GRANULOCYTES # BLD: 6.75 10*3/UL (ref 1.4–6.5)
GRANULOCYTES NFR BLD: 92.9 % (ref 50–68)
HGB BLD-MCNC: 8.5 G/DL (ref 12–16)
HGB BLD-MCNC: 9.1 G/DL (ref 12–16)
HYPOCHROMIA BLD QL SMEAR: (no result)
INR PPP: 1.1 (ref 0.93–1.08)
LG PLATELETS BLD QL SMEAR: PRESENT
LYMPHOCYTE: 5 % (ref 22–35)
LYMPHOCYTES # BLD: 0.4 10*3/UL (ref 1.2–3.4)
LYMPHOCYTES NFR BLD AUTO: 5 % (ref 22–35)
MCH RBC QN AUTO: 29.4 PG (ref 25–35)
MCHC RBC AUTO-ENTMCNC: 33.6 G/DL (ref 31–37)
MCV RBC AUTO: 87.5 FL (ref 80–105)
MONOCYTES # BLD AUTO: 0.2 10*3/UL (ref 0.1–0.6)
MONOCYTES NFR BLD: 2.1 % (ref 1–6)
NEUTROPHILS NFR BLD AUTO: 95 % (ref 50–70)
PLATELET # BLD EST: NORMAL 10*3/UL
PLATELET # BLD: 358 10^3/UL (ref 120–450)
PMV BLD AUTO: 9.6 FL (ref 7–11)
PROTHROMBIN TIME: 12.7 SECONDS (ref 9.4–12.5)
RBC # BLD AUTO: 2.89 10^6/UL (ref 3.5–6.1)
WBC # BLD AUTO: 7.3 10^3/UL (ref 4.5–11)

## 2018-05-30 PROCEDURE — F07M6ZZ THERAPEUTIC EXERCISE TREATMENT OF MUSCULOSKELETAL SYSTEM - WHOLE BODY: ICD-10-PCS | Performed by: INTERNAL MEDICINE

## 2018-05-30 PROCEDURE — F08Z4ZZ HOME MANAGEMENT TREATMENT: ICD-10-PCS | Performed by: INTERNAL MEDICINE

## 2018-05-30 PROCEDURE — F07Z9ZZ GAIT TRAINING/FUNCTIONAL AMBULATION TREATMENT: ICD-10-PCS | Performed by: INTERNAL MEDICINE

## 2018-05-30 RX ADMIN — VANCOMYCIN HYDROCHLORIDE SCH MG: 500 INJECTION, POWDER, LYOPHILIZED, FOR SOLUTION INTRAVENOUS at 17:00

## 2018-05-30 RX ADMIN — PANTOPRAZOLE SODIUM SCH MG: 40 TABLET, DELAYED RELEASE ORAL at 17:00

## 2018-05-30 RX ADMIN — DILTIAZEM HYDROCHLORIDE SCH MG: 120 CAPSULE, COATED, EXTENDED RELEASE ORAL at 09:12

## 2018-05-30 RX ADMIN — SUCRALFATE SCH GM: 1 SUSPENSION ORAL at 16:59

## 2018-05-30 RX ADMIN — VANCOMYCIN HYDROCHLORIDE SCH MG: 500 INJECTION, POWDER, LYOPHILIZED, FOR SOLUTION INTRAVENOUS at 21:01

## 2018-05-30 RX ADMIN — VANCOMYCIN HYDROCHLORIDE SCH MG: 500 INJECTION, POWDER, LYOPHILIZED, FOR SOLUTION INTRAVENOUS at 09:14

## 2018-05-30 RX ADMIN — VANCOMYCIN HYDROCHLORIDE SCH MG: 500 INJECTION, POWDER, LYOPHILIZED, FOR SOLUTION INTRAVENOUS at 13:18

## 2018-05-30 RX ADMIN — HEPARIN SODIUM SCH MLS/HR: 10000 INJECTION, SOLUTION INTRAVENOUS at 11:30

## 2018-05-30 RX ADMIN — IPRATROPIUM BROMIDE AND ALBUTEROL SULFATE PRN ML: .5; 3 SOLUTION RESPIRATORY (INHALATION) at 13:05

## 2018-05-30 RX ADMIN — PANTOPRAZOLE SODIUM SCH MLS/HR: 40 INJECTION, POWDER, FOR SOLUTION INTRAVENOUS at 09:13

## 2018-05-30 RX ADMIN — HEPARIN SODIUM SCH MLS/HR: 10000 INJECTION, SOLUTION INTRAVENOUS at 19:41

## 2018-05-30 RX ADMIN — SUCRALFATE SCH GM: 1 SUSPENSION ORAL at 06:09

## 2018-05-30 NOTE — CP.PCM.CON
History of Present Illness





- History of Present Illness


History of Present Illness: 


62 year old female with PMH of atrial fibrillation, asthma, HTN, severe mitral 

regurgitation S/P mitral valve replacement, esophageal ulcers, carpal tunnel 

syndrome, COPD, dyslipidemia, S/P cholecystectomy, S/P foot surgery initially 

came in to [facility] because of gastrointestinal bleeding. Patient received 

conservative management for this. She also had loose bowel movement. Urine cx 

were done which showed ESBL E. coli in the urine but patient did not have 

symptoms. Patient is now transferred to [facility] for continued medical therapy and 

physical therapy. Infectious Diseases consult is requested to further evaluate 

and manage. Currently the patient is comfortable in bed, no fever or chills, no 

nausea or vomiting, no chest pain, no SOB, no cough or rhinorrhea, no abdominal 

pain, currently no diarrhea, no dysuria.





Review of Systems





- Review of Systems


All systems: reviewed and no additional remarkable complaints except (as per HPI

)





Past Patient History





- Infectious Disease


Hx of Infectious Diseases: None





- Tetanus Immunizations


Tetanus Immunization: Unknown





- Past Social History


Smoking Status: Never Smoked





- CARDIAC


Hx Cardiac Disorders: Yes (mitral valve disease)





- PULMONARY


Hx Chronic Obstructive Pulmonary Disease (COPD): Yes





- NEUROLOGICAL


Hx Neurological Disorder: Yes


Hx Dizziness: Yes


Hx Migraine: Yes





- HEENT


Hx HEENT Problems: No





- RENAL


Hx Chronic Kidney Disease: No





- ENDOCRINE/METABOLIC


Hx Endocrine Disorders: No





- HEMATOLOGICAL/ONCOLOGICAL


Hx Blood Transfusions: Yes


Hx Blood Transfusion Reaction: No





- INTEGUMENTARY


Hx Dermatological Problems: No





- MUSCULOSKELETAL/RHEUMATOLOGICAL


Hx Falls: Yes





- GASTROINTESTINAL


Hx Gastrointestinal Disorders: Yes





- GENITOURINARY/GYNECOLOGICAL


Hx Genitourinary Disorders: No


Hx Reproductive Disorders: No





- PSYCHIATRIC


Hx Psychophysiologic Disorder: No


Hx Emotional Abuse: No


Hx Physical Abuse: No


Hx Substance Use: No





- SURGICAL HISTORY


Hx Surgeries: Yes (BILATERAL BUNION SX,LEFT CARPAL TUNNEL SX,CARD CATH)





- ANESTHESIA


Hx Anesthesia Reactions: No


Hx Malignant Hyperthermia: No





Meds


Allergies/Adverse Reactions: 


 Allergies











Allergy/AdvReac Type Severity Reaction Status Date / Time


 


cinnamon Allergy Intermediate ANAPHYLAXIS Verified 05/29/18 20:33














- Medications


Medications: 


 Current Medications





Acetaminophen (Tylenol 325mg Tab)  650 mg PO Q4H PRN; Protocol


   PRN Reason: mild pain


Albuterol/Ipratropium (Duoneb 3 Mg/0.5 Mg (3 Ml) Ud)  3 ml IH [id number] PRN; 

Protocol


   PRN Reason: Shortness of Breath


Aspirin (Aspirin Chewable)  81 mg PO DAILY [facility]


   PRN Reason: Protocol


Diltiazem HCl (Cardizem Cd)  120 mg PO DAILY [facility]


   PRN Reason: Protocol


Diphenhydramine HCl (Benadryl)  25 mg PO Q6 PRN; Protocol


   PRN Reason: Allergy symptoms


Potassium Chloride 20 meq/ (Sodium Chloride)  1,010 mls @ 70 mls/hr IV .[id number] 

[facility]


   PRN Reason: Protocol


   Last Admin: 05/29/18 22:22 Dose:  70 mls/hr


Pantoprazole Sodium (Protonix 40mg Ivpb)  40 mg in 100 mls @ 200 mls/hr IVPB 

Q12 [facility]


   PRN Reason: Protocol


   Last Admin: 05/29/18 22:22 Dose:  200 mls/hr


Heparin Sodium/Sodium Chloride (Heparin 72930 Units/250ml 1/2 Normal Saline)  25

,000 units in 250 mls @ 8 mls/hr IV .Q24H PRN; Protocol


   PRN Reason: ADJUST RATE PER PROTOCOL


Metoprolol Tartrate (Lopressor)  50 mg PO 0800,1800 [facility]


   PRN Reason: Protocol


Montelukast Sodium (Singulair)  10 mg PO HS [facility]


   PRN Reason: Protocol


   Last Admin: 05/29/18 22:21 Dose:  10 mg


Ondansetron HCl (Zofran Inj)  4 mg IVP Q6H PRN; Protocol


   PRN Reason: Nausea/Vomiting


Oxycodone/Acetaminophen (Percocet 5/325 Mg Tab)  1 tab PO Q4H PRN; Protocol


   PRN Reason: severe pain


   Stop: 06/01/18 20:52


Sucralfate (Carafate Oral Susp)  1 gm PO 0600,1600 [facility]


   PRN Reason: Protocol


   Last Admin: 05/30/18 06:09 Dose:  1 gm


Tramadol HCl (Ultram)  50 mg PO TID PRN; Protocol


   PRN Reason: moderate pain


Vancomycin HCl (Vancocin 25 Mg/Ml (Oral Use))  125 mg PO QID [facility]


   PRN Reason: Protocol


   Last Admin: 05/29/18 22:20 Dose:  125 mg











Physical Exam





- Constitutional


Appears: Chronically Ill





- Head Exam


Head Exam: NORMAL INSPECTION





- Neck Exam


Neck exam: Negative for: Meningismus





- Respiratory Exam


Respiratory Exam: Decreased Breath Sounds





- Cardiovascular Exam


Cardiovascular Exam: +S1, +S2





- GI/Abdominal Exam


GI & Abdominal Exam: Soft.  absent: Tenderness





Results





- Vital Signs


Recent Vital Signs: 


 Last Vital Signs











Temp  98 F   05/30/18 06:30


 


Pulse  72   05/30/18 06:30


 


Resp  18   05/30/18 06:30


 


BP  124/70   05/30/18 06:30


 


Pulse Ox  98   05/30/18 06:30














- Labs


Result Diagrams: 


 05/30/18 18:01





 05/30/18 07:15


Labs: 


 Laboratory Results - last 24 hr











  05/29/18





  22:00


 


Urine Color  Yellow


 


Urine Appearance  Turbid


 


Urine pH  6.0


 


Ur Specific Gravity  1.015


 


Urine Protein  Trace H


 


Urine Glucose (UA)  Negative


 


Urine Ketones  Negative


 


Urine Blood  Moderate H


 


Urine Nitrate  Positive H


 


Urine Bilirubin  Negative


 


Urine Urobilinogen  0.2


 


Ur Leukocyte Esterase  Trace H


 


Urine RBC  1 - 3


 


Urine WBC  0 - 2














Assessment & Plan





- Assessment and Plan (Free Text)


Plan: 





Assessment


asymptomatic bacteriuria with ESBL E. coli


loose bowel movements, R/O C. diff. infection


S/P GI bleeding


chronic CHF


severe mitral regurgitation S/P mitral valve replacement


atrial fibrillation


asthma


HTN


esophageal ulcers


carpal tunnel syndrome


COPD


dyslipidemia


atrial fibrillation


S/P cholecystectomy


S/P foot surgery





Plan


continue PO Vancomycin - follow up stool for C. diff. 


bacteria in the urine need not be treated 


will monitor clinically

## 2018-05-30 NOTE — CP.PCM.CON
History of Present Illness





- History of Present Illness


History of Present Illness: 





Awake, alert, denies shortness of breath, denies chest pain





Reason for consultation: Continuity of care to TCU, cardiac follow up, history 

of mitral valve replacement (bioprosthetic) , Atrial fibrillation,was on eliquis

,COPD, asthma, GI bleeding, anemia requiring blood transfusion. 








Brief history of present illness: A 62 year old female who came in to the ER 

due to shortness of breath and complaining of tarry stools. Hemoglobin showed 

to be low (6.7) and blood were transfused. Endoscopy showed large gastric 

ulcer. Eliquis was discontinued and heparin drip was started. Developed gout on 

both knees and steroid injection done. Had an episode of fall 2 days ago trying 

to get up  and knees gave up.


Transferred to TCU for reconditioning.








Seen and examined by me and Dr. Gaxiola





Review of Systems





- Constitutional


Constitutional: As Per HPI





- Cardiovascular


Additional comments: 





denies chest pain, denies shortness of breath





- Gastrointestinal


Additional comments: 





denies tarry stools, had bowel movement yesterday





- Genitourinary


Additional comments: 





continent





- Musculoskeletal


Additional comments: 





walks with cane,





Past Patient History





- Infectious Disease


Hx of Infectious Diseases: None





- Tetanus Immunizations


Tetanus Immunization: Unknown





- Past Social History


Smoking Status: Never Smoked





- CARDIAC


Hx Cardiac Disorders: Yes (mitral valve disease)





- PULMONARY


Hx Chronic Obstructive Pulmonary Disease (COPD): Yes





- NEUROLOGICAL


Hx Neurological Disorder: Yes


Hx Dizziness: Yes


Hx Migraine: Yes





- HEENT


Hx HEENT Problems: No





- RENAL


Hx Chronic Kidney Disease: No





- ENDOCRINE/METABOLIC


Hx Endocrine Disorders: No





- HEMATOLOGICAL/ONCOLOGICAL


Hx Blood Transfusions: Yes


Hx Blood Transfusion Reaction: No





- INTEGUMENTARY


Hx Dermatological Problems: No





- MUSCULOSKELETAL/RHEUMATOLOGICAL


Hx Falls: Yes





- GASTROINTESTINAL


Hx Gastrointestinal Disorders: Yes





- GENITOURINARY/GYNECOLOGICAL


Hx Genitourinary Disorders: No


Hx Reproductive Disorders: No





- PSYCHIATRIC


Hx Psychophysiologic Disorder: No


Hx Emotional Abuse: No


Hx Physical Abuse: No


Hx Substance Use: No





- SURGICAL HISTORY


Hx Surgeries: Yes (BILATERAL BUNION SX,LEFT CARPAL TUNNEL SX,CARD CATH)





- ANESTHESIA


Hx Anesthesia Reactions: No


Hx Malignant Hyperthermia: No





Meds


Allergies/Adverse Reactions: 


 Allergies











Allergy/AdvReac Type Severity Reaction Status Date / Time


 


cinnamon Allergy Intermediate ANAPHYLAXIS Verified 05/29/18 20:33














- Medications


Medications: 


 Current Medications





Acetaminophen (Tylenol 325mg Tab)  650 mg PO Q4H PRN; Protocol


   PRN Reason: mild pain


Albuterol/Ipratropium (Duoneb 3 Mg/0.5 Mg (3 Ml) Ud)  3 ml IH [id number] PRN; 

Protocol


   PRN Reason: Shortness of Breath


Aspirin (Aspirin Chewable)  81 mg PO DAILY [facility]


   PRN Reason: Protocol


Diltiazem HCl (Cardizem Cd)  120 mg PO DAILY [facility]


   PRN Reason: Protocol


Diphenhydramine HCl (Benadryl)  25 mg PO Q6 PRN; Protocol


   PRN Reason: Allergy symptoms


Potassium Chloride 20 meq/ (Sodium Chloride)  1,010 mls @ 70 mls/hr IV .[id number] 

[facility]


   PRN Reason: Protocol


   Last Admin: 05/29/18 22:22 Dose:  70 mls/hr


Pantoprazole Sodium (Protonix 40mg Ivpb)  40 mg in 100 mls @ 200 mls/hr IVPB 

Q12 [facility]


   PRN Reason: Protocol


   Last Admin: 05/29/18 22:22 Dose:  200 mls/hr


Heparin Sodium/Sodium Chloride (Heparin 42116 Units/250ml 1/2 Normal Saline)  25

,000 units in 250 mls @ 8 mls/hr IV .Q24H PRN; Protocol


   PRN Reason: ADJUST RATE PER PROTOCOL


Metoprolol Tartrate (Lopressor)  50 mg PO 0800,1800 [facility]


   PRN Reason: Protocol


Montelukast Sodium (Singulair)  10 mg PO HS [facility]


   PRN Reason: Protocol


   Last Admin: 05/29/18 22:21 Dose:  10 mg


Ondansetron HCl (Zofran Inj)  4 mg IVP Q6H PRN; Protocol


   PRN Reason: Nausea/Vomiting


Oxycodone/Acetaminophen (Percocet 5/325 Mg Tab)  1 tab PO Q4H PRN; Protocol


   PRN Reason: severe pain


   Stop: 06/01/18 20:52


Sucralfate (Carafate Oral Susp)  1 gm PO 0600,1600 [facility]


   PRN Reason: Protocol


   Last Admin: 05/30/18 06:09 Dose:  1 gm


Tramadol HCl (Ultram)  50 mg PO TID PRN; Protocol


   PRN Reason: moderate pain


Vancomycin HCl (Vancocin 25 Mg/Ml (Oral Use))  125 mg PO QID [facility]


   PRN Reason: Protocol


   Last Admin: 05/29/18 22:20 Dose:  125 mg











Physical Exam





- Constitutional


Appears: No Acute Distress





- Eye Exam


Eye Exam: Normal appearance





- ENT Exam


ENT Exam: Mucous Membranes Moist





- Respiratory Exam


Respiratory Exam: Clear to Auscultation Bilateral, NORMAL BREATHING PATTERN





- Cardiovascular Exam


Cardiovascular Exam: +S1, +S2





- GI/Abdominal Exam


GI & Abdominal Exam: Normal Bowel Sounds, Soft





- Extremities Exam


Extremities exam: Positive for: normal capillary refill


Additional comments: 





knees less swelling 1+, with band aid post steroid injection





- Neurological Exam


Neurological exam: Alert, Oriented x3





- Psychiatric Exam


Psychiatric exam: Normal Affect, Normal Mood





- Skin


Skin Exam: Intact, Normal Color, Warm





Results





- Vital Signs


Recent Vital Signs: 


 Last Vital Signs











Temp  98 F   05/30/18 06:30


 


Pulse  72   05/30/18 06:30


 


Resp  18   05/30/18 06:30


 


BP  124/70   05/30/18 06:30


 


Pulse Ox  98   05/30/18 06:30














- Labs


Result Diagrams: 


 05/30/18 07:15





Labs: 


 Laboratory Results - last 24 hr











  05/29/18





  22:00


 


Urine Color  Yellow


 


Urine Appearance  Turbid


 


Urine pH  6.0


 


Ur Specific Gravity  1.015


 


Urine Protein  Trace H


 


Urine Glucose (UA)  Negative


 


Urine Ketones  Negative


 


Urine Blood  Moderate H


 


Urine Nitrate  Positive H


 


Urine Bilirubin  Negative


 


Urine Urobilinogen  0.2


 


Ur Leukocyte Esterase  Trace H


 


Urine RBC  1 - 3


 


Urine WBC  0 - 2














Assessment & Plan





- Assessment and Plan (Free Text)


Assessment: 





A 62 year old female who came in to the ER due to shortness of breath and 

complaining of tarry stools. Hemoglobin showed to be low (6.7) and blood were 

transfused. Endoscopy showed large gastric ulcer. Eliquis was discontinued and 

heparin drip was started. Developed gout on both knees and steroid injection 

done. Had an episode of fall 2 days ago trying to get up  and knees gave up.


Transferred to TCU for reconditioning.


Plan: 





On Heparin drip at 800 units/hr


PTT level per protocol


Claimed to have no more tarry stools


For physical therapy


Closely monitor hemoglobin


Fall precaution


Continue current treatment


Continue current medications





Will follow up





Plan and treatment discussed with Dr. Gaxiola





Thank you  for the opportunity to take care of Ms. Malu Meeks

## 2018-05-30 NOTE — CP.PCM.PN
Subjective





- Date & Time of Evaluation


Date of Evaluation: 05/30/18


Time of Evaluation: 11:00





- Subjective


Subjective: 





Seen and examined at the bedside earlier this morning, chart reviewed.  Patient 

transferred from telemetry to TCU last night.  No reports of any overt GI 

bleed.  Had soft BM today, no reports of bleeding.  Tolerating oral intake 

denies nausea, vomiting, or abdominal pain. Patient hemoglobin of 8.5 noted 

this morning.





Objective





- Vital Signs/Intake and Output


Vital Signs (last 24 hours): 


 











Temp Pulse Resp BP Pulse Ox


 


 98 F   75   18   119/61   98 


 


 05/30/18 06:30  05/30/18 09:12  05/30/18 06:30  05/30/18 09:12  05/30/18 06:30











- Medications


Medications: 


 Current Medications





Acetaminophen (Tylenol 325mg Tab)  650 mg PO Q4H PRN; Protocol


   PRN Reason: mild pain


Albuterol/Ipratropium (Duoneb 3 Mg/0.5 Mg (3 Ml) Ud)  3 ml IH [id number] PRN; 

Protocol


   PRN Reason: Shortness of Breath


Aspirin (Aspirin Chewable)  81 mg PO DAILY [facility]


   PRN Reason: Protocol


   Last Admin: 05/30/18 09:05 Dose:  Not Given


Diltiazem HCl (Cardizem Cd)  120 mg PO DAILY [facility]


   PRN Reason: Protocol


   Last Admin: 05/30/18 09:12 Dose:  120 mg


Diphenhydramine HCl (Benadryl)  25 mg PO Q6 PRN; Protocol


   PRN Reason: Allergy symptoms


Potassium Chloride 20 meq/ (Sodium Chloride)  1,010 mls @ 70 mls/hr IV .[id number] 

[facility]


   PRN Reason: Protocol


   Last Admin: 05/30/18 11:52 Dose:  70 mls/hr


Heparin Sodium/Sodium Chloride (Heparin 92658 Units/250ml 1/2 Normal Saline)  25

,000 units in 250 mls @ 7.054 mls/hr IV .Q24H [facility]; 9.6 U/KG/HR


   PRN Reason: Protocol


   Last Admin: 05/30/18 11:30 Dose:  9.6 u/kg/hr, 7.054 mls/hr


Metoprolol Tartrate (Lopressor)  50 mg PO 0800,1800 [facility]


   PRN Reason: Protocol


   Last Admin: 05/30/18 07:50 Dose:  50 mg


Montelukast Sodium (Singulair)  10 mg PO HS [facility]


   PRN Reason: Protocol


   Last Admin: 05/29/18 22:21 Dose:  10 mg


Ondansetron HCl (Zofran Inj)  4 mg IVP Q6H PRN; Protocol


   PRN Reason: Nausea/Vomiting


Oxycodone/Acetaminophen (Percocet 5/325 Mg Tab)  1 tab PO Q4H PRN; Protocol


   PRN Reason: severe pain


   Stop: 06/01/18 20:52


Sucralfate (Carafate Oral Susp)  1 gm PO 0600,1600 [facility]


   PRN Reason: Protocol


   Last Admin: 05/30/18 06:09 Dose:  1 gm


Tramadol HCl (Ultram)  50 mg PO TID PRN; Protocol


   PRN Reason: moderate pain


Vancomycin HCl (Vancocin 25 Mg/Ml (Oral Use))  125 mg PO QID [facility]


   PRN Reason: Protocol


   Last Admin: 05/30/18 09:14 Dose:  125 mg











- Labs


Labs: 


 





 05/30/18 07:15 





 05/30/18 07:15 





 











PT  12.7 SECONDS (9.4-12.5)  H  05/30/18  07:15    


 


INR  1.10  (0.93-1.08)  H  05/30/18  07:15    


 


APTT  69.5 Seconds (25.1-36.5)  H  05/30/18  07:15    














- Constitutional


Appears: No Acute Distress





- Eye Exam


Eye Exam: Normal appearance.  absent: Scleral icterus





- ENT Exam


ENT Exam: Mucous Membranes Moist





- Neck Exam


Neck Exam: Normal Inspection





- Respiratory Exam


Respiratory Exam: NORMAL BREATHING PATTERN.  absent: Respiratory Distress





- Cardiovascular Exam


Cardiovascular Exam: +S1, +S2





- GI/Abdominal Exam


GI & Abdominal Exam: Soft, Normal Bowel Sounds.  absent: Guarding, Tenderness, 

Organomegaly, Rebound





- Extremities Exam


Extremities Exam: absent: Calf Tenderness, Pedal Edema





- Neurological Exam


Neurological Exam: Alert, Awake, Oriented x3





- Skin


Skin Exam: Dry, Warm





Assessment and Plan





- Assessment and Plan (Free Text)


Assessment: 





Assessment: 





GI bleed, status post endoscopy found to have non bleeding gastric ulcer with 

clean base (zeny clase III)


Anemia,CT abd/pelvis without contrast on 5/22/18 did not show any acute findings

, patient received total 3 units pRBC, and 2 units FFP


Diarrhea rule out C. difficile


Mitral valve disease


Paroxsymal atrial fibrillation





Plan:





On heparin drip per protocol, may start anticoagulation since no bleeding after 

48 hours being on heparin, recommend Coumadin, anticoagulant as per PCP/

hematology, hemoglobin stable.


Continue PPI currently on Protonix twice a day, and view of acute renal failure 

would recommend to DC on PPI daily in am and can take Zantac 150 mg in PM, 

discussed w/ patient.


Patient would need repeat endoscopy in about 8 weeks to follow up healing of 

ulcers, discussed with patient


Monitor H&H and monitor for overt GI bleed.


Diet as tolerated


pending stool for C. difficile


Continue Flagyl and oral vancomycin





Seen and discussed with Dr. Frost

## 2018-05-30 NOTE — CON
DATE:  2018



Patient admitted for Dr. Philip Dinh.



REFERRING MD:  Jah Wright MD



REASON FOR CONSULTATION:  Evaluation of the patient known to me from prior

evaluation who presents with an elevated BUN and creatinine in the setting

of a likely upper GI bleed.



HISTORY OF PRESENT ILLNESS:  The patient is a pleasant 62-year-old black

female with a history of paroxysmal atrial fibrillation on chronic

anticoagulation with Eliquis, past history of mitral valve replacement in

the fall of .  History of LVH, ejection fraction 55%, history of mild

valvular heart disease, history of anemia, history of UTI diagnosed during

present hospitalization, history of bilateral osteoarthritis of her knees. 

The patient presented to the hospital 1 week ago with increasing shortness

of breath and fatigue.  She was noted to have a hemoglobin of 6.7.  The

patient was evaluated by GI for likely GI bleed.  She had an upper

endoscopy which showed nonbleeding antral ulcer.  She received a total of 4

units of packed red blood cells and 2 units of FFP.  Her anticoagulation

was reversed.  The patient currently remains in atrial fibrillation on

low-dose heparin therapy.  Her hemoglobin has remained stable over the last

several days in the low 9 range.  Her BUN and creatinine were always normal

at less than 10 and 1.0.  Two days ago, her BUN went up to 31, today is 35.

Creatinine went up from 1-1.4 to 1.7, it is currently 1.5.  The patient had

been started on IV fluid hydration.  We are asked to evaluate the patient

for the increase in her BUN and creatinine.



PAST MEDICAL HISTORY:  Significant for paroxysmal atrial fibrillation,

valvular heart disease, status post mitral valve repair, chronic

anticoagulation, history of LVH with AI/MR/TR.  History of anemia.  History

of a Klebsiella urinary tract infection as noted above.  History of

osteoarthritis of her knees.  History of hypertension.



MEDICATIONS AT HOME:  Include that of simvastatin, Singulair, Lopressor,

mag oxide, Claritin, p.o. Lasix, Feosol, Pepcid, Cartia, vitamin D,

Symbicort, Eliquis, and aspirin.



ALLERGIES:  THE PATIENT IS ALLERGIC TO CINNAMON.



CURRENT MEDICATIONS IN HOSPITAL:  Include that of Benadryl, Carafate,

Cardizem, DuoNeb, Ecotrin, p.o. Flagyl, IV heparin, Lopressor, Percocet,

Protonix, Singulair, half-normal saline 70 mL an hour, Tylenol p.r.n.,

Ultram p.r.n., and Zofran p.r.n.



SOCIAL HISTORY:  History of occasional alcohol use, none recently.  No

history of cigarette smoking.  The patient was never a cigarette smoker. 

She denies exposure of secondhand cigarette smoke from her father.



FAMILY HISTORY:  Father  of brain cancer and diabetes.  Mother  of

complications of heart disease.



REVIEW OF SYSTEMS:  A 10+ systems reviewed with the patient.  All negative

except what is noted above.

GENERAL:  The patient states appetite and weight have been stable.

HEENT:  Denies any hearing or visual problems.

PULMONARY:  No shortness of breath.  History of mild asthma in the past. 

No history of COPD.  No history of emphysema or bronchitis.  No recent

pneumonias.

CARDIAC:  History of valvular heart disease, status post mitral valve

repair.  History of nonobstructive coronary artery disease, history of

valvular heart disease.  History of atrial fibrillation on chronic

anticoagulation.

GI:  No nausea.  No vomiting.  No diarrhea, constipation, abdominal pain. 

The patient denies seeing any GI bleeding.

:  No history of chronic kidney disease.  No history of UTIs.  However,

the patient does have a Klebsiella UTI presently.

GYN:  Postmenopausal.

ENDOCRINE:  No history of diabetes.

MUSCULOSKELETAL:  No issues.

NEURO:  No history of CVA, TIA, seizures or syncope.

HEM/ONC:  No history of anemia.  No history of malignancy prior to this

point in time.

PSYCHIATRIC:  History is negative.



PHYSICAL EXAMINATION:

GENERAL:  The patient is currently seen just having completed a carotid

Doppler study.

VITAL SIGNS:  Blood pressure 129/65, heart rate 88, temperature 97.9,

respiratory rate of 20 with a pulse ox of 100%.

HEENT:  Shows her to be normocephalic, atraumatic.  Conjunctivae remain

pale.  Sclerae are nonicteric.  Pupils equal, reactive to light and

accommodation.  Extraocular muscles are intact.  Posterior pharynx is

normal.

NECK:  Supple.  No neck vein distention.  No thyromegaly.  No

lymphadenopathy.  No bruits.

CHEST:  Clear to auscultation and percussion with no rales, rhonchi or

wheezing.

CARDIOVASCULAR:  Shows an irregular S1, S2.  No S3.  No S4.  Status post

mitral valve replacement.  MR/AI/TR.  Distal lower extremity pulses are 1

to 2+ bilaterally.

ABDOMEN:  Soft.  Bowel sounds normal.  No rebound, guarding or masses.

BACK:  No CVAT.  No spinal tenderness.

EXTREMITIES:  Show no lower extremity cyanosis, clubbing or edema.  Distal

lower extremity pulses are 1 to 2+ bilaterally.

NEURO:  Shows her be alert and oriented x3 with no gross focal motor or

sensory deficits noted.



LABORATORY DATA AND IMAGING STUDIES:  Abdominal CT on admission was

unremarkable.  Chest x-ray on admission showed mild pulmonary vascular

congestion.  Upper endoscopy done during hospitalization showed nonbleeding

antral ulcer.  CBC, white blood cell count was as high as 21.4 is currently

down to 11.2.  Hemoglobin was 6.7 on admission, status post 4 units of

packed red blood cells.  Hemoglobin is now stable at 9.1.  Platelet count

is 324,000.  Coag show a PTT of 57.9 on heparin.  Chemistries show a sodium

of 137, potassium 3.5, BUN 35 with a creatinine of 1.5.  Her baseline BUN

is less than 10 with a creatinine of 1.  Glucose is 126.  Calcium 8.9,

phosphorus 4.1 with a magnesium level of 2.1.  Liver enzymes are normal. 

Albumin level is 3.3.  No urine is available for comment.  Microbiology,

urine culture positive for Klebsiella.  Blood cultures were negative at 24

hours.



ASSESSMENT:

1.  Acute renal failure in a patient with no past history of chronic kidney

disease.  This is likely in the setting of a perhaps upper gastrointestinal

bleed with volume depletion.  I expect that with IV fluid hydration, her

BUN and creatinine will return to normal.  Her abdominal CT scan showed no

abnormalities of her kidney.  The patient had been seen by us for a similar

presentation in the past.

2.  History of paroxysmal atrial fibrillation on chronic anticoagulation. 

The patient remains on low-dose heparin therapy.  Trying to determine the

best time of anticoagulation for her given her gastrointestinal bleed.

3.  Status post severe anemia.  The patient is now mildly anemic with a

hemoglobin level of 9.1.  She was as low as 6.7.

4.  History of mitral valve replacement, history of aortic

insufficiency/mitral regurgitation/tricuspid regurgitation with ejection

fraction of 55%.

5.  History of Klebsiella urinary tract infection.  Antibiotic therapy as

per her primary care physician.

6.  History of osteoarthritis of her knees bilaterally, status post

orthopedic intervention.

7.  History of hypertension.  Blood pressure is controlled on present

medical therapy.  No change in current regimen.

8.  History of a gastric antral ulcer.  The patient will continue on

protein pump inhibition therapy.



PLAN:

1.  Agree with IV fluid administration.  In light of her potassium level of

3.5, we will supplement her potassium and the IV fluids.

2.  Consider treatment for her Klebsiella urinary tract infection.

3.  Continue to monitor accurate Is and Os and labs on a daily basis at

least for the next several days.

4.  Continued followup by Cardiology and Hematology/Oncology.



Thank you for letting me partake and share in the care of your patient.





__________________________________________

Binu Arevalo MD



DD:  2018 13:43:07

DT:  2018 13:50:47

Job # [id number]

## 2018-05-30 NOTE — CON
DATE:  05/29/2018



LOCATION:  Patient is seen earlier today in 371, bed 2.



CHIEF COMPLAINT:  Positive urine cultures x1 day.



HISTORY OF PRESENT ILLNESS:  This is a 62-year-old female with past medical

history significant for asthma, hypertension, severe mitral regurgitation,

history of mitral valve replacement, history of esophageal ulcers, chronic

obstructive lung disease, dyslipidemia, atrial fibrillation, who was

admitted with the diagnosis of gastrointestinal bleeding.  The patient was

seen in the emergency room by Dr. Markus Cleary and she was admitted also with

shortness of breath and had diagnoses of GI bleed and tachycardia, chronic

obstructive lung disease.  Urine culture was done for reasons unknown and

is positive for Klebsiella pneumonia which is ESBL Klebsiella in the urine.



REVIEW OF SYSTEMS:  Revealed the patient has no fevers, although on the

25th, patient did have a temperature of 100.3 and the urine culture was

collected on the 27th.  Patient's shortness of breath is improved and no

chest pain, abdominal pain, diarrhea, or constipation at this point. 

Twelve-point review of system is performed.



PAST MEDICAL HISTORY:  Significant for asthma, chronic obstructive lung

disease, atrial fibrillation, cardiac arrhythmia, congestive heart failure,

esophageal ulcers, severe mitral regurgitation, hypertension, asthma,

peripheral vascular disease, coronary artery disease, arthritis,

depression, anxiety.



PAST SURGICAL HISTORY:  Significant for bilateral bunion surgery, cardiac

catheterization, cholecystectomy, mitral valve replacement.



ALLERGIES:  PATIENT IS ALLERGIC WITH CINNAMON.



MEDICATIONS:  Reviewed.



PHYSICAL EXAMINATION:

VITAL SIGNS:  Patient's temperature is 97, had a temperature of 100.3 on

the 25th and earlier than that, the patient's admitting temperatures are

normal with a heart rate of 100, respiratory rate of 18, blood pressure is

115/70.

HEENT:  Unremarkable.

NECK:  Supple.

LUNGS:  Have decreased breath sounds.

HEART:  Normal S1 and S2.

ABDOMEN:  Soft, nontender.



LABORATORY DATA:  Reveals a white count of 9.2, it went up to 21,000, 86%

neutrophils, 9% band.  Chemistries reveal a BUN of 31, creatinine of 1.7

and initially, patient's creatinine was 1.  On admission, the patient's

creatinine was 0.9.  Synovial fluid analysis shows a synovial wbc of 9000,

it went up to 24, 000.  Her blood cultures had been negative.  Nares are

negative for MRSA.  The Klebsiella pneumonia ESBL is in the urine.  Patient

was on steroids.  Patient was started on p.o. vancomycin because of

diarrhea.   _____ note is reviewed.



ASSESSMENT AND PLAN:  This is a 62-year-old female with asthma, chronic

obstructive pulmonary disease, dyslipidemia, atrial fibrillation,

congestive heart failure, coronary artery disease, peripheral vascular

disease, esophageal ulcers, severe mitral regurgitation, hypertension,

arthritis, depression, anxiety, was initially admitted with shortness of

breath, black tarry stools and loose in nature with symptomatic anemia and

gastrointestinal bleed.  Patient did not have any dysuria or frequency.  No

urinary symptoms and had a urine culture now with:

1.  Systemic inflammatory response syndrome with Klebsiella pneumonia in

urine culture, which is extended spectrum beta lactamase, asymptomatic

bacteruria, does not require antibiotic therapy.  We would not treat this

patient for extended spectrum beta lactamase and no urinalysis is

available.  We are waiting for urinalysis and we will check on urinalysis

and re-question regarding symptoms and check on the stool, Clostridium

difficile.  If it is negative, we will discontinue the p.o. vancomycin.







__________________________________________

Daniel Macdonald MD



DD:  05/29/2018 17:23:48

DT:  05/30/2018 1:44:51

Job # [id number]

## 2018-05-30 NOTE — US
PROCEDURE:  Neural carotid artery duplex ultrasound 



HISTORY:

Carotid stenosis CVA



PHYSICIAN(S):  Carlton Caceres MD.



TECHNIQUE:

Duplex sonography and color-flow Doppler were used to evaluate the 

carotid bifurcations and limited segments of the vertebral arteries 

bilaterally.



FINDINGS:

The exam is limited by body habitus and the patient's inability to 

cooperate. 



There is mild diffuse smooth hypoechoic plaque noted at the carotid 

bifurcations bilaterally. The peak systolic velocity in the proximal 

right internal carotid artery is 45 cm/sec. This corresponds to a 20 

to 39% proximal right ICA stenosis. Normal systolic velocities are 

noted in the proximal right external carotid artery. There is 

antegrade flow in the right vertebral artery. There is a 3rd arterial 

structure between the right carotid and vertebral artery. This could 

represent a congenital variant. 



The peak systolic velocity in the proximal left internal carotid 

artery is 82 cm/sec. This corresponds to a 20 to 39% proximal left 

ICA stenosis. Normal systolic velocities are noted in the proximal 

left external carotid artery. There is antegrade flow in the left 

vertebral artery.



IMPRESSION:

1. Bilateral 20-39% proximal ICA stenoses.



2. Antegrade flow in both vertebral arteries. 



3. Limited study.

## 2018-05-31 LAB
% IRON SATURATION: 31 % (ref 20–55)
ALBUMIN SERPL-MCNC: 3.2 G/DL (ref 3–4.8)
ALBUMIN/GLOB SERPL: 1 {RATIO} (ref 1.1–1.8)
ALT SERPL-CCNC: 26 U/L (ref 7–56)
AST SERPL-CCNC: 19 U/L (ref 14–36)
BASOPHILS # BLD AUTO: 0 K/MM3 (ref 0–2)
BASOPHILS # BLD AUTO: 0 K/MM3 (ref 0–2)
BASOPHILS NFR BLD: 0 % (ref 0–3)
BASOPHILS NFR BLD: 0 % (ref 0–3)
BUN SERPL-MCNC: 22 MG/DL (ref 7–21)
CALCIUM SERPL-MCNC: 9.4 MG/DL (ref 8.4–10.5)
EOSINOPHIL # BLD: 0 10*3/UL (ref 0–0.7)
EOSINOPHIL # BLD: 0 10*3/UL (ref 0–0.7)
EOSINOPHIL NFR BLD: 0 % (ref 1.5–5)
EOSINOPHIL NFR BLD: 0 % (ref 1.5–5)
ERYTHROCYTE [DISTWIDTH] IN BLOOD BY AUTOMATED COUNT: 15.8 % (ref 11.5–14.5)
ERYTHROCYTE [DISTWIDTH] IN BLOOD BY AUTOMATED COUNT: 15.8 % (ref 11.5–14.5)
GFR NON-AFRICAN AMERICAN: > 60
GRANULOCYTES # BLD: 8.04 10*3/UL (ref 1.4–6.5)
GRANULOCYTES # BLD: 8.61 10*3/UL (ref 1.4–6.5)
GRANULOCYTES NFR BLD: 86 % (ref 50–68)
GRANULOCYTES NFR BLD: 87.8 % (ref 50–68)
HGB BLD-MCNC: 8.5 G/DL (ref 12–16)
HGB BLD-MCNC: 8.6 G/DL (ref 12–16)
IRON SERPL-MCNC: 75 UG/DL (ref 45–180)
LYMPHOCYTES # BLD: 0.4 10*3/UL (ref 1.2–3.4)
LYMPHOCYTES # BLD: 1 10*3/UL (ref 1.2–3.4)
LYMPHOCYTES NFR BLD AUTO: 10.6 % (ref 22–35)
LYMPHOCYTES NFR BLD AUTO: 4.2 % (ref 22–35)
MCH RBC QN AUTO: 28.9 PG (ref 25–35)
MCH RBC QN AUTO: 29.1 PG (ref 25–35)
MCHC RBC AUTO-ENTMCNC: 32.3 G/DL (ref 31–37)
MCHC RBC AUTO-ENTMCNC: 32.7 G/DL (ref 31–37)
MCV RBC AUTO: 88.9 FL (ref 80–105)
MCV RBC AUTO: 89.5 FL (ref 80–105)
MONOCYTES # BLD AUTO: 0.3 10*3/UL (ref 0.1–0.6)
MONOCYTES # BLD AUTO: 0.8 10*3/UL (ref 0.1–0.6)
MONOCYTES NFR BLD: 3.4 % (ref 1–6)
MONOCYTES NFR BLD: 8 % (ref 1–6)
PLATELET # BLD: 418 10^3/UL (ref 120–450)
PLATELET # BLD: 420 10^3/UL (ref 120–450)
PMV BLD AUTO: 9.6 FL (ref 7–11)
PMV BLD AUTO: 9.7 FL (ref 7–11)
RBC # BLD AUTO: 2.94 10^6/UL (ref 3.5–6.1)
RBC # BLD AUTO: 2.96 10^6/UL (ref 3.5–6.1)
TIBC SERPL-MCNC: 247 UG/DL (ref 265–497)
WBC # BLD AUTO: 9.4 10^3/UL (ref 4.5–11)
WBC # BLD AUTO: 9.8 10^3/UL (ref 4.5–11)

## 2018-05-31 RX ADMIN — VANCOMYCIN HYDROCHLORIDE SCH MG: 500 INJECTION, POWDER, LYOPHILIZED, FOR SOLUTION INTRAVENOUS at 21:27

## 2018-05-31 RX ADMIN — SUCRALFATE SCH GM: 1 SUSPENSION ORAL at 05:24

## 2018-05-31 RX ADMIN — PANTOPRAZOLE SODIUM SCH MG: 40 TABLET, DELAYED RELEASE ORAL at 05:24

## 2018-05-31 RX ADMIN — IPRATROPIUM BROMIDE AND ALBUTEROL SULFATE PRN ML: .5; 3 SOLUTION RESPIRATORY (INHALATION) at 13:12

## 2018-05-31 RX ADMIN — DILTIAZEM HYDROCHLORIDE SCH MG: 120 CAPSULE, COATED, EXTENDED RELEASE ORAL at 09:28

## 2018-05-31 RX ADMIN — VANCOMYCIN HYDROCHLORIDE SCH MG: 500 INJECTION, POWDER, LYOPHILIZED, FOR SOLUTION INTRAVENOUS at 13:42

## 2018-05-31 RX ADMIN — VANCOMYCIN HYDROCHLORIDE SCH MG: 500 INJECTION, POWDER, LYOPHILIZED, FOR SOLUTION INTRAVENOUS at 09:28

## 2018-05-31 RX ADMIN — SUCRALFATE SCH GM: 1 SUSPENSION ORAL at 17:52

## 2018-05-31 RX ADMIN — VANCOMYCIN HYDROCHLORIDE SCH MG: 500 INJECTION, POWDER, LYOPHILIZED, FOR SOLUTION INTRAVENOUS at 17:54

## 2018-05-31 RX ADMIN — PANTOPRAZOLE SODIUM SCH MG: 40 TABLET, DELAYED RELEASE ORAL at 17:53

## 2018-05-31 RX ADMIN — HEPARIN SODIUM SCH: 10000 INJECTION, SOLUTION INTRAVENOUS at 13:46

## 2018-05-31 NOTE — HP
REASON FOR ADMISSION:  Generalized weakness, deconditioning, gait disorder

and medical therapy.



HISTORY OF PRESENT ILLNESS:  Malu Meeks, 62-year-old female admitted with

anemia due to acute GI bleeding.  The patient was seen by multiple consults

including Dr. Frost, ulcer was not actively bleeding.  She was

transfused in ICU and was stabilized, was then transferred to the medical

floor.  She did have a pseudo acute gouty arthritis or pseudo gouty

arthritis.  She was given IV steroids, which do not help and the day of

discharge was planned by Dr. Moise Crabtree and fluid sent for analysis. 

The patient still has problem walking.  She is on IV heparin by now.  She

will be on Coumadin at  risk for draining and we explained to the patient. 

Since her discharge _____ admitted to TCU for physical therapy.  Primarily,

she does not have any rectal bleeding.  She is on IV Reglan, IV fluids. 

Seen by Dr. Boswell.  Acute renal failure and we will monitor the

hemoglobin.  She seems stable.  She has no new complaint.



PAST MEDICAL HISTORY:  Paroxysmal atrial fibrillation, mitral valve

replacement, biological valve, hypertension, COPD, chronic osteoarthritis,

degenerative disk disease over lumbosacral area and bilateral knee

osteoarthritis, pseudogout and history of acute renal failure, recurrent

and also history of noncompliance with medications.



ALLERGIES:  CINNAMON.



SOCIAL HISTORY:  Nonsmoker.  No drinking.  Lives by herself.



MEDICATIONS AT HOME:  She was taking Eliquis 5 mg b.i.d.; however, she was

compliant with Protonix when we tested and transferred the patient to the

floor.  She does take aspirin 81 mg once a day, diltiazem .  She has

inhalers and nebulizer treatment  _____.  The patient is also on Singulair,

Tylenol, tramadol 50 t.i.d., Zofran p.r.n. and Vancocin 125 q.i.d.



REVIEW OF SYSTEMS:  As per the present illness.

PHYSICAL EXAMINATION:

VITAL SIGNS:  The patient's temperature 98, heart rate 72, blood pressure

124/70, respirations 20, saturations 98%.

HEAD AND NECK:  Normal.  No JVD.  No thyromegaly.

CHEST:  Clear bilaterally.

CARDIAC:  First sound and second sound normal.

ABDOMEN:  Soft, nontender, obese.

EXTREMITIES:  No edema.  Knee exam is still better, less tender, less

swollen.  All the extremities are better.  Distal pulses improved.  She can

flex and extend both knees without any associated pain.

NEUROLOGICAL:  Normal.



LABORATORY STUDIES:  She has white count 7.6, hemoglobin 8.5, hematocrit

25.6, platelets 338.  Chemistry:  Sodium 137, potassium 4, chloride 107,

bicarb 20, BUN 23, creatinine 0.9, blood sugar 2.1 and phosphorus and

magnesium are normal.  PT is 1.1, PTT 69.5.  Urinalysis was positive

nitrites, trace leukocytes, 0-2 white cells.  The patient did have ESBL in

the urine cultures.  Another sample was sent.



IMPRESSION:

1.  Generalized weakness, deconditioning.  Continue physical therapy.

2.  Paroxysmal atrial fibrillation associated with Eliquis, was treated. 

Patient is also  _____ with IV heparin and monitoring her H&H.



PLAN:

1.  We will continue heparin.  Monitor H&H.  We will repeat that at 4:00

p.m. and in the morning.  _____ Hematology consult and they will see the

patient for followup.

2.  The patient has mitral valve repair, acute renal failure, hypertension.

Dr. Leong will see the patient.  Continue IV fluid.  Continue current

medication.  We will follow up clinically.  Repeat lab in the morning and

discuss with the consults and repeat H&H at 04:00 p.m. to evaluate for any

associated bleeding.  It is possibly dilutional.  The patient does not have

any evidence of bleeding.  Clinically, she does not have any rectal

bleeding.  Continue current management.  Follow up clinically.





__________________________________________

Philip Dinh MD





DD:  05/30/2018 10:05:54

DT:  05/30/2018 14:45:59

Job # [id number]

## 2018-05-31 NOTE — PN
DATE:  05/31/2018



This is [facility] visit on TCU.



For, Dr. Alatorre.



SUBJECTIVE:  The patient is a 62-year-old female seen sitting up in bed for

reconditioning on TCU after being transferred from the medical floor.



The patient has significant anemic indices, being transfused 3 units of

packed red blood cells for an ulcer diagnosed by Dr. Frost by EGD. 

Unfortunately, the patient also suffers from paroxysmal atrial fibrillation

and had been on Eliquis prior to her GI bleed.  Since then, she has been on

IV heparin low flow with no boluses as per Dr. Frost's recommendations

with now to be restarted on Coumadin so that her PT/INR maybe monitored so

that she is not over anticoagulated as Eliquis cannot be measured with a

PT/INR or PTT as heparin and Coumadin can be monitored.  Otherwise, the

patient is in no acute distress this visit.  Her appetite is improved.



OBJECTIVE PHYSICAL EXAMINATION:

VITAL SIGNS:  Temperature 98, pulse 66, respirations 18, blood pressure is

137/80, pulse ox of 100%.

HEENT:  Unremarkable.

NECK:  Supple.

HEART:  Regular rate.  Occasional ectopic beat.

LUNGS:  Minimal decreased breath sounds at the bases.

ABDOMEN:  Soft, nontender.

EXTREMITIES:  Decreased range of motion at the knees status post injection

as per Dr. Crabtree with aspiration of the knee.

NEUROLOGIC:  She is awake and alert.

SKIN:  Otherwise, warm, dry and clear.



LABORATORY DATA:  The patient's labs were done.  White blood cell count of

9.4; hemoglobin of 8.5 after repeated, 8.6 earlier today with a hematocrit

of 26.3; platelet count of 420,000.  Her chem metabolic panel was within

normal range except for chloride of 113, BUN of 22 with normal creatinine

of 0.9.  This has slowly improved with IV fluids as per Dr. Dinh and

previous consult with Dr. Arevalo.



Her most recent PTT is 54.6 with an INR done yesterday of 1.1 with Coumadin

now begun.



Her repeat stool for occult blood today was negative.



ASSESSMENT:  The assessment for this patient is that of gastrointestinal

bleed, status post transfusions of 3 units of packed red blood cells;

paroxysmal atrial fibrillation with anticoagulation; acute renal failure,

possibly secondary to dehydration, now improved with hydration; gait

disturbance with degenerative joint disease of the knees, status post

injection; deconditioning; gouty arthritis history; hypertension; chronic

obstructive pulmonary disease.



PLAN:  Plan for this patient after conversation with Dr. Alatorre is to

continue present medical regimen.  We also spoke with Dr. Dinh regarding

restarting her Coumadin as this was recommended over Eliquis as it may be

monitored as far as efficacy with an INR to be maintained with therapeutic

range between 2 and 3 as indicated.



The heparin will continue until the Coumadin is therapeutic.  We will

monitor for bleeding including CBC as indicated and chem panel as

indicated.



The prognosis for this patient is guarded.  We will monitor clinically and

with labs.





__________________________________________

Jah Wright MD





DD:  05/31/2018 21:45:38

DT:  05/31/2018 21:50:30

Job # [id number]

## 2018-05-31 NOTE — CP.PCM.PN
Subjective





- Date & Time of Evaluation


Date of Evaluation: 05/31/18


Time of Evaluation: 06:50





- Subjective


Subjective: 





Awake, alert, denies shortness of breath, denies chest pain





Reason for consultation: Continuity of care to TCU, cardiac follow up, history 

of mitral valve replacement (bioprosthetic) , Atrial fibrillation,was on eliquis

,COPD, asthma, GI bleeding, anemia requiring blood transfusion. 





Seen and examined by me and Dr. Gaxiola








Objective





- Vital Signs/Intake and Output


Vital Signs (last 24 hours): 


 











Temp Pulse Resp BP Pulse Ox


 


 98.3 F   80   20   168/90 H  98 


 


 05/31/18 05:41  05/31/18 05:41  05/31/18 05:41  05/31/18 08:11  05/30/18 06:30








Intake and Output: 


 











 05/31/18 05/31/18





 06:59 18:59


 


Intake Total 250 


 


Balance 250 














- Medications


Medications: 


 Current Medications





Acetaminophen (Tylenol 325mg Tab)  650 mg PO Q4H PRN; Protocol


   PRN Reason: mild pain


Albuterol/Ipratropium (Duoneb 3 Mg/0.5 Mg (3 Ml) Ud)  3 ml IH [id number] PRN; 

Protocol


   PRN Reason: Shortness of Breath


   Last Admin: 05/30/18 13:05 Dose:  3 ml


Diltiazem HCl (Cardizem Cd)  120 mg PO DAILY [facility]


   PRN Reason: Protocol


   Last Admin: 05/30/18 09:12 Dose:  120 mg


Diphenhydramine HCl (Benadryl)  25 mg PO Q6 PRN; Protocol


   PRN Reason: Allergy symptoms


   Last Admin: 05/30/18 23:24 Dose:  25 mg


Famotidine (Pepcid)  40 mg PO HS [facility]


Heparin Sodium/Sodium Chloride (Heparin 17878 Units/250ml 1/2 Normal Saline)  25

,000 units in 250 mls @ 7.054 mls/hr IV .Q24H [facility]; 9.6 U/KG/HR


   PRN Reason: Protocol


   Last Admin: 05/30/18 19:41 Dose:  9.6 u/kg/hr, 7.054 mls/hr


Metoprolol Tartrate (Lopressor)  50 mg PO 0800,1800 [facility]


   PRN Reason: Protocol


   Last Admin: 05/31/18 08:11 Dose:  50 mg


Montelukast Sodium (Singulair)  10 mg PO HS [facility]


   PRN Reason: Protocol


   Last Admin: 05/30/18 21:00 Dose:  10 mg


Ondansetron HCl (Zofran Inj)  4 mg IVP Q6H PRN; Protocol


   PRN Reason: Nausea/Vomiting


Oxycodone/Acetaminophen (Percocet 5/325 Mg Tab)  1 tab PO Q4H PRN; Protocol


   PRN Reason: severe pain


   Stop: 06/01/18 20:52


Pantoprazole Sodium (Protonix Ec Tab)  40 mg PO 0600,1600 [facility]


   Last Admin: 05/31/18 05:24 Dose:  40 mg


Sucralfate (Carafate Oral Susp)  1 gm PO 0600,1600 [facility]


   PRN Reason: Protocol


   Last Admin: 05/31/18 05:24 Dose:  1 gm


Tramadol HCl (Ultram)  50 mg PO TID PRN; Protocol


   PRN Reason: moderate pain


Vancomycin HCl (Vancocin 25 Mg/Ml (Oral Use))  125 mg PO QID [facility]


   PRN Reason: Protocol


   Last Admin: 05/30/18 21:01 Dose:  125 mg











- Labs


Labs: 


 





 05/31/18 06:40 





 05/31/18 06:40 





 











PT  12.7 SECONDS (9.4-12.5)  H  05/30/18  07:15    


 


INR  1.10  (0.93-1.08)  H  05/30/18  07:15    


 


APTT  54.0 Seconds (25.1-36.5)  H  05/31/18  06:40    














- Constitutional


Appears: No Acute Distress





- Eye Exam


Eye Exam: Normal appearance





- ENT Exam


ENT Exam: Mucous Membranes Moist





- Respiratory Exam


Respiratory Exam: Clear to Ausculation Bilateral, NORMAL BREATHING PATTERN





- Cardiovascular Exam


Cardiovascular Exam: +S1, +S2





- GI/Abdominal Exam


GI & Abdominal Exam: Soft, Normal Bowel Sounds


Additional comments: 





denies tarry stools





- Extremities Exam


Extremities Exam: Normal Capillary Refill


Additional comments: 





less knee swelling ,denies pain





- Neurological Exam


Neurological Exam: Alert, Awake, Oriented x3





- Psychiatric Exam


Psychiatric exam: Normal Affect, Normal Mood





- Skin


Skin Exam: Intact, Normal Color, Warm





Assessment and Plan





- Assessment and Plan (Free Text)


Assessment: 





A 62 year old female who came in to the ER due to shortness of breath and 

complaining of tarry stools. Hemoglobin showed to be low (6.7) and blood were 

transfused. Endoscopy showed large gastric ulcer. Eliquis was discontinued and 

heparin drip was started. Developed gout on both knees and steroid injection 

done. Had an episode of fall 2 days ago trying to get up  and knees gave up.


Transferred to TCU for reconditioning.Physical therapy





Plan: 





Physical therapy in progress


Claimed to be feeling better, knees better


On Heparin drip,


To switch to Xarelto


PTT level per protocol


Claimed to have no more tarry stools


had loose bowel movement (5/29/) sent for C-diff


Started on Vancomycin po


ID consult


Fall precaution


Continue current treatment


Continue current medications


Discharge planning





Will follow up





Plan and treatment discussed with Dr. Gaxiola

## 2018-05-31 NOTE — CP.PCM.PN
Subjective





- Date & Time of Evaluation


Date of Evaluation: 05/31/18


Time of Evaluation: 10:45





- Subjective


Subjective: 





No diarrhea, no dysuria, no abdominal pain.





Objective





- Vital Signs/Intake and Output


Vital Signs (last 24 hours): 


 











Temp Pulse Resp BP Pulse Ox


 


 98.3 F   80   20   168/90 H  98 


 


 05/31/18 05:41  05/31/18 05:41  05/31/18 05:41  05/31/18 05:41  05/30/18 06:30








Intake and Output: 


 











 05/30/18 05/31/18





 18:59 06:59


 


Intake Total  250


 


Balance  250














- Medications


Medications: 


 Current Medications





Acetaminophen (Tylenol 325mg Tab)  650 mg PO Q4H PRN; Protocol


   PRN Reason: mild pain


Albuterol/Ipratropium (Duoneb 3 Mg/0.5 Mg (3 Ml) Ud)  3 ml IH [id number] PRN; 

Protocol


   PRN Reason: Shortness of Breath


   Last Admin: 05/30/18 13:05 Dose:  3 ml


Aspirin (Aspirin Chewable)  81 mg PO 0800 [facility]


   PRN Reason: Protocol


Diltiazem HCl (Cardizem Cd)  120 mg PO DAILY [facility]


   PRN Reason: Protocol


   Last Admin: 05/30/18 09:12 Dose:  120 mg


Diphenhydramine HCl (Benadryl)  25 mg PO Q6 PRN; Protocol


   PRN Reason: Allergy symptoms


   Last Admin: 05/30/18 23:24 Dose:  25 mg


Potassium Chloride 20 meq/ (Sodium Chloride)  1,010 mls @ 70 mls/hr IV .[id number] 

[facility]


   PRN Reason: Protocol


   Last Admin: 05/31/18 01:41 Dose:  Not Given


Heparin Sodium/Sodium Chloride (Heparin 42158 Units/250ml 1/2 Normal Saline)  25

,000 units in 250 mls @ 7.054 mls/hr IV .Q24H [facility]; 9.6 U/KG/HR


   PRN Reason: Protocol


   Last Admin: 05/30/18 19:41 Dose:  9.6 u/kg/hr, 7.054 mls/hr


Metoprolol Tartrate (Lopressor)  50 mg PO 0800,1800 [facility]


   PRN Reason: Protocol


   Last Admin: 05/30/18 17:01 Dose:  50 mg


Montelukast Sodium (Singulair)  10 mg PO HS [facility]


   PRN Reason: Protocol


   Last Admin: 05/30/18 21:00 Dose:  10 mg


Ondansetron HCl (Zofran Inj)  4 mg IVP Q6H PRN; Protocol


   PRN Reason: Nausea/Vomiting


Oxycodone/Acetaminophen (Percocet 5/325 Mg Tab)  1 tab PO Q4H PRN; Protocol


   PRN Reason: severe pain


   Stop: 06/01/18 20:52


Pantoprazole Sodium (Protonix Ec Tab)  40 mg PO 0600,1600 [facility]


   Last Admin: 05/31/18 05:24 Dose:  40 mg


Sucralfate (Carafate Oral Susp)  1 gm PO 0600,1600 [facility]


   PRN Reason: Protocol


   Last Admin: 05/31/18 05:24 Dose:  1 gm


Tramadol HCl (Ultram)  50 mg PO TID PRN; Protocol


   PRN Reason: moderate pain


Vancomycin HCl (Vancocin 25 Mg/Ml (Oral Use))  125 mg PO QID [facility]


   PRN Reason: Protocol


   Last Admin: 05/30/18 21:01 Dose:  125 mg











- Labs


Labs: 


 





 05/30/18 18:01 





 05/30/18 07:15 





 











PT  12.7 SECONDS (9.4-12.5)  H  05/30/18  07:15    


 


INR  1.10  (0.93-1.08)  H  05/30/18  07:15    


 


APTT  60.6 Seconds (25.1-36.5)  H  05/30/18  18:01    














- Constitutional


Appears: Non-toxic, Chronically Ill





- Head Exam


Head Exam: NORMAL INSPECTION





- Respiratory Exam


Respiratory Exam: Decreased Breath Sounds





- Cardiovascular Exam


Cardiovascular Exam: +S1, +S2





- GI/Abdominal Exam


GI & Abdominal Exam: Soft.  absent: Tenderness





Assessment and Plan





- Assessment and Plan (Free Text)


Plan: 





Assessment


asymptomatic bacteriuria with ESBL E. coli


loose bowel movements, R/O C. diff. infection


S/P GI bleeding


chronic CHF


severe mitral regurgitation S/P mitral valve replacement


atrial fibrillation


asthma


HTN


esophageal ulcers


carpal tunnel syndrome


COPD


dyslipidemia


atrial fibrillation


S/P cholecystectomy


S/P foot surgery





Plan


continue PO Vancomycin day 4 - follow up stool for C. diff. 


bacteria in the urine need not be treated 


will continue to monitor clinically

## 2018-05-31 NOTE — CON
DATE:  05/30/2018



This is [facility] consult on TCU.



For Dr. Alatorre.



CHIEF COMPLAINT:  GI bleed, reconditioning.



HISTORY OF PRESENT ILLNESS:  The patient is a 62-year-old black female

seems sitting up in bed, feeling significantly improved after recent

transfer from the medical floor for reconditioning after being transfused 3

units of packed red blood cells for a ulcer, diagnosed by Dr. Frost as

per EGD.  However, the patient also has paroxysmal atrial fibrillation with

a history of valve replacement at [facility] and was on Eliquis prior to

her event.  At present, the patient is resting comfortably, participating

with TCU protocol, still on IV heparin with eventual transition back to

Eliquis versus Coumadin as per Dr. Alatorre's recommendation after

conversation and consultation with other consultants.



ALLERGIES:  THE PATIENT'S ALLERGIES ARE TO [place].



MEDICATIONS:  Include aspirin, Benadryl, Carafate, Cardizem, DuoNeb;

heparin low-flow no boluses IV; Lopressor, Percocet, saline 70 mL an hour,

Protonix, Singulair, Tylenol, tramadol, vancomycin, and Zofran.



PAST MEDICAL HISTORY:  Significant for paroxysmal atrial fibrillation;

status post valve replacement at [facility] in [place]; COPD; asthma; lower

extremity peripheral vascular disease; DJD of the knees; iron deficiency

anemia; anticoagulation, on Eliquis for her chronic atrial fibrillation. 

Also, history of peptic ulcer disease, questionable noncompliance.



FAMILY AND SOCIAL HISTORY:  Nonsmoker.  Non-ethanolic.  Lives by herself. 

Otherwise, noncontributory.



REVIEW OF SYSTEMS:  A 12-point review of systems was done, which was

negative to questioning except for items mentioned in history of present

illness.



OBJECTIVE:

VITAL SIGNS:  Temperature 98.6, pulse 72, respirations 18, blood pressure

141/65, with pulse ox of 98%.

HEENT:  Unremarkable.

NECK:  Supple.

HEART:  Regular rate.  Occasional ectopic beat.

LUNGS:  Minimal decreased breath sounds at the bases.

ABDOMEN:  Soft and nontender.

EXTREMITIES:  Decreased _____ the knees, status post aspiration of the

joints of the knee.  Faint +1 edema.

NEUROLOGIC:  Awake and alert.

SKIN:  Otherwise, warm, dry, and clear.



LABORATORY DATA:  The patient's labs were done with a white blood cell

count of 7.3, hemoglobin 8.5, with a repeat of 9.1, hematocrit 27.6,

platelet count of 358,000.  Chem metabolic panel was done showing a normal

chem metabolic panel with a potassium of 3.5, now repeated at 4 today.  Her

PTT was 60.5.



ASSESSMENT:  For this patient is that of deconditioning, GI bleed with

nonbleeding antral ulcer on esophagogastroduodenoscopy, severe symptomatic

anemia, status post transfusion of 3 units of packed cells, hypertension,

chronic obstructive lung disease, paroxysmal atrial fibrillation,

degenerative joint disease with knee pain, gouty arthritis, status post

fall, no acute trauma.



PLAN:  For this patient after conversation with Dr. Alatorre is to monitor

clinically as there was question of persistent drop in her hemoglobin;

however, this will be monitored.  We will also check her iron levels to see

if she is a candidate for IV iron, with monitoring of her IV heparin,

eventually transitioning to Eliquis versus Coumadin once she is stable. 

There will be no boluses with the heparin, as per her PTT as indicated.



This is a complex patient with comprehensive, medically necessary and

appropriate visit carried out in excess of 40 minutes face-to-face time

with this patient with her labs reviewed.  The patient's questions answered

to her satisfaction.





__________________________________________

Jah Wright MD



DD:  05/30/2018 20:42:23

DT:  05/30/2018 23:58:03

Job # [id number]

## 2018-05-31 NOTE — DS
HISTORY OF PRESENT ILLNESS:  A 62-year-old female admitted with low

hemoglobin of 6.5 or 6.8 with severe anemia, found to have peptic ulcer

disease.  No active bleeding.  The patient had been stable after

transfusions.  She was on Eliquis and she was treated with transfusions and

was given fresh frozen plasma.  She was admitted in ICU, monitored very

well.  The patient was hemodynamically stable after endoscopy.  The patient

was transferred to the telemetry.  She has no chest pain.  No shortness of

breath.  Seen by Cardiology, Dr. Gaxiola and after evaluation and

stabilization, the patient was started on IV heparin to prevent any

thromboembolic phenomena from paroxysmal atrial fibrillations.  The patient

does also have mitral valve replacement, biological valve.  The patient is

stable clinically.  She did complain while she was in the hospital with

bilateral knee swelling, more in the left.  She has difficulty walking

because of that.  Initially, the patient refused tapping the fluid,

however, she was given IV steroids with some improvements.  Later on, day

of discharge, the patient was tapped from the knee joint by Dr. Moise Crabtree and the patient felt lot better,given steroids and she felt lot

better after that.  She is stable hemodynamically.  Blood count stable and

clinically, she is much better.  She did have a fall.  CT was negative and

she was started on baby aspirin as her previous regimen.  We will continue

to monitor her H and H.



PHYSICAL EXAMINATION:

VITAL SIGNS:  On discharge, her vital signs are as follows:  Temperature

98.7, heart rate 79, blood pressure 114/75, respirations 18.

HEAD AND NECK:  Normal.  No JVD.  No thyromegaly.

CHEST:  Clear.  Good air entry.

CARDIAC:  First sound and second sound normal.  No murmur, rub or gallop.

ABDOMEN:  Soft, nontender.

EXTREMITIES:  No edema. Her knee is less swollen, less warm and less

tender.  Significant improvement.

NEUROLOGIC:  Normal.



LABORATORY DATA:  Laboratory study on discharge, white count 11.2,

hemoglobin 9.1, hematocrit 27.1 and platelets 324.   Chemistry, sodium 137,

potassium 3.5, chloride 104, bicarbonate 22, BUN 35, creatinine 1.5,

improving.  Her glucose is 126.  Her liver function test is normal.



DISCHARGE DIAGNOSES:

1.  Acute gastrointestinal bleed.

2.  Peptic ulcer disease.

3.  Severe anemia requiring transfusion.

4.  Paroxysmal atrial fibrillation.

5.  Acute gouty arthritis.

6.  Knee effusion and swelling, bilateral.

7.  Status post fall.

8.  Chronic obstructive pulmonary disease.

9.  Anxiety.



The patient will be due to her general weakness and associated urinary

tract infection, ESBL, we will repeat the urine analysis.   The patient

will be transferred due to her generalized weakness and bilateral

phlebitis, being on a blood thinner, risk of fall and bleeding if she hit

her head.  We have to stabilize the patient before discharge to be safe

discharge.  At this moment, transfer the patient.  Discharge the patient to

transitional care unit for continuation of her medical care and monitoring

her PT INR and her clinical status.  We will continue current therapy.  We

will get a consult also, Cardiology, Hematology to follow up on the

patient.  ID consult with Dr. Macdonald has been consulted for ESBL in the

urine.  Continue current therapy.  The patient currently is getting

aspirin, Benadryl, Carafate, Cardizem , DuoNeb, Lopressor 50 b.i.d.,

Percocet p.r.n. for pain.  She received potassium, Protonix 40 IV,

Singulair 10, Tylenol p.r.n., tramadol 50 t.i.d. p.r.n. and she got by Dr. Frost, who had endoscopy, Vancocin and was given Zofran.  We will

continue current management.  To discharge the patient to TCU for

continuation of medical care.





__________________________________________

Philip Dinh MD





DD:  05/30/2018 7:42:35

DT:  05/30/2018 23:04:18

Job # [id number]

## 2018-05-31 NOTE — CP.PCM.PN
<Xi Casey - Last Filed: 05/31/18 15:50>





Subjective





- Date & Time of Evaluation


Date of Evaluation: 05/31/18


Time of Evaluation: 10:10





- Subjective


Subjective: 





Seen and examined at the bedside earlier today, chart review.  No further 

episodes of diarrhea, last BM was yesterday which was soft, no reports of 

melena or bright red blood per rectum.  Denies nausea, vomiting, or abdominal 

pain.  Patient started on Coumadin last night.





Objective





- Vital Signs/Intake and Output


Vital Signs (last 24 hours): 


 











Temp Pulse Resp BP Pulse Ox


 


 97.6 F   58 L  20   145/77   98 


 


 05/31/18 10:00  05/31/18 10:00  05/31/18 10:00  05/31/18 10:00  05/30/18 06:30








Intake and Output: 


 











 05/31/18 05/31/18





 06:59 18:59


 


Intake Total 250 


 


Balance 250 














- Medications


Medications: 


 Current Medications





Acetaminophen (Tylenol 325mg Tab)  650 mg PO Q4H PRN; Protocol


   PRN Reason: mild pain


Albuterol/Ipratropium (Duoneb 3 Mg/0.5 Mg (3 Ml) Ud)  3 ml IH [id number] PRN; 

Protocol


   PRN Reason: Shortness of Breath


   Last Admin: 05/30/18 13:05 Dose:  3 ml


Diltiazem HCl (Cardizem Cd)  120 mg PO DAILY [facility]


   PRN Reason: Protocol


   Last Admin: 05/31/18 09:28 Dose:  120 mg


Diphenhydramine HCl (Benadryl)  25 mg PO Q6 PRN; Protocol


   PRN Reason: Allergy symptoms


   Last Admin: 05/30/18 23:24 Dose:  25 mg


Heparin Sodium/Sodium Chloride (Heparin 32454 Units/250ml 1/2 Normal Saline)  25

,000 units in 250 mls @ 7.054 mls/hr IV .Q24H [facility]; 9.6 U/KG/HR


   PRN Reason: Protocol


   Last Admin: 05/30/18 19:41 Dose:  9.6 u/kg/hr, 7.054 mls/hr


Metoprolol Tartrate (Lopressor)  50 mg PO 0800,1800 [facility]


   PRN Reason: Protocol


   Last Admin: 05/31/18 08:11 Dose:  50 mg


Montelukast Sodium (Singulair)  10 mg PO HS [facility]


   PRN Reason: Protocol


   Last Admin: 05/30/18 21:00 Dose:  10 mg


Ondansetron HCl (Zofran Inj)  4 mg IVP Q6H PRN; Protocol


   PRN Reason: Nausea/Vomiting


Oxycodone/Acetaminophen (Percocet 5/325 Mg Tab)  1 tab PO Q4H PRN; Protocol


   PRN Reason: severe pain


   Stop: 06/01/18 20:52


Pantoprazole Sodium (Protonix Ec Tab)  40 mg PO 0600,1600 [facility]


   Last Admin: 05/31/18 05:24 Dose:  40 mg


Sucralfate (Carafate Oral Susp)  1 gm PO 0600,1600 [facility]


   PRN Reason: Protocol


   Last Admin: 05/31/18 05:24 Dose:  1 gm


Tramadol HCl (Ultram)  50 mg PO TID PRN; Protocol


   PRN Reason: moderate pain


Vancomycin HCl (Vancocin 25 Mg/Ml (Oral Use))  125 mg PO QID [facility]


   PRN Reason: Protocol


   Last Admin: 05/31/18 09:28 Dose:  125 mg











- Labs


Labs: 


 





 05/31/18 06:40 





 05/31/18 06:40 





 











PT  12.7 SECONDS (9.4-12.5)  H  05/30/18  07:15    


 


INR  1.10  (0.93-1.08)  H  05/30/18  07:15    


 


APTT  54.0 Seconds (25.1-36.5)  H  05/31/18  06:40    














- Constitutional


Appears: No Acute Distress





- Eye Exam


Eye Exam: Normal appearance.  absent: Scleral icterus





- ENT Exam


ENT Exam: Mucous Membranes Moist





- Neck Exam


Neck Exam: Normal Inspection





- Respiratory Exam


Respiratory Exam: NORMAL BREATHING PATTERN.  absent: Respiratory Distress





- Cardiovascular Exam


Cardiovascular Exam: +S1, +S2





- GI/Abdominal Exam


GI & Abdominal Exam: Soft, Normal Bowel Sounds.  absent: Guarding (all), 

Tenderness, Rebound





- Extremities Exam


Extremities Exam: absent: Calf Tenderness, Pedal Edema





- Neurological Exam


Neurological Exam: Alert, Awake, Oriented x3





- Skin


Skin Exam: Dry, Warm





Assessment and Plan





- Assessment and Plan (Free Text)


Assessment: 





Assessment: 





GI bleed, status post endoscopy found to have non bleeding gastric ulcer with 

clean base (zeny clase III)


Anemia,CT abd/pelvis without contrast on 5/22/18 did not show any acute findings

, patient received total 3 units pRBC, and 2 units FFP


Diarrhea rule out C. difficile


Mitral valve disease


Paroxsymal atrial fibrillation





Plan:





On heparin drip per protocol, given a dose of Coumadin yesterday.


Continue PPI currently on Protonix twice a day, and in view of acute renal 

failure which has now resolved on discharge would recommend to DC on PPI daily 

in am and can take Zantac 150 mg in PM, discussed w/ patient.


Patient would need repeat endoscopy in about 8 weeks to follow up healing of 

ulcers, discussed with patient


Monitor H&H and monitor for overt GI bleed.


Diet as tolerated


pending stool for C. difficile, diarrhea improved but likely has Cdiff, now 

improved on oral vancomycin


Continue oral vancomycin





Seen and discussed with Dr. Frost





<Jonathan Frost - Last Filed: 05/31/18 22:19>





Objective





- Vital Signs/Intake and Output


Vital Signs (last 24 hours): 


 











Temp Pulse Resp BP Pulse Ox


 


 98 F   66   20   137/80   100 


 


 05/31/18 16:00  05/31/18 16:00  05/31/18 20:50  05/31/18 17:53  05/31/18 16:00











- Medications


Medications: 


 Current Medications





Acetaminophen (Tylenol 325mg Tab)  650 mg PO Q4H PRN; Protocol


   PRN Reason: mild pain


Albuterol/Ipratropium (Duoneb 3 Mg/0.5 Mg (3 Ml) Ud)  3 ml IH [id number] PRN; 

Protocol


   PRN Reason: Shortness of Breath


   Last Admin: 05/31/18 13:12 Dose:  3 ml


Diltiazem HCl (Cardizem Cd)  120 mg PO DAILY [facility]


   PRN Reason: Protocol


   Last Admin: 05/31/18 09:28 Dose:  120 mg


Diphenhydramine HCl (Benadryl)  25 mg PO Q6 PRN; Protocol


   PRN Reason: Allergy symptoms


   Last Admin: 05/31/18 21:29 Dose:  25 mg


Heparin Sodium/Sodium Chloride (Heparin 96099 Units/250ml 1/2 Normal Saline)  25

,000 units in 250 mls @ 7.054 mls/hr IV .Q24H [facility]; 9.6 U/KG/HR


   PRN Reason: Protocol


   Last Admin: 05/31/18 13:46 Dose:  Not Given


Metoprolol Tartrate (Lopressor)  50 mg PO 0800,1800 [facility]


   PRN Reason: Protocol


   Last Admin: 05/31/18 17:53 Dose:  50 mg


Montelukast Sodium (Singulair)  10 mg PO HS [facility]


   PRN Reason: Protocol


   Last Admin: 05/31/18 21:27 Dose:  10 mg


Ondansetron HCl (Zofran Inj)  4 mg IVP Q6H PRN; Protocol


   PRN Reason: Nausea/Vomiting


Oxycodone/Acetaminophen (Percocet 5/325 Mg Tab)  1 tab PO Q4H PRN; Protocol


   PRN Reason: severe pain


   Stop: 06/01/18 20:52


Pantoprazole Sodium (Protonix Ec Tab)  40 mg PO 0600,1600 [facility]


   Last Admin: 05/31/18 17:53 Dose:  40 mg


Sucralfate (Carafate Oral Susp)  1 gm PO 0600,1600 [facility]


   PRN Reason: Protocol


   Last Admin: 05/31/18 17:52 Dose:  1 gm


Tramadol HCl (Ultram)  50 mg PO TID PRN; Protocol


   PRN Reason: moderate pain


Vancomycin HCl (Vancocin 25 Mg/Ml (Oral Use))  125 mg PO QID [facility]


   PRN Reason: Protocol


   Last Admin: 05/31/18 21:27 Dose:  125 mg


Warfarin Sodium (Coumadin)  7.5 mg PO 1800 [facility]


   PRN Reason: Protocol


   Last Admin: 05/31/18 17:52 Dose:  7.5 mg











- Labs


Labs: 


 





 05/31/18 11:13 





 05/31/18 06:40 





 











PT  12.7 SECONDS (9.4-12.5)  H  05/30/18  07:15    


 


INR  1.10  (0.93-1.08)  H  05/30/18  07:15    


 


APTT  54.6 Seconds (25.1-36.5)  H  05/31/18  11:13    














Attending/Attestation





- Attestation


I have personally seen and examined this patient.: Yes


I have fully participated in the care of the patient.: Yes


I have reviewed all pertinent clinical information, including history, physical 

exam and plan: Yes


Notes (Text): 


This is an addendum to GI progress report dictated by BACILIO Sparks.The 

patient was seen and examined earlier.  Medical records, lab studies, imagings 

were reviewed.  Last 24 hours events reviewed.  Agreed with the above treatment 

plan as outlined in  BACILIO Sparks's notes the with the addition of the 

following





05/31/18 22:19

## 2018-06-01 VITALS — OXYGEN SATURATION: 94 %

## 2018-06-01 LAB
APTT BLD: 57.1 SECONDS (ref 25.1–36.5)
BASOPHILS # BLD AUTO: 0 K/MM3 (ref 0–2)
BASOPHILS NFR BLD: 0 % (ref 0–3)
EOSINOPHIL # BLD: 0 10*3/UL (ref 0–0.7)
EOSINOPHIL NFR BLD: 0.2 % (ref 1.5–5)
ERYTHROCYTE [DISTWIDTH] IN BLOOD BY AUTOMATED COUNT: 15.9 % (ref 11.5–14.5)
GRANULOCYTES # BLD: 7.72 10*3/UL (ref 1.4–6.5)
GRANULOCYTES NFR BLD: 81.4 % (ref 50–68)
HGB BLD-MCNC: 8.3 G/DL (ref 12–16)
INR PPP: 1.42 (ref 0.93–1.08)
LYMPHOCYTES # BLD: 0.8 10*3/UL (ref 1.2–3.4)
LYMPHOCYTES NFR BLD AUTO: 8.2 % (ref 22–35)
MCH RBC QN AUTO: 29.1 PG (ref 25–35)
MCHC RBC AUTO-ENTMCNC: 32.7 G/DL (ref 31–37)
MCV RBC AUTO: 89.1 FL (ref 80–105)
MONOCYTES # BLD AUTO: 1 10*3/UL (ref 0.1–0.6)
MONOCYTES NFR BLD: 10.2 % (ref 1–6)
PLATELET # BLD: 439 10^3/UL (ref 120–450)
PMV BLD AUTO: 9.2 FL (ref 7–11)
PROTHROMBIN TIME: 16.5 SECONDS (ref 9.4–12.5)
RBC # BLD AUTO: 2.85 10^6/UL (ref 3.5–6.1)
WBC # BLD AUTO: 9.5 10^3/UL (ref 4.5–11)

## 2018-06-01 RX ADMIN — PANTOPRAZOLE SODIUM SCH MG: 40 TABLET, DELAYED RELEASE ORAL at 17:22

## 2018-06-01 RX ADMIN — SUCRALFATE SCH GM: 1 SUSPENSION ORAL at 05:05

## 2018-06-01 RX ADMIN — VANCOMYCIN HYDROCHLORIDE SCH MG: 500 INJECTION, POWDER, LYOPHILIZED, FOR SOLUTION INTRAVENOUS at 14:22

## 2018-06-01 RX ADMIN — DILTIAZEM HYDROCHLORIDE SCH MG: 120 CAPSULE, COATED, EXTENDED RELEASE ORAL at 09:45

## 2018-06-01 RX ADMIN — VANCOMYCIN HYDROCHLORIDE SCH MG: 500 INJECTION, POWDER, LYOPHILIZED, FOR SOLUTION INTRAVENOUS at 21:02

## 2018-06-01 RX ADMIN — HEPARIN SODIUM SCH MLS/HR: 10000 INJECTION, SOLUTION INTRAVENOUS at 05:00

## 2018-06-01 RX ADMIN — HEPARIN SODIUM SCH: 10000 INJECTION, SOLUTION INTRAVENOUS at 12:20

## 2018-06-01 RX ADMIN — VANCOMYCIN HYDROCHLORIDE SCH MG: 500 INJECTION, POWDER, LYOPHILIZED, FOR SOLUTION INTRAVENOUS at 17:23

## 2018-06-01 RX ADMIN — VANCOMYCIN HYDROCHLORIDE SCH MG: 500 INJECTION, POWDER, LYOPHILIZED, FOR SOLUTION INTRAVENOUS at 09:45

## 2018-06-01 RX ADMIN — SUCRALFATE SCH GM: 1 SUSPENSION ORAL at 17:19

## 2018-06-01 RX ADMIN — PANTOPRAZOLE SODIUM SCH MG: 40 TABLET, DELAYED RELEASE ORAL at 05:05

## 2018-06-01 NOTE — PN
DATE:  06/01/2018



REASON FOR DICTATION:  Addendum to the initial progress note dictated by

our nurse practitioner, Karina Amin.



REASON FOR ADDENDUM:  The patient's today hemoglobin is 8.3, yesterday's

8.5, no significant drop.  Nursing staff in TCU was concerned.  Explained

to the patient that we will continue Coumadin, repeat CBC tomorrow, if

remained stable,  possible discharge home on Coumadin.  Follow up every

week, PT/INR and CBC with Dr. Dinh.  The patient is noncompliant, so we

will keep close observation.  Once antidote for Xarelto and Eliquis will be

available, we can switch over to Xarelto or Eliquis, but for now we will

continue Coumadin and keep an eye on the CBC and H&H, we will repeat

tomorrow.  Possible discharge home tomorrow.





__________________________________________

Ajay Gaxiola MD



DD:  06/01/2018 9:04:58

DT:  06/01/2018 10:45:40

Job # [id number]

## 2018-06-01 NOTE — CP.PCM.PN
Subjective





- Date & Time of Evaluation


Date of Evaluation: 06/01/18


Time of Evaluation: 11:15





- Subjective


Subjective: 





Resting comfortably in bed, no fevers, not in distress.





Objective





- Vital Signs/Intake and Output


Vital Signs (last 24 hours): 


 











Temp Pulse Resp BP Pulse Ox


 


 98 F   60   20   168/98 H  100 


 


 05/31/18 16:00  06/01/18 09:45  05/31/18 20:50  06/01/18 09:45  05/31/18 16:00








Intake and Output: 


 











 06/01/18 06/01/18





 06:59 18:59


 


Intake Total 250 


 


Balance 250 














- Medications


Medications: 


 Current Medications





Acetaminophen (Tylenol 325mg Tab)  650 mg PO Q4H PRN; Protocol


   PRN Reason: mild pain


Albuterol/Ipratropium (Duoneb 3 Mg/0.5 Mg (3 Ml) Ud)  3 ml IH [id number] PRN; 

Protocol


   PRN Reason: Shortness of Breath


   Last Admin: 05/31/18 13:12 Dose:  3 ml


Diltiazem HCl (Cardizem Cd)  120 mg PO DAILY [facility]


   PRN Reason: Protocol


   Last Admin: 06/01/18 09:45 Dose:  120 mg


Diphenhydramine HCl (Benadryl)  25 mg PO Q6 PRN; Protocol


   PRN Reason: Allergy symptoms


   Last Admin: 05/31/18 21:29 Dose:  25 mg


Heparin Sodium/Sodium Chloride (Heparin 49173 Units/250ml 1/2 Normal Saline)  25

,000 units in 250 mls @ 7.054 mls/hr IV .Q24H [facility]; 9.6 U/KG/HR


   PRN Reason: Protocol


   Last Admin: 06/01/18 05:00 Dose:  9.6 u/kg/hr, 7.054 mls/hr


Metoprolol Tartrate (Lopressor)  50 mg PO 0800,1800 [facility]


   PRN Reason: Protocol


   Last Admin: 06/01/18 07:57 Dose:  50 mg


Montelukast Sodium (Singulair)  10 mg PO HS [facility]


   PRN Reason: Protocol


   Last Admin: 05/31/18 21:27 Dose:  10 mg


Ondansetron HCl (Zofran Inj)  4 mg IVP Q6H PRN; Protocol


   PRN Reason: Nausea/Vomiting


Oxycodone/Acetaminophen (Percocet 5/325 Mg Tab)  1 tab PO Q4H PRN; Protocol


   PRN Reason: severe pain


   Stop: 06/01/18 20:52


Pantoprazole Sodium (Protonix Ec Tab)  40 mg PO 0600,1600 [facility]


   Last Admin: 06/01/18 05:05 Dose:  40 mg


Sucralfate (Carafate Oral Susp)  1 gm PO 0600,1600 [facility]


   PRN Reason: Protocol


   Last Admin: 06/01/18 05:05 Dose:  1 gm


Tramadol HCl (Ultram)  50 mg PO TID PRN; Protocol


   PRN Reason: moderate pain


Vancomycin HCl (Vancocin 25 Mg/Ml (Oral Use))  125 mg PO QID [facility]


   PRN Reason: Protocol


   Last Admin: 06/01/18 09:45 Dose:  125 mg


Warfarin Sodium (Coumadin)  7.5 mg PO 1800 [facility]


   PRN Reason: Protocol


   Last Admin: 05/31/18 17:52 Dose:  7.5 mg











- Labs


Labs: 


 





 06/01/18 07:15 





 05/31/18 06:40 





 











PT  16.5 SECONDS (9.4-12.5)  H  06/01/18  07:15    


 


INR  1.42  (0.93-1.08)  H  06/01/18  07:15    


 


APTT  57.1 Seconds (25.1-36.5)  H  06/01/18  07:15    














- Constitutional


Appears: Chronically Ill





- Head Exam


Head Exam: NORMAL INSPECTION





- ENT Exam


ENT Exam: Mucous Membranes Moist





- Neck Exam


Neck Exam: absent: Meningismus





- Respiratory Exam


Respiratory Exam: Decreased Breath Sounds





- Cardiovascular Exam


Cardiovascular Exam: +S1, +S2





- GI/Abdominal Exam


GI & Abdominal Exam: Soft.  absent: Tenderness





Assessment and Plan





- Assessment and Plan (Free Text)


Plan: 





Assessment


asymptomatic bacteriuria with ESBL Klebsiella


loose bowel movements, R/O C. diff. infection


S/P GI bleeding


chronic CHF


severe mitral regurgitation S/P mitral valve replacement


atrial fibrillation


asthma


HTN


esophageal ulcers


carpal tunnel syndrome


COPD


dyslipidemia


atrial fibrillation


S/P cholecystectomy


S/P foot surgery





Plan


continue PO Vancomycin day 5 - follow up stool for C. diff. 


bacteria in the urine need not be treated 


will continue to follow clinically

## 2018-06-01 NOTE — CP.PCM.PN
Subjective





- Date & Time of Evaluation


Date of Evaluation: 06/01/18


Time of Evaluation: 09:50





- Subjective


Subjective: 





Seen and examined at the bedside earlier today, chart review.  Patient is 

having formed stool, that is brown and does not see any melena or bright red 

blood per rectum.  Hemoglobin noted.  She has stool for guaiac which was 

negative done yesterday.  Patient denies nausea, vomiting, or abdominal pain.  

Tolerating oral intake.  No new complaints.





Objective





- Vital Signs/Intake and Output


Vital Signs (last 24 hours): 


 











Temp Pulse Resp BP Pulse Ox


 


 98 F   60   20   168/98 H  100 


 


 05/31/18 16:00  06/01/18 09:45  05/31/18 20:50  06/01/18 09:45  05/31/18 16:00








Intake and Output: 


 











 06/01/18 06/01/18





 06:59 18:59


 


Intake Total 250 


 


Balance 250 














- Medications


Medications: 


 Current Medications





Acetaminophen (Tylenol 325mg Tab)  650 mg PO Q4H PRN; Protocol


   PRN Reason: mild pain


Albuterol/Ipratropium (Duoneb 3 Mg/0.5 Mg (3 Ml) Ud)  3 ml IH [id number] PRN; 

Protocol


   PRN Reason: Shortness of Breath


   Last Admin: 05/31/18 13:12 Dose:  3 ml


Diltiazem HCl (Cardizem Cd)  120 mg PO DAILY [facility]


   PRN Reason: Protocol


   Last Admin: 06/01/18 09:45 Dose:  120 mg


Diphenhydramine HCl (Benadryl)  25 mg PO Q6 PRN; Protocol


   PRN Reason: Allergy symptoms


   Last Admin: 05/31/18 21:29 Dose:  25 mg


Heparin Sodium/Sodium Chloride (Heparin 84706 Units/250ml 1/2 Normal Saline)  25

,000 units in 250 mls @ 7.054 mls/hr IV .Q24H [facility]; 9.6 U/KG/HR


   PRN Reason: Protocol


   Last Admin: 06/01/18 05:00 Dose:  9.6 u/kg/hr, 7.054 mls/hr


Metoprolol Tartrate (Lopressor)  50 mg PO 0800,1800 [facility]


   PRN Reason: Protocol


   Last Admin: 06/01/18 07:57 Dose:  50 mg


Montelukast Sodium (Singulair)  10 mg PO HS [facility]


   PRN Reason: Protocol


   Last Admin: 05/31/18 21:27 Dose:  10 mg


Ondansetron HCl (Zofran Inj)  4 mg IVP Q6H PRN; Protocol


   PRN Reason: Nausea/Vomiting


Oxycodone/Acetaminophen (Percocet 5/325 Mg Tab)  1 tab PO Q4H PRN; Protocol


   PRN Reason: severe pain


   Stop: 06/01/18 20:52


Pantoprazole Sodium (Protonix Ec Tab)  40 mg PO 0600,1600 [facility]


   Last Admin: 06/01/18 05:05 Dose:  40 mg


Sucralfate (Carafate Oral Susp)  1 gm PO 0600,1600 [facility]


   PRN Reason: Protocol


   Last Admin: 06/01/18 05:05 Dose:  1 gm


Tramadol HCl (Ultram)  50 mg PO TID PRN; Protocol


   PRN Reason: moderate pain


Vancomycin HCl (Vancocin 25 Mg/Ml (Oral Use))  125 mg PO QID [facility]


   PRN Reason: Protocol


   Last Admin: 06/01/18 09:45 Dose:  125 mg


Warfarin Sodium (Coumadin)  7.5 mg PO 1800 [facility]


   PRN Reason: Protocol


   Last Admin: 05/31/18 17:52 Dose:  7.5 mg











- Labs


Labs: 


 





 06/01/18 07:15 





 05/31/18 06:40 





 











PT  16.5 SECONDS (9.4-12.5)  H  06/01/18  07:15    


 


INR  1.42  (0.93-1.08)  H  06/01/18  07:15    


 


APTT  57.1 Seconds (25.1-36.5)  H  06/01/18  07:15    














- Constitutional


Appears: No Acute Distress





- Head Exam


Head Exam: NORMOCEPHALIC





- Eye Exam


Eye Exam: Normal appearance.  absent: Scleral icterus





- ENT Exam


ENT Exam: Mucous Membranes Moist





- Respiratory Exam


Respiratory Exam: Clear to Ausculation Bilateral, NORMAL BREATHING PATTERN.  

absent: Respiratory Distress





- Cardiovascular Exam


Cardiovascular Exam: +S1, +S2





- GI/Abdominal Exam


GI & Abdominal Exam: Soft, Normal Bowel Sounds.  absent: Guarding, Tenderness, 

Rebound





- Extremities Exam


Extremities Exam: Normal Capillary Refill.  absent: Calf Tenderness, Pedal Edema





- Neurological Exam


Neurological Exam: Alert, Awake, Oriented x3





- Skin


Skin Exam: Dry, Warm





Assessment and Plan





- Assessment and Plan (Free Text)


Assessment: 





Assessment: 





GI bleed, status post endoscopy found to have non bleeding gastric ulcer with 

clean base (zeny clase III)


Anemia,CT abd/pelvis without contrast on 5/22/18 did not show any acute findings

, patient received total 3 units pRBC, and 2 units FFP


Diarrhea rule out C. difficile


Mitral valve disease


Paroxsymal atrial fibrillation


UTI (+) Ecoli





Plan:





On heparin drip per protocol, given a dose of Coumadin 7.5 mg yesterday.


Continue PPI currently on Protonix twice a day, and in view of acute renal 

failure which has now resolved on discharge would recommend to DC on PPI daily 

in am and can take Zantac 150 mg in PM, discussed w/ patient.


Patient would need repeat endoscopy in about 8 weeks to follow up healing of 

ulcers, discussed with patient multiple times


Monitor H&H and monitor for overt GI bleed.


Diet as tolerated


FU stool cdiff


Continue oral vancomycin





Seen and discussed with Dr. Frost

## 2018-06-01 NOTE — CP.PCM.PN
Subjective





- Date & Time of Evaluation


Date of Evaluation: 06/01/18


Time of Evaluation: 07:30





- Subjective


Subjective: 





Heme Onc Progress Note for Dr. Alatorre





Patient was seen and examined at bedside. Patient found to be resting 

comfortably. No acute distress and is tolerating po intake. Patient admitted to 

having normally formed bowel movements without blood noted.  She has stool for 

guaiac which was negative done yesterday.  Patient denied fever, chills, 

shortness of breath, chest pains, abdominal pains, nausea, vomiting, dysuria. 





Objective





- Vital Signs/Intake and Output


Vital Signs (last 24 hours): 


 











Temp Pulse Resp BP Pulse Ox


 


 98 F   60   20   168/98 H  100 


 


 05/31/18 16:00  06/01/18 09:45  05/31/18 20:50  06/01/18 09:45  05/31/18 16:00








Intake and Output: 


 











 06/01/18 06/01/18





 06:59 18:59


 


Intake Total 250 


 


Balance 250 














- Medications


Medications: 


 Current Medications





Acetaminophen (Tylenol 325mg Tab)  650 mg PO Q4H PRN; Protocol


   PRN Reason: mild pain


Albuterol/Ipratropium (Duoneb 3 Mg/0.5 Mg (3 Ml) Ud)  3 ml IH [id number] PRN; 

Protocol


   PRN Reason: Shortness of Breath


   Last Admin: 05/31/18 13:12 Dose:  3 ml


Diltiazem HCl (Cardizem Cd)  120 mg PO DAILY [facility]


   PRN Reason: Protocol


   Last Admin: 06/01/18 09:45 Dose:  120 mg


Diphenhydramine HCl (Benadryl)  25 mg PO Q6 PRN; Protocol


   PRN Reason: Allergy symptoms


   Last Admin: 05/31/18 21:29 Dose:  25 mg


Heparin Sodium/Sodium Chloride (Heparin 28829 Units/250ml 1/2 Normal Saline)  25

,000 units in 250 mls @ 7.054 mls/hr IV .Q24H [facility]; 9.6 U/KG/HR


   PRN Reason: Protocol


   Last Admin: 06/01/18 12:20 Dose:  Not Given


Metoprolol Tartrate (Lopressor)  50 mg PO 0800,1800 [facility]


   PRN Reason: Protocol


   Last Admin: 06/01/18 07:57 Dose:  50 mg


Montelukast Sodium (Singulair)  10 mg PO HS [facility]


   PRN Reason: Protocol


   Last Admin: 05/31/18 21:27 Dose:  10 mg


Ondansetron HCl (Zofran Inj)  4 mg IVP Q6H PRN; Protocol


   PRN Reason: Nausea/Vomiting


Oxycodone/Acetaminophen (Percocet 5/325 Mg Tab)  1 tab PO Q4H PRN; Protocol


   PRN Reason: severe pain


   Stop: 06/01/18 20:52


Pantoprazole Sodium (Protonix Ec Tab)  40 mg PO 0600,1600 [facility]


   Last Admin: 06/01/18 05:05 Dose:  40 mg


Sucralfate (Carafate Oral Susp)  1 gm PO 0600,1600 [facility]


   PRN Reason: Protocol


   Last Admin: 06/01/18 05:05 Dose:  1 gm


Tramadol HCl (Ultram)  50 mg PO TID PRN; Protocol


   PRN Reason: moderate pain


Vancomycin HCl (Vancocin 25 Mg/Ml (Oral Use))  125 mg PO QID [facility]


   PRN Reason: Protocol


   Last Admin: 06/01/18 09:45 Dose:  125 mg


Warfarin Sodium (Coumadin)  7.5 mg PO 1800 [facility]


   PRN Reason: Protocol











- Labs


Labs: 


 





 06/01/18 07:15 





 05/31/18 06:40 





 











PT  16.5 SECONDS (9.4-12.5)  H  06/01/18  07:15    


 


INR  1.42  (0.93-1.08)  H  06/01/18  07:15    


 


APTT  57.1 Seconds (25.1-36.5)  H  06/01/18  07:15    














- Constitutional


Appears: No Acute Distress





- Head Exam


Head Exam: ATRAUMATIC, NORMAL INSPECTION, NORMOCEPHALIC





- Eye Exam


Eye Exam: EOMI, Normal appearance, PERRL





- ENT Exam


ENT Exam: Mucous Membranes Moist, Normal Exam





- Respiratory Exam


Respiratory Exam: Clear to Ausculation Bilateral, NORMAL BREATHING PATTERN





- Cardiovascular Exam


Cardiovascular Exam: REGULAR RHYTHM, +S1, +S2.  absent: Murmur





- GI/Abdominal Exam


GI & Abdominal Exam: Soft, Normal Bowel Sounds.  absent: Tenderness





- Neurological Exam


Neurological Exam: Alert, Awake, CN II-XII Intact, Oriented x3





- Psychiatric Exam


Psychiatric exam: Normal Affect, Normal Mood





- Skin


Skin Exam: Dry, Intact, Normal Color, Warm





Assessment and Plan





- Assessment and Plan (Free Text)


Assessment: 


62 F admitted with shortness of breath and tarry stools, found to have a GI 

bleed s/p EGD demonstrated large duodenal ulcer s/p  3 units pRBC, and 2 units 

FFP. Diarrhea rule out C. difficile, injections provided for acute gout attack 

on b/l knees. Hemoglobin stable. On heparin drip and started coumadin 7.5mg 

yesterday, subtherapeutic INR currently. Mitral valve disease. Paroxsymal 

atrial fibrillation. Asymptomatic UTI (+) Ecoli. Continue PPI currently on 

Protonix twice a day, and in view of acute renal failure which has now resolved 

on discharge would recommend to DC on PPI daily in am and can take Zantac 150 

mg in PM as per GI. Repeat endoscopy in about 8 weeks to follow up healing of 

ulcers. Monitor H&H and monitor for overt GI bleed. Diet as tolerated. FU stool 

cdiff. continue PO Vancomycin day 5 - follow up stool for C. diff.

## 2018-06-01 NOTE — PN
DATE:  05/31/2018



TYPE OF DICTATION:  Addendum to the initial progress note dictated by a

nurse practitioner, Karina Amin.



REASON FOR ADDENDUM:  As discussed yesterday with Dr. Dinh, they will

start Xarelto, but since antidote is not available, so plan has changed, we

will start Coumadin and once antidote off Xarelto and Eliquis is available

in a month, and the patient shows noncompliance again, then we will switch

over to Xarelto or Eliquis.  As the patient is very noncompliant that is

why after Coumadin, the patient was started on Eliquis, the patient has a

GI bleed and since so far no antidote is available, we will put back on

Coumadin and close follow up with Dr. Dinh to monitor PT/INR as the

patient has a paroxysmal atrial fibrillation.  If the patient shows again

the sign of noncompliance in a month, we will consider adding either

switching over to Xarelto or Eliquis as antidote will be available by that

time.  This dictation was discussed with Dr. Dinh this morning.





__________________________________________

Ajay Gaxiola MD



DD:  05/31/2018 14:43:43

DT:  05/31/2018 16:17:39

Job # [id number]

## 2018-06-01 NOTE — PN
DATE:  05/31/2018



SUBJECTIVE:  The patient is doing better.  She is still on IV heparin. 

Started on Coumadin, second day.  The patient will be given 7.5 mg Coumadin

today.  I explained the patient the risk of Coumadin versus Eliquis and

bleeding from peptic ulcer and the importance of compliance with the

anticoagulation and the stomach medicines, Prilosec and Pepcid.  Currently,

she has no chest pain, not short of breath.  Seems stable.



PHYSICAL EXAMINATION:

VITAL SIGNS:  Temperature 98, heart rate 66, blood pressure 129/76,

respirations 18, sat 100%.

HEAD AND NECK:  Normal.  No JVD.  No thyromegaly.

CHEST:  Clear.  Good air entry.

CARDIAC:  First sound and second sound normal.

ABDOMEN:  Soft, nontender.

EXTREMITIES:  No edema.  Her knee _____ bilateral is doing better.  Less

swollen, less tender.

NEUROLOGICAL:  The patient is getting training, walking, stable with

physical therapy everyday.



LABORATORY STUDY:  On 05/31/2018, her PTT is 54 and we will order PT in the

morning for Coumadin.  Also, her laboratory study was done and twice. 

Hemoglobin was 8.5, on repeat at 12:00 p.m. 8.3, no significant change and

hematocrit 26.3 and it is 25.4.  No black stools.  The patient also had

chemistry, shows sodium 145, potassium 4.4, chloride 113, bicarb 20, BUN

22, creatinine 0.9, blood sugar 192, calcium 9.4 and her total iron binding

capacity is low.  Iron level is normal and iron saturation is normal.  The

patient had a stool occult blood and came back negative.



IMPRESSION:

1.  Generalized weakness, deconditioning, bilateral knee osteoarthritis,

treated with knee aspiration and intrathecal injections, a lot better,

walking better.

2.  Chronic atrial fibrillations, status post mitral valve replacement,

biological valve.  She is on anticoagulations, Coumadin will be started. 

Explained to the patient after discussion with Dr. Gaxiola, Cardiology and Dr. Frost, GI.  The patient will be tried on Coumadin.  We will see how

compliant she is, which she had a history of noncompliance with Coumadin

that is why we switched to Eliquis, easier compliance; however, because of

risk of bleeding on Eliquis and lack of antidote and her noncompliance

history, we will better start on Coumadin and we have antidote if she do

well on Coumadin and if she has no more bleeding, we may continue Coumadin

or switch her back to Eliquis as long as she has no bleeding and her

hemoglobin is stable.  We will decrease aspirin to every other day for the

first weeks or two, then switch it back to once a day everyday plus

Coumadin.  Discussed with Dr. Gaxiola and Dr. Frost and the patient

understands the benefit and risks.  She will follow up at the office once a

week for the next 4 weeks.

3.  Asthma, chronic obstructive pulmonary disease.  Continue inhaled

bronchodilators.

4.  Diarrhea.  We will continue vancomycin 125 mg four times daily.

5.  Knee arthritis.  Continue tramadol and oxycodone if needed.



PLAN:  Continue current therapy.  Current medications, Benadryl; Carafate;

Cardizem ; Coumadin 7.5 mg given on 05/31/2018; DuoNeb; heparin IV;

Lopressor or metoprolol 50 mg twice a day, we will continue that; Protonix

40 mg once a day, which changed to twice a day in the hospital; Singular;

Tylenol; Ultram; Zofran injection p.r.n.  Continue current therapy.





__________________________________________

Philip Dinh MD





DD:  06/01/2018 9:01:44

DT:  06/01/2018 9:13:27

Job # [id number]

## 2018-06-02 ENCOUNTER — HOSPITAL ENCOUNTER (INPATIENT)
Dept: HOSPITAL 42 - ED | Age: 63
LOS: 5 days | Discharge: NURSING, REHAB OR LONG-TERM CARE | DRG: 378 | End: 2018-06-07
Attending: INTERNAL MEDICINE | Admitting: INTERNAL MEDICINE
Payer: MEDICARE

## 2018-06-02 VITALS — TEMPERATURE: 98.9 F

## 2018-06-02 VITALS — BODY MASS INDEX: 26.5 KG/M2

## 2018-06-02 VITALS — SYSTOLIC BLOOD PRESSURE: 133 MMHG | HEART RATE: 82 BPM | RESPIRATION RATE: 18 BRPM | DIASTOLIC BLOOD PRESSURE: 75 MMHG

## 2018-06-02 DIAGNOSIS — K25.4: Primary | ICD-10-CM

## 2018-06-02 DIAGNOSIS — Z95.3: ICD-10-CM

## 2018-06-02 DIAGNOSIS — Z79.01: ICD-10-CM

## 2018-06-02 DIAGNOSIS — E78.5: ICD-10-CM

## 2018-06-02 DIAGNOSIS — I50.9: ICD-10-CM

## 2018-06-02 DIAGNOSIS — I25.10: ICD-10-CM

## 2018-06-02 DIAGNOSIS — E87.6: ICD-10-CM

## 2018-06-02 DIAGNOSIS — I34.0: ICD-10-CM

## 2018-06-02 DIAGNOSIS — K22.10: ICD-10-CM

## 2018-06-02 DIAGNOSIS — B96.1: ICD-10-CM

## 2018-06-02 DIAGNOSIS — D68.9: ICD-10-CM

## 2018-06-02 DIAGNOSIS — K27.4: ICD-10-CM

## 2018-06-02 DIAGNOSIS — E78.00: ICD-10-CM

## 2018-06-02 DIAGNOSIS — Z16.12: ICD-10-CM

## 2018-06-02 DIAGNOSIS — N39.0: ICD-10-CM

## 2018-06-02 DIAGNOSIS — I73.9: ICD-10-CM

## 2018-06-02 DIAGNOSIS — I11.0: ICD-10-CM

## 2018-06-02 DIAGNOSIS — E83.42: ICD-10-CM

## 2018-06-02 DIAGNOSIS — Z91.14: ICD-10-CM

## 2018-06-02 DIAGNOSIS — G89.29: ICD-10-CM

## 2018-06-02 DIAGNOSIS — I48.2: ICD-10-CM

## 2018-06-02 DIAGNOSIS — Z87.01: ICD-10-CM

## 2018-06-02 DIAGNOSIS — K44.9: ICD-10-CM

## 2018-06-02 DIAGNOSIS — K29.70: ICD-10-CM

## 2018-06-02 DIAGNOSIS — M10.9: ICD-10-CM

## 2018-06-02 DIAGNOSIS — J44.9: ICD-10-CM

## 2018-06-02 DIAGNOSIS — I48.0: ICD-10-CM

## 2018-06-02 DIAGNOSIS — D62: ICD-10-CM

## 2018-06-02 DIAGNOSIS — M54.9: ICD-10-CM

## 2018-06-02 DIAGNOSIS — Z91.19: ICD-10-CM

## 2018-06-02 DIAGNOSIS — M17.0: ICD-10-CM

## 2018-06-02 LAB
APTT BLD: 34.1 SECONDS (ref 25.1–36.5)
BASOPHILS # BLD AUTO: 0.01 K/MM3 (ref 0–2)
BASOPHILS NFR BLD: 0.1 % (ref 0–3)
EOSINOPHIL # BLD: 0.2 10*3/UL (ref 0–0.7)
EOSINOPHIL NFR BLD: 1.5 % (ref 1.5–5)
ERYTHROCYTE [DISTWIDTH] IN BLOOD BY AUTOMATED COUNT: 16 % (ref 11.5–14.5)
ERYTHROCYTE [DISTWIDTH] IN BLOOD BY AUTOMATED COUNT: 16.4 % (ref 11.5–14.5)
GRANULOCYTES # BLD: 9.57 10*3/UL (ref 1.4–6.5)
GRANULOCYTES NFR BLD: 79.7 % (ref 50–68)
HGB BLD-MCNC: 7.7 G/DL (ref 12–16)
HGB BLD-MCNC: 9 G/DL (ref 12–16)
INR PPP: 3.04 (ref 0.93–1.08)
INR PPP: 4.14 (ref 0.93–1.08)
LYMPHOCYTES # BLD: 1.7 10*3/UL (ref 1.2–3.4)
LYMPHOCYTES NFR BLD AUTO: 13.8 % (ref 22–35)
MCH RBC QN AUTO: 29.1 PG (ref 25–35)
MCH RBC QN AUTO: 29.1 PG (ref 25–35)
MCHC RBC AUTO-ENTMCNC: 32.1 G/DL (ref 31–37)
MCHC RBC AUTO-ENTMCNC: 32.5 G/DL (ref 31–37)
MCV RBC AUTO: 89.6 FL (ref 80–105)
MCV RBC AUTO: 90.6 FL (ref 80–105)
MONOCYTES # BLD AUTO: 0.6 10*3/UL (ref 0.1–0.6)
MONOCYTES NFR BLD: 4.9 % (ref 1–6)
PLATELET # BLD: 482 10^3/UL (ref 120–450)
PLATELET # BLD: 511 10^3/UL (ref 120–450)
PMV BLD AUTO: 10 FL (ref 7–11)
PMV BLD AUTO: 9.6 FL (ref 7–11)
PROTHROMBIN TIME: 35.4 SECONDS (ref 9.4–12.5)
PROTHROMBIN TIME: 48.6 SECONDS (ref 9.4–12.5)
RBC # BLD AUTO: 2.65 10^6/UL (ref 3.5–6.1)
RBC # BLD AUTO: 3.09 10^6/UL (ref 3.5–6.1)
WBC # BLD AUTO: 12 10^3/UL (ref 4.5–11)
WBC # BLD AUTO: 16.9 10^3/UL (ref 4.5–11)

## 2018-06-02 RX ADMIN — SUCRALFATE SCH GM: 1 SUSPENSION ORAL at 05:28

## 2018-06-02 RX ADMIN — SUCRALFATE SCH GM: 1 SUSPENSION ORAL at 16:55

## 2018-06-02 RX ADMIN — PANTOPRAZOLE SODIUM SCH MG: 40 TABLET, DELAYED RELEASE ORAL at 17:42

## 2018-06-02 RX ADMIN — DILTIAZEM HYDROCHLORIDE SCH MG: 120 CAPSULE, COATED, EXTENDED RELEASE ORAL at 09:01

## 2018-06-02 RX ADMIN — PANTOPRAZOLE SODIUM SCH MG: 40 TABLET, DELAYED RELEASE ORAL at 05:27

## 2018-06-02 RX ADMIN — VANCOMYCIN HYDROCHLORIDE SCH MG: 500 INJECTION, POWDER, LYOPHILIZED, FOR SOLUTION INTRAVENOUS at 09:00

## 2018-06-02 NOTE — PN
DATE:  06/02/2018



SUBJECTIVE:  The patient is in bed, in no acute distress, nontoxic.  She

states she is doing well.  She wants to be discharged.



PHYSICAL EXAMINATION:

VITAL SIGNS:  Temperature is 98, blood pressure is 160/70, respiratory rate

of 18.

HEENT:  Examination of HEENT is unremarkable.

NECK:  Supple.

LUNGS:  Have decreased breath sounds.

HEART:  Normal S1, S2.

ABDOMEN:  Soft, nontender.



LABORATORY DATA:  Laboratory examination reveals a white count of 12,000,

hemoglobin of 9, platelets of 511.  Chemistries reveals a BUN of 22,

creatinine of 0.9.  Urinalysis is noted and microbiology reveals Klebsiella

pneumoniae in the urine.  It is sensitive to ertapenem, sensitive to

cefazolin, Cipro, cefepime, gentamicin, piperacillin.  The patient also had

a stool C. diff antigen and toxin, which are both negative.



CURRENT MEDICATIONS:  Reveals the patient to be on p.o. vancomycin.



ASSESSMENT AND PLAN:  A 62-year-old female with asymptomatic bacteriuria

with extended-spectrum beta-lactamase Klebsiella loose bowel movements with

a negative Clostridium difficile antigen and toxin and chronic congestive

heart failure, mitral regurgitation, currently on p.o. vancomycin day #6. 

The stool Clostridium difficile is negative.  We will discontinue the p.o.

vancomycin.  The patient is requesting to be discharged as per Dr. Dinh's

decision.   _____ note from yesterday is reviewed.  From Infectious

Disease point of view, she is clear for discharge.





__________________________________________

Daniel Macdonald MD





DD:  06/02/2018 9:39:31

DT:  06/02/2018 9:41:29

Job # [id number]

## 2018-06-02 NOTE — ED PDOC
Arrival/HPI





- General


Chief Complaint: GI Problem


Time Seen by Provider: 06/02/18 21:48


Historian: Patient





- History of Present Illness


Narrative History of Present Illness (Text): 


06/02/18 22:19


Malu Meeks is a 62 year old female, whose past medical history includes 

paroxysmal atrial fibrillation, hypertension, CAD, mitral valve repair, COPD, 

asthma, lower extremity edema, chronic back pain, anemia, and GI bleed, who 

presents to the Emergency department transferred from [facility] for rectal bleeding. 

As per RN, patient had dark, tarry stool earlier today and noted patient's last 

hemoglobin today was 7.7. Patient notes she has some rectal bleeding earlier 

today after moving her bowels. Patient is currently on Coumadin. Patient was 

recently seen on 05/22/2018 for shortness of breath, chest pain, and dizziness. 

Patient was noted to be anemic with a hemoglobin of 6, patient was subsequently 

admitted to the hospital for further evaluation. Patient denies any abdominal 

pain, chest pain, nausea, vomiting, headache, dizziness, or any other 

complaints.





PMD: Dr. Dinh





Symptom Onset: Gradual


Symptom Course: Unchanged


Activities at Onset: Light


Context: Other ([facility])





Past Medical History





- Provider Review


Nursing Documentation Reviewed: Yes





- Infectious Disease


Hx of Infectious Diseases: None





- Tetanus Immunization


Tetanus Immunization: Unknown





- Reproductive


Menopause: Yes





- Past Medical History


Past Medical History: No Previous





- Cardiac


Hx Cardiac Disorders: Yes (mitral valve disease)





- Pulmonary


Hx Chronic Obstructive Pulmonary Disease (COPD): Yes





- Neurological


Hx Neurological Disorder: Yes


Hx Dizziness: Yes


Hx Migraine: Yes





- HEENT


Hx HEENT Disorder: No





- Renal


Hx Renal Disorder: No





- Endocrine/Metabolic


Hx Endocrine Disorders: No





- Hematological/Oncological


Hx Blood Transfusions: Yes


Hx Blood Transfusion Reaction: No





- Integumentary


Hx Dermatological Disorder: No





- Musculoskeletal/Rheumatological


Hx Falls: Yes





- Gastrointestinal


Hx Gastrointestinal Disorders: Yes





- Genitourinary/Gynecological


Hx Genitourinary Disorders: No


Hx Reproductive Disorders: No





- Psychiatric


Hx Psychophysiologic Disorder: No


Hx Emotional Abuse: No


Hx Physical Abuse: No


Hx Substance Use: No





- Surgical History


Other/Comment: Mitral Valve Replacement





- Anesthesia


Hx Anesthesia: No


Hx Anesthesia Reactions: No


Hx Malignant Hyperthermia: No





- Suicidal Assessment


Feels Threatened In Home Enviroment: No





Family/Social History





- Physician Review


Nursing Documentation Reviewed: Yes


Family/Social History: Unknown Family HX


Smoking Status: Never Smoked


Hx Alcohol Use: Yes (SOCIALLY)


Amount per day: 6


Hx Substance Use: No


Hx Substance Use Treatment: No





Allergies/Home Meds


Allergies/Adverse Reactions: 


Allergies





cinnamon Allergy (Intermediate, Verified 06/02/18 21:48)


 ANAPHYLAXIS








Home Medications: 


 Home Meds











 Medication  Instructions  Recorded  Confirmed


 


Simvastatin [Zocor] 20 mg PO HS 02/13/18 06/02/18


 


Budesonide/Formoterol Fumarate 1 aer IH DAILY 05/22/18 06/02/18





[Symbicort]   


 


Cholecalciferol [Vitamin D 1000 IU] 50,000 iu PO QWK 05/22/18 06/02/18


 


Diltiazem HCl [Cartia Xt] 120 mg PO DAILY 05/22/18 06/02/18


 


Famotidine [Pepcid] 40 mg PO BID 05/22/18 06/02/18


 


Ferrous Sulfate [Feosol] 325 mg PO BID 05/22/18 06/02/18


 


Furosemide [Lasix] 20 mg PO DAILY 05/22/18 06/02/18


 


Loratadine [Claritin] 10 mg PO DAILY 05/22/18 06/02/18














Review of Systems





- Physician Review


All systems were reviewed & negative as marked: Yes





- Review of Systems


Constitutional: Normal.  absent: Fevers


Eyes: Normal


ENT: Normal


Respiratory: Normal.  absent: SOB, Cough


Cardiovascular: Normal.  absent: Chest Pain


Gastrointestinal: Hematochezia (+dark, tarry stool)


Genitourinary Female: Normal.  absent: Dysuria, Frequency, Hematuria, Urine 

Output Changes


Musculoskeletal: Normal.  absent: Back Pain, Neck Pain


Skin: Normal.  absent: Rash


Neurological: Normal.  absent: Headache, Dizziness


Endocrine: Normal


Hemo/Lymphatic: Normal


Psychiatric: Normal





Physical Exam


Vital Signs Reviewed: Yes


Vital Signs











  Temp Pulse Resp BP Pulse Ox


 


 06/03/18 01:21   94 H  16  155/85 H  100


 


 06/02/18 21:43  98.1 F  90  18  137/79  100











Temperature: Afebrile


Blood Pressure: Normal


Pulse: Regular


Respiratory Rate: Normal


Appearance: Positive for: Well-Appearing, Non-Toxic, Comfortable


Pain Distress: None


Mental Status: Positive for: Alert and Oriented X 3





- Systems Exam


Head: Present: Atraumatic, Normocephalic


Pupils: Present: PERRL


Extroacular Muscles: Present: EOMI


Conjunctiva: Present: Normal


Mouth: Present: Moist Mucous Membranes


Neck: Present: Normal Range of Motion


Respiratory/Chest: Present: Clear to Auscultation, Good Air Exchange.  No: 

Respiratory Distress, Accessory Muscle Use


Cardiovascular: Present: Regular Rate and Rhythm, Normal S1, S2.  No: Murmurs


Abdomen: No: Tenderness, Distention, Peritoneal Signs


Back: Present: Normal Inspection


Upper Extremity: Present: Normal Inspection.  No: Cyanosis, Edema


Lower Extremity: Present: Normal Inspection.  No: Edema


Neurological: Present: GCS=15, CN II-XII Intact, Speech Normal


Skin: Present: Warm, Dry, Normal Color.  No: Rashes


Psychiatric: Present: Alert, Oriented x 3, Normal Insight, Normal Concentration





Medical Decision Making


ED Course and Treatment: 


06/02/18 22:19


Impression:


62 year old female transferred from [facility] for dark, tarry stools and low 

hemoglobin.





Differential Diagnosis included but are not limited to:  GI bleed





Plan:


-- EKG


-- Chest X-ray


-- Labs, blood type and screen


-- Reassess and disposition





Prior Visits:


Notes and results from previous visits were reviewed. 





Progress Notes:


Reviewed EKG, sinus rhythm at 88 bpm. Frequent PVCs. Septal infarct. Non-

specific ST/T wave changes.





06/02/18 23:06


Case discussed with Dr. Mata, intensivist, who is aware and agrees to evaluate 

pt. Medical resident notified.





06/02/18 23:07


Case discussed with Dr. Dinh, who is aware and agrees with plan. Requests ICU 

admission.





06/02/18 23:09


Chest X-ray reviewed, shows no acute processes.





06/03/18 00:22


Spoke with Dr. Mata and medical resident, who evaluated pt. Pt to be admitted 

to the ICU for GI bleed under Dr. Dinh's service.





06/03/18 02:03


Pt adamantly refuses repeat blood drawing.Will attempt to convince patient 

otherwise. 








- Critical Care


Critical Care Minutes: 30 minutes





- RAD Interpretation


Radiology Orders: 








06/02/18 22:25


CHEST PORTABLE [RAD] Stat 











: ED Physician





- EKG Interpretation


Interpreted by ED Physician: Yes


Type: 12 lead EKG





- Medication Orders


Current Medication Orders: 








Albuterol/Ipratropium (Duoneb 3 Mg/0.5 Mg (3 Ml) Ud)  3 ml IH [id number] PRN


   PRN Reason: Shortness of Breath


Diltiazem HCl (Cardizem)  5 mg IVP Q6 PRN


   PRN Reason: Heart rate


Sodium Chloride (Sodium Chloride 0.9%)  1,000 mls @ 100 mls/hr IV .Q10H OLMAN


   Last Admin: 06/02/18 23:41  Dose: 100 mls/hr





eMAR Start Stop


 Document     06/02/18 23:41  CNR  (Rec: 06/02/18 23:41  CNR  [id number])


     Intravenous Solution


      Start Date                                 06/02/18


      Start Time                                 23:41





Pantoprazole Sodium (Protonix 40mg Ivpb)  40 mg in 100 mls @ 20 mls/hr IVPB 

.Q5H OLMAN


   Last Admin: 06/03/18 06:54  Dose: 20 mls/hr





eMAR Start Stop


 Document     06/03/18 06:54  SMA  (Rec: 06/03/18 06:55  SMA  [id number])


     Intravenous Solution


      Start Date                                 06/03/18


      Start Time                                 06:55


      End Date                                   06/03/18


      End time                                   11:50


      Total Infusion Time                        295





Non-Formulary Medication (Budesonide/Formoterol Fumarate [Symbicort 80-4.5 Mcg 

Inhaler])  1 aer IH DAILY OLMAN





Discontinued Medications





Lorazepam (Ativan)  1 mg PO ONCE STA


   Stop: 06/02/18 23:40


   Last Admin: 06/02/18 23:46  Dose: 1 mg





Pantoprazole Sodium (Protonix Inj)  40 mg IVP ONCE STA


   Stop: 06/02/18 23:12


   Last Admin: 06/02/18 23:39  Dose: 40 mg





IVP Administration


 Document     06/02/18 23:39  CNR  (Rec: 06/02/18 23:41  CNR  [id number])


     Charges for Administration


      # of IVP Administrations                   1














- Scribe Statement


The provider has reviewed the documentation as recorded by the Mark Basilio





Provider Scribe Attestation:


All medical record entries made by the Scribe were at my direction and 

personally dictated by me. I have reviewed the chart and agree that the record 

accurately reflects my personal performance of the history, physical exam, 

medical decision making, and the department course for this patient. I have 

also personally directed, reviewed, and agree with the discharge instructions 

and disposition.








Disposition/Present on Arrival





- Present on Arrival


Any Indicators Present on Arrival: No


History of DVT/PE: No


History of Uncontrolled Diabetes: No


Urinary Catheter: No


History of Decub. Ulcer: No


History Surgical Site Infection Following: None





- Disposition


Have Diagnosis and Disposition been Completed?: Yes


Diagnosis: 


 GI bleed





Disposition: HOSPITALIZED


Disposition Time: 00:26


Patient Plan: ICU


Patient Problems: 


 Current Active Problems











Problem Status Onset


 


GI bleed Acute  











Condition: GUARDED

## 2018-06-03 LAB
ALBUMIN SERPL-MCNC: 2.9 G/DL (ref 3–4.8)
ALBUMIN/GLOB SERPL: 1.1 {RATIO} (ref 1.1–1.8)
ALT SERPL-CCNC: 23 U/L (ref 7–56)
APTT BLD: 34.7 SECONDS (ref 25.1–36.5)
AST SERPL-CCNC: 23 U/L (ref 14–36)
BASOPHILS # BLD AUTO: 0 K/MM3 (ref 0–2)
BASOPHILS NFR BLD: 0 % (ref 0–3)
BUN SERPL-MCNC: 32 MG/DL (ref 7–21)
CALCIUM SERPL-MCNC: 8.6 MG/DL (ref 8.4–10.5)
EOSINOPHIL # BLD: 0.3 10*3/UL (ref 0–0.7)
EOSINOPHIL NFR BLD: 3.1 % (ref 1.5–5)
ERYTHROCYTE [DISTWIDTH] IN BLOOD BY AUTOMATED COUNT: 15.8 % (ref 11.5–14.5)
ERYTHROCYTE [DISTWIDTH] IN BLOOD BY AUTOMATED COUNT: 15.8 % (ref 11.5–14.5)
ERYTHROCYTE [DISTWIDTH] IN BLOOD BY AUTOMATED COUNT: 16.1 % (ref 11.5–14.5)
GFR NON-AFRICAN AMERICAN: > 60
GRANULOCYTES # BLD: 7.3 10*3/UL (ref 1.4–6.5)
GRANULOCYTES NFR BLD: 69.7 % (ref 50–68)
HGB BLD-MCNC: 7 G/DL (ref 12–16)
HGB BLD-MCNC: 8 G/DL (ref 12–16)
HGB BLD-MCNC: 8.5 G/DL (ref 12–16)
INR PPP: 2.72 (ref 0.93–1.08)
INR PPP: 3.96 (ref 0.93–1.08)
LYMPHOCYTES # BLD: 1.4 10*3/UL (ref 1.2–3.4)
LYMPHOCYTES NFR BLD AUTO: 13.1 % (ref 22–35)
MCH RBC QN AUTO: 29.7 PG (ref 25–35)
MCH RBC QN AUTO: 30 PG (ref 25–35)
MCH RBC QN AUTO: 30.2 PG (ref 25–35)
MCHC RBC AUTO-ENTMCNC: 33.2 G/DL (ref 31–37)
MCHC RBC AUTO-ENTMCNC: 33.8 G/DL (ref 31–37)
MCHC RBC AUTO-ENTMCNC: 34 G/DL (ref 31–37)
MCV RBC AUTO: 88.8 FL (ref 80–105)
MCV RBC AUTO: 89 FL (ref 80–105)
MCV RBC AUTO: 89.6 FL (ref 80–105)
MONOCYTES # BLD AUTO: 1.5 10*3/UL (ref 0.1–0.6)
MONOCYTES NFR BLD: 14.1 % (ref 1–6)
PLATELET # BLD: 353 10^3/UL (ref 120–450)
PLATELET # BLD: 384 10^3/UL (ref 120–450)
PLATELET # BLD: 432 10^3/UL (ref 120–450)
PMV BLD AUTO: 9.2 FL (ref 7–11)
PMV BLD AUTO: 9.6 FL (ref 7–11)
PMV BLD AUTO: 9.6 FL (ref 7–11)
PROTHROMBIN TIME: 31.9 SECONDS (ref 9.4–12.5)
PROTHROMBIN TIME: 46.4 SECONDS (ref 9.4–12.5)
RBC # BLD AUTO: 2.33 10^6/UL (ref 3.5–6.1)
RBC # BLD AUTO: 2.69 10^6/UL (ref 3.5–6.1)
RBC # BLD AUTO: 2.81 10^6/UL (ref 3.5–6.1)
WBC # BLD AUTO: 10.5 10^3/UL (ref 4.5–11)
WBC # BLD AUTO: 11.1 10^3/UL (ref 4.5–11)
WBC # BLD AUTO: 15.1 10^3/UL (ref 4.5–11)

## 2018-06-03 PROCEDURE — 30233N1 TRANSFUSION OF NONAUTOLOGOUS RED BLOOD CELLS INTO PERIPHERAL VEIN, PERCUTANEOUS APPROACH: ICD-10-PCS | Performed by: INTERNAL MEDICINE

## 2018-06-03 PROCEDURE — 30233K1 TRANSFUSION OF NONAUTOLOGOUS FROZEN PLASMA INTO PERIPHERAL VEIN, PERCUTANEOUS APPROACH: ICD-10-PCS | Performed by: INTERNAL MEDICINE

## 2018-06-03 RX ADMIN — PANTOPRAZOLE SODIUM SCH MLS/HR: 40 INJECTION, POWDER, FOR SOLUTION INTRAVENOUS at 01:47

## 2018-06-03 RX ADMIN — PANTOPRAZOLE SODIUM SCH MLS/HR: 40 INJECTION, POWDER, FOR SOLUTION INTRAVENOUS at 20:45

## 2018-06-03 RX ADMIN — PANTOPRAZOLE SODIUM SCH MLS/HR: 40 INJECTION, POWDER, FOR SOLUTION INTRAVENOUS at 06:54

## 2018-06-03 NOTE — PN
DATE:  06/02/2018



SUBJECTIVE:  This patient was seen and evaluated earlier today.  The

patient is comfortable.  The patient had significant improvement of the

diarrhea.



PHYSICAL EXAMINATION:

VITAL SIGNS:  Temperature is 98.9, pulse is 82, blood pressure is 133/75.

HEENT:  Atraumatic, anicteric.

NECK:  Supple.

HEART:  S1, S2 heard.

LUNGS:  Bilateral air entry present.

ABDOMEN:  Soft.  There is no tenderness.

EXTREMITIES:  No cyanosis, no clubbing.



LABORATORY DATA:  Hemoglobin has come down to 7.9, hematocrit is 24, WBC

16.9, platelets 482.  BUN 22, creatinine 0.9.  The patient's INR has gone

up to 4.1.



IMPRESSION:  This 62-year-old patient with status post mitral valve

replacement; history of atrial fibrillation, on Eliquis admitted with

severe anemia, status post endoscopy, showed large gastric ulcer.  Biopsy

showed inflamed gastric mucosa.  No Helicobacter pylori.

1.  Large gastric ulcer.

2.  Paroxysmal atrial fibrillation, on anticoagulation, on Coumadin.

3.  Supratherapeutic elevated INR.

4.  Anemia, drop in hemoglobin.

5.  History of atrial fibrillation.

6.  Status post mitral valve repair.



RECOMMENDATIONS:

1.  Close followup of the INR and also followup of the hemoglobin and

transfuse as needed.

2.  The patient had _____ history of severe anemia with large ulceration. 

Would recommend to avoid the newer anticoagulation.  The reasonable thing

is to keep the patient on low-level therapeutic anticoagulation with

warfarin.  This is in view of this large ulceration.

3.  The patient would benefit from the repeat endoscopic evaluation in

about eight weeks' time.

4.  We will continue the close followup of the hemoglobin and hematocrit

and transfuse as needed.  Once and if the patient is to be discharged, the

patient can be placed on Protonix 40 mg in morning and Zantac 150 mg in the

evening.  However, it is a reasonable thing to repeat his hemoglobin and

follow the trend.

5.  Would recommend close followup of the hemoglobin and hematocrit and

also INR.













Thank you very much for allowing us to participate in the care of the

patient.







__________________________________________

Jonathan Frost MD



DD:  06/02/2018 22:31:16

DT:  06/03/2018 5:50:47

Job # [id number]

## 2018-06-03 NOTE — RAD
HISTORY:

Medical clearance  



COMPARISON:

Comparison chest dated 05/22/2018 



FINDINGS:



LUNGS:

Right paratracheal density felt to represent ectatic great vessels. .



Mild bibasilar atelectasis with questionable small bilateral 

effusions left larger than right



PLEURA:

As above. No pneumothorax apparent.



CARDIOVASCULAR:

Cardiomegaly. Sternotomy wires and apparent prostatic valve 

replacements. 



OSSEOUS STRUCTURES:

No significant abnormalities.



VISUALIZED UPPER ABDOMEN:

Normal.



OTHER FINDINGS:

None.



IMPRESSION:

Mild bibasilar atelectasis with questionable small bilateral 

effusions left larger than right

## 2018-06-03 NOTE — CARD
--------------- APPROVED REPORT --------------





EKG Measurement

Heart [id number]

WY 108P

[id number]

[id number]

[id number]



<Conclusion>

Sinus rhythm with short W[place] with premature atrial complexes with 

aberrant conduction

Nonspecific T wave abnormality

Abnormal ECG

## 2018-06-03 NOTE — CARD
--------------- APPROVED REPORT --------------





EKG Measurement

Heart [id number]

NE 130P89

[id number]

[id number]

[id number]



<Conclusion>

Sinus rhythm with frequent APCs with aberrancy

Septal infarct, age undetermined

Abnormal ECG

## 2018-06-03 NOTE — PN
DATE:  06/01/2018



SUBJECTIVE:  Patient is clinically stable.  Currently has no new

complaints.  Her knee is better.  She is currently on IV heparin, Coumadin.

Planning to discontinue heparin once INR is 2.



Patient also wants to go home on Saturday and we explained to the patient

it depends on your clinical conditions and stability of your vitals and

hemoglobin.



PHYSICAL EXAMINATION

On 06/01, patient has:

VITAL SIGNS:  Temperature 97.6, heart rate 72, Blood pressure 138/74,

respiration 18, and saturation 96% on room air.

HEAD AND NECK:  Normal.  No JVD.  No thyromegaly.

CHEST:  Clear.  Good air entry.

CARDIAC:  First sound and second sound normal.

ABDOMEN:  Soft, nontender.

EXTREMITIES:  No edema.

NEUROLOGIC:  Normal.



LABORATORY STUDIES:  Her PT/INR, PT is 16.5, INR 1.42.  Her PTT is 57.  The

patient had laboratory studies on 06/01, which shows white count 9.5,

hemoglobin 8.3, hematocrit 25.4, and platelets is 439.  Patient's chemistry

shows iron is normal, iron saturation is normal.



IMPRESSION AND PLAN:

1.  Paroxysmal atrial fibrillations, we will go with Coumadin, we will

avoid any new anticoagulant medication because of lack of antidote

availability and risk of bleeding in this particular patient.  We will

continue Coumadin, we will monitor her PT/INR in the morning.  Once PT/INR

is therapeutic, we will discontinue IV heparin and we will continue to

monitor her conditions.  We will follow up clinically.

2.  Peptic ulcer disease, gastric ulcer, no active bleeding on the ulcer,

discussed with GI and Cardiology.  We will go with Coumadin and monitor

PT/INR.  Patient mentioned that she wants to go home, also I did advice the

patient to stay longer to monitor her condition on the anticoagulant.  We

will see how she does depending on her clinical conditions.

3.  Hypertension, chronic obstructive pulmonary disease, asthma,

hypercholesterolemia, history of gouty arthritis status post knee

injections.  Patient seems stable.  Continue current therapy, Cardizem,

metoprolol, magnesium, continue vitamin D.  No aspirin, we will hold the

aspirin, and we will only give Coumadin to monitor PT/INR in the morning. 

Continue current therapy.  Continue physical therapy.







__________________________________________

Philip Dinh MD



DD:  06/03/2018 13:14:52

DT:  06/03/2018 17:32:32

[facility] # [id number]

## 2018-06-03 NOTE — PN
DATE:  06/03/2018



INTENSIVIST NOTE



LOCATION:  At [facility].



SUBJECTIVE:  The patient is resting in bed.  No complaints of increased

shortness of breath, cough, wheezing, chest congestion.  No abdominal pain.

No chest pain at this time.  The patient is very comfortable and

hemodynamically stable.  Is in isolation for ESBL in the urine.



PHYSICAL EXAMINATION:

VITAL SIGNS:  Physical exam note that her temperature is 98.7, her pulse is

98, respirations are 19 and BP is 160/71, O2 saturation is 98% on room air.

HEENT:  Head is atraumatic, normocephalic.  Eyes reactive to light.  Ear,

nose and throat seemed to be within normal limits.

NECK:  Supple.  No JVD.  No thyroid enlargement.  No lymph nodes.

HEART:  Has regular rate and rhythm.  Normal S1, S2.

LUNGS:  Reveal good breath sounds bilaterally.

ABDOMEN:  Soft, nontender, decreased bowel sounds.

GENITALIA AND RECTAL:  Deferred.

MUSCULOSKELETAL:  No joint deformities.

EXTREMITIES:  Reveal trace lower extremity edema.

NEUROLOGICAL:  She seems to be grossly intact.



LABORATORY DATA:  As far as her laboratories are concerned, her white count

is 15.1, hemoglobin is 7, hematocrit 20.7 and platelets of 432,000.  The

patient's PT is 46.4 and INR is 3.96, PTT is 34.7.  Sodium is 139,

potassium 4.1, chloride 105, CO2 of 26 with a BUN of 32, creatinine of 0.9

and a glucose of 151.  Chest x-ray results are pending.



IMPRESSION:  The patient has history of a gastric ulcer and has acute

gastrointestinal bleed.  The patient has anemia and initial episode of

hypotension, which has been stabilized.  The patient has atrial

fibrillation, mitral valve repair, coronary artery disease, chronic

obstructive pulmonary and as stated above urinary tract infection with

extended-spectrum beta-lactamase.



PLAN:  As far as plan, we will transfuse packed red blood cells and follow

the patient's hemoglobin closely.  We will continue with DuoNeb as far as a

bronchodilator.  The patient is on Cardizem as well as Protonix and normal

saline.  Her Eliquis has been held as well as aspirin and we will follow

closely and treat aggressively along with the other consultants and the

primary care doctor.





__________________________________________

Dennis Mathew MD





DD:  06/03/2018 7:58:44

DT:  06/03/2018 8:03:03

Job # [id number]

## 2018-06-04 LAB
ALBUMIN SERPL-MCNC: 3.1 G/DL (ref 3–4.8)
ALBUMIN/GLOB SERPL: 1.1 {RATIO} (ref 1.1–1.8)
ALT SERPL-CCNC: 26 U/L (ref 7–56)
APPEARANCE UR: CLEAR
AST SERPL-CCNC: 25 U/L (ref 14–36)
BACTERIA #/AREA URNS HPF: (no result) /[HPF]
BASOPHILS # BLD AUTO: 0.01 K/MM3 (ref 0–2)
BASOPHILS # BLD AUTO: 0.01 K/MM3 (ref 0–2)
BASOPHILS NFR BLD: 0.1 % (ref 0–3)
BASOPHILS NFR BLD: 0.1 % (ref 0–3)
BILIRUB UR-MCNC: NEGATIVE MG/DL
BUN SERPL-MCNC: 13 MG/DL (ref 7–21)
CALCIUM SERPL-MCNC: 8.5 MG/DL (ref 8.4–10.5)
COLOR UR: YELLOW
EOSINOPHIL # BLD: 0.4 10*3/UL (ref 0–0.7)
EOSINOPHIL # BLD: 0.5 10*3/UL (ref 0–0.7)
EOSINOPHIL NFR BLD: 3.8 % (ref 1.5–5)
EOSINOPHIL NFR BLD: 3.9 % (ref 1.5–5)
ERYTHROCYTE [DISTWIDTH] IN BLOOD BY AUTOMATED COUNT: 15.8 % (ref 11.5–14.5)
ERYTHROCYTE [DISTWIDTH] IN BLOOD BY AUTOMATED COUNT: 16 % (ref 11.5–14.5)
GFR NON-AFRICAN AMERICAN: > 60
GLUCOSE UR STRIP-MCNC: NEGATIVE MG/DL
GRANULOCYTES # BLD: 6.95 10*3/UL (ref 1.4–6.5)
GRANULOCYTES # BLD: 9.88 10*3/UL (ref 1.4–6.5)
GRANULOCYTES NFR BLD: 66.8 % (ref 50–68)
GRANULOCYTES NFR BLD: 73.1 % (ref 50–68)
HGB BLD-MCNC: 8.8 G/DL (ref 12–16)
HGB BLD-MCNC: 9.3 G/DL (ref 12–16)
LEUKOCYTE ESTERASE UR-ACNC: (no result) LEU/UL
LYMPHOCYTES # BLD: 1.5 10*3/UL (ref 1.2–3.4)
LYMPHOCYTES # BLD: 2.4 10*3/UL (ref 1.2–3.4)
LYMPHOCYTES NFR BLD AUTO: 14.1 % (ref 22–35)
LYMPHOCYTES NFR BLD AUTO: 17.7 % (ref 22–35)
MCH RBC QN AUTO: 30 PG (ref 25–35)
MCH RBC QN AUTO: 30.3 PG (ref 25–35)
MCHC RBC AUTO-ENTMCNC: 33.3 G/DL (ref 31–37)
MCHC RBC AUTO-ENTMCNC: 33.6 G/DL (ref 31–37)
MCV RBC AUTO: 89.4 FL (ref 80–105)
MCV RBC AUTO: 90.9 FL (ref 80–105)
MONOCYTES # BLD AUTO: 0.7 10*3/UL (ref 0.1–0.6)
MONOCYTES # BLD AUTO: 1.6 10*3/UL (ref 0.1–0.6)
MONOCYTES NFR BLD: 15.2 % (ref 1–6)
MONOCYTES NFR BLD: 5.2 % (ref 1–6)
PH UR STRIP: 6 [PH] (ref 4.7–8)
PLATELET # BLD: 386 10^3/UL (ref 120–450)
PLATELET # BLD: 395 10^3/UL (ref 120–450)
PMV BLD AUTO: 10 FL (ref 7–11)
PMV BLD AUTO: 9.7 FL (ref 7–11)
PROT UR STRIP-MCNC: NEGATIVE MG/DL
RBC # BLD AUTO: 2.93 10^6/UL (ref 3.5–6.1)
RBC # BLD AUTO: 3.07 10^6/UL (ref 3.5–6.1)
RBC # UR STRIP: (no result) /UL
SP GR UR STRIP: 1.01 (ref 1–1.03)
UROBILINOGEN UR STRIP-ACNC: 0.2 E.U./DL
WBC # BLD AUTO: 10.4 10^3/UL (ref 4.5–11)
WBC # BLD AUTO: 13.5 10^3/UL (ref 4.5–11)
WBC #/AREA URNS HPF: (no result) /HPF (ref 0–6)

## 2018-06-04 RX ADMIN — PANTOPRAZOLE SODIUM SCH MLS/HR: 40 INJECTION, POWDER, FOR SOLUTION INTRAVENOUS at 13:00

## 2018-06-04 RX ADMIN — PANTOPRAZOLE SODIUM SCH MLS/HR: 40 INJECTION, POWDER, FOR SOLUTION INTRAVENOUS at 18:13

## 2018-06-04 RX ADMIN — PANTOPRAZOLE SODIUM SCH MLS/HR: 40 INJECTION, POWDER, FOR SOLUTION INTRAVENOUS at 00:52

## 2018-06-04 RX ADMIN — ARFORMOTEROL TARTRATE SCH MCG: 15 SOLUTION RESPIRATORY (INHALATION) at 20:22

## 2018-06-04 RX ADMIN — PANTOPRAZOLE SODIUM SCH MLS/HR: 40 INJECTION, POWDER, FOR SOLUTION INTRAVENOUS at 07:34

## 2018-06-04 RX ADMIN — PANTOPRAZOLE SODIUM SCH: 40 INJECTION, POWDER, FOR SOLUTION INTRAVENOUS at 07:45

## 2018-06-04 RX ADMIN — BUDESONIDE SCH MG: 0.25 SUSPENSION RESPIRATORY (INHALATION) at 20:22

## 2018-06-04 RX ADMIN — PANTOPRAZOLE SODIUM SCH: 40 INJECTION, POWDER, FOR SOLUTION INTRAVENOUS at 15:01

## 2018-06-04 NOTE — CP.CCUPN
<BrandanHugh - Last Filed: 06/04/18 12:39>





CCU Subjective





- Physician Review


Subjective (Free Text): 


06/04/18 09:30A


Patient seen and examined at bedside.  No acute events overnight.  Patient 

states she is feeling well and denies any symptoms of fatigue or dizziness.  

Patient offers no new complaints.





CCU Objective





- Vital Signs / Intake & Output


Vital Signs (Last 4 hours): 


Vital Signs











  Pulse Resp BP Pulse Ox


 


 06/04/18 10:49  83  14  


 


 06/04/18 10:48  90  21  


 


 06/04/18 10:47  87  21  


 


 06/04/18 10:46  87  22  


 


 06/04/18 10:45  82  19  


 


 06/04/18 10:44  90  14  


 


 06/04/18 10:43  94 H  20  


 


 06/04/18 10:42  83  23  


 


 06/04/18 10:41  89  13  


 


 06/04/18 10:40  91 H  17  


 


 06/04/18 10:39  97 H  14  


 


 06/04/18 10:38  87  12  


 


 06/04/18 10:37  84  18  


 


 06/04/18 10:36  91 H  23  


 


 06/04/18 10:35  88  17  


 


 06/04/18 10:34  93 H  30 H  


 


 06/04/18 10:33  89  19  


 


 06/04/18 10:32  99 H  29 H  


 


 06/04/18 10:31  90   


 


 06/04/18 10:30  95 H  35 H  


 


 06/04/18 10:29  90  13  


 


 06/04/18 10:26  99 H  41 H  


 


 06/04/18 10:24  90  35 H  


 


 06/04/18 10:20  87  16   95


 


 06/04/18 10:10  93 H  20   95


 


 06/04/18 10:00  98 H  17  153/69 H  97


 


 06/04/18 09:57  95 H   


 


 06/04/18 09:56  97 H  18  


 


 06/04/18 09:50  90  15   98


 


 06/04/18 09:41  88  16  


 


 06/04/18 09:40  86  20   99


 


 06/04/18 09:30  89  19   79 L


 


 06/04/18 09:29  94 H   137/70 


 


 06/04/18 09:25  81   137/70  100


 


 06/04/18 09:20  90    96


 


 06/04/18 09:10  95 H  23   98


 


 06/04/18 09:00  94 H  29 H   99


 


 06/04/18 08:50  92 H  16   96


 


 06/04/18 08:40  94 H  17   98











Intake and Output (Last 8hrs): 


 Intake & Output











 06/03/18 06/04/18 06/04/18





 22:59 06:59 14:59


 


Intake Total 1050  1763


 


Output Total 1000  0


 


Balance 50  1763


 


Weight 76.884 kg  


 


Intake:   


 


    1440


 


    Left Forearm   0


 


    Left Wrist 240  1440


 


  Oral   0


 


  Tube Feeding   0


 


  TPN/PPN   0


 


  Blood Product 810  323


 


  Lipid   0


 


  Albumin   0


 


  Other   0


 


Output:   


 


  Urine 400  


 


    Urine, Voided 400  


 


  Stool 600  0


 


  Urine/Stool Mix   0


 


  Emesis   0


 


  Oral Regurgitation   0


 


  Other   0


 


Other:   


 


  Voiding Method Bedpan  


 


  # Voids   


 


    Urine, Voided   3


 


  # Bowel Movements 2  3














- Physical Exam


Head: Positive for: Atraumatic, Normocephalic


Pupils: Positive for: PERRL


Extroacular Muscles: Positive for: EOMI


Conjunctiva: Positive for: Normal


Mouth: Positive for: Moist Mucous Membranes


Neck: Positive for: Normal Range of Motion


Respiratory/Chest: Positive for: Clear to Auscultation, Good Air Exchange.  

Negative for: Respiratory Distress, Accessory Muscle Use


Cardiovascular: Positive for: Regular Rate and Rhythm, Normal S1, S2.  Negative 

for: Murmurs


Abdomen: Negative for: Tenderness, Distention, Peritoneal Signs


Back: Positive for: Normal Inspection


Upper Extremity: Positive for: Normal Inspection.  Negative for: Cyanosis, Edema


Lower Extremity: Positive for: Normal Inspection.  Negative for: Edema


Neurological: Positive for: GCS=15, CN II-XII Intact, Speech Normal


Skin: Positive for: Warm, Dry, Normal Color.  Negative for: Rashes


Psychiatric: Positive for: Alert, Oriented x 3, Normal Insight, Normal 

Concentration





- Medications


Active Medications: 


Active Medications











Generic Name Dose Route Start Last Admin





  Trade Name Freq  PRN Reason Stop Dose Admin


 


Albuterol/Ipratropium  3 ml  06/03/18 01:47  





  Duoneb 3 Mg/0.5 Mg (3 Ml) Ud  IH   





  [id number] PRN   





  Shortness of Breath   


 


Arformoterol Tartrate  15 mcg  06/04/18 20:00  





  Brovana  IH   





  [id number] OLMAN   


 


Budesonide  0.25 mg  06/04/18 20:00  





  Pulmicort Respules  IH   





  [id number] OLMAN   


 


Diltiazem HCl  5 mg  06/03/18 01:47  





  Cardizem  IVP   





  Q6 PRN   





  Heart rate   


 


Sodium Chloride  1,000 mls @ 100 mls/hr  06/02/18 23:15  06/04/18 00:51





  Sodium Chloride 0.9%  IV   100 mls/hr





  .[id number] OLMAN   Administration


 


Pantoprazole Sodium  40 mg in 100 mls @ 20 mls/hr  06/03/18 01:45  06/04/18 07:

45





  Protonix 40mg Ivpb  IVPB   Not Given





  .Q5H OLMAN   


 


Levalbuterol HCl  1.25 mg  06/03/18 11:37  





  Xopenex  IH   





  [id number] PRN   





  Shortness of Breath   


 


Metoprolol Tartrate  25 mg  06/03/18 11:45  06/04/18 09:29





  Lopressor  PO   25 mg





  BID OLMAN   Administration














- Patient Studies


Lab Studies: 


 Lab Studies











  06/04/18 06/04/18 06/04/18 Range/Units





  08:26 06:00 05:40 


 


WBC    10.4  (4.5-11.0)  10^3/ul


 


RBC    2.93 L  (3.5-6.1)  10^6/uL


 


Hgb    8.8 L  (12.0-16.0)  g/dL


 


Hct    26.2 L  (36.0-48.0)  %


 


MCV    89.4  (80.0-105.0)  fl


 


MCH    30.0  (25.0-35.0)  pg


 


MCHC    33.6  (31.0-37.0)  g/dl


 


RDW    15.8 H  (11.5-14.5)  %


 


Plt Count    386  (120.0-450.0)  10^3/uL


 


MPV    10.0  (7.0-11.0)  fl


 


Gran %    66.8  (50.0-68.0)  %


 


Lymph % (Auto)    14.1 L  (22.0-35.0)  %


 


Mono % (Auto)    15.2 H  (1.0-6.0)  %


 


Eos % (Auto)    3.8  (1.5-5.0)  %


 


Baso % (Auto)    0.1  (0.0-3.0)  %


 


Gran #    6.95 H  (1.4-6.5)  


 


Lymph # (Auto)    1.5  (1.2-3.4)  


 


Mono # (Auto)    1.6 H  (0.1-0.6)  


 


Eos # (Auto)    0.4  (0.0-0.7)  


 


Baso # (Auto)    0.01  (0.0-2.0)  K/mm3


 


PT     (9.4-12.5)  SECONDS


 


INR     (0.93-1.08)  


 


Sodium   141   (132-148)  mmol/L


 


Potassium   3.6   (3.6-5.0)  mmol/L


 


Chloride   107   ()  mmol/L


 


Carbon Dioxide   28   (21-33)  mmol/L


 


Anion Gap   10   (10-20)  


 


BUN   13   (7-21)  mg/dL


 


Creatinine   0.8   (0.7-1.2)  mg/dl


 


Est GFR (African Amer)   > 60   


 


Est GFR (Non-Af Amer)   > 60   


 


Random Glucose   106   ()  mg/dL


 


Calcium   8.5   (8.4-10.5)  mg/dL


 


Total Bilirubin   0.2   (0.2-1.3)  mg/dL


 


AST   25   (14-36)  U/L


 


ALT   26   (7-56)  U/L


 


Alkaline Phosphatase   64   ()  U/L


 


Total Protein   5.7 L   (5.8-8.3)  g/dL


 


Albumin   3.1   (3.0-4.8)  g/dL


 


Globulin   2.7   gm/dL


 


Albumin/Globulin Ratio   1.1   (1.1-1.8)  


 


Urine Color  Yellow    (YELLOW)  


 


Urine Appearance  Clear    (CLEAR)  


 


Urine pH  6.0    (4.7-8.0)  


 


Ur Specific Gravity  1.015    (1.005-1.035)  


 


Urine Protein  Negative    (<30 mg/dL)  mg/dL


 


Urine Glucose (UA)  Negative    (NEGATIVE)  mg/dL


 


Urine Ketones  Negative    (NEGATIVE)  mg/dL


 


Urine Blood  Large H    (NEGATIVE)  


 


Urine Nitrate  Negative    (NEGATIVE)  


 


Urine Bilirubin  Negative    (NEGATIVE)  


 


Urine Urobilinogen  0.2    (<1 E.U./dL)  E.U./dL


 


Ur Leukocyte Esterase  Trace H    (NEGATIVE)  [place]/uL


 


Urine RBC  1 - 3    (0-2)  /hpf


 


Urine WBC  0 - 2    (0-6)  /hpf


 


Ur Epithelial Cells  4 - 5    (0-5)  /hpf


 


Urine Bacteria  Trace    (NEG)  


 


Blood Type     


 


Antibody Screen     


 


Crossmatch     


 


BBK History Checked     














  06/03/18 06/03/18 06/03/18 Range/Units





  22:20 12:55 12:55 


 


WBC  10.5   11.1 H D  (4.5-11.0)  10^3/ul


 


RBC  2.81 L   2.69 L  (3.5-6.1)  10^6/uL


 


Hgb  8.5 L   8.0 L  (12.0-16.0)  g/dL


 


Hct  25.0 L   24.1 L  (36.0-48.0)  %


 


MCV  89.0   89.6  (80.0-105.0)  fl


 


MCH  30.2   29.7  (25.0-35.0)  pg


 


MCHC  34.0   33.2  (31.0-37.0)  g/dl


 


RDW  15.8 H   15.8 H  (11.5-14.5)  %


 


Plt Count  353   384  (120.0-450.0)  10^3/uL


 


MPV  9.6   9.6  (7.0-11.0)  fl


 


Gran %  69.7 H    (50.0-68.0)  %


 


Lymph % (Auto)  13.1 L    (22.0-35.0)  %


 


Mono % (Auto)  14.1 H    (1.0-6.0)  %


 


Eos % (Auto)  3.1    (1.5-5.0)  %


 


Baso % (Auto)  0.0    (0.0-3.0)  %


 


Gran #  7.30 H    (1.4-6.5)  


 


Lymph # (Auto)  1.4    (1.2-3.4)  


 


Mono # (Auto)  1.5 H    (0.1-0.6)  


 


Eos # (Auto)  0.3    (0.0-0.7)  


 


Baso # (Auto)  0.00    (0.0-2.0)  K/mm3


 


PT   31.9 H   (9.4-12.5)  SECONDS


 


INR   2.72 H   (0.93-1.08)  


 


Sodium     (132-148)  mmol/L


 


Potassium     (3.6-5.0)  mmol/L


 


Chloride     ()  mmol/L


 


Carbon Dioxide     (21-33)  mmol/L


 


Anion Gap     (10-20)  


 


BUN     (7-21)  mg/dL


 


Creatinine     (0.7-1.2)  mg/dl


 


Est GFR (African Amer)     


 


Est GFR (Non-Af Amer)     


 


Random Glucose     ()  mg/dL


 


Calcium     (8.4-10.5)  mg/dL


 


Total Bilirubin     (0.2-1.3)  mg/dL


 


AST     (14-36)  U/L


 


ALT     (7-56)  U/L


 


Alkaline Phosphatase     ()  U/L


 


Total Protein     (5.8-8.3)  g/dL


 


Albumin     (3.0-4.8)  g/dL


 


Globulin     gm/dL


 


Albumin/Globulin Ratio     (1.1-1.8)  


 


Urine Color     (YELLOW)  


 


Urine Appearance     (CLEAR)  


 


Urine pH     (4.7-8.0)  


 


Ur Specific Gravity     (1.005-1.035)  


 


Urine Protein     (<30 mg/dL)  mg/dL


 


Urine Glucose (UA)     (NEGATIVE)  mg/dL


 


Urine Ketones     (NEGATIVE)  mg/dL


 


Urine Blood     (NEGATIVE)  


 


Urine Nitrate     (NEGATIVE)  


 


Urine Bilirubin     (NEGATIVE)  


 


Urine Urobilinogen     (<1 E.U./dL)  E.U./dL


 


Ur Leukocyte Esterase     (NEGATIVE)  Malgorzata/uL


 


Urine RBC     (0-2)  /hpf


 


Urine WBC     (0-6)  /hpf


 


Ur Epithelial Cells     (0-5)  /hpf


 


Urine Bacteria     (NEG)  


 


Blood Type     


 


Antibody Screen     


 


Crossmatch     


 


BBK History Checked     














  06/03/18 06/03/18 Range/Units





  02:15 02:15 


 


WBC    (4.5-11.0)  10^3/ul


 


RBC    (3.5-6.1)  10^6/uL


 


Hgb    (12.0-16.0)  g/dL


 


Hct    (36.0-48.0)  %


 


MCV    (80.0-105.0)  fl


 


MCH    (25.0-35.0)  pg


 


MCHC    (31.0-37.0)  g/dl


 


RDW    (11.5-14.5)  %


 


Plt Count    (120.0-450.0)  10^3/uL


 


MPV    (7.0-11.0)  fl


 


Gran %    (50.0-68.0)  %


 


Lymph % (Auto)    (22.0-35.0)  %


 


Mono % (Auto)    (1.0-6.0)  %


 


Eos % (Auto)    (1.5-5.0)  %


 


Baso % (Auto)    (0.0-3.0)  %


 


Gran #    (1.4-6.5)  


 


Lymph # (Auto)    (1.2-3.4)  


 


Mono # (Auto)    (0.1-0.6)  


 


Eos # (Auto)    (0.0-0.7)  


 


Baso # (Auto)    (0.0-2.0)  K/mm3


 


PT    (9.4-12.5)  SECONDS


 


INR    (0.93-1.08)  


 


Sodium    (132-148)  mmol/L


 


Potassium    (3.6-5.0)  mmol/L


 


Chloride    ()  mmol/L


 


Carbon Dioxide    (21-33)  mmol/L


 


Anion Gap    (10-20)  


 


BUN    (7-21)  mg/dL


 


Creatinine    (0.7-1.2)  mg/dl


 


Est GFR (African Amer)    


 


Est GFR (Non-Af Amer)    


 


Random Glucose    ()  mg/dL


 


Calcium    (8.4-10.5)  mg/dL


 


Total Bilirubin    (0.2-1.3)  mg/dL


 


AST    (14-36)  U/L


 


ALT    (7-56)  U/L


 


Alkaline Phosphatase    ()  U/L


 


Total Protein    (5.8-8.3)  g/dL


 


Albumin    (3.0-4.8)  g/dL


 


Globulin    gm/dL


 


Albumin/Globulin Ratio    (1.1-1.8)  


 


Urine Color    (YELLOW)  


 


Urine Appearance    (CLEAR)  


 


Urine pH    (4.7-8.0)  


 


Ur Specific Gravity    (1.005-1.035)  


 


Urine Protein    (<30 mg/dL)  mg/dL


 


Urine Glucose (UA)    (NEGATIVE)  mg/dL


 


Urine Ketones    (NEGATIVE)  mg/dL


 


Urine Blood    (NEGATIVE)  


 


Urine Nitrate    (NEGATIVE)  


 


Urine Bilirubin    (NEGATIVE)  


 


Urine Urobilinogen    (<1 E.U./dL)  E.U./dL


 


Ur Leukocyte Esterase    (NEGATIVE)  [place]gor[place]/uL


 


Urine RBC    (0-2)  /hpf


 


Urine WBC    (0-6)  /hpf


 


Ur Epithelial Cells    (0-5)  /hpf


 


Urine Bacteria    (NEG)  


 


Blood Type  O POSITIVE  O POSITIVE  


 


Antibody Screen  Negative  Negative  


 


Crossmatch   See Detail  


 


BBK History Checked  Patient has bt  Patient has bt  








 Laboratory Results - last 24 hr











  06/03/18 06/03/18 06/03/18





  02:15 02:15 12:55


 


WBC    11.1 H D


 


RBC    2.69 L


 


Hgb    8.0 L


 


Hct    24.1 L


 


MCV    89.6


 


MCH    29.7


 


MCHC    33.2


 


RDW    15.8 H


 


Plt Count    384


 


MPV    9.6


 


Gran %   


 


Lymph % (Auto)   


 


Mono % (Auto)   


 


Eos % (Auto)   


 


Baso % (Auto)   


 


Gran #   


 


Lymph # (Auto)   


 


Mono # (Auto)   


 


Eos # (Auto)   


 


Baso # (Auto)   


 


PT   


 


INR   


 


Sodium   


 


Potassium   


 


Chloride   


 


Carbon Dioxide   


 


Anion Gap   


 


BUN   


 


Creatinine   


 


Est GFR ( Amer)   


 


Est GFR (Non-Af Amer)   


 


Random Glucose   


 


Calcium   


 


Total Bilirubin   


 


AST   


 


ALT   


 


Alkaline Phosphatase   


 


Total Protein   


 


Albumin   


 


Globulin   


 


Albumin/Globulin Ratio   


 


Urine Color   


 


Urine Appearance   


 


Urine pH   


 


Ur Specific Gravity   


 


Urine Protein   


 


Urine Glucose (UA)   


 


Urine Ketones   


 


Urine Blood   


 


Urine Nitrate   


 


Urine Bilirubin   


 


Urine Urobilinogen   


 


Ur Leukocyte Esterase   


 


Urine RBC   


 


Urine WBC   


 


Ur Epithelial Cells   


 


Urine Bacteria   


 


Blood Type  O POSITIVE  O POSITIVE 


 


Antibody Screen  Negative  Negative 


 


Crossmatch  See Detail  


 


BBK History Checked  Patient has bt  Patient has bt 














  06/03/18 06/03/18 06/04/18





  12:55 22:20 05:40


 


WBC   10.5  10.4


 


RBC   2.81 L  2.93 L


 


Hgb   8.5 L  8.8 L


 


Hct   25.0 L  26.2 L


 


MCV   89.0  89.4


 


MCH   30.2  30.0


 


MCHC   34.0  33.6


 


RDW   15.8 H  15.8 H


 


Plt Count   353  386


 


MPV   9.6  10.0


 


Gran %   69.7 H  66.8


 


Lymph % (Auto)   13.1 L  14.1 L


 


Mono % (Auto)   14.1 H  15.2 H


 


Eos % (Auto)   3.1  3.8


 


Baso % (Auto)   0.0  0.1


 


Gran #   7.30 H  6.95 H


 


Lymph # (Auto)   1.4  1.5


 


Mono # (Auto)   1.5 H  1.6 H


 


Eos # (Auto)   0.3  0.4


 


Baso # (Auto)   0.00  0.01


 


PT  31.9 H  


 


INR  2.72 H  


 


Sodium   


 


Potassium   


 


Chloride   


 


Carbon Dioxide   


 


Anion Gap   


 


BUN   


 


Creatinine   


 


Est GFR ( Amer)   


 


Est GFR (Non-Af Amer)   


 


Random Glucose   


 


Calcium   


 


Total Bilirubin   


 


AST   


 


ALT   


 


Alkaline Phosphatase   


 


Total Protein   


 


Albumin   


 


Globulin   


 


Albumin/Globulin Ratio   


 


Urine Color   


 


Urine Appearance   


 


Urine pH   


 


Ur Specific Gravity   


 


Urine Protein   


 


Urine Glucose (UA)   


 


Urine Ketones   


 


Urine Blood   


 


Urine Nitrate   


 


Urine Bilirubin   


 


Urine Urobilinogen   


 


Ur Leukocyte Esterase   


 


Urine RBC   


 


Urine WBC   


 


Ur Epithelial Cells   


 


Urine Bacteria   


 


Blood Type   


 


Antibody Screen   


 


Crossmatch   


 


BBK History Checked   














  06/04/18 06/04/18





  06:00 08:26


 


WBC  


 


RBC  


 


Hgb  


 


Hct  


 


MCV  


 


MCH  


 


MCHC  


 


RDW  


 


Plt Count  


 


MPV  


 


Gran %  


 


Lymph % (Auto)  


 


Mono % (Auto)  


 


Eos % (Auto)  


 


Baso % (Auto)  


 


Gran #  


 


Lymph # (Auto)  


 


Mono # (Auto)  


 


Eos # (Auto)  


 


Baso # (Auto)  


 


PT  


 


INR  


 


Sodium  141 


 


Potassium  3.6 


 


Chloride  107 


 


Carbon Dioxide  28 


 


Anion Gap  10 


 


BUN  13 


 


Creatinine  0.8 


 


Est GFR ( Amer)  > 60 


 


Est GFR (Non-Af Amer)  > 60 


 


Random Glucose  106 


 


Calcium  8.5 


 


Total Bilirubin  0.2 


 


AST  25 


 


ALT  26 


 


Alkaline Phosphatase  64 


 


Total Protein  5.7 L 


 


Albumin  3.1 


 


Globulin  2.7 


 


Albumin/Globulin Ratio  1.1 


 


Urine Color   Yellow


 


Urine Appearance   Clear


 


Urine pH   6.0


 


Ur Specific Gravity   1.015


 


Urine Protein   Negative


 


Urine Glucose (UA)   Negative


 


Urine Ketones   Negative


 


Urine Blood   Large H


 


Urine Nitrate   Negative


 


Urine Bilirubin   Negative


 


Urine Urobilinogen   0.2


 


Ur Leukocyte Esterase   Trace H


 


Urine RBC   1 - 3


 


Urine WBC   0 - 2


 


Ur Epithelial Cells   4 - 5


 


Urine Bacteria   Trace


 


Blood Type  


 


Antibody Screen  


 


Crossmatch  


 


BBK History Checked  














Assessment/Plan





- Assessment and Plan (Free Text)


Assessment: 





62 F with a pertinent medical history of A-Fib on Eliquis and Gastritis with 

Chambers 3 non-bleeding gastric ulcer was transferred from TCU on 06/01 for UGIB 

with initial Hgb of 7.0 with INR of 4.14.  Patient was given 2 PRBC and 1 FFP 

and is now stabilizing.  H/H is improving with Hgb of 8.8  today.





Of note, patient is on contact for asymptomatic bacteruria colonized by extended

-spectrum beta-lactamase klebsiella pneumonia, but repeat UA on this admission 

is negative for UTI.  She also recently underwent course of Vanc until C-Diff 

antigen was negative.





Chronic medical problems are MV repair s/p MVR bioprosthetic, CAD, and COPD.





Plan





Neuro:


- Maintain euthermia





Pulm:


- Hx COPD: Duoneb 3 q6 and Xopenex 3 q6 (for tachycardia); Brovana, Pulmicort


- Maintain SaO2 above 92% 





Cardio:


- Hx A-Fib: Cardizem IV PRN; hold Eliquis 2/2 GIB; Cardio consult: Dr. Gaxiola


- Hx CAD: hold ASA 2/2 GIB; Continue Lopressor BID





GI:


- UGIB s/p 2 PRBC, 1 FFP: Protonix gtt; NPO, NS@100 mls/hr


- GI Consult: Dr. Frost


   Repeat endoscopy today


- CBC q8





Renal:


- Strict I/O's





Endo:


- Maintain euglycemia





Heme:


- Hold Eliquis and ASA


- Monitor H/H


- Heme: Dr Alatorre





ID:


- Contact precautions for ESBL Klebiella: No treatment needed


- Recent C Diff Infection: Resolved


- ID: Dr Macdonald





Dispo:    At this time, patient is stable for transfer to telemetry


FEN:   NPO


Access:    Peripheral IVs


Ppx:    Protonix gtt, SCD





<Milton Mendiola - Last Filed: 06/04/18 14:22>





CCU Objective





- Vital Signs / Intake & Output


Vital Signs (Last 4 hours): 


Vital Signs











  Pulse Resp BP Pulse Ox


 


 06/04/18 13:02  79  20  144/85  91 L


 


 06/04/18 13:00  97 H    84 L


 


 06/04/18 12:57  77  19  


 


 06/04/18 12:56  85  33 H  


 


 06/04/18 12:55  95 H   


 


 06/04/18 12:53  94 H   


 


 06/04/18 12:52  91 H  33 H  


 


 06/04/18 12:51  77  27 H  


 


 06/04/18 12:50  89  20  


 


 06/04/18 12:49  92 H  28 H  


 


 06/04/18 12:48  90  24  


 


 06/04/18 12:47  87  21  


 


 06/04/18 12:46  84  30 H  


 


 06/04/18 12:45  80  38 H  


 


 06/04/18 12:44  84  37 H  


 


 06/04/18 12:43  83  20  


 


 06/04/18 12:42  79  31 H  


 


 06/04/18 12:19  81  16  


 


 06/04/18 12:18  84  28 H  


 


 06/04/18 12:17  88  22  


 


 06/04/18 12:16  101 H  38 H  


 


 06/04/18 12:15  96 H  18  


 


 06/04/18 12:13  94 H  34 H  


 


 06/04/18 12:00  95 H  18  160/75 H  99


 


 06/04/18 11:46  83  24  


 


 06/04/18 11:16  96 H  13  


 


 06/04/18 11:15  83  10 L  


 


 06/04/18 11:13  86   


 


 06/04/18 11:12  82  30 H  


 


 06/04/18 11:11  93 H  19  


 


 06/04/18 11:10  82  21  


 


 06/04/18 11:09  85  14  


 


 06/04/18 11:08  91 H  17  


 


 06/04/18 11:07  88  10 L  


 


 06/04/18 11:06  86  13  


 


 06/04/18 11:05  82  15  


 


 06/04/18 11:04  95 H  16  


 


 06/04/18 11:03  92 H  16  


 


 06/04/18 11:02  78  13  


 


 06/04/18 11:01  88  15  


 


 06/04/18 11:00    165/87 H 


 


 06/04/18 10:59  90  17  


 


 06/04/18 10:58  88  16  


 


 06/04/18 10:57  84   


 


 06/04/18 10:56  93 H  20  


 


 06/04/18 10:55  85  14  


 


 06/04/18 10:54  82  16  


 


 06/04/18 10:53  86  15  


 


 06/04/18 10:49  83  14  


 


 06/04/18 10:48  90  21  


 


 06/04/18 10:47  87  21  


 


 06/04/18 10:46  87  22  


 


 06/04/18 10:45  82  19  


 


 06/04/18 10:44  90  14  


 


 06/04/18 10:43  94 H  20  


 


 06/04/18 10:42  83  23  


 


 06/04/18 10:41  89  13  


 


 06/04/18 10:40  91 H  17  


 


 06/04/18 10:39  97 H  14  


 


 06/04/18 10:38  87  12  


 


 06/04/18 10:37  84  18  


 


 06/04/18 10:36  91 H  23  


 


 06/04/18 10:35  88  17  


 


 06/04/18 10:34  93 H  30 H  


 


 06/04/18 10:33  89  19  


 


 06/04/18 10:32  99 H  29 H  


 


 06/04/18 10:31  90   


 


 06/04/18 10:30  95 H  35 H  


 


 06/04/18 10:29  90  13  


 


 06/04/18 10:26  99 H  41 H  


 


 06/04/18 10:24  90  35 H  


 


 06/04/18 10:20  87  16   95











Intake and Output (Last 8hrs): 


 Intake & Output











 06/03/18 06/04/18 06/04/18





 22:59 06:59 14:59


 


Intake Total 1050  1763


 


Output Total 1000  0


 


Balance 50  1763


 


Weight 169 lb 8 oz  


 


Intake:   


 


    1440


 


    Left Forearm   0


 


    Left Wrist 240  1440


 


  Oral   0


 


  Tube Feeding   0


 


  TPN/PPN   0


 


  Blood Product 810  323


 


  Lipid   0


 


  Albumin   0


 


  Other   0


 


Output:   


 


  Urine 400  


 


    Urine, Voided 400  


 


  Stool 600  0


 


  Urine/Stool Mix   0


 


  Emesis   0


 


  Oral Regurgitation   0


 


  Other   0


 


Other:   


 


  Voiding Method Bedpan  


 


  # Voids   


 


    Urine, Voided   3


 


  # Bowel Movements 2  3














- Medications


Active Medications: 


Active Medications











Generic Name Dose Route Start Last Admin





  Trade Name Freq  PRN Reason Stop Dose Admin


 


Albuterol/Ipratropium  3 ml  06/03/18 01:47  





  Duoneb 3 Mg/0.5 Mg (3 Ml) Ud  IH   





  [id number] PRN   





  Shortness of Breath   


 


Arformoterol Tartrate  15 mcg  06/04/18 20:00  





  Brovana  IH   





  [id number] OLMAN   


 


Budesonide  0.25 mg  06/04/18 20:00  





  Pulmicort Respules  IH   





  [id number] OLMAN   


 


Diltiazem HCl  5 mg  06/03/18 01:47  





  Cardizem  IVP   





  Q6 PRN   





  Heart rate   


 


Sodium Chloride  1,000 mls @ 100 mls/hr  06/02/18 23:15  06/04/18 00:51





  Sodium Chloride 0.9%  IV   100 mls/hr





  .[id number] OLMAN   Administration


 


Pantoprazole Sodium  40 mg in 100 mls @ 20 mls/hr  06/03/18 01:45  06/04/18 07:

45





  Protonix 40mg Ivpb  IVPB   Not Given





  .Q5H OLMAN   


 


Levalbuterol HCl  1.25 mg  06/03/18 11:37  





  Xopenex  IH   





  [id number] PRN   





  Shortness of Breath   


 


Metoprolol Tartrate  25 mg  06/03/18 11:45  06/04/18 09:29





  Lopressor  PO   25 mg





  BID OLMAN   Administration














- Patient Studies


Lab Studies: 


 Lab Studies











  06/04/18 06/04/18 06/04/18 Range/Units





  08:26 06:00 05:40 


 


WBC    10.4  (4.5-11.0)  10^3/ul


 


RBC    2.93 L  (3.5-6.1)  10^6/uL


 


Hgb    8.8 L  (12.0-16.0)  g/dL


 


Hct    26.2 L  (36.0-48.0)  %


 


MCV    89.4  (80.0-105.0)  fl


 


MCH    30.0  (25.0-35.0)  pg


 


MCHC    33.6  (31.0-37.0)  g/dl


 


RDW    15.8 H  (11.5-14.5)  %


 


Plt Count    386  (120.0-450.0)  10^3/uL


 


MPV    10.0  (7.0-11.0)  fl


 


Gran %    66.8  (50.0-68.0)  %


 


Lymph % (Auto)    14.1 L  (22.0-35.0)  %


 


Mono % (Auto)    15.2 H  (1.0-6.0)  %


 


Eos % (Auto)    3.8  (1.5-5.0)  %


 


Baso % (Auto)    0.1  (0.0-3.0)  %


 


Gran #    6.95 H  (1.4-6.5)  


 


Lymph # (Auto)    1.5  (1.2-3.4)  


 


Mono # (Auto)    1.6 H  (0.1-0.6)  


 


Eos # (Auto)    0.4  (0.0-0.7)  


 


Baso # (Auto)    0.01  (0.0-2.0)  K/mm3


 


Sodium   141   (132-148)  mmol/L


 


Potassium   3.6   (3.6-5.0)  mmol/L


 


Chloride   107   ()  mmol/L


 


Carbon Dioxide   28   (21-33)  mmol/L


 


Anion Gap   10   (10-20)  


 


BUN   13   (7-21)  mg/dL


 


Creatinine   0.8   (0.7-1.2)  mg/dl


 


Est GFR (African Amer)   > 60   


 


Est GFR (Non-Af Amer)   > 60   


 


Random Glucose   106   ()  mg/dL


 


Calcium   8.5   (8.4-10.5)  mg/dL


 


Total Bilirubin   0.2   (0.2-1.3)  mg/dL


 


AST   25   (14-36)  U/L


 


ALT   26   (7-56)  U/L


 


Alkaline Phosphatase   64   ()  U/L


 


Total Protein   5.7 L   (5.8-8.3)  g/dL


 


Albumin   3.1   (3.0-4.8)  g/dL


 


Globulin   2.7   gm/dL


 


Albumin/Globulin Ratio   1.1   (1.1-1.8)  


 


Urine Color  Yellow    (YELLOW)  


 


Urine Appearance  Clear    (CLEAR)  


 


Urine pH  6.0    (4.7-8.0)  


 


Ur Specific Gravity  1.015    (1.005-1.035)  


 


Urine Protein  Negative    (<30 mg/dL)  mg/dL


 


Urine Glucose (UA)  Negative    (NEGATIVE)  mg/dL


 


Urine Ketones  Negative    (NEGATIVE)  mg/dL


 


Urine Blood  Large H    (NEGATIVE)  


 


Urine Nitrate  Negative    (NEGATIVE)  


 


Urine Bilirubin  Negative    (NEGATIVE)  


 


Urine Urobilinogen  0.2    (<1 E.U./dL)  E.U./dL


 


Ur Leukocyte Esterase  Trace H    (NEGATIVE)  [place]/uL


 


Urine RBC  1 - 3    (0-2)  /hpf


 


Urine WBC  0 - 2    (0-6)  /hpf


 


Ur Epithelial Cells  4 - 5    (0-5)  /hpf


 


Urine Bacteria  Trace    (NEG)  


 


Blood Type     


 


Antibody Screen     


 


Crossmatch     


 


BBK History Checked     














  06/03/18 06/03/18 06/03/18 Range/Units





  22:20 02:15 02:15 


 


WBC  10.5    (4.5-11.0)  10^3/ul


 


RBC  2.81 L    (3.5-6.1)  10^6/uL


 


Hgb  8.5 L    (12.0-16.0)  g/dL


 


Hct  25.0 L    (36.0-48.0)  %


 


MCV  89.0    (80.0-105.0)  fl


 


MCH  30.2    (25.0-35.0)  pg


 


MCHC  34.0    (31.0-37.0)  g/dl


 


RDW  15.8 H    (11.5-14.5)  %


 


Plt Count  353    (120.0-450.0)  10^3/uL


 


MPV  9.6    (7.0-11.0)  fl


 


Gran %  69.7 H    (50.0-68.0)  %


 


Lymph % (Auto)  13.1 L    (22.0-35.0)  %


 


Mono % (Auto)  14.1 H    (1.0-6.0)  %


 


Eos % (Auto)  3.1    (1.5-5.0)  %


 


Baso % (Auto)  0.0    (0.0-3.0)  %


 


Gran #  7.30 H    (1.4-6.5)  


 


Lymph # (Auto)  1.4    (1.2-3.4)  


 


Mono # (Auto)  1.5 H    (0.1-0.6)  


 


Eos # (Auto)  0.3    (0.0-0.7)  


 


Baso # (Auto)  0.00    (0.0-2.0)  K/mm3


 


Sodium     (132-148)  mmol/L


 


Potassium     (3.6-5.0)  mmol/L


 


Chloride     ()  mmol/L


 


Carbon Dioxide     (21-33)  mmol/L


 


Anion Gap     (10-20)  


 


BUN     (7-21)  mg/dL


 


Creatinine     (0.7-1.2)  mg/dl


 


Est GFR (African Amer)     


 


Est GFR (Non-Af Amer)     


 


Random Glucose     ()  mg/dL


 


Calcium     (8.4-10.5)  mg/dL


 


Total Bilirubin     (0.2-1.3)  mg/dL


 


AST     (14-36)  U/L


 


ALT     (7-56)  U/L


 


Alkaline Phosphatase     ()  U/L


 


Total Protein     (5.8-8.3)  g/dL


 


Albumin     (3.0-4.8)  g/dL


 


Globulin     gm/dL


 


Albumin/Globulin Ratio     (1.1-1.8)  


 


Urine Color     (YELLOW)  


 


Urine Appearance     (CLEAR)  


 


Urine pH     (4.7-8.0)  


 


Ur Specific Gravity     (1.005-1.035)  


 


Urine Protein     (<30 mg/dL)  mg/dL


 


Urine Glucose (UA)     (NEGATIVE)  mg/dL


 


Urine Ketones     (NEGATIVE)  mg/dL


 


Urine Blood     (NEGATIVE)  


 


Urine Nitrate     (NEGATIVE)  


 


Urine Bilirubin     (NEGATIVE)  


 


Urine Urobilinogen     (<1 E.U./dL)  E.U./dL


 


Ur Leukocyte Esterase     (NEGATIVE)  Malgorzata/uL


 


Urine RBC     (0-2)  /hpf


 


Urine WBC     (0-6)  /hpf


 


Ur Epithelial Cells     (0-5)  /hpf


 


Urine Bacteria     (NEG)  


 


Blood Type   O POSITIVE  O POSITIVE  


 


Antibody Screen   Negative  Negative  


 


Crossmatch    See Detail  


 


BBK History Checked   Patient has bt  Patient has bt  








 Laboratory Results - last 24 hr











  06/03/18 06/03/18 06/03/18





  02:15 02:15 22:20


 


WBC    10.5


 


RBC    2.81 L


 


Hgb    8.5 L


 


Hct    25.0 L


 


MCV    89.0


 


MCH    30.2


 


MCHC    34.0


 


RDW    15.8 H


 


Plt Count    353


 


MPV    9.6


 


Gran %    69.7 H


 


Lymph % (Auto)    13.1 L


 


Mono % (Auto)    14.1 H


 


Eos % (Auto)    3.1


 


Baso % (Auto)    0.0


 


Gran #    7.30 H


 


Lymph # (Auto)    1.4


 


Mono # (Auto)    1.5 H


 


Eos # (Auto)    0.3


 


Baso # (Auto)    0.00


 


Sodium   


 


Potassium   


 


Chloride   


 


Carbon Dioxide   


 


Anion Gap   


 


BUN   


 


Creatinine   


 


Est GFR ( Amer)   


 


Est GFR (Non-Af Amer)   


 


Random Glucose   


 


Calcium   


 


Total Bilirubin   


 


AST   


 


ALT   


 


Alkaline Phosphatase   


 


Total Protein   


 


Albumin   


 


Globulin   


 


Albumin/Globulin Ratio   


 


Urine Color   


 


Urine Appearance   


 


Urine pH   


 


Ur Specific Gravity   


 


Urine Protein   


 


Urine Glucose (UA)   


 


Urine Ketones   


 


Urine Blood   


 


Urine Nitrate   


 


Urine Bilirubin   


 


Urine Urobilinogen   


 


Ur Leukocyte Esterase   


 


Urine RBC   


 


Urine WBC   


 


Ur Epithelial Cells   


 


Urine Bacteria   


 


Blood Type  O POSITIVE  O POSITIVE 


 


Antibody Screen  Negative  Negative 


 


Crossmatch  See Detail  


 


BBK History Checked  Patient has bt  Patient has bt 














  06/04/18 06/04/18 06/04/18





  05:40 06:00 08:26


 


WBC  10.4  


 


RBC  2.93 L  


 


Hgb  8.8 L  


 


Hct  26.2 L  


 


MCV  89.4  


 


MCH  30.0  


 


MCHC  33.6  


 


RDW  15.8 H  


 


Plt Count  386  


 


MPV  10.0  


 


Gran %  66.8  


 


Lymph % (Auto)  14.1 L  


 


Mono % (Auto)  15.2 H  


 


Eos % (Auto)  3.8  


 


Baso % (Auto)  0.1  


 


Gran #  6.95 H  


 


Lymph # (Auto)  1.5  


 


Mono # (Auto)  1.6 H  


 


Eos # (Auto)  0.4  


 


Baso # (Auto)  0.01  


 


Sodium   141 


 


Potassium   3.6 


 


Chloride   107 


 


Carbon Dioxide   28 


 


Anion Gap   10 


 


BUN   13 


 


Creatinine   0.8 


 


Est GFR ( Amer)   > 60 


 


Est GFR (Non-Af Amer)   > 60 


 


Random Glucose   106 


 


Calcium   8.5 


 


Total Bilirubin   0.2 


 


AST   25 


 


ALT   26 


 


Alkaline Phosphatase   64 


 


Total Protein   5.7 L 


 


Albumin   3.1 


 


Globulin   2.7 


 


Albumin/Globulin Ratio   1.1 


 


Urine Color    Yellow


 


Urine Appearance    Clear


 


Urine pH    6.0


 


Ur Specific Gravity    1.015


 


Urine Protein    Negative


 


Urine Glucose (UA)    Negative


 


Urine Ketones    Negative


 


Urine Blood    Large H


 


Urine Nitrate    Negative


 


Urine Bilirubin    Negative


 


Urine Urobilinogen    0.2


 


Ur Leukocyte Esterase    Trace H


 


Urine RBC    1 - 3


 


Urine WBC    0 - 2


 


Ur Epithelial Cells    4 - 5


 


Urine Bacteria    Trace


 


Blood Type   


 


Antibody Screen   


 


Crossmatch   


 


BBK History Checked   














Assessment/Plan





- Assessment and Plan (Free Text)


Assessment: 


Patient seen and examined, on rounds with resident, agree with note with 

following additions/exceptions:


Patient is 63yo female with PMHX of A-Fib on Eliquis and Gastritis with Chambers 

3 non-bleeding gastric ulcer, MV repair s/p MVR bioprosthetic, CAD, and COPD.


 was transferred from TCU on 06/01 for UGIB. Given 2u pRBC, and 1u FFP. 

Currently afebrile, HD stable, comfortable in NAD, doing well. Hgb has been 

stable, not requiring transfusions over last 24h. 


Pt denies any major complaints. Gi following, not immediate for EGD. 





UGIB


Afib 


Gastritis


CAD


COPD





Recommend:


- supp o2 as needed


- follow up ID, regarding + UCx


- BP control


- PPI drip


- NPO


- follow up GI


- Hold Eliquis, ASA for now


- Lopressor BID


- GI ppx


- DVT ppx


- STABLE, transfer to telemetry

## 2018-06-04 NOTE — CON
DATE:



This patient was seen and evaluated earlier today.



REASON FOR CONSULTATION:  GI bleeding.



HISTORY OF PRESENT ILLNESS:  This is a 62-year-old patient with past

medical history of atrial fibrillation, on Coumadin, status post mitral

valve repair, coronary artery disease, was initially admitted to the

med-surgery floor with GI bleeding.  The patient was initially admitted to

the hospital with shortness of breath on exertion for 1 week.  The patient

was on Eliquis prior to the last admission.  The patient was also anemic. 

The patient was on iron supplement 3 times a week.  The patient had a

history of melena at that time.  Upper GI endoscopy done.  The patient's

hemoglobin at that time initially on 05/22/2018 was 6.7.  The patient was

transfused 3 units of packed RBC at that time.  Hemoglobin remained stable.

Endoscopy showed large clean-based antral ulceration.  The patient was

started back on initially heparin.  Hemoglobin remained stable.  Started

back on Coumadin.  The patient was in TCU, noticed to have a drop in blood

count and also melena and bleeding per rectum.  The patient was transferred

to the ER and admitted to the ICU now.  The patient's hemoglobin dropped in

TCU, it is up to 7.  The patient is receiving second unit of transfusion. 

The patient did receive 1 unit of packed RBC and is receiving second unit

now.  No complaints of an abdominal pain.  No vomiting blood.



PAST MEDICAL HISTORY:  His other past medical history is significant as

above.



SOCIAL HISTORY:  Social alcohol use.  Denies smoking.



REVIEW OF SYSTEMS:  _____ other systems reviewed.



PHYSICAL EXAMINATION:

GENERAL:  The patient is lying on the bed, not in acute distress.

VITAL SIGNS:  Temperature is 98.4, pulse 99, blood pressure is 170/98.

HEENT:  Atraumatic and anicteric.

NECK:  Supple.

HEART:  S1 and S2 heard.

LUNGS:  Bilateral air entry present.

ABDOMEN:  Soft.  There is no mass palpable.  No tenderness.

EXTREMITIES:  No cyanosis.  No clubbing.

NEUROLOGIC:  Alert and oriented.  Moves all the extremities.



LABORATORY DATA:  Patient's hemoglobin after 1 unit of transfusion is 8,

hematocrit 24.1, WBC is 11.1, platelets 384.  BUN 32, creatinine 0.9.  LFTs

are normal.  Albumin 2.9.  The patient had a urine culture done, which

showed Klebsiella pneumonia, ESBL.  The patient had history of loose bowel.

Stool for C. diff was negative.



IMPRESSION:

1.  This is a 62-year-old patient with gastric ulcer, recently was in the

TCU, on anticoagulation, had recurrence of the bleeding.  The patient's INR

was elevated to 4.1.  The patient had a melena, now in ICU, is receiving

second unit of transfusion and platelets.  The likely cause of the bleeding

is probably the gastric ulcer bleeding with coagulopathy.

2.  History of diarrhea, improved, on vancomycin empirically.

3.  History of urinary tract infection, extended-spectrum beta-lactamases.

4.  History of gouty arthritis.  The patient was in a short course of

steroids.



RECOMMENDATIONS:

1.  Followup of the hemoglobin and hematocrit, and correct the

coagulopathy, transfuse as needed.

2.  We will consider repeating an upper GI endoscopy for further

optimization.

3.  Infectious Disease evaluation for an extended-spectrum beta-lactamases

urinary tract infection in view of this patient has a prosthetic valve.

4.  Paroxysmal atrial fibrillation.  The patient needs to be long-term

anticoagulation; however, Eliquis or newer anticoagulants are higher risk. 

If the patient needs to be on anticoagulation, this _____ the low-dose

Coumadin after further evaluation.

5.  Continue the patient on Protonix drip.  We will continue a short

course.

6.  History of acute kidney injury, improved.  The patient has history of

acute kidney injury, improved.  The patient's BUN is elevated now, maybe

secondary to the GI bleeding.  We will discuss with Dr. Dihn.



Thank you very much for allowing us to participate in the care of the

patient.





__________________________________________

Jonathan Frost MD



DD:  06/03/2018 19:15:24

DT:  06/04/2018 1:52:09

Job # [id number]

## 2018-06-04 NOTE — DS
The patient was seen, evaluated initially in the morning _____ on

06/02/2018.



PHYSICAL EXAMINATION:

VITAL SIGNS:  Temperature 97.6, heart rate 87, blood pressure is 134/92,

respirations 18.

HEAD AND NECK:  Normal.  No JVD.  No thyromegaly.

CHEST:  Clear.  Good air entry.

CARDIAC:  First sound and second sound normal.

ABDOMEN:  Soft, nontender.

EXTREMITIES:  No edema.

NEUROLOGIC:  Normal.



LABORATORY DATA:  On 06/02/2018, PT is 35.4, INR is 3.04.  Patient also had

blood hematology, which showed white count 12, hemoglobin 9, hematocrit

27.7 and platelets 511.



CLINICAL COURSE:  Patient was doing stable.  She was planned to be

discharged on Saturday; however, patient did have bloody bowel movement on

Saturday around 04:00-05:00 p.m. and we canceled discharge and patient was

sent to the ER for further evaluation.  This morning, she was off heparin,

on the Coumadin and her PT/INR was 3 and we discharged the patient to go to

the emergency room because of the recurrent GI bleed.  Patient's condition

has been discussed with the GI consults about the Coumadin versus new

anticoagulant because of risk of bleeding and the patient was advised to

stay longer, but she wants to go home.  She wants to go before Saturday.  I

did discuss with her and convinced her to stay until Saturday at least and

monitor her condition.  However, the patient had a bleeding and she will be

readmitted again and monitored through the emergency room, probably will be

discharged and admitted to ICU and we will follow up clinically.



DISCHARGE DIAGNOSES:

1.  Acute recurrent gastrointestinal bleeding.  Patient had a black stool

that was seen.

2.  Anemia.  Patient repeat hemoglobin was low.  Hemoglobin was 7, which is

lower than before.

3.  Peptic ulcer disease.

4.  Gastric ulcers.

5.  Patient has paroxysmal atrial fibrillation with mitral valve

replacement.

6.  Chronic obstructive pulmonary disease and asthma.



PLAN:  Discharge the patient to the ER for reevaluation and admission to

ICU.  Continue to monitor H and H, probably patient will be reversed, _____

Coumadin with fresh frozen plasma, transfuse 2 units of packed RBCs. 

Patient will be discharged and will be readmitted to ICU.







__________________________________________

Philip Dinh MD



DD:  06/03/2018 13:19:55

DT:  06/04/2018 7:32:29

[facility] # [id number]

## 2018-06-04 NOTE — CP.PCM.PN
Subjective





- Date & Time of Evaluation


Date of Evaluation: 06/04/18


Time of Evaluation: 16:00





- Subjective


Subjective: 





Merrem Iv ordered as per PMD, Dr. Dinh, for UTI with pos Klebsiella,to 

maintain until seen by Infectious Disease doctor, consult entered and Dr. Hsu 

notified of Urine cx results. 





Objective





- Vital Signs/Intake and Output


Vital Signs (last 24 hours): 


 











Temp Pulse Resp BP Pulse Ox


 


 97.5 F L  79   20   144/85   91 L


 


 06/04/18 05:06  06/04/18 13:02  06/04/18 13:02  06/04/18 13:02  06/04/18 13:02








Intake and Output: 


 











 06/04/18 06/04/18





 06:59 18:59


 


Intake Total  1763


 


Output Total  300


 


Balance  1463














- Medications


Medications: 


 Current Medications





Albuterol/Ipratropium (Duoneb 3 Mg/0.5 Mg (3 Ml) Ud)  3 ml IH [id number] PRN


   PRN Reason: Shortness of Breath


Arformoterol Tartrate (Brovana)  15 mcg IH [id number] OLMAN


Budesonide (Pulmicort Respules)  0.25 mg IH [id number] [facility]MAN


Diltiazem HCl (Cardizem)  5 mg IVP Q6 PRN


   PRN Reason: Heart rate


Diltiazem HCl (Cardizem Cd)  120 mg PO DAILY [facility]


Sodium Chloride (Sodium Chloride 0.9%)  1,000 mls @ 100 mls/hr IV .Q10H [facility]


   Last Admin: 06/04/18 16:05 Dose:  Not Given


Pantoprazole Sodium (Protonix 40mg Ivpb)  40 mg in 100 mls @ 20 mls/hr IVPB 

.Q5H [facility]


   Last Admin: 06/04/18 15:01 Dose:  Not Given


Meropenem 500 mg/ Sodium (Chloride)  50 mls @ 100 mls/hr IVPB Q12 [facility]MAN


   PRN Reason: Protocol


   Stop: 06/04/18 22:29


Levalbuterol HCl (Xopenex)  1.25 mg IH [id number] PRN


   PRN Reason: Shortness of Breath


Metoprolol Tartrate (Lopressor)  25 mg PO BID [facility]


   Last Admin: 06/04/18 09:29 Dose:  25 mg











- Labs


Labs: 


 





 06/04/18 05:40 





 06/04/18 06:00 





 











PT  31.9 SECONDS (9.4-12.5)  H  06/03/18  12:55    


 


INR  2.72  (0.93-1.08)  H  06/03/18  12:55    


 


APTT  34.7 Seconds (25.1-36.5)   06/03/18  02:15

## 2018-06-04 NOTE — CP.PCM.PN
<Laurel Marie - Last Filed: 06/04/18 10:51>





Subjective





- Date & Time of Evaluation


Date of Evaluation: 06/04/18


Time of Evaluation: 07:00





- Subjective


Subjective: 


GI Progress Note for NASIR Rowell PGY2





Patient seen and examined at bedside. As per nursing staff, there were no acute 

overnight events. Patient reports feeling better today. She denies having chest 

pain, shortness of breath, nausea/vomiting/diarrhea, fever/chills, dysuria or 

hematuria. Patient states she feels hungry and would like to eat. 





Objective





- Vital Signs/Intake and Output


Vital Signs (last 24 hours): 


 











Temp Pulse Resp BP Pulse Ox


 


 97.5 F L  94 H  12   137/70   97 


 


 06/04/18 05:06  06/04/18 09:29  06/04/18 07:10  06/04/18 09:29  06/04/18 07:10








Intake and Output: 


 











 06/04/18 06/04/18





 06:59 18:59


 


Intake Total  1763


 


Output Total  0


 


Balance  1763














- Medications


Medications: 


 Current Medications





Albuterol/Ipratropium (Duoneb 3 Mg/0.5 Mg (3 Ml) Ud)  3 ml IH [id number] PRN


   PRN Reason: Shortness of Breath


Arformoterol Tartrate (Brovana)  15 mcg IH [id number] [facility]MAN


Budesonide (Pulmicort Respules)  0.25 mg IH [id number] [facility]


Diltiazem HCl (Cardizem)  5 mg IVP Q6 PRN


   PRN Reason: Heart rate


Sodium Chloride (Sodium Chloride 0.9%)  1,000 mls @ 100 mls/hr IV .Q10H [facility]


   Last Admin: 06/04/18 00:51 Dose:  100 mls/hr


Pantoprazole Sodium (Protonix 40mg Ivpb)  40 mg in 100 mls @ 20 mls/hr IVPB 

.Q5H [facility]


   Last Admin: 06/04/18 07:45 Dose:  Not Given


Levalbuterol HCl (Xopenex)  1.25 mg IH [id number] PRN


   PRN Reason: Shortness of Breath


Metoprolol Tartrate (Lopressor)  25 mg PO BID [facility]


   Last Admin: 06/04/18 09:29 Dose:  25 mg











- Labs


Labs: 


 





 06/04/18 05:40 





 06/04/18 06:00 





 











PT  31.9 SECONDS (9.4-12.5)  H  06/03/18  12:55    


 


INR  2.72  (0.93-1.08)  H  06/03/18  12:55    


 


APTT  34.7 Seconds (25.1-36.5)   06/03/18  02:15    














- Constitutional


Appears: No Acute Distress





- Head Exam


Head Exam: ATRAUMATIC, NORMAL INSPECTION, NORMOCEPHALIC





- Eye Exam


Eye Exam: Normal appearance, PERRL


Pupil Exam: NORMAL ACCOMODATION





- ENT Exam


ENT Exam: Mucous Membranes Moist





- Respiratory Exam


Respiratory Exam: Clear to Ausculation Bilateral, NORMAL BREATHING PATTERN.  

absent: Rales, Rhonchi, Wheezes





- Cardiovascular Exam


Cardiovascular Exam: Irregular Rhythm, +S1, +S2.  absent: Tachycardia, Gallop, 

Rubs, Murmur





- GI/Abdominal Exam


GI & Abdominal Exam: Soft, Tenderness (mild epigastric ), Normal Bowel Sounds.  

absent: Rigid, Mass, Rebound





- Extremities Exam


Extremities Exam: absent: Calf Tenderness, Pedal Edema





- Neurological Exam


Neurological Exam: Alert, Awake, CN II-XII Intact, Oriented x3





- Psychiatric Exam


Psychiatric exam: Normal Affect, Normal Mood





- Skin


Skin Exam: Dry, Warm





Assessment and Plan





- Assessment and Plan (Free Text)


Assessment: 


This is a 61yo AA female with past medical history of a.fib (was on Coumadin), 

MV repair, CAD who was admitted for 





1. GI bleed secondary to antral ulcer seen on EGD


2. UTI (ESBL +) s/p IV antibiotics 


3. Diarrhea- resolved 


4. A.fib (anticoagulation on hold) 


5. CAD 


Plan: 


The plan is for patient to have EGD today to monitor her gastric ulcer for 

further bleeding. Diet can be advanced after EGD. Hgb is stable at this time 

and will continue to monitor. Continue Protonix drip. Consider re-starting 

anticoagulation after EGD for history of a.fib. With anticoagulation on hold, 

patient is at risk for adverse events such as CVA. We will make further 

recommendations after EGD today. 





Case seen, discussed and reviewed with Dr. Frost. 


NASIR Marie PGY2





<Jonathan Frost V - Last Filed: 06/04/18 21:43>





Objective





- Vital Signs/Intake and Output


Vital Signs (last 24 hours): 


 











Temp Pulse Resp BP Pulse Ox


 


 97.5 F L  80   16   166/93 H  94 L


 


 06/04/18 05:06  06/04/18 20:23  06/04/18 20:11  06/04/18 20:00  06/04/18 20:00








Intake and Output: 


 











 06/04/18 06/05/18





 18:59 06:59


 


Intake Total 1763 200


 


Output Total 400 625


 


Balance 1363 -425














- Medications


Medications: 


 Current Medications





Albuterol/Ipratropium (Duoneb 3 Mg/0.5 Mg (3 Ml) Ud)  3 ml IH [id number] PRN


   PRN Reason: Shortness of Breath


Arformoterol Tartrate (Brovana)  15 mcg IH [id number] [facility]


   Last Admin: 06/04/18 20:22 Dose:  15 mcg


Budesonide (Pulmicort Respules)  0.25 mg IH [id number] [facility]


   Last Admin: 06/04/18 20:22 Dose:  0.25 mg


Diltiazem HCl (Cardizem)  5 mg IVP Q6 PRN


   PRN Reason: Heart rate


Diltiazem HCl (Cardizem Cd)  120 mg PO DAILY [facility]


Pantoprazole Sodium (Protonix 40mg Ivpb)  40 mg in 100 mls @ 20 mls/hr IVPB 

.Q5H [facility]


   Last Admin: 06/04/18 18:13 Dose:  20 mls/hr


Meropenem 500 mg/ Sodium (Chloride)  50 mls @ 100 mls/hr IVPB Q12 [facility]


   PRN Reason: Protocol


   Stop: 06/04/18 22:29


Levalbuterol HCl (Xopenex)  1.25 mg IH [id number] PRN


   PRN Reason: Shortness of Breath


Metoprolol Tartrate (Lopressor)  25 mg PO BID [facility]


   Last Admin: 06/04/18 17:04 Dose:  25 mg











- Labs


Labs: 


 





 06/04/18 17:00 





 06/04/18 06:00 





 











PT  31.9 SECONDS (9.4-12.5)  H  06/03/18  12:55    


 


INR  2.72  (0.93-1.08)  H  06/03/18  12:55    


 


APTT  34.7 Seconds (25.1-36.5)   06/03/18  02:15    














Attending/Attestation





- Attestation


I have personally seen and examined this patient.: Yes


I have fully participated in the care of the patient.: Yes


I have reviewed all pertinent clinical information, including history, physical 

exam and plan: Yes


Notes (Text): 





This is an addendum to GI progress  report dictated by the Medical Resident.The 

patient was seen and examined earlier.  Medical records, lab studies, imagings 

were reviewed.  Last 24 hours events reviewed.  Agreed with the above treatment 

plan as outlined in Medical Resident 's notes the with the addition of the 

following 


Hemodynamically stable


On examination abdomen soft no tenderness


On isolation for ESBL in urine repeat culture pending


Plan for EGD to reevaluate the gastric ulcer status which has been rescheduled 

in a.m.


continue Protonix


clear liquid diet


Close follow-up of hemoglobin





06/04/18 21:39

## 2018-06-05 VITALS — OXYGEN SATURATION: 99 %

## 2018-06-05 LAB
ALBUMIN SERPL-MCNC: 3.1 G/DL (ref 3–4.8)
ALBUMIN/GLOB SERPL: 1.2 {RATIO} (ref 1.1–1.8)
ALT SERPL-CCNC: 28 U/L (ref 7–56)
AST SERPL-CCNC: 19 U/L (ref 14–36)
BASOPHILS # BLD AUTO: 0.01 K/MM3 (ref 0–2)
BASOPHILS # BLD AUTO: 0.01 K/MM3 (ref 0–2)
BASOPHILS NFR BLD: 0.1 % (ref 0–3)
BASOPHILS NFR BLD: 0.1 % (ref 0–3)
BUN SERPL-MCNC: 7 MG/DL (ref 7–21)
CALCIUM SERPL-MCNC: 8.7 MG/DL (ref 8.4–10.5)
EOSINOPHIL # BLD: 0.4 10*3/UL (ref 0–0.7)
EOSINOPHIL # BLD: 0.5 10*3/UL (ref 0–0.7)
EOSINOPHIL NFR BLD: 3.7 % (ref 1.5–5)
EOSINOPHIL NFR BLD: 3.7 % (ref 1.5–5)
ERYTHROCYTE [DISTWIDTH] IN BLOOD BY AUTOMATED COUNT: 16 % (ref 11.5–14.5)
ERYTHROCYTE [DISTWIDTH] IN BLOOD BY AUTOMATED COUNT: 16.1 % (ref 11.5–14.5)
GFR NON-AFRICAN AMERICAN: > 60
GRANULOCYTES # BLD: 8.4 10*3/UL (ref 1.4–6.5)
GRANULOCYTES # BLD: 9.93 10*3/UL (ref 1.4–6.5)
GRANULOCYTES NFR BLD: 69.8 % (ref 50–68)
GRANULOCYTES NFR BLD: 72.5 % (ref 50–68)
HGB BLD-MCNC: 9 G/DL (ref 12–16)
HGB BLD-MCNC: 9.1 G/DL (ref 12–16)
INR PPP: 1.57 (ref 0.93–1.08)
LYMPHOCYTES # BLD: 1.2 10*3/UL (ref 1.2–3.4)
LYMPHOCYTES # BLD: 1.2 10*3/UL (ref 1.2–3.4)
LYMPHOCYTES NFR BLD AUTO: 10.1 % (ref 22–35)
LYMPHOCYTES NFR BLD AUTO: 8.6 % (ref 22–35)
MCH RBC QN AUTO: 30.1 PG (ref 25–35)
MCH RBC QN AUTO: 30.2 PG (ref 25–35)
MCHC RBC AUTO-ENTMCNC: 33.1 G/DL (ref 31–37)
MCHC RBC AUTO-ENTMCNC: 33.3 G/DL (ref 31–37)
MCV RBC AUTO: 90.4 FL (ref 80–105)
MCV RBC AUTO: 91.3 FL (ref 80–105)
MONOCYTES # BLD AUTO: 2 10*3/UL (ref 0.1–0.6)
MONOCYTES # BLD AUTO: 2.1 10*3/UL (ref 0.1–0.6)
MONOCYTES NFR BLD: 15.1 % (ref 1–6)
MONOCYTES NFR BLD: 16.3 % (ref 1–6)
PLATELET # BLD: 374 10^3/UL (ref 120–450)
PLATELET # BLD: 394 10^3/UL (ref 120–450)
PMV BLD AUTO: 10 FL (ref 7–11)
PMV BLD AUTO: 9.6 FL (ref 7–11)
PROTHROMBIN TIME: 18.2 SECONDS (ref 9.4–12.5)
RBC # BLD AUTO: 2.98 10^6/UL (ref 3.5–6.1)
RBC # BLD AUTO: 3.02 10^6/UL (ref 3.5–6.1)
WBC # BLD AUTO: 12 10^3/UL (ref 4.5–11)
WBC # BLD AUTO: 13.7 10^3/UL (ref 4.5–11)

## 2018-06-05 PROCEDURE — 0DJ08ZZ INSPECTION OF UPPER INTESTINAL TRACT, VIA NATURAL OR ARTIFICIAL OPENING ENDOSCOPIC: ICD-10-PCS | Performed by: INTERNAL MEDICINE

## 2018-06-05 RX ADMIN — BUDESONIDE SCH: 0.25 SUSPENSION RESPIRATORY (INHALATION) at 20:40

## 2018-06-05 RX ADMIN — PANTOPRAZOLE SODIUM SCH MG: 40 TABLET, DELAYED RELEASE ORAL at 16:25

## 2018-06-05 RX ADMIN — OXYCODONE HYDROCHLORIDE AND ACETAMINOPHEN PRN TAB: 5; 325 TABLET ORAL at 22:23

## 2018-06-05 RX ADMIN — PANTOPRAZOLE SODIUM SCH MLS/HR: 40 INJECTION, POWDER, FOR SOLUTION INTRAVENOUS at 06:55

## 2018-06-05 RX ADMIN — ARFORMOTEROL TARTRATE SCH MCG: 15 SOLUTION RESPIRATORY (INHALATION) at 07:36

## 2018-06-05 RX ADMIN — ARFORMOTEROL TARTRATE SCH: 15 SOLUTION RESPIRATORY (INHALATION) at 20:40

## 2018-06-05 RX ADMIN — DILTIAZEM HYDROCHLORIDE SCH MG: 120 CAPSULE, COATED, EXTENDED RELEASE ORAL at 09:43

## 2018-06-05 RX ADMIN — MEROPENEM SCH MLS/HR: 1 INJECTION INTRAVENOUS at 22:13

## 2018-06-05 RX ADMIN — OXYCODONE HYDROCHLORIDE AND ACETAMINOPHEN PRN TAB: 5; 325 TABLET ORAL at 16:26

## 2018-06-05 RX ADMIN — BUDESONIDE SCH MG: 0.25 SUSPENSION RESPIRATORY (INHALATION) at 07:36

## 2018-06-05 RX ADMIN — MEROPENEM SCH MLS/HR: 1 INJECTION INTRAVENOUS at 07:33

## 2018-06-05 NOTE — HP
REASON FOR ADMISSION:  Rectal bleeding.



HISTORY OF PRESENT ILLNESS:  Patient was seen in TCU, was admitted to ICU

and she had rectal bleeding while she was on Coumadin.  Patient was

transferred to the emergency room for evaluation and from there, she was

admitted to ICU medical floor for treatment.  Patient does have a large

peptic ulcer disease.  She had been on heparin, then switched to Coumadin. 

INR was 3.  Patient is not on heparin; however, patient seems stable, was

preparing to be discharged.  She had a large bowel movement with blood and

was admitted to ICU for observation and GI evaluation.  She has no other

complaint and she was, otherwise, hemodynamically stable.  No nausea, no

vomiting and was afebrile.



PAST MEDICAL HISTORY:  As I mentioned here, large peptic ulcer disease,

history of noncompliance with medicines, _____ mitral valve replacement

with biological valve, which was done originally a year ago, paroxysmal

atrial fibrillation, gouty arthritis, chronic back pain, hypertension.



ALLERGIES:  CINNAMON, OTHERWISE NO KNOWN DRUG ALLERGY.



FAMILY HISTORY:  Noncontributory.



MEDICATIONS:  She does take famotidine 40 mg.  She takes Protonix 40 p.o.

daily, Cartia 120 once a day, vitamin D 50,000 _____, Symbicort one

inhalation twice a day.  She was on Coumadin while being discharged, on 3

mg; then, it was stopped.  Zocor 20 mg p.o. at bedtime, _____ she was given

metoprolol 25 mg b.i.d.,Singulair 10 mg once a day, magnesium oxide 400 mg

p.o. daily, Claritin 10 mg once a day and DuoNeb and Xopenex four times a

day.



REVIEW OF SYSTEMS:  As in the present illness, she does have rectal bloody

bowel movement, black tarry stool.  She also had gouty arthritis, which has

been resolved.  Otherwise, she does have wheezing, but it is better,

resolved, otherwise not significant.



PHYSICAL EXAMINATION:

VITAL SIGNS:  As follows:  Temperature _____ 138/82, respirations 24,

saturation 100%.

HEAD AND NECK:  Normal.  No JVD.  No thyromegaly.

CHEST:  Clear.  Good air entry.

CARDIAC:  First sound and second sound normal.

ABDOMEN:  Soft, nontender.

EXTREMITIES:  No edema.

NEUROLOGICAL:  Normal.



DATA:  Laboratory data shows white count 15.1, hemoglobin 7, hematocrit

20.7, platelets 432.  She has chemistry, which shows sodium 139, potassium

4.1, chloride 107, bicarbonate 26, BUN 32, creatinine 0.9, blood sugar 151.

Liver enzyme was normal.  Her PT/INR; last PT was on 06/03, PT 13.9 and INR

2.72.  Troponin was negative.



IMPRESSION AND PLAN:

1.  Acute upper gastrointestinal bleed, recurrent bleeding peptic ulcer,

although initially it was not bleeding on initial endoscopy and there is 5%

chance of reactivation of bleeding and that would be monitored again. 

Patient may go for endoscopy again.  We will continue to monitor hemoglobin

and hematocrit; two units of fresh frozen plasma, two units of packed RBCs.

Repeat monitor of hemoglobin and hematocrit and we will keep the patient in

the ICU.  Continue current therapy, Protonix IV infusion.

2.  Chronic paroxysmal atrial fibrillation.  Patient seems stable, right

now in sinus rhythm.  We will continue Cartia 120 plus metoprolol 25 b.i.d.

3.  Chronic obstructive pulmonary disease, asthma.  Continue nebulizer

treatment.  Continue current management.  Follow up clinically.







__________________________________________

Philip Dinh MD



DD:  06/04/2018 22:35:56

DT:  06/05/2018 4:36:22

Job # [id number]

## 2018-06-05 NOTE — CP.PCM.CON
History of Present Illness





- History of Present Illness


History of Present Illness: 


62 year old female with PMH of atrial fibrillation, asthma, HTN, severe mitral 

regurgitation S/P mitral valve replacement, esophageal ulcers, carpal tunnel 

syndrome, COPD, dyslipidemia, S/P cholecystectomy, S/P foot surgery initially 

came in to [facility] because of GI bleeding. She did better and then transferred to 

[facility] but she then developed GI bleeding again and is now transferred back to 

the acute care portion of the hospital. She had also had LBM which initially 

was though to be C. diff. but is now off treatment for this. She also had 

asymptomatic bacteriuria but on this transfer she continues to have bacteria in 

the urine and has slight suprapubic pain. She denies fever or chills, no nausea 

or vomiting, no chest pain, no SOB, no cough or rhinorrhea, no abdominal pain, 

no flank pain, no headache or dizziness. Infectious diseases consult is 

requested to further evaluate and manage.





Review of Systems





- Review of Systems


All systems: reviewed and no additional remarkable complaints except (as per HPI

)





Past Patient History





- Infectious Disease


Hx of Infectious Diseases: None





- Tetanus Immunizations


Tetanus Immunization: Unknown





- Past Social History


Smoking Status: Never Smoked





- CARDIAC


Hx Cardiac Disorders: Yes (Mitral Valve Repair.)


Hx Angina: Yes


Hx Cardia Arrhythmia: Yes (Atrial Fibrillation.)


Hx Congestive Heart Failure: Yes


Hx Heart Murmur: Yes


Hx Hypercholesterolemia: Yes


Hx Hypertension: Yes


Hx Mitral Valve Prolapse: Yes


Hx Peripheral Edema: Yes


Hx Peripheral Vascular Disease: Yes





- PULMONARY


Hx Respiratory Disorders: Yes


Hx Asthma: Yes


Hx Chronic Obstructive Pulmonary Disease (COPD): Yes


Hx Pneumonia: Yes





- NEUROLOGICAL


Hx Neurological Disorder: Yes


Hx Dizziness: Yes


Hx Migraine: Yes





- HEENT


Hx HEENT Problems: No





- RENAL


Hx Chronic Kidney Disease: No





- ENDOCRINE/METABOLIC


Hx Endocrine Disorders: No





- HEMATOLOGICAL/ONCOLOGICAL


Hx Blood Disorders: Yes (H/o blood transfusions.)


Hx Anemia: Yes





- INTEGUMENTARY


Hx Dermatological Problems: No





- MUSCULOSKELETAL/RHEUMATOLOGICAL


Hx Falls: Yes





- GASTROINTESTINAL


Hx Gastrointestinal Disorders: Yes (H/o Hematemesis. GI Bleeding. Melena)


Hx Gall Bladder Disease: Yes (Cholelithiasis. Cholecystectomy.)





- GENITOURINARY/GYNECOLOGICAL


Hx Genitourinary Disorders: Yes


Hx Urinary Tract Infection: Yes





- PSYCHIATRIC


Hx Substance Use: No (Denied by pt.)





- SURGICAL HISTORY


Hx Surgeries: Yes


Hx Cardiac Catheterization: Yes


Hx Cholecystectomy: Yes


Hx Open Heart Surgery: Yes (Mitral Valve Repair.)


Hx Orthopedic Surgery: Yes (B/L Bunion Sx; Left Carpal Tunnel Sx.)





- ANESTHESIA


Hx Anesthesia: No


Hx Anesthesia Reactions: No


Hx Malignant Hyperthermia: No





Meds


Allergies/Adverse Reactions: 


 Allergies











Allergy/AdvReac Type Severity Reaction Status Date / Time


 


cinnamon Allergy Intermediate ANAPHYLAXIS Verified 06/02/18 21:48














- Medications


Medications: 


 Current Medications





Albuterol/Ipratropium (Duoneb 3 Mg/0.5 Mg (3 Ml) Ud)  3 ml IH [id number] PRN


   PRN Reason: Shortness of Breath


Arformoterol Tartrate (Brovana)  15 mcg IH [id number] [facility]


   Last Admin: 06/04/18 20:22 Dose:  15 mcg


Budesonide (Pulmicort Respules)  0.25 mg IH [id number] [facility]


   Last Admin: 06/04/18 20:22 Dose:  0.25 mg


Diltiazem HCl (Cardizem)  5 mg IVP Q6 PRN


   PRN Reason: Heart rate


Diltiazem HCl (Cardizem Cd)  120 mg PO DAILY [facility]


Pantoprazole Sodium (Protonix 40mg Ivpb)  40 mg in 100 mls @ 20 mls/hr IVPB 

.Q5H [facility]


   Last Admin: 06/04/18 18:13 Dose:  20 mls/hr


Levalbuterol HCl (Xopenex)  1.25 mg IH [id number] PRN


   PRN Reason: Shortness of Breath


Metoprolol Tartrate (Lopressor)  25 mg PO BID [facility]


   Last Admin: 06/04/18 17:04 Dose:  25 mg











Physical Exam





- Constitutional


Appears: Non-toxic, Chronically Ill





- Head Exam


Head Exam: NORMAL INSPECTION





- ENT Exam


ENT Exam: Mucous Membranes Moist





- Neck Exam


Neck exam: Negative for: Lymphadenopathy, Meningismus





- Respiratory Exam


Respiratory Exam: Decreased Breath Sounds





- Cardiovascular Exam


Cardiovascular Exam: +S1, +S2





- GI/Abdominal Exam


GI & Abdominal Exam: Soft.  absent: Tenderness





Results





- Vital Signs


Recent Vital Signs: 


 Last Vital Signs











Temp  97.5 F L  06/04/18 05:06


 


Pulse  80   06/04/18 20:23


 


Resp  16   06/04/18 20:11


 


BP  166/93 H  06/04/18 20:00


 


Pulse Ox  94 L  06/04/18 20:00














- Labs


Result Diagrams: 


 06/05/18 08:45





 06/05/18 07:00


Labs: 


 Laboratory Results - last 24 hr











  06/04/18 06/04/18 06/04/18





  05:40 06:00 08:26


 


WBC  10.4  


 


RBC  2.93 L  


 


Hgb  8.8 L  


 


Hct  26.2 L  


 


MCV  89.4  


 


MCH  30.0  


 


MCHC  33.6  


 


RDW  15.8 H  


 


Plt Count  386  


 


MPV  10.0  


 


Gran %  66.8  


 


Lymph % (Auto)  14.1 L  


 


Mono % (Auto)  15.2 H  


 


Eos % (Auto)  3.8  


 


Baso % (Auto)  0.1  


 


Gran #  6.95 H  


 


Lymph # (Auto)  1.5  


 


Mono # (Auto)  1.6 H  


 


Eos # (Auto)  0.4  


 


Baso # (Auto)  0.01  


 


Sodium   141 


 


Potassium   3.6 


 


Chloride   107 


 


Carbon Dioxide   28 


 


Anion Gap   10 


 


BUN   13 


 


Creatinine   0.8 


 


Est GFR ( Amer)   > 60 


 


Est GFR (Non-Af Amer)   > 60 


 


Random Glucose   106 


 


Calcium   8.5 


 


Total Bilirubin   0.2 


 


AST   25 


 


ALT   26 


 


Alkaline Phosphatase   64 


 


Total Protein   5.7 L 


 


Albumin   3.1 


 


Globulin   2.7 


 


Albumin/Globulin Ratio   1.1 


 


Urine Color    Yellow


 


Urine Appearance    Clear


 


Urine pH    6.0


 


Ur Specific Gravity    1.015


 


Urine Protein    Negative


 


Urine Glucose (UA)    Negative


 


Urine Ketones    Negative


 


Urine Blood    Large H


 


Urine Nitrate    Negative


 


Urine Bilirubin    Negative


 


Urine Urobilinogen    0.2


 


Ur Leukocyte Esterase    Trace H


 


Urine RBC    1 - 3


 


Urine WBC    0 - 2


 


Ur Epithelial Cells    4 - 5


 


Urine Bacteria    Trace














  06/04/18





  17:00


 


WBC  13.5 H D


 


RBC  3.07 L


 


Hgb  9.3 L


 


Hct  27.9 L


 


MCV  90.9


 


MCH  30.3


 


MCHC  33.3


 


RDW  16.0 H


 


Plt Count  395


 


MPV  9.7


 


Gran %  73.1 H


 


Lymph % (Auto)  17.7 L


 


Mono % (Auto)  5.2


 


Eos % (Auto)  3.9


 


Baso % (Auto)  0.1


 


Gran #  9.88 H


 


Lymph # (Auto)  2.4


 


Mono # (Auto)  0.7 H


 


Eos # (Auto)  0.5


 


Baso # (Auto)  0.01


 


Sodium 


 


Potassium 


 


Chloride 


 


Carbon Dioxide 


 


Anion Gap 


 


BUN 


 


Creatinine 


 


Est GFR ( Amer) 


 


Est GFR (Non-Af Amer) 


 


Random Glucose 


 


Calcium 


 


Total Bilirubin 


 


AST 


 


ALT 


 


Alkaline Phosphatase 


 


Total Protein 


 


Albumin 


 


Globulin 


 


Albumin/Globulin Ratio 


 


Urine Color 


 


Urine Appearance 


 


Urine pH 


 


Ur Specific Gravity 


 


Urine Protein 


 


Urine Glucose (UA) 


 


Urine Ketones 


 


Urine Blood 


 


Urine Nitrate 


 


Urine Bilirubin 


 


Urine Urobilinogen 


 


Ur Leukocyte Esterase 


 


Urine RBC 


 


Urine WBC 


 


Ur Epithelial Cells 


 


Urine Bacteria 














Assessment & Plan





- Assessment and Plan (Free Text)


Plan: 





Assessment


ESBL Klebsiella complicated UTI with Tee catheter


GI bleeding


chronic CHF


severe mitral regurgitation S/P mitral valve replacement


atrial fibrillation


asthma


HTN


esophageal ulcers


carpal tunnel syndrome


COPD


dyslipidemia


atrial fibrillation


S/P cholecystectomy


S/P foot surgery





Plan


started Merrem and would recommend change of Tee catheter - should aim for 5-

7 days of antibiotics


follow up further recommendations of GI regarding GI bleeding


will monitor clinically

## 2018-06-05 NOTE — PN
DATE:  06/05/2018



REASON FOR CONSULTATION AND FOLLOWUP:  Cardiac evaluation; recurrent GI

bleed; paroxysmal atrial fibrillation, on anticoagulation; history of

mitral valve replacement, bioprosthetic; nonobstructive coronary artery

disease.



SUBJECTIVE:  The patient denies any chest pain, shortness of breath or any

palpitations, coming back from Endoscopy.



OBJECTIVE:

GENERAL:  Not in apparent distress.

VITAL SIGNS:  Temperature afebrile, heart rate 111, blood pressure 134/62.

HEENT:  PERRLA.  Extraocular muscles intact.

NECK:  Supple.  No carotid bruit or thyromegaly.

CHEST:  Clear to auscultation.

HEART:  S1, S2 regular.

ABDOMEN:  Soft.

EXTREMITIES:  Clubbing and cyanosis negative.



LABORATORY DATA:  Blood workup as follows:  WBC 13, hemoglobin 9,

hematocrit 27.2, platelet count 394.  Chemistry shows sodium 140, potassium

3.5, chloride 104, carbon dioxide 20, anion gap of 11, BUN 7, creatinine

0.8.



IMPRESSION:  Recurrent gastrointestinal bleed.  The patient was in

Transitional Care Unit, on anticoagulation; had gastrointestinal bleed,

hemoglobin dropped to 7, on anticoagulation.  INR went to 3.96, vitamin K

was given yesterday, now INR is 1.5.  The patient underwent re-endoscopy,

no active bleeding, but a large antral ulcer noted.  History of mitral

valve replacement, bioprosthetic, nonobstructive coronary artery disease. 

Cardiac catheterization in 2017 before the open heart surgery, history of

paroxysmal atrial fibrillation.



RECOMMENDATIONS:  We will control the heart rate.  Continue Cardizem,

continue beta blocker.  We will give IV p.r.n. if the patient remains

n.p.o. and for now, we will not give any anticoagulation; if the patient

recurs into AFib, consider electrophysiological study ablation or WATCHMAN

placement to prevent a stroke.  Discussed with the patient, we will discuss

with Dr. Dinh.  The patient bled multiple times and _____ on

anticoagulation.  So, as mentioned, we will not start re-anticoagulation. 

We will give 5 mg every 6 hours p.r.n. for heart rate more than



Thank you, Dr. Dinh, for providing us the opportunity in taking care of

Malu Meeks.





__________________________________________

Ajay Gaxiola MD



DD:  06/05/2018 10:41:59

DT:  06/05/2018 12:30:56

[facility] # [id number]

## 2018-06-05 NOTE — PN
DATE:  06/04/2018



SUBJECTIVE:  Patient is seen today.  Patient is stable.  No chest pain.  No

short of breath.  She is supposedly going for an endoscopy by Dr. Frost,

this will be discussed.  Patient otherwise stable.



PHYSICAL EXAMINATION:

GENERAL:  Her temperature 98, heart rate 95, blood pressure 166/93,

respirations 16, saturating 94% on room air.

HEENT:  Head and neck exam normal.

NECK:  No JVD.  No thyromegaly.

CHEST:  Clear bilaterally.

CARDIAC:  First sounds and second sounds normal.

ABDOMEN:  Soft, nontender.

EXTREMITIES:  No edema.  SCDs on.

NEUROLOGIC:  Normal.



LABORATORY DATA:  White count 10.4, hemoglobin 8.8, hematocrit 26.2,

platelets 386.  Chemistry:  Sodium 141, potassium 3.6, chloride 107, bicarb

28, BUN 15, creatinine 0.8.



IMPRESSION:

1.  Acute upper GI bleeding, recurrent peptic ulcer bleeding, status post

transfusion 2 units of fresh frozen plasma and 3 units of packed red blood

cells.  Hemoglobin is better.  Repeat hemoglobin is up and earlier

hemoglobin 8.8 and repeat at 6 p.m. was 9.3.  We will continue monitor

hemoglobin and hematocrit.  Patient may go for endoscopy later today or in

the morning by Dr. Frost.

2.  Paroxysmal atrial fibrillation, continue Cartia  and metoprolol

25 b.i.d.

3.  Hypertension.  Continue current medication, metoprolol and Cartia XT

and monitor her condition and blood pressure in the unit.

4.  Urinary tract infections, we will give meropenem, ID consult with Dr. Macdonald.  Continue current therapy.  Repeat lab in the morning, CBC and

PT/INR.







__________________________________________

Philip Dinh MD



DD:  06/04/2018 22:39:23

DT:  06/05/2018 0:51:49

[facility] # [id number]

## 2018-06-05 NOTE — CON
DATE:  06/04/2018



REFERRING PHYSICIAN:  Isa Stoddard MD



REASON FOR CONSULTATION:  Chronic obstructive lung disease, may have sleep

apnea syndrome, admitted with GI bleed.



HISTORY OF PRESENT ILLNESS:  This is a 62-year-old female known to me from

office with atrial fibrillation on anticoagulation, history of mitral valve

repair, coronary artery disease, chronic obstructive lung disease, came in

to ER 2-3 days ago with rectal bleed.  Hemoglobin was found to be 7.7 and

was coagulopathic with INR of 4.0.  She was symptomatic with

lightheadedness with black tarry stools.  She received FFP, and packed

cells were given, admitted to intensive care unit.  On previous admission

she had a gastric ulcer, recently treated for ESBL pneumonia.  She was

supposed to get attended sleep study, PFT as outpatient, but ended up in

hospital with GI bleed.  Presently she feels okay.  Mild cough.  No

shortness breath, no chest pain, no dysuria.  Past medical history as per

history of present illness.  Also has paroxysmal atrial fibrillation, been

on anticoagulation.



FAMILY HISTORY:  Positive for coronary artery disease.



SOCIAL HISTORY:  Nonsmoker, nondrinker.



ALLERGIES:  TO CINNAMON.



MEDICATIONS:  She is on Brovana inhaled twice a day, Cardizem 5 mg q.6h.

p.r.n., diltiazem 120 mg daily, DuoNeb q.6h. p.r.n., metoprolol tartrate 25

mg twice a day, meropenem 500 mg q.12h., Protonix 40 mg daily, Pulmicort

inhaled twice a day, Xopenex inhale q. 6 hours p.r.n.



REVIEW OF SYSTEMS:  No headache, no rhinitis.  No cough.  Gets short of

breath with minimal exertion.  No chest pain.  No nausea, no vomiting, no

diarrhea.  No more melena.  No significant leg swelling.



PHYSICAL EXAMINATION:

GENERAL:  Lying in the bed, in no acute distress.

VITAL SIGNS:  Temperature is 98, heart rate is 95, respiratory rate is 20,

blood pressure 138/56, pulse ox 91% room air.

HEENT:  Moist mucous membrane.  Crowded airway.  Mallampati score is 4.

NECK:  Supple.  No JVD.

LUNGS:  Has fair airflow with rhonchi.

HEART:  S1, S2.

ABDOMEN:  Soft, nontender, no organomegaly.

EXTREMITIES:  No edema.

NEUROLOGIC:  Awake, alert; follows simple commands.



LABORATORY DATA:  Shows hemoglobin 9.3, hematocrit 27.9, WBC 13.5, platelet

count is 395.  INR 2.72 from yesterday.  Sodium 141, potassium 3.6,

chloride 107, bicarbonate 28, BUN 13, creatinine 0.8, glucose 106, calcium

8.5, AST 25, ALT 26, alk phos is 64.  Albumin 3.1.  Urinalysis shows wbc

0-2, rbc 1-3.  Microbiology:  Nasal MRSA is nondetected.



IMPRESSION AND PLAN:  Gastrointestinal bleed with anemia requiring

transfusion, FFP and reversal of coagulopathy; history of upper

gastrointestinal bleed, atrial fibrillation, coronary artery disease,

chronic obstructive lung disease, suspected sleep apnea syndrome.  We will

continue bronchodilator.  Keep head at 45 degrees.  Sleep apnea precaution,

especially if sedated.  Gastroenterology followup.  Follow up labs the

morning.



Thank you and we will follow with you.





__________________________________________

Ajay Wisdom MD



DD:  06/04/2018 20:32:32

DT:  06/04/2018 20:37:29

[facility] # [id number]

## 2018-06-05 NOTE — CP.PCM.CON
History of Present Illness





- History of Present Illness


History of Present Illness: 





Heme-Onc Consult note for Dr. Alatorre


Reason for consult: coagulopathy and bleeding





63yo female PMHx A-fib on coumadin, mitral valve repair (severe mitral 

regurgitation s/p open heart surgery and repair with MVR biprosthetic), CAD, 

COPD was transferred from TCU to MICU for rectal bleed on 6/2. Patient report 

dark tarry stool with assoicated lightheadedness. She was found to have 

supratherapeutic INR 4.14 and Hgb 7.7. Patient was given pRBC, FFP, protonix 

and transferred to the MICU. During hospital stay patient clinically improved. 

Patient seen and examined at bedside this AM. Nursing reported no acute events 

overnight. Patient transferred from MICU to TELE this AM. SHe denied acute 

complaints of fever, chills, headache, dizziness, chest pain, palpitations, SOB

, cough, abd pain, nausea, vomiting, bowel/bladder complaints, melena, 

hematochezia, hematuria,  pain/swelling in her legs bilaterally. Patient was 

concerned her IV was infiltrated because her RUE was sore. 





PMHx: A-fib on coumadin, mitral valve repair (severe mitral regurgitation s/p 

open heart surgery and repair with MVR biprosthetic), CAD, COPD


PSurgHx: open heart surgery, carpal tunnel, bunion, cholecystectomy.


FamHx: heart disease


SocHx: denies EtOH, tobacco, drug use.


ALL: cinnamon


Meds: pls see EMR 


PMD: Fabrizio


GI: Santosh





Review of Systems





- Review of Systems


All systems: reviewed and no additional remarkable complaints except


Review of Systems: 





as per HPI





Past Patient History





- Infectious Disease


Hx of Infectious Diseases: None





- Tetanus Immunizations


Tetanus Immunization: Unknown





- Past Social History


Smoking Status: Never Smoked





- CARDIAC


Hx Cardiac Disorders: Yes (Mitral Valve Repair.)


Hx Angina: Yes


Hx Cardia Arrhythmia: Yes (Atrial Fibrillation.)


Hx Congestive Heart Failure: Yes


Hx Heart Murmur: Yes


Hx Hypercholesterolemia: Yes


Hx Hypertension: Yes


Hx Mitral Valve Prolapse: Yes


Hx Peripheral Edema: Yes


Hx Peripheral Vascular Disease: Yes





- PULMONARY


Hx Respiratory Disorders: Yes


Hx Asthma: Yes


Hx Chronic Obstructive Pulmonary Disease (COPD): Yes


Hx Pneumonia: Yes





- NEUROLOGICAL


Hx Neurological Disorder: Yes


Hx Dizziness: Yes


Hx Migraine: Yes





- HEENT


Hx HEENT Problems: No





- RENAL


Hx Chronic Kidney Disease: No





- ENDOCRINE/METABOLIC


Hx Endocrine Disorders: No





- HEMATOLOGICAL/ONCOLOGICAL


Hx Blood Disorders: Yes (H/o blood transfusions.)


Hx Anemia: Yes





- INTEGUMENTARY


Hx Dermatological Problems: No





- MUSCULOSKELETAL/RHEUMATOLOGICAL


Hx Falls: Yes





- GASTROINTESTINAL


Hx Gastrointestinal Disorders: Yes (H/o Hematemesis. GI Bleeding. Melena)


Hx Gall Bladder Disease: Yes (Cholelithiasis. Cholecystectomy.)





- GENITOURINARY/GYNECOLOGICAL


Hx Genitourinary Disorders: Yes


Hx Urinary Tract Infection: Yes





- PSYCHIATRIC


Hx Substance Use: No (Denied by pt.)





- SURGICAL HISTORY


Hx Surgeries: Yes


Hx Cardiac Catheterization: Yes


Hx Cholecystectomy: Yes


Hx Open Heart Surgery: Yes (Mitral Valve Repair.)


Hx Orthopedic Surgery: Yes (B/L Bunion Sx; Left Carpal Tunnel Sx.)





- ANESTHESIA


Hx Anesthesia: No


Hx Anesthesia Reactions: No


Hx Malignant Hyperthermia: No





Meds


Allergies/Adverse Reactions: 


 Allergies











Allergy/AdvReac Type Severity Reaction Status Date / Time


 


cinnamon Allergy Intermediate ANAPHYLAXIS Verified 06/02/18 21:48














- Medications


Medications: 


 Current Medications





Albuterol/Ipratropium (Duoneb 3 Mg/0.5 Mg (3 Ml) Ud)  3 ml IH [id number] PRN


   PRN Reason: Shortness of Breath


Arformoterol Tartrate (Brovana)  15 mcg IH [id number] [facility]


   Last Admin: 06/05/18 07:36 Dose:  15 mcg


Budesonide (Pulmicort Respules)  0.25 mg IH [id number] [facility]


   Last Admin: 06/05/18 07:36 Dose:  0.25 mg


Diltiazem HCl (Cardizem)  5 mg IVP Q6 PRN


   PRN Reason: Heart rate


Diltiazem HCl (Cardizem Cd)  120 mg PO DAILY [facility]


Pantoprazole Sodium (Protonix 40mg Ivpb)  40 mg in 100 mls @ 20 mls/hr IVPB 

.Q5H [facility]


   Last Admin: 06/05/18 06:55 Dose:  20 mls/hr


Meropenem (Merrem Iv 1 Gm Premix)  50 mls @ 100 mls/hr IVPB Q8 [facility]


   PRN Reason: Protocol


   Last Admin: 06/05/18 07:33 Dose:  100 mls/hr


Levalbuterol HCl (Xopenex)  1.25 mg IH [id number] PRN


   PRN Reason: Shortness of Breath


Metoprolol Tartrate (Lopressor)  25 mg PO BID [facility]


   Last Admin: 06/04/18 17:04 Dose:  25 mg











Physical Exam





- Constitutional


Appears: Non-toxic, No Acute Distress





- Head Exam


Head Exam: ATRAUMATIC, NORMAL INSPECTION, NORMOCEPHALIC





- Eye Exam


Eye Exam: EOMI, Normal appearance, PERRL.  absent: Conjunctival injection, 

Scleral icterus





- ENT Exam


ENT Exam: Mucous Membranes Moist





- Neck Exam


Neck exam: Positive for: Normal Inspection





- Respiratory Exam


Respiratory Exam: Clear to Auscultation Bilateral, NORMAL BREATHING PATTERN.  

absent: Accessory Muscle Use, Rales, Rhonchi, Wheezes, Respiratory Distress





- Cardiovascular Exam


Cardiovascular Exam: +S1, +S2





- GI/Abdominal Exam


GI & Abdominal Exam: Normal Bowel Sounds, Soft.  absent: Tenderness





- Rectal Exam


Rectal Exam: Deferred





- Extremities Exam


Extremities exam: Positive for: normal inspection, pedal pulses present.  

Negative for: pedal edema





- Neurological Exam


Neurological exam: Alert, CN II-XII Intact, Oriented x3





- Psychiatric Exam


Psychiatric exam: Normal Affect, Normal Mood





- Skin


Skin Exam: Dry, Intact, Normal Color, Warm





Results





- Vital Signs


Recent Vital Signs: 


 Last Vital Signs











Temp  99.9 F H  06/04/18 22:00


 


Pulse  108 H  06/05/18 07:00


 


Resp  18   06/05/18 07:00


 


BP  140/61   06/04/18 23:00


 


Pulse Ox  91 L  06/04/18 22:00














- Labs


Result Diagrams: 


 06/05/18 08:45





 06/05/18 07:00


Labs: 


 Laboratory Results - last 24 hr











  06/04/18 06/04/18 06/04/18





  06:00 08:26 17:00


 


WBC    13.5 H D


 


RBC    3.07 L


 


Hgb    9.3 L


 


Hct    27.9 L


 


MCV    90.9


 


MCH    30.3


 


MCHC    33.3


 


RDW    16.0 H


 


Plt Count    395


 


MPV    9.7


 


Gran %    73.1 H


 


Lymph % (Auto)    17.7 L


 


Mono % (Auto)    5.2


 


Eos % (Auto)    3.9


 


Baso % (Auto)    0.1


 


Gran #    9.88 H


 


Lymph # (Auto)    2.4


 


Mono # (Auto)    0.7 H


 


Eos # (Auto)    0.5


 


Baso # (Auto)    0.01


 


PT   


 


INR   


 


Sodium  141  


 


Potassium  3.6  


 


Chloride  107  


 


Carbon Dioxide  28  


 


Anion Gap  10  


 


BUN  13  


 


Creatinine  0.8  


 


Est GFR ( Amer)  > 60  


 


Est GFR (Non-Af Amer)  > 60  


 


Random Glucose  106  


 


Calcium  8.5  


 


Total Bilirubin  0.2  


 


AST  25  


 


ALT  26  


 


Alkaline Phosphatase  64  


 


Total Protein  5.7 L  


 


Albumin  3.1  


 


Globulin  2.7  


 


Albumin/Globulin Ratio  1.1  


 


Urine Color   Yellow 


 


Urine Appearance   Clear 


 


Urine pH   6.0 


 


Ur Specific Gravity   1.015 


 


Urine Protein   Negative 


 


Urine Glucose (UA)   Negative 


 


Urine Ketones   Negative 


 


Urine Blood   Large H 


 


Urine Nitrate   Negative 


 


Urine Bilirubin   Negative 


 


Urine Urobilinogen   0.2 


 


Ur Leukocyte Esterase   Trace H 


 


Urine RBC   1 - 3 


 


Urine WBC   0 - 2 


 


Ur Epithelial Cells   4 - 5 


 


Urine Bacteria   Trace 














  06/05/18 06/05/18 06/05/18





  00:25 07:00 07:00


 


WBC  12.0 H  


 


RBC  3.02 L  


 


Hgb  9.1 L  


 


Hct  27.3 L  


 


MCV  90.4  


 


MCH  30.1  


 


MCHC  33.3  


 


RDW  16.0 H  


 


Plt Count  374  


 


MPV  9.6  


 


Gran %  69.8 H  


 


Lymph % (Auto)  10.1 L  


 


Mono % (Auto)  16.3 H  


 


Eos % (Auto)  3.7  


 


Baso % (Auto)  0.1  


 


Gran #  8.40 H  


 


Lymph # (Auto)  1.2  


 


Mono # (Auto)  2.0 H  


 


Eos # (Auto)  0.4  


 


Baso # (Auto)  0.01  


 


PT    18.2 H


 


INR    1.57 H


 


Sodium   140 


 


Potassium   3.5 L 


 


Chloride   104 


 


Carbon Dioxide   28 


 


Anion Gap   11 


 


BUN   7 


 


Creatinine   0.8 


 


Est GFR ( Amer)   > 60 


 


Est GFR (Non-Af Amer)   > 60 


 


Random Glucose   104 


 


Calcium   8.7 


 


Total Bilirubin   0.3 


 


AST   19 


 


ALT   28 


 


Alkaline Phosphatase   71 


 


Total Protein   5.9 


 


Albumin   3.1 


 


Globulin   2.7 


 


Albumin/Globulin Ratio   1.2 


 


Urine Color   


 


Urine Appearance   


 


Urine pH   


 


Ur Specific Gravity   


 


Urine Protein   


 


Urine Glucose (UA)   


 


Urine Ketones   


 


Urine Blood   


 


Urine Nitrate   


 


Urine Bilirubin   


 


Urine Urobilinogen   


 


Ur Leukocyte Esterase   


 


Urine RBC   


 


Urine WBC   


 


Ur Epithelial Cells   


 


Urine Bacteria   














Assessment & Plan





- Assessment and Plan (Free Text)


Assessment: 





63yo female PMHx A-fib on coumadin, mitral valve repair (severe mitral 

regurgitation s/p open heart surgery and repair with MVR biprosthetic), CAD, 

COPD was transferred from TCU to MICU for rectal bleed on 6/2. Patient is s/p 

2U PRBC, 1U FFP. Patient currently transferred to TELE. INR 1.57 this AM and 

Hgb 9 Hct 27.2. Anticoag on hold. Gi Dr. Frost on board- appreciate reccs. 

Cardio Dr. Gaxiola on board- appreciate reccs. Continue management as per primary.





Discussed with Dr. Landry Stacy PGY2

## 2018-06-06 VITALS — TEMPERATURE: 99.6 F

## 2018-06-06 LAB
ALBUMIN SERPL-MCNC: 3.1 G/DL (ref 3–4.8)
ALBUMIN/GLOB SERPL: 1.1 {RATIO} (ref 1.1–1.8)
ALT SERPL-CCNC: 27 U/L (ref 7–56)
AST SERPL-CCNC: 23 U/L (ref 14–36)
BUN SERPL-MCNC: 6 MG/DL (ref 7–21)
CALCIUM SERPL-MCNC: 8.7 MG/DL (ref 8.4–10.5)
GFR NON-AFRICAN AMERICAN: > 60

## 2018-06-06 RX ADMIN — PANTOPRAZOLE SODIUM SCH MG: 40 TABLET, DELAYED RELEASE ORAL at 06:05

## 2018-06-06 RX ADMIN — BUDESONIDE SCH MG: 0.25 SUSPENSION RESPIRATORY (INHALATION) at 07:19

## 2018-06-06 RX ADMIN — MEROPENEM SCH MLS/HR: 1 INJECTION INTRAVENOUS at 21:29

## 2018-06-06 RX ADMIN — MEROPENEM SCH MLS/HR: 1 INJECTION INTRAVENOUS at 13:43

## 2018-06-06 RX ADMIN — ARFORMOTEROL TARTRATE SCH: 15 SOLUTION RESPIRATORY (INHALATION) at 19:45

## 2018-06-06 RX ADMIN — OXYCODONE HYDROCHLORIDE AND ACETAMINOPHEN PRN TAB: 5; 325 TABLET ORAL at 13:20

## 2018-06-06 RX ADMIN — PANTOPRAZOLE SODIUM SCH MG: 40 TABLET, DELAYED RELEASE ORAL at 16:46

## 2018-06-06 RX ADMIN — BUDESONIDE SCH: 0.25 SUSPENSION RESPIRATORY (INHALATION) at 19:45

## 2018-06-06 RX ADMIN — DILTIAZEM HYDROCHLORIDE SCH MG: 120 CAPSULE, COATED, EXTENDED RELEASE ORAL at 09:10

## 2018-06-06 RX ADMIN — ARFORMOTEROL TARTRATE SCH MCG: 15 SOLUTION RESPIRATORY (INHALATION) at 07:18

## 2018-06-06 RX ADMIN — Medication SCH MG: at 17:00

## 2018-06-06 RX ADMIN — Medication SCH MG: at 13:02

## 2018-06-06 RX ADMIN — OXYCODONE HYDROCHLORIDE AND ACETAMINOPHEN PRN TAB: 5; 325 TABLET ORAL at 21:14

## 2018-06-06 RX ADMIN — MEROPENEM SCH MLS/HR: 1 INJECTION INTRAVENOUS at 16:46

## 2018-06-06 RX ADMIN — MEROPENEM SCH MLS/HR: 1 INJECTION INTRAVENOUS at 06:05

## 2018-06-06 NOTE — CP.PCM.PN
<Laurel Marie - Last Filed: 06/06/18 11:12>





Subjective





- Date & Time of Evaluation


Date of Evaluation: 06/06/18


Time of Evaluation: 08:00





- Subjective


Subjective: 


GI Progress Note for NASIR Rowell PGY2





Patient seen and examined at bedside. There were no acute overnight events as 

per nursing staff. Patient reports having pain in her L leg and thinks it is 

because she has not been moving as much. She reports her abdomen feels better 

and denies chest pain, shortness of breath, nausea/vomiting/diarrhea, fever/

chills, numbness or tingling. Patient's IV fell out and is refusing to have one 

be put in because she is afraid of needles. 





Objective





- Vital Signs/Intake and Output


Vital Signs (last 24 hours): 


 











Temp Pulse Resp BP Pulse Ox


 


 99.0 F   103 H  18   134/62   99 


 


 06/05/18 08:09  06/05/18 22:00  06/05/18 18:29  06/05/18 09:44  06/05/18 08:30











- Medications


Medications: 


 Current Medications





Albuterol/Ipratropium (Duoneb 3 Mg/0.5 Mg (3 Ml) Ud)  3 ml IH [id number] PRN


   PRN Reason: Shortness of Breath


Arformoterol Tartrate (Brovana)  15 mcg IH [id number] [facility]


   Last Admin: 06/06/18 07:18 Dose:  15 mcg


Budesonide (Pulmicort Respules)  0.25 mg IH [id number] [facility]


   Last Admin: 06/06/18 07:19 Dose:  0.25 mg


Diltiazem HCl (Cardizem)  5 mg IVP Q6 PRN


   PRN Reason: Heart rate


Diltiazem HCl (Cardizem Cd)  120 mg PO DAILY [facility]


   Last Admin: 06/05/18 09:43 Dose:  120 mg


Meropenem (Merrem Iv 1 Gm Premix)  50 mls @ 100 mls/hr IVPB Q8 [facility]


   PRN Reason: Protocol


   Last Admin: 06/06/18 06:05 Dose:  100 mls/hr


Magnesium 2 gm/50 ml NS (Magnesium Sulfate 2 Gm/50 Ml Ns)  2 gm in 50 mls @ 50 

mls/hr IVPB ONCE ONE


   Stop: 06/06/18 08:46


Levalbuterol HCl (Xopenex)  1.25 mg IH [id number] PRN


   PRN Reason: Shortness of Breath


Metoprolol Tartrate (Lopressor)  25 mg PO BID [facility]


   Last Admin: 06/05/18 09:44 Dose:  25 mg


Metoprolol Tartrate (Lopressor)  5 mg IV Q6H PRN


   PRN Reason: for HR>130, hold for sbp<90


Oxycodone/Acetaminophen (Percocet 5/325 Mg Tab)  1 tab PO Q6H PRN


   PRN Reason: Pain, moderate (4-7)


   Stop: 06/08/18 16:03


   Last Admin: 06/05/18 22:23 Dose:  1 tab


Pantoprazole Sodium (Protonix Ec Tab)  40 mg PO 0600,1600 [facility]


   Last Admin: 06/06/18 06:05 Dose:  40 mg











- Labs


Labs: 


 





 06/05/18 08:45 





 06/06/18 06:00 





 











PT  18.2 SECONDS (9.4-12.5)  H  06/05/18  07:00    


 


INR  1.57  (0.93-1.08)  H  06/05/18  07:00    


 


APTT  34.7 Seconds (25.1-36.5)   06/03/18  02:15    














- Constitutional


Appears: No Acute Distress





- Head Exam


Head Exam: NORMAL INSPECTION





- Eye Exam


Eye Exam: Normal appearance


Pupil Exam: NORMAL ACCOMODATION





- ENT Exam


ENT Exam: Mucous Membranes Moist





- Neck Exam


Neck Exam: Full ROM





- Respiratory Exam


Respiratory Exam: Clear to Ausculation Bilateral, NORMAL BREATHING PATTERN.  

absent: Rales, Rhonchi, Wheezes





- Cardiovascular Exam


Cardiovascular Exam: Tachycardia, Irregular Rhythm, +S1, +S2.  absent: Gallop, 

Rubs, Murmur





- GI/Abdominal Exam


GI & Abdominal Exam: Soft, Normal Bowel Sounds.  absent: Rigid, Tenderness, Mass

, Rebound





- Extremities Exam


Extremities Exam: Calf Tenderness (on L ), Normal Inspection.  absent: Pedal 

Edema





- Neurological Exam


Neurological Exam: Alert, Awake, CN II-XII Intact, Oriented x3





- Psychiatric Exam


Psychiatric exam: Normal Affect, Normal Mood





- Skin


Skin Exam: Dry, Warm





Assessment and Plan





- Assessment and Plan (Free Text)


Assessment: 


This is a 61yo AA female with past medical history of a.fib (was on Coumadin), 

MV repair, CAD who was admitted for 





1. GI bleed secondary 


2. UTI 


3. Diarrhea- resolved 


4. A.fib (anticoagulation on hold) 


5. CAD 


Plan: 


EGD showed 1cm non-bleeding ulcer (Letcher Class III). Patient is on antibiotics 

for UTI. ID is on consult. Repeat Urine culture negative for ESBL. Recommend 

for patient to continue PPI and will advance her diet to soft heart healthy. 

Although on physial exam, DVT seems unlikely, we will get lower extremity 

dopplers to rule out DVT since patient has been off of anticoagulation. We 

recommend to start patient on low dose anticoagulation. Since patient can be at 

risk for blood clots with a history of a.fib and no anticoagulation. As per 

Cardiology, they would like to hold off on anticoagulation for now and discuss 

the plan with them. 





Case seen, discussed and reviewed with Dr. Frost. 


NASIR Marie PGY2





<Jonathan Frost - Last Filed: 06/06/18 23:43>





Objective





- Vital Signs/Intake and Output


Vital Signs (last 24 hours): 


 











Temp Pulse Resp BP Pulse Ox


 


 99.6 F   112 H  20   123/57 L  99 


 


 06/06/18 20:00  06/06/18 20:00  06/06/18 20:00  06/06/18 18:00  06/05/18 08:30











- Medications


Medications: 


 Current Medications





Arformoterol Tartrate (Brovana)  15 mcg IH [id number] [facility]


   Last Admin: 06/06/18 19:45 Dose:  Not Given


Budesonide (Pulmicort Respules)  0.25 mg IH [id number] [facility]


   Last Admin: 06/06/18 19:45 Dose:  Not Given


Diltiazem HCl (Cardizem)  5 mg IVP Q6 PRN


   PRN Reason: Heart rate


Diltiazem HCl (Cardizem Cd)  120 mg PO DAILY [facility]


   Last Admin: 06/06/18 09:10 Dose:  120 mg


Meropenem (Merrem Iv 1 Gm Premix)  50 mls @ 100 mls/hr IVPB Q8 [facility]


   PRN Reason: Protocol


   Last Admin: 06/06/18 21:29 Dose:  100 mls/hr


Levalbuterol HCl (Xopenex)  0.63 mg IH [id number] PRN


   PRN Reason: Shortness of Breath


Magnesium Oxide (Mag-Ox)  400 mg PO BID [facility]


   Last Admin: 06/06/18 17:00 Dose:  400 mg


Metoprolol Tartrate (Lopressor)  25 mg PO BID [facility]


   Last Admin: 06/06/18 17:00 Dose:  25 mg


Metoprolol Tartrate (Lopressor)  5 mg IV Q6H PRN


   PRN Reason: for HR>130, hold for sbp<90


Oxycodone/Acetaminophen (Percocet 5/325 Mg Tab)  1 tab PO Q6H PRN


   PRN Reason: Pain, moderate (4-7)


   Stop: 06/08/18 16:03


   Last Admin: 06/06/18 21:14 Dose:  1 tab


Pantoprazole Sodium (Protonix Ec Tab)  40 mg PO 0600,1600 [facility]


   Last Admin: 06/06/18 16:46 Dose:  40 mg


Sucralfate (Carafate Oral Susp)  1 gm PO 0600,1600 [facility]











- Labs


Labs: 


 





 06/05/18 08:45 





 06/06/18 06:00 





 











PT  18.2 SECONDS (9.4-12.5)  H  06/05/18  07:00    


 


INR  1.57  (0.93-1.08)  H  06/05/18  07:00    


 


APTT  34.7 Seconds (25.1-36.5)   06/03/18  02:15    














Attending/Attestation





- Attestation


I have personally seen and examined this patient.: Yes


I have fully participated in the care of the patient.: Yes


I have reviewed all pertinent clinical information, including history, physical 

exam and plan: Yes


Notes (Text): 


This is an addendum to GI progress  report dictated by the Medical Resident.The 

patient was seen and examined earlier.  Medical records, lab studies, imagings 

were reviewed.  Last 24 hours events reviewed.  Agreed with the above treatment 

plan as outlined in Medical Resident 's notes the with the addition of the 

following 


On examination patient's a tenderness


Cardiology note reviewed now off the anticoagulation..  Restart anticoagulation 

as per cardiology


 if anticoagulation is started  will need close follow-up   hematocrit 


would avoid  newer anticoagulant


Continue high-dose PPI


06/06/18 23:38

## 2018-06-06 NOTE — PN
DATE:  06/05/2018



SUBJECTIVE:  Status post endoscopy.  She is comfortable.  We are having IV

access problem.  Patient in the unit.  She is comfortable otherwise.



OBJECTIVE:

VITAL SIGNS:  Temperature is 98, heart rate 99, blood pressure 130/70,

respirations 18, saturating 95%.

HEAD AND NECK:  Normal.  No JVD.  No thyromegaly.

CHEST:  Clear bilaterally.

CARDIAC:  First sound and second sound normal, regular.

ABDOMEN:  Soft, nontender.

EXTREMITIES:  No edema.

NEUROLOGIC:  Normal.



LABORATORY DATA:  On 06/05:  White count 12, hemoglobin 9.1, hematocrit

27.3, and platelets 374.  Chemistry shows sodium 140, potassium 3.5,

chloride 104, bicarb 28.  BUN 7, creatinine 0.8.  Magnesium is 1.5.  Liver

function test is normal.



IMPRESSION AND PLAN:

1.  Acute upper gastrointestinal bleeding, status post endoscopy. 

Endoscopic view of the ulcer, no active bleeding.  Patient was started on a

soft diet.

2.  Hypomagnesemia, electrolyte abnormalities, will be replaced.  Potassium

will be given p.o. because patient has IV access problem.  We will get a

PICC line.  Magnesium was given p.o.  At this time, we will follow up

clinically and repeat labs.

3.  Anemia, status post transfusion 2 units of packed red blood cells, 2

units of fresh frozen plasma.  Repeat labs in the morning.  Hematology

consult in the case.

4.  Paroxysmal atrial fibrillation.  She is off any anticoagulant now.  We

will leave this to the Cardiology and Hematology to manage the PT/INR. 

Probably, we will need low dose IV heparin and small low dose also

Coumadin.  INR 1.8, 1.6, and 1.8; as per Dr. Alatorre, it is okay for this

particular patient with recurrent bleeding.  We will consider ablation for

her.  At this time, we will continue current therapy.  Continue current

treatment.  Follow up with the consultants.

5.  Urinary tract infection, Klebsiella.  She is on meropenem, ID consult

in the case.  Continue current therapy.  IV access will be done by Dr. Carlton Caceres.







__________________________________________

Philip Dinh MD



DD:  06/06/2018 13:08:06

DT:  06/06/2018 15:12:59

Job # [id number]

## 2018-06-06 NOTE — CP.PCM.PN
Subjective





- Date & Time of Evaluation


Date of Evaluation: 06/06/18


Time of Evaluation: 09:40





- Subjective


Subjective: 





Comfortable, no fevers, not in distress currently.





Objective





- Vital Signs/Intake and Output


Vital Signs (last 24 hours): 


 











Temp Pulse Resp BP Pulse Ox


 


 99.0 F   125 H  19   134/62   99 


 


 06/05/18 08:09  06/06/18 08:50  06/06/18 08:48  06/05/18 09:44  06/05/18 08:30











- Medications


Medications: 


 Current Medications





Albuterol/Ipratropium (Duoneb 3 Mg/0.5 Mg (3 Ml) Ud)  3 ml IH [id number] PRN


   PRN Reason: Shortness of Breath


Arformoterol Tartrate (Brovana)  15 mcg IH [id number] [facility]


   Last Admin: 06/06/18 07:18 Dose:  15 mcg


Budesonide (Pulmicort Respules)  0.25 mg IH [id number] [facility]


   Last Admin: 06/06/18 07:19 Dose:  0.25 mg


Diltiazem HCl (Cardizem)  5 mg IVP Q6 PRN


   PRN Reason: Heart rate


Diltiazem HCl (Cardizem Cd)  120 mg PO DAILY [facility]


   Last Admin: 06/05/18 09:43 Dose:  120 mg


Meropenem (Merrem Iv 1 Gm Premix)  50 mls @ 100 mls/hr IVPB Q8 [facility]


   PRN Reason: Protocol


   Last Admin: 06/06/18 06:05 Dose:  100 mls/hr


Levalbuterol HCl (Xopenex)  1.25 mg IH [id number] PRN


   PRN Reason: Shortness of Breath


Metoprolol Tartrate (Lopressor)  25 mg PO BID [facility]


   Last Admin: 06/05/18 09:44 Dose:  25 mg


Metoprolol Tartrate (Lopressor)  5 mg IV Q6H PRN


   PRN Reason: for HR>130, hold for sbp<90


Oxycodone/Acetaminophen (Percocet 5/325 Mg Tab)  1 tab PO Q6H PRN


   PRN Reason: Pain, moderate (4-7)


   Stop: 06/08/18 16:03


   Last Admin: 06/05/18 22:23 Dose:  1 tab


Pantoprazole Sodium (Protonix Ec Tab)  40 mg PO 0600,1600 [facility]


   Last Admin: 06/06/18 06:05 Dose:  40 mg











- Labs


Labs: 


 





 06/05/18 08:45 





 06/06/18 06:00 





 











PT  18.2 SECONDS (9.4-12.5)  H  06/05/18  07:00    


 


INR  1.57  (0.93-1.08)  H  06/05/18  07:00    


 


APTT  34.7 Seconds (25.1-36.5)   06/03/18  02:15    














- Constitutional


Appears: Chronically Ill





- Head Exam


Head Exam: NORMAL INSPECTION





- Respiratory Exam


Respiratory Exam: Decreased Breath Sounds





- Cardiovascular Exam


Cardiovascular Exam: +S1, +S2





- GI/Abdominal Exam


GI & Abdominal Exam: Soft.  absent: Tenderness





Assessment and Plan





- Assessment and Plan (Free Text)


Plan: 





Assessment


ESBL Klebsiella complicated UTI with Tee catheter


GI bleeding


chronic CHF


severe mitral regurgitation S/P mitral valve replacement


atrial fibrillation


asthma


HTN


esophageal ulcers


carpal tunnel syndrome


COPD


dyslipidemia


atrial fibrillation


S/P cholecystectomy


S/P foot surgery





Plan


continue Merrem day 2 and would recommend change of Tee catheter - should aim 

for 5-7 days of antibiotics


follow up further recommendations of GI regarding GI bleeding


will continue to monitor clinically

## 2018-06-06 NOTE — PN
DATE:  06/06/2018



PULMONARY PROGRESS NOTE



REFERRING PHYSICIAN:  Philip Dinh MD.



SUBJECTIVE:  She is lying in the bed, head at 45 degrees.  Frustrated

because hurt her with the IV, no veins, could not get this morning

antibiotics.  Not much cough, sputum production.  No chest pain.  No melena

reported today.  No leg pain or leg swelling.



OBJECTIVE:

GENERAL:  In no acute distress.

VITAL SIGNS:  Temperature is 98, heart rate is 110, respiratory rate is 14,

blood pressure 162/84.

HEENT:  Moist mucous membrane.  Crowded airway.  Mallampati score is 4.

NECK:  Supple.  No JVD.

LUNGS:  Have a fair airflow with rhonchi.

HEART:  S1 and S2.

ABDOMEN:  Soft, nontender.  No organomegaly.

EXTREMITIES:  Not much edema.

NEUROLOGICAL:   Awake and alert.  Follows simple command.



LABORATORY DATA:  Reviewed.  Sodium 136, potassium 3.7, chloride 101,

bicarbonate 27, BUN 6, creatinine 0.9, glucose 104, calcium is 8.7,

magnesium is 1.5.  AST 23, ALT 27, alk phos is 95, albumin is 3.1.  Urine

culture has Klebsiella pneumoniae, which is sensitive to Cipro.



MEDICATIONS:  She is on Brovana inhaled twice a day, Cardizem 5 mg IV every

6 hours p.r.n., Cardizem  mg daily, metoprolol tartrate 25 mg twice a

day and also metoprolol 5 mg IV every 6 hours p.r.n., mag oxide 400 mg

twice a day, meropenem 1 g IV every 8 hours, Percocet 5/325 one tab every 6

hours p.r.n., Protonix 40 mg twice a day, Pulmicort inhaled twice a day,

Xopenex inhaled every 6 hours p.r.n.



IMPRESSION AND PLAN:  Gastrointestinal bleed, anemia requiring transfusion,

has gastric ulcers, history of atrial fibrillation, coronary artery

disease, chronic obstructive lung disease, suspected sleep apnea syndrome,

urinary tract infection with Cipro-sensitive organism.  Case discussed with

the nursing staff.  Also spoke to the intensivist.  Antibiotics could be

switched p.o. antibiotics if it is okay with Infectious Diseases.  The

patient could be transferred to _____ type of services.  Outpatient

pulmonary function test and sleep study.  Aspiration precaution.  Continue

bronchodilator.  Follow up labs in the morning.



Thank you and we will follow with you.





__________________________________________

Ajay Wisdom MD





DD:  06/06/2018 12:45:13

DT:  06/06/2018 14:47:07

Job # [id number]

## 2018-06-06 NOTE — US
HISTORY:

Leg pain and swelling. Evaluate for DVT



PHYSICIAN(S):  Carlton Caceres MD.



TECHNIQUE:

Duplex sonography and color-flow Doppler with graded compression were 

used to evaluate the deep venous systems of both lower extremities. 



FINDINGS:

The visualized deep venous systems of both lower extremities are 

sonographically normal and compressible. Normal wave forms and 

augmentation are seen. There is no sonographic evidence for deep 

venous thrombosis in the visualized segments of both lower 

extremities.



IMPRESSION:

No sonographic evidence for deep venous thrombosis in the visualized 

segments of both lower extremities.

## 2018-06-06 NOTE — PQF ANEMIA
***** This form is a permanent part of the medical record*****

Dr. Dinh,

Patient admitted with GI bleeding and anemia treated with 2UPRBC and platelets. 
Please specify type and acuity of anemia present in this patient.   

          

Clarification of your documentation is requested to better reflect the severity 
of illness and intensity of treatment of your patient.  



Indicators present



 [x] Anemia

                  

 [x]  Drop in H&H from []_9__ to []___7

         

 [] Hypotension   

            

 [x] GI Bleed   

               

 [x] Transfusion(s)

               

 [] Acute bleed  other sites

            

 [x] Tachycardia      

         

 [] Surgical Procedure Blood Loss     (expected not a complication)

    

 Other:[]______   

      



Location in the medical record that reflects the above clinical findings: []





Treatment Provided: []

         

PHYSICIAN'S RESPONSE

  



Based on your medical judgment of the clinical indicators outlined above, are 
you treating this patient for a known or suspected: 



[x] Acute blood loss anemia             [] Chronic blood loss anemia

 

[x] Acute on Chronic blood loss anemia   



[] Anemia due to malignancy



[] Anemia due to chemotherapy or radiation therapy

          

[] Anemia of Chronic Disease, please specify: []______

 

[] Other, please indicate type of anemia []____

 

[] If Unable to Determine, please check the box, sign and date.   

 

Present On Admission (POA) Indicator:



[x] Present at the time of admission 



[] Not present at the time of admission



[] Clinically Undetermined



               



In responding to this query, please exercise your independent professional 
judgment.  The fact that a question is asked does not imply that any particular 
answer is desired or expected.  Thank you for your clarification on this 
documentation.



If you have any questions please call:[ ]

* Thank you,

   [ ]EDIL [facility] #[id number]

   [facility] Coder
LAILA

## 2018-06-06 NOTE — PN
DATE:  06/06/2018



REASON FOR CONSULTATION AND FOLLOWUP:  Cardiac evaluation; recurrent GI

bleed; paroxysmal atrial fibrillation, was on anticoagulation prior to

bleed; history of mitral valve replacement, bioprosthetic; nonobstructive

coronary artery disease.



SUBJECTIVE:  The patient denies any chest pain, shortness of breath, or any

palpitations.  The patient denies any apparent distress.  Status post

endoscopy yesterday.



OBJECTIVE:

GENERAL:   Not in apparent distress, lying flat in the bed.

VITAL SIGNS:  Temperature afebrile, heart rate 125, blood pressure 140/61.

HEENT:  PERRLA.  Intact.

NECK:  Supple.  No carotid bruit or thyromegaly.

CHEST:  Clear to auscultation.

HEART:  S1 and S2, regular.

ABDOMEN:  Soft.

EXTREMITIES:  Clubbing and cyanosis negative.



LABORATORY DATA:  WBC 13.7, hemoglobin 9, hematocrit 27.2, and platelet

count 394.  Chemistry shows sodium 139, potassium 3.7, chloride 101, carbon

dioxide 27, anion gap of 12.  BUN 6, creatinine 0.9.  Magnesium 1.5.



IMPRESSION:  Recurrent gastrointestinal bleed, while the patient was on

anticoagulation for paroxysmal atrial fibrillation.  The patient was in

Transitional Care Unit, anticoagulation was started, and INR went to 3.96

yesterday, reversed with vitamin K.  Yesterday, the patient underwent

endoscopy.  Ulcer, but not bleeding.  History of  mitral valve replacement,

bioprosthetic; nonobstructive coronary artery disease, status post cardiac

catheterization twice, most recently in 10/2017, before open heart surgery;

history of paroxysmal atrial fibrillation.



RECOMMENDATIONS:  Keep off anticoagulation.  Monitor H and H.  Continue

Cardizem  mg, continue metoprolol 25 mg twice p.r.n.  The patient

will not get any anticoagulation.  Should the patient go back into AFib,

consider EP study and WATCHMAN placement, discussed with the patient.  We

will supplement electrolyte.  If remaining stable, the patient is okay to

be transferred to telemetry.  We will discontinue albuterol because it

causes more tachycardia.  We changed the Xopenex 0.63 every 6 hours p.r.n. 

Avoid _____ nebulizer treatment to prevent going into rapid atrial

fibrillation.  We will also supplement potassium.  We will do CBC,

magnesium, and phosphorus level in the morning.



Thank you, Dr. Dinh, for providing us the opportunity in taking care of

the patient, Malu Meeks.







__________________________________________

Ajay Gaxiola MD



DD:  06/06/2018 9:13:11

DT:  06/06/2018 12:36:11

Job # [id number]

## 2018-06-06 NOTE — PN
PROCEDURE DATE:  06/05/2018



This patient underwent an upper GI endoscopy today.  She was found to have

an 1-cm size ulcer noticed in the antrum-prepyloric area.  The ulcer was

clean based.  No visible vessel was noticed.  Recommendation is to slowly

advance the diet.  The patient needs to have a low level anticoagulation

with close monitoring of the hemoglobin and hematocrit.  Continue the high

dose PPI.





__________________________________________

Jonathan Frost MD



DD:  06/05/2018 22:24:25

DT:  06/05/2018 22:37:21

Job # [id number]

## 2018-06-06 NOTE — PN
DATE:  06/05/2018



PULMONARY PROGRESS NOTE



REFERRING PHYSICIAN:  hPilip Dinh MD.



SUBJECTIVE:  She is lying in the bed, head at 45 degrees.  Attempt was made

to get a right-sided midline IV access.  Short of breath with exertion.  No

chest pain, no nausea, no vomiting.  No melena today.  No leg pain, no leg

swelling.



OBJECTIVE:

GENERAL:  In no acute distress.

VITAL SIGNS:  Temperature is 98, heart is 105, respiratory rate is 18,

blood pressure is 134/62, pulse ox 99% on 3 liters nasal cannula.

HEENT:  Moist mucous membrane.  Crowded airway.  Mallampati score is 4.

NECK:  Supple.  No JVD.

LUNGS:  Have a fair airflow with rhonchi.

HEART:  S1 and S2.

ABDOMEN:  Soft, nontender, no organomegaly.

EXTREMITIES:  No edema.

NEUROLOGIC:  Awake, alert, and follows simple command.



MEDICATIONS:  She is on Brovana inhaled twice a day, Cardizem 5 mg IV every

6 hour p.r.n., Cardizem 120 mg daily, DuoNeb every 6 hour p.r.n.,

metoprolol tartrate 25 mg twice a day, metoprolol tartrate IV every 6 hour

p.r.n. for a heart rate above 130, meropenem 1 g IV every 8 hour, Percocet

5/325 one tablet every 6 hour p.r.n., Protonix 40 mg daily, Pulmicort

inhaled twice a day, Xopenex 1.25 mg every 6 hours p.r.n.



LABORATORY DATA:  Shows hemoglobin 9, hematocrit 27.2, WBC 13.7, platelet

is 394.  INR 1.57.  Sodium 140, potassium 3.5, chloride 104, bicarbonate

28, BUN 7, creatinine 0.8, glucose 104, calcium is 8.7.  AST 19, ALT 28,

alk phos is 71.  Albumin is 3.1.  Urinalysis has gram-negative rods.  She

has endoscopy done today which shows esophagogastric landmark, 35.  Has a

hiatal hernia, nonbleeding gastric ulcer with clear ulcer base.



IMPRESSION AND PLAN:  GI bleed, anemia requiring transfusion, also

requiring fresh frozen plasm and reversal of coagulopathy, history of upper

GI bleed, atrial fibrillation, coronary artery disease, chronic obstructive

lung disease, suspected sleep apnea syndrome, now has a urinary tract

infection.  Case discussed with the nursing staff, 1 mg morphine is given

to stabilize the patient to get a midline IV catheter.  Pulmonary point of

view, keep head at 45 degrees.  Sleep apnea precaution.  Bronchodilator. 

Will need pulmonary function test and sleep study upon discharge as

outpatient.  Anticoagulation as per Cardiology.  Follow up labs in the

morning.



Thank you and we will follow up with you.







__________________________________________

Ajay Wisdom MD



DD:  06/05/2018 23:35:52

DT:  06/05/2018 23:38:10

Job # [id number]

## 2018-06-07 ENCOUNTER — HOSPITAL ENCOUNTER (INPATIENT)
Dept: HOSPITAL 42 - TRCU | Age: 63
LOS: 4 days | Discharge: HOME | DRG: 378 | End: 2018-06-11
Attending: INTERNAL MEDICINE | Admitting: INTERNAL MEDICINE
Payer: COMMERCIAL

## 2018-06-07 VITALS — RESPIRATION RATE: 13 BRPM | SYSTOLIC BLOOD PRESSURE: 139 MMHG | DIASTOLIC BLOOD PRESSURE: 68 MMHG

## 2018-06-07 VITALS — BODY MASS INDEX: 26.4 KG/M2

## 2018-06-07 VITALS — HEART RATE: 110 BPM

## 2018-06-07 DIAGNOSIS — B96.1: ICD-10-CM

## 2018-06-07 DIAGNOSIS — K22.10: ICD-10-CM

## 2018-06-07 DIAGNOSIS — I48.0: ICD-10-CM

## 2018-06-07 DIAGNOSIS — F43.22: ICD-10-CM

## 2018-06-07 DIAGNOSIS — E78.00: ICD-10-CM

## 2018-06-07 DIAGNOSIS — Z87.891: ICD-10-CM

## 2018-06-07 DIAGNOSIS — G56.00: ICD-10-CM

## 2018-06-07 DIAGNOSIS — I50.9: ICD-10-CM

## 2018-06-07 DIAGNOSIS — I25.10: ICD-10-CM

## 2018-06-07 DIAGNOSIS — K25.4: Primary | ICD-10-CM

## 2018-06-07 DIAGNOSIS — H04.123: ICD-10-CM

## 2018-06-07 DIAGNOSIS — N39.0: ICD-10-CM

## 2018-06-07 DIAGNOSIS — M10.9: ICD-10-CM

## 2018-06-07 DIAGNOSIS — J44.9: ICD-10-CM

## 2018-06-07 DIAGNOSIS — D64.9: ICD-10-CM

## 2018-06-07 DIAGNOSIS — M17.10: ICD-10-CM

## 2018-06-07 DIAGNOSIS — I11.0: ICD-10-CM

## 2018-06-07 DIAGNOSIS — Z95.2: ICD-10-CM

## 2018-06-07 LAB
ALBUMIN SERPL-MCNC: 3.1 G/DL (ref 3–4.8)
ALBUMIN/GLOB SERPL: 1.1 {RATIO} (ref 1.1–1.8)
ALT SERPL-CCNC: 24 U/L (ref 7–56)
AST SERPL-CCNC: 22 U/L (ref 14–36)
BASOPHILS # BLD AUTO: 0.01 K/MM3 (ref 0–2)
BASOPHILS NFR BLD: 0.1 % (ref 0–3)
BUN SERPL-MCNC: 8 MG/DL (ref 7–21)
CALCIUM SERPL-MCNC: 8.9 MG/DL (ref 8.4–10.5)
EOSINOPHIL # BLD: 0.4 10*3/UL (ref 0–0.7)
EOSINOPHIL NFR BLD: 3.1 % (ref 1.5–5)
ERYTHROCYTE [DISTWIDTH] IN BLOOD BY AUTOMATED COUNT: 15.6 % (ref 11.5–14.5)
GFR NON-AFRICAN AMERICAN: > 60
GRANULOCYTES # BLD: 10.27 10*3/UL (ref 1.4–6.5)
GRANULOCYTES NFR BLD: 71.8 % (ref 50–68)
HGB BLD-MCNC: 8.6 G/DL (ref 12–16)
INR PPP: 1.47 (ref 0.93–1.08)
LYMPHOCYTES # BLD: 1.4 10*3/UL (ref 1.2–3.4)
LYMPHOCYTES NFR BLD AUTO: 9.7 % (ref 22–35)
MCH RBC QN AUTO: 29.3 PG (ref 25–35)
MCHC RBC AUTO-ENTMCNC: 31.7 G/DL (ref 31–37)
MCV RBC AUTO: 92.2 FL (ref 80–105)
MONOCYTES # BLD AUTO: 2.2 10*3/UL (ref 0.1–0.6)
MONOCYTES NFR BLD: 15.3 % (ref 1–6)
PLATELET # BLD: 377 10^3/UL (ref 120–450)
PMV BLD AUTO: 10 FL (ref 7–11)
PROTHROMBIN TIME: 17 SECONDS (ref 9.4–12.5)
RBC # BLD AUTO: 2.94 10^6/UL (ref 3.5–6.1)
WBC # BLD AUTO: 14.3 10^3/UL (ref 4.5–11)

## 2018-06-07 RX ADMIN — Medication SCH MG: at 17:30

## 2018-06-07 RX ADMIN — ARFORMOTEROL TARTRATE SCH MCG: 15 SOLUTION RESPIRATORY (INHALATION) at 09:17

## 2018-06-07 RX ADMIN — OXYCODONE HYDROCHLORIDE AND ACETAMINOPHEN PRN TAB: 5; 325 TABLET ORAL at 17:30

## 2018-06-07 RX ADMIN — DILTIAZEM HYDROCHLORIDE SCH MG: 120 CAPSULE, COATED, EXTENDED RELEASE ORAL at 09:00

## 2018-06-07 RX ADMIN — SUCRALFATE SCH GM: 1 SUSPENSION ORAL at 17:00

## 2018-06-07 RX ADMIN — OXYCODONE HYDROCHLORIDE AND ACETAMINOPHEN PRN TAB: 5; 325 TABLET ORAL at 08:10

## 2018-06-07 RX ADMIN — BUDESONIDE SCH MG: 0.25 SUSPENSION RESPIRATORY (INHALATION) at 20:42

## 2018-06-07 RX ADMIN — ARFORMOTEROL TARTRATE SCH MCG: 15 SOLUTION RESPIRATORY (INHALATION) at 20:42

## 2018-06-07 RX ADMIN — BUDESONIDE SCH MG: 0.25 SUSPENSION RESPIRATORY (INHALATION) at 09:17

## 2018-06-07 RX ADMIN — Medication SCH MG: at 09:00

## 2018-06-07 RX ADMIN — PANTOPRAZOLE SODIUM SCH MG: 40 TABLET, DELAYED RELEASE ORAL at 06:59

## 2018-06-07 RX ADMIN — PANTOPRAZOLE SODIUM SCH MG: 40 TABLET, DELAYED RELEASE ORAL at 17:00

## 2018-06-07 NOTE — CP.PCM.PN
Subjective





- Date & Time of Evaluation


Date of Evaluation: 06/07/18


Time of Evaluation: 08:00





- Subjective


Subjective: 


GI Progress Note for NASIR Rowell PGY2





Patient seen and examined at bedside. There were no acute overnight events as 

per nursing staff. Patient states she feels much better today and is tolerating 

her diet. She denies chest pain, shortness of breath, nausea/vomiting/diarrhea, 

fever/chills, numbness or tingling. Her calf pain has resolved. 





Objective





- Vital Signs/Intake and Output


Vital Signs (last 24 hours): 


 











Temp Pulse Resp BP Pulse Ox


 


 99.6 F   110 H  13   139/68   99 


 


 06/06/18 20:00  06/07/18 10:00  06/07/18 08:00  06/07/18 09:00  06/05/18 08:30











- Medications


Medications: 


 Current Medications





Arformoterol Tartrate (Brovana)  15 mcg IH [id number] [facility]


   Last Admin: 06/07/18 09:17 Dose:  15 mcg


Budesonide (Pulmicort Respules)  0.25 mg IH [id number] [facility]


   Last Admin: 06/07/18 09:17 Dose:  0.25 mg


Diltiazem HCl (Cardizem)  5 mg IVP Q6 PRN


   PRN Reason: Heart rate


Diltiazem HCl (Cardizem Cd)  120 mg PO DAILY [facility]


   Last Admin: 06/07/18 09:00 Dose:  120 mg


Levalbuterol HCl (Xopenex)  0.63 mg IH [id number] PRN


   PRN Reason: Shortness of Breath


Levofloxacin (Levaquin)  500 mg PO DAILY [facility]


   PRN Reason: Protocol


   Last Admin: 06/07/18 08:08 Dose:  500 mg


Magnesium Oxide (Mag-Ox)  400 mg PO BID [facility]


   Last Admin: 06/07/18 09:00 Dose:  400 mg


Metoprolol Tartrate (Lopressor)  5 mg IV Q6H PRN


   PRN Reason: for HR>130, hold for sbp<90


Metoprolol Tartrate (Lopressor)  50 mg PO BID [facility]


   Last Admin: 06/07/18 09:51 Dose:  Not Given


Oxycodone/Acetaminophen (Percocet 5/325 Mg Tab)  1 tab PO Q6H PRN


   PRN Reason: Pain, moderate (4-7)


   Stop: 06/08/18 16:03


   Last Admin: 06/07/18 08:10 Dose:  1 tab


Pantoprazole Sodium (Protonix Ec Tab)  40 mg PO 0600,1600 [facility]


   Last Admin: 06/07/18 06:59 Dose:  40 mg


Sucralfate (Carafate Oral Susp)  1 gm PO 0600,1600 [facility]


   Last Admin: 06/07/18 06:59 Dose:  1 gm











- Labs


Labs: 


 





 06/07/18 06:00 





 06/07/18 06:00 





 











PT  17.0 SECONDS (9.4-12.5)  H  06/07/18  06:00    


 


INR  1.47  (0.93-1.08)  H  06/07/18  06:00    


 


APTT  34.7 Seconds (25.1-36.5)   06/03/18  02:15    














- Constitutional


Appears: No Acute Distress





- Head Exam


Head Exam: ATRAUMATIC, NORMAL INSPECTION, NORMOCEPHALIC





- Eye Exam


Eye Exam: Normal appearance, PERRL


Pupil Exam: NORMAL ACCOMODATION





- ENT Exam


ENT Exam: Mucous Membranes Moist





- Respiratory Exam


Respiratory Exam: Clear to Ausculation Bilateral, NORMAL BREATHING PATTERN.  

absent: Rales, Rhonchi, Wheezes





- Cardiovascular Exam


Cardiovascular Exam: REGULAR RHYTHM, +S1, +S2.  absent: Gallop, Rubs, Murmur





- GI/Abdominal Exam


GI & Abdominal Exam: Soft, Normal Bowel Sounds.  absent: Rigid, Tenderness, Mass

, Rebound





- Extremities Exam


Extremities Exam: Normal Inspection.  absent: Calf Tenderness, Pedal Edema





- Neurological Exam


Neurological Exam: Alert, Awake, CN II-XII Intact





- Skin


Skin Exam: Dry, Warm





Assessment and Plan





- Assessment and Plan (Free Text)


Assessment: 





This is a 63yo AA female with past medical history of a.fib (was on Coumadin), 

MV repair, CAD who was admitted for 





1. GI bleed


   - EGD showed 1cm non-bleeding ulcer (Can[place]³vanas Class III).


2. UTI 


3. Diarrhea- resolved 


4. A.fib (anticoagulation on hold) 


5. CAD 


Plan: 


Hgb is stable. Continue high dose PPI and soft heart healthy diet. Extremity 

doppler negative for DVT. Calf pain resolved. Decision for re-starting 

anticoagulation will be decided by Cardiology. As per Dr. Gaxiola, he does not 

want patient to be on anti-coagulation due to high risk of bleeding. Hematology 

also is on consult for history of bleeding with anticoagulation. They recommend 

possible Continue antibiotics for UTI. 





Case seen, discussed and reviewed with Dr. Frost. 


NASIR Marie PGY2

## 2018-06-07 NOTE — PN
DATE:  06/07/2018



REASON FOR THE CONSULTATION AND FOLLOWUP:  Cardiac evaluation; recurrent GI

bleed; paroxysmal atrial fibrillation, was on anticoagulation prior to

bleed this time; history of mitral valve replacement, bioprosthetic;

nonobstructive coronary artery disease.



SUBJECTIVE:  The patient denies any chest pain, shortness of breath, any

palpitations.



OBJECTIVE:

GENERAL:  Not in any apparent distress.

VITAL SIGNS:  Temperature afebrile, heart rate 110, blood pressure 139/68.

HEENT:  PERRLA.  Extraocular muscles intact.

NECK:  Supple.  No carotid bruit.  No thyromegaly.

CHEST:  Clear to auscultation.

HEART:  S1 and S2 regular.

ABDOMEN:  Soft.

EXTREMITIES:  Clubbing and cyanosis negative.



LABORATORY DATA:  Blood workup as follows; WBC 14.3, hemoglobin 8.6,

hematocrit 27.1, platelet count 377.  Chemistry shows sodium 130, potassium

4, chloride 102, carbon dioxide 29, anion gap of 11, BUN 8, creatinine 0.9,

magnesium 1.6, phosphorus 3.1.



IMPRESSION:  A 62-year-old female with past medical history significant for

nonobstructive coronary artery disease, status post catheterization twice,

last catheterization in 10/2017 prior to mitral valve repair;

nonobstructive coronary artery disease, 50% left anterior descending;

history of severe mitral regurgitation; mitral valve prolapse, status post

mitral valve replacement; history of recurrent gastrointestinal bleed, on

anticoagulation because of paroxysmal atrial fibrillation.  The patient

bled twice in the hospital, off anticoagulation.



RECOMMENDATIONS:  Keep off anticoagulation.  No further anticoagulation

because the patient has more HAS-BLED score rather than TASH.  Continue

Cardizem  mg daily.  Continue metoprolol, increase to 50 mg twice a

day.  Discuss with the resident.  Discuss with Dr. Dinh.  No

anticoagulation.  If the patient goes back into atrial fibrillation, we

will consider EP evaluation and WATCHMAN device placement, but no

anticoagulation because the patient bled multiple times.  As mentioned

above, has high HAS-BLED score.  Also, avoid beta 2 agonist, dual

nebulizer; if needed, we will route Xopenex 0.63 to prevent tachycardia. 

We will follow with you.  The patient is also ____ because of the low

potassium.

















Thank you, Dr. Dinh, for providing us the opportunity in taking care of

the patient, Malu Meeks.







__________________________________________

Ajay Gaxiola MD



DD:  06/07/2018 9:40:15

DT:  06/07/2018 9:59:45

[facility] # [id number]

## 2018-06-07 NOTE — CP.PCM.PN
Objective





- Vital Signs/Intake and Output


Vital Signs (last 24 hours): 


 











Temp Pulse Resp BP Pulse Ox


 


 99.6 F   110 H  13   139/68   99 


 


 06/06/18 20:00  06/07/18 09:00  06/07/18 08:00  06/07/18 09:00  06/05/18 08:30











- Medications


Medications: 


 Current Medications





Arformoterol Tartrate (Brovana)  15 mcg IH [id number] [facility]


   Last Admin: 06/07/18 09:17 Dose:  15 mcg


Budesonide (Pulmicort Respules)  0.25 mg IH [id number] [facility]


   Last Admin: 06/07/18 09:17 Dose:  0.25 mg


Diltiazem HCl (Cardizem)  5 mg IVP Q6 PRN


   PRN Reason: Heart rate


Diltiazem HCl (Cardizem Cd)  120 mg PO DAILY [facility]


   Last Admin: 06/07/18 09:00 Dose:  120 mg


Magnesium 2 gm/50 ml NS (Magnesium Sulfate 2 Gm/50 Ml Ns)  2 gm in 50 mls @ 50 

mls/hr IVPB ONCE ONE


   Stop: 06/07/18 10:38


Levalbuterol HCl (Xopenex)  0.63 mg IH [id number] PRN


   PRN Reason: Shortness of Breath


Levofloxacin (Levaquin)  500 mg PO DAILY [facility]


   PRN Reason: Protocol


   Last Admin: 06/07/18 08:08 Dose:  500 mg


Magnesium Oxide (Mag-Ox)  400 mg PO BID [facility]


   Last Admin: 06/07/18 09:00 Dose:  400 mg


Metoprolol Tartrate (Lopressor)  5 mg IV Q6H PRN


   PRN Reason: for HR>130, hold for sbp<90


Metoprolol Tartrate (Lopressor)  50 mg PO BID [facility]


   Last Admin: 06/07/18 09:51 Dose:  Not Given


Oxycodone/Acetaminophen (Percocet 5/325 Mg Tab)  1 tab PO Q6H PRN


   PRN Reason: Pain, moderate (4-7)


   Stop: 06/08/18 16:03


   Last Admin: 06/07/18 08:10 Dose:  1 tab


Pantoprazole Sodium (Protonix Ec Tab)  40 mg PO 0600,1600 [facility]


   Last Admin: 06/07/18 06:59 Dose:  40 mg


Sucralfate (Carafate Oral Susp)  1 gm PO 0600,1600 [facility]


   Last Admin: 06/07/18 06:59 Dose:  1 gm











- Labs


Labs: 


 





 06/07/18 06:00 





 06/07/18 06:00 





 











PT  17.0 SECONDS (9.4-12.5)  H  06/07/18  06:00    


 


INR  1.47  (0.93-1.08)  H  06/07/18  06:00    


 


APTT  34.7 Seconds (25.1-36.5)   06/03/18  02:15

## 2018-06-07 NOTE — CP.PCM.PN
Subjective





- Date & Time of Evaluation


Date of Evaluation: 06/07/18


Time of Evaluation: 08:20





- Subjective


Subjective: 





Patient comfortable in bed, no fevers, not in distress, no diarrhea.





Objective





- Vital Signs/Intake and Output


Vital Signs (last 24 hours): 


 











Temp Pulse Resp BP Pulse Ox


 


 99.6 F   112 H  20   123/57 L  99 


 


 06/06/18 20:00  06/06/18 20:00  06/06/18 20:00  06/06/18 18:00  06/05/18 08:30











- Medications


Medications: 


 Current Medications





Arformoterol Tartrate (Brovana)  15 mcg IH [id number] [facility]


   Last Admin: 06/06/18 19:45 Dose:  Not Given


Budesonide (Pulmicort Respules)  0.25 mg IH [id number] [facility]


   Last Admin: 06/06/18 19:45 Dose:  Not Given


Diltiazem HCl (Cardizem)  5 mg IVP Q6 PRN


   PRN Reason: Heart rate


Diltiazem HCl (Cardizem Cd)  120 mg PO DAILY [facility]


   Last Admin: 06/06/18 09:10 Dose:  120 mg


Levalbuterol HCl (Xopenex)  0.63 mg IH [id number] PRN


   PRN Reason: Shortness of Breath


Levofloxacin (Levaquin)  500 mg PO DAILY [facility]


   PRN Reason: Protocol


Magnesium Oxide (Mag-Ox)  400 mg PO BID [facility]


   Last Admin: 06/06/18 17:00 Dose:  400 mg


Metoprolol Tartrate (Lopressor)  25 mg PO BID [facility]


   Last Admin: 06/06/18 17:00 Dose:  25 mg


Metoprolol Tartrate (Lopressor)  5 mg IV Q6H PRN


   PRN Reason: for HR>130, hold for sbp<90


Oxycodone/Acetaminophen (Percocet 5/325 Mg Tab)  1 tab PO Q6H PRN


   PRN Reason: Pain, moderate (4-7)


   Stop: 06/08/18 16:03


   Last Admin: 06/06/18 21:14 Dose:  1 tab


Pantoprazole Sodium (Protonix Ec Tab)  40 mg PO 0600,1600 [facility]


   Last Admin: 06/06/18 16:46 Dose:  40 mg


Sucralfate (Carafate Oral Susp)  1 gm PO 0600,1600 [facility]











- Labs


Labs: 


 





 06/05/18 08:45 





 06/06/18 06:00 





 











PT  18.2 SECONDS (9.4-12.5)  H  06/05/18  07:00    


 


INR  1.57  (0.93-1.08)  H  06/05/18  07:00    


 


APTT  34.7 Seconds (25.1-36.5)   06/03/18  02:15    














- Constitutional


Appears: Non-toxic, Chronically Ill





- Head Exam


Head Exam: NORMAL INSPECTION





- Neck Exam


Neck Exam: absent: Meningismus





- Respiratory Exam


Respiratory Exam: Decreased Breath Sounds





- Cardiovascular Exam


Cardiovascular Exam: +S1, +S2





- GI/Abdominal Exam


GI & Abdominal Exam: Soft.  absent: Tenderness





Assessment and Plan





- Assessment and Plan (Free Text)


Plan: 





Assessment


ESBL Klebsiella complicated UTI with Tee catheter - now the Klebsiella is 

sensitive to Quinolones


GI bleeding


chronic CHF


severe mitral regurgitation S/P mitral valve replacement


atrial fibrillation


asthma


HTN


esophageal ulcers


carpal tunnel syndrome


COPD


dyslipidemia


atrial fibrillation


S/P cholecystectomy


S/P foot surgery





Plan


QTc is less than 500 ms - can switch to PO Levaquin (day 3 total) to complete 5-

7 days total antibiotics


will continue to monitor clinically

## 2018-06-07 NOTE — PN
DATE:  06/06/2018



SUBJECTIVE:  Patient is in the unit; she is sitting; very alert, awake,

oriented x3.  She has no chest pain, not short of breath.  Patient has no

other complaints.



PHYSICAL EXAMINATION:

VITAL SIGNS:  Patient had a fever of 99, heart rate 114 and tachycardic,

blood pressure is 130/70, respiratory rate 18, saturating 95% on room air.

HEAD AND NECK:  Normal.  No JVD.  No thyromegaly.

CHEST:  Clear bilaterally.

CARDIAC:  First sound and second sound normal.

ABDOMEN:  Soft, nontender.

EXTREMITIES:  No edema.

NEUROLOGICAL:  Normal.



LABORATORY DATA:  Patient has laboratory studies, which shows white count

13.7, hemoglobin 9, hematocrit 27.2, platelets 394.  Chemistry:  Sodium

136, potassium 3.7, chloride 101, bicarbonate 27, BUN 6, creatinine 0.9,

magnesium is low at 1.5.  Liver function test is normal.



IMPRESSION AND PLAN:

1.  Status post acute upper gastrointestinal bleed, status post transfusion

2 units packed red blood cells and fresh frozen plasma, hemoglobin running

stable at 9.

2.  Paroxysmal atrial fibrillation, discussed with Dr. Gaxiola.  Currently, we

will not give any anticoagulants because the patient is at risk of

bleeding.  We will continue to monitor lab.  The patient _____ atrial

fibrillation, maybe we will take her to _____ by cardiologist to take her

to electrophysiology study and ablation therapy.  We will continue to

follow up with her cardiologist about that, discussed the risk of bleeding

versus stroke or clot embolization.  The decision was to hold off on any

anticoagulation for now because of recurrent bleeding.  Patient has atrial

fibrillation, 62.  No history of hypertension.  She gave her medicine

because of rapid atrial fibrillation.  She had no diabetes.  No other

factors as discussed with the cardiologist.

3.  Electrolyte abnormalities, hypomagnesemia, hypokalemia.  Patient does

not have IV access.  We will get a PICC line.

4.  Urinary tract infection, Klebsiella, resistant to multiple antibiotics.

We will continue meropenem now.  Patient needs to be treated for that

infection; risk of sepsis increasing infection to pyelonephritis has to be

advised to this patient.  Patient wants to go home, but we explained to her

that she needs to stay for treatment.

5.  Status post mitral valve replacement, stable.

6.  Hypercholesterolemia.

7.  Peptic ulcer disease, stable.  Try and continue current therapy for

that.  Continue sequential compression device for both lower extremities.



CURRENT TREATMENTS:   Brovana, Carafate, Cardizem 120 daily, Levaquin 500

p.o., metoprolol 25 p.o. b.i.d., magnesium oxide 400 b.i.d., oxycodone,

Pepcid every 6 hour p.r.n., Protonix 40, Pulmicort 10, Xopenex.  We will

continue to follow up.  Discussed with Dr. Macdonald about whether the

patient could be switched to p.o. antibiotic if _____ with the patient

according to the urine sensitivity.



We will follow up on that.







__________________________________________

Philip Dinh MD



DD:  06/07/2018 8:32:18

DT:  06/07/2018 11:24:51

Job # [id number]

## 2018-06-07 NOTE — CP.PCM.PN
Subjective





- Date & Time of Evaluation


Date of Evaluation: 06/07/18


Time of Evaluation: 13:59





- Subjective


Subjective: 





Heme-Onc Progress note for Dr. Wright





Patient seen and examined at bedside. Nursing reported no acute events 

overnight. She reported feeling very hot and itchy this morning and was 

requesting a fan by placed in her room. Patient reported a loose BM the day 

before with no signs of blood/melena. Patient complained of a headache which 

she related to feeling hot. On complete ROS patient denied acute complaints of 

dizziness, fever, chills, chest pain, palpitations, SOB, cough, abd pain, nausea

, vomiting, bowel/bladder complaints, pain/swelling in her legs b/l. 





Objective





- Vital Signs/Intake and Output


Vital Signs (last 24 hours): 


 











Temp Pulse Resp BP Pulse Ox


 


 99.6 F   112 H  20   123/57 L  99 


 


 06/06/18 20:00  06/06/18 20:00  06/06/18 20:00  06/06/18 18:00  06/05/18 08:30











- Medications


Medications: 


 Current Medications





Arformoterol Tartrate (Brovana)  15 mcg IH [id number] [facility]


   Last Admin: 06/06/18 19:45 Dose:  Not Given


Budesonide (Pulmicort Respules)  0.25 mg IH [id number] [facility]


   Last Admin: 06/06/18 19:45 Dose:  Not Given


Diltiazem HCl (Cardizem)  5 mg IVP Q6 PRN


   PRN Reason: Heart rate


Diltiazem HCl (Cardizem Cd)  120 mg PO DAILY [facility]


   Last Admin: 06/06/18 09:10 Dose:  120 mg


Levalbuterol HCl (Xopenex)  0.63 mg IH [id number] PRN


   PRN Reason: Shortness of Breath


Levofloxacin (Levaquin)  500 mg PO DAILY [facility]


   PRN Reason: Protocol


Magnesium Oxide (Mag-Ox)  400 mg PO BID [facility]


   Last Admin: 06/06/18 17:00 Dose:  400 mg


Metoprolol Tartrate (Lopressor)  25 mg PO BID [facility]


   Last Admin: 06/06/18 17:00 Dose:  25 mg


Metoprolol Tartrate (Lopressor)  5 mg IV Q6H PRN


   PRN Reason: for HR>130, hold for sbp<90


Oxycodone/Acetaminophen (Percocet 5/325 Mg Tab)  1 tab PO Q6H PRN


   PRN Reason: Pain, moderate (4-7)


   Stop: 06/08/18 16:03


   Last Admin: 06/06/18 21:14 Dose:  1 tab


Pantoprazole Sodium (Protonix Ec Tab)  40 mg PO 0600,1600 [facility]


   Last Admin: 06/07/18 06:59 Dose:  40 mg


Sucralfate (Carafate Oral Susp)  1 gm PO 0600,1600 [facility]


   Last Admin: 06/07/18 06:59 Dose:  1 gm











- Labs


Labs: 


 





 06/07/18 06:00 





 06/07/18 06:00 





 











PT  17.0 SECONDS (9.4-12.5)  H  06/07/18  06:00    


 


INR  1.47  (0.93-1.08)  H  06/07/18  06:00    


 


APTT  34.7 Seconds (25.1-36.5)   06/03/18  02:15    














- Constitutional


Appears: Non-toxic, No Acute Distress





- Head Exam


Head Exam: ATRAUMATIC, NORMAL INSPECTION, NORMOCEPHALIC





- Eye Exam


Eye Exam: EOMI, Normal appearance, PERRL.  absent: Conjunctival injection, 

Scleral icterus


Pupil Exam: PERRL





- ENT Exam


ENT Exam: Mucous Membranes Moist





- Neck Exam


Neck Exam: Normal Inspection





- Respiratory Exam


Respiratory Exam: Clear to Ausculation Bilateral, NORMAL BREATHING PATTERN.  

absent: Accessory Muscle Use, Rales, Rhonchi, Wheezes, Respiratory Distress





- Cardiovascular Exam


Cardiovascular Exam: +S1, +S2





- GI/Abdominal Exam


GI & Abdominal Exam: Soft, Normal Bowel Sounds.  absent: Firm, Guarding, Rigid





- Extremities Exam


Extremities Exam: Normal Capillary Refill, Normal Inspection.  absent: Pedal 

Edema, Tenderness





- Neurological Exam


Neurological Exam: Alert, Awake, CN II-XII Intact, Oriented x3





- Psychiatric Exam


Psychiatric exam: Normal Affect, Normal Mood





- Skin


Skin Exam: Dry, Intact, Normal Color, Warm





Assessment and Plan





- Assessment and Plan (Free Text)


Plan: 





63yo female PMHx A-fib on coumadin, mitral valve repair (severe mitral 

regurgitation s/p open heart surgery and repair with MVR biprosthetic), CAD, 

COPD was transferred from TCU to MICU for rectal bleed on 6/2. Patient is s/p 

2U PRBC, 1U FFP. s/p EGD showing 1cm nonbleeding ulcer ([place] Class III). 

Patient currently transferred to [facility]. INR 1.47 this AM and H&H stable. 

Anticoag on hold. Recommend radiofrequency ablation at [facility] 

and restarting low-flow heparin in anticaption vs coumadin with INR goal of 

1.8. GI Dr. Frost on board- appreciate reccs. Cardio Dr. Gaxiola on board- 

appreciate reccs. Continue management as per primary.





Discussed with attending


Juliette Stacy PGY2

## 2018-06-07 NOTE — PN
DATE:  06/06/2018



This is [facility] visit in the Intensive Care Unit.



For, Dr. Alatorre.



SUBJECTIVE:  The patient is a 62-year-old female seen lying awake in bed in

the Intensive Care Unit, now status post transfusion of packed red blood

cells and fresh frozen plasma for acute GI bleed while on TCU.  The patient

was being transitioned from low-flow heparin to low-dose Coumadin as the

patient is now suffering from paroxysmal atrial fibrillation with severe

mitral regurgitation status post open heart surgery with bioprosthetic

valve repair.  She also suffers from coronary artery disease, COPD with a

rectal bleed and an INR of 4.14 with a hemoglobin having dropped to 7.  At

present, she is now resting comfortably with the labs being monitored with

an INR yesterday, 1.57, and a hemoglobin of 9.  Her chem metabolic panel

was within normal range this morning.  Her urinalysis on 06/04/2018 showed

large amount of blood in the urine and stool for occult blood was positive

earlier today.  The patient is now to be scheduled for radiofrequency

ablation by Dr. Gaxiola with eventually the patient to be maintained on a low

dose Coumadin to keep an INR in the 1.8 range after discussion with Dr. Alatorre.  We will order labs for the morning as per intensivist and

resident if they were not done earlier today.  However, she appears to be

in no acute distress.



OBJECTIVE:

PHYSICAL EXAMINATION:

VITAL SIGNS:  Temperature is 99.6, pulse 112, respirations 20, blood

pressure 123/57 with a pulse ox most recently checked documented at 99% on

06/05/2018.

HEENT:  Unremarkable.

NECK:  Supple.

HEART:  Tachy rate.  Occasional ectopic beat.

LUNGS:  Rare rhonchi.

ABDOMEN:  Soft.  Nontender.

EXTREMITIES:  No edema.

SKIN:  Warm and dry.

NEUROLOGIC:  Awake, alert.

SKIN:  Otherwise warm, dry, and clear.



LABORATORY DATA:  The patient had EKG done 06/02/2018 showing sinus rhythm

with short NE with premature atrial complex with aberrant conduction,

nonspecific T-wave abnormalities, abnormal EKG.  She had a Doppler

ultrasound of her lower extremities done earlier today, it was read as no

sonographic evidence of DVT in the visualized segments of both lower

extremities.  The patient had an EGD done by Dr. Frost yesterday which

showed antral ulcer and hiatal hernia.



The patient's labs again were done from yesterday reporting a blood cell

count of 13.7, hemoglobin of 9, hematocrit 27.2, platelet count of 394,000.

She had a chem metabolic panel completely within normal limits with a

phosphorus of 1.5 done earlier today.  INR yesterday was 1.57.  Stool for

occult blood was positive earlier today.



Patient's blood cultures were negative from yesterday; however, urine clean

catch showed Klebsiella pneumoniae on 06/04/2018, greater than 100,000

colonies.



ASSESSMENT AND PLAN:  For this patient is that of acute GI bleed,

symptomatic anemia status post transfusion, paroxysmal atrial fibrillation,

mitral valve regurgitation with bioprosthetic valve, coronary artery

disease, chronic obstructive pulmonary disease, urinary tract infection,

deconditioning, history of recent acute kidney injury, severe degenerative

joint disease of the knees status post injection, gouty arthritis,

hypertension.



PLAN:  The plan is to check the labs in the morning with further

recommendations as indicated.  Dr. Alatorre will speak with Dr. Gaxiola

regarding radiofrequency ablation recommendations at [facility] with consideration to be restarted on low-flow heparin in

anticipation of such procedure versus restarting her Coumadin at a very low

dose; she was bolused 7.5 mg two times prior to her abnormal

supratherapeutic INR with GI bleed.  We will also continue present medical

regimen including Protonix 40 mg twice a day as per Dr. Frost.  We will

also add Carafate 1 g p.o. oral suspension b.i.d. with monitoring the

patient clinically.



This is a complex patient with comprehensive, medically necessary and

appropriate visit carried out in excess of 30 minutes face-to-face time

with the patient's questions answered to her satisfaction with her chart

medicines reviewed, testing reviewed with discussion held with Dr. Dinh,

her attending doctor, and also Dr. Frost, the gastrointestinal

consultant, with Dr. Gaxiola also to make recommendations regarding possible

radiofrequency ablation as indicated.







__________________________________________

Jah Wright MD



DD:  06/06/2018 21:29:15

DT:  06/07/2018 1:14:54

Job # [id number]

## 2018-06-08 LAB
% IRON SATURATION: 7 % (ref 20–55)
ALBUMIN SERPL-MCNC: 3.3 G/DL (ref 3–4.8)
ALBUMIN/GLOB SERPL: 1.1 {RATIO} (ref 1.1–1.8)
ALT SERPL-CCNC: 19 U/L (ref 7–56)
AST SERPL-CCNC: 22 U/L (ref 14–36)
BASOPHILS # BLD AUTO: 0.01 K/MM3 (ref 0–2)
BASOPHILS NFR BLD: 0.1 % (ref 0–3)
BUN SERPL-MCNC: 7 MG/DL (ref 7–21)
CALCIUM SERPL-MCNC: 9 MG/DL (ref 8.4–10.5)
EOSINOPHIL # BLD: 0.5 10*3/UL (ref 0–0.7)
EOSINOPHIL NFR BLD: 3.4 % (ref 1.5–5)
ERYTHROCYTE [DISTWIDTH] IN BLOOD BY AUTOMATED COUNT: 15 % (ref 11.5–14.5)
GFR NON-AFRICAN AMERICAN: > 60
GRANULOCYTES # BLD: 10.62 10*3/UL (ref 1.4–6.5)
GRANULOCYTES NFR BLD: 72.8 % (ref 50–68)
HGB BLD-MCNC: 8.6 G/DL (ref 12–16)
IRON SERPL-MCNC: 16 UG/DL (ref 45–180)
LYMPHOCYTES # BLD: 1.6 10*3/UL (ref 1.2–3.4)
LYMPHOCYTES NFR BLD AUTO: 11.3 % (ref 22–35)
MCH RBC QN AUTO: 29.5 PG (ref 25–35)
MCHC RBC AUTO-ENTMCNC: 32.5 G/DL (ref 31–37)
MCV RBC AUTO: 90.8 FL (ref 80–105)
MONOCYTES # BLD AUTO: 1.8 10*3/UL (ref 0.1–0.6)
MONOCYTES NFR BLD: 12.4 % (ref 1–6)
PLATELET # BLD: 397 10^3/UL (ref 120–450)
PMV BLD AUTO: 9.8 FL (ref 7–11)
RBC # BLD AUTO: 2.92 10^6/UL (ref 3.5–6.1)
TIBC SERPL-MCNC: 244 UG/DL (ref 265–497)
WBC # BLD AUTO: 14.6 10^3/UL (ref 4.5–11)

## 2018-06-08 PROCEDURE — F07Z9ZZ GAIT TRAINING/FUNCTIONAL AMBULATION TREATMENT: ICD-10-PCS | Performed by: INTERNAL MEDICINE

## 2018-06-08 PROCEDURE — F08Z4ZZ HOME MANAGEMENT TREATMENT: ICD-10-PCS | Performed by: INTERNAL MEDICINE

## 2018-06-08 RX ADMIN — PANTOPRAZOLE SODIUM SCH MG: 40 TABLET, DELAYED RELEASE ORAL at 17:39

## 2018-06-08 RX ADMIN — OXYCODONE HYDROCHLORIDE AND ACETAMINOPHEN PRN TAB: 5; 325 TABLET ORAL at 09:39

## 2018-06-08 RX ADMIN — SUCRALFATE SCH GM: 1 SUSPENSION ORAL at 05:32

## 2018-06-08 RX ADMIN — Medication SCH MG: at 09:40

## 2018-06-08 RX ADMIN — SUCRALFATE SCH GM: 1 SUSPENSION ORAL at 17:39

## 2018-06-08 RX ADMIN — Medication SCH MG: at 17:41

## 2018-06-08 RX ADMIN — ARFORMOTEROL TARTRATE SCH MCG: 15 SOLUTION RESPIRATORY (INHALATION) at 07:21

## 2018-06-08 RX ADMIN — ARFORMOTEROL TARTRATE SCH MCG: 15 SOLUTION RESPIRATORY (INHALATION) at 20:22

## 2018-06-08 RX ADMIN — BUDESONIDE SCH MG: 0.25 SUSPENSION RESPIRATORY (INHALATION) at 20:22

## 2018-06-08 RX ADMIN — DILTIAZEM HYDROCHLORIDE SCH MG: 120 CAPSULE, COATED, EXTENDED RELEASE ORAL at 09:40

## 2018-06-08 RX ADMIN — BUDESONIDE SCH MG: 0.25 SUSPENSION RESPIRATORY (INHALATION) at 07:21

## 2018-06-08 RX ADMIN — PANTOPRAZOLE SODIUM SCH MG: 40 TABLET, DELAYED RELEASE ORAL at 05:32

## 2018-06-08 NOTE — CP.PCM.PN
Subjective





- Date & Time of Evaluation


Date of Evaluation: 06/08/18


Time of Evaluation: 04:38





- Subjective


Subjective: 





S:Requests artificial eye drops.


   Seen at bedside.


   States that she uses them at home.


   States that her eyes are sometimes watering.


   Has no other complaints.


   Medical record was reviewed.





O:Alert ,awake.


   Last Vital Signs





 3





Temp  98.2 F   06/07/18 16:26


 


Pulse  84   06/07/18 20:46


 


Resp  18   06/07/18 16:26


 


BP  135/75   06/07/18 17:21


 


Pulse Ox      








   LUNGS: Normal breathing pattern.


   HEENT: Both eyes are wet.


              Pupils equal , round , regular, reactive to light.


              Conjunctiva: normal color.


              Sclera:White.





A: History of dry eyes.





P: Artificial tears as ordered.





Objective





- Vital Signs/Intake and Output


Vital Signs (last 24 hours): 


 











Temp Pulse Resp BP Pulse Ox


 


 98.2 F   84   18   135/75    


 


 06/07/18 16:26  06/07/18 20:46  06/07/18 16:26  06/07/18 17:21   











- Medications


Medications: 


 Current Medications





Arformoterol Tartrate (Brovana)  15 mcg IH [id number] [facility]


   Last Admin: 06/07/18 20:42 Dose:  15 mcg


Artificial Tears (Artificial Tears)  0 ml OU BID PRN


   PRN Reason: Dry eyes


   Last Admin: 06/08/18 04:28 Dose:  1 drop


Budesonide (Pulmicort Respules)  0.25 mg IH [id number] [facility]


   PRN Reason: Protocol


   Last Admin: 06/07/18 20:42 Dose:  0.25 mg


Diltiazem HCl (Cardizem Cd)  120 mg PO DAILY [facility]


   PRN Reason: Protocol


Levalbuterol HCl (Xopenex)  0.63 mg IH [id number] PRN; Protocol


   PRN Reason: Shortness of Breath


Levofloxacin (Levaquin)  500 mg PO DAILY [facility]


   PRN Reason: Protocol


Magnesium Oxide (Mag-Ox)  400 mg PO BID [facility]


   PRN Reason: Protocol


   Last Admin: 06/07/18 17:30 Dose:  400 mg


Metoprolol Tartrate (Lopressor)  50 mg PO BID [facility]


   PRN Reason: Protocol


   Last Admin: 06/07/18 17:21 Dose:  50 mg


Oxycodone/Acetaminophen (Percocet 5/325 Mg Tab)  1 tab PO Q6H PRN; Protocol


   PRN Reason: Pain, moderate (4-7)


   Stop: 06/10/18 14:28


   Last Admin: 06/07/18 17:30 Dose:  1 tab


Pantoprazole Sodium (Protonix Ec Tab)  40 mg PO 0600,1600 [facility]


   PRN Reason: Protocol


   Last Admin: 06/07/18 17:00 Dose:  40 mg


Sucralfate (Carafate Oral Susp)  1 gm PO 0600,1600 [facility]


   Last Admin: 06/07/18 17:00 Dose:  1 gm

## 2018-06-08 NOTE — PN
DATE:  06/08/2018



PULMONARY PROGRESS NOTE



REFERRING PHYSICIAN:  Philip Dinh MD



SUBJECTIVE:  She is out of bed to reclining chair.  Night was unremarkable.

Early morning, had a nonspecific chest discomfort; rapid response was

called.  Presently feels better.  No chest pain.  No nausea, no vomiting,

no diarrhea.  No leg pain or leg swelling.



OBJECTIVE:

GENERAL:  In no distress.

VITAL SIGNS:  Temperature is 98, heart rate 65, respiratory rate is 20,

blood pressure 122/81, pulse ox 96% room air.

HEENT:  Moist mucous membranes.  Crowded airway.  Mallampati score is 4.

NECK:  Supple.  No JVD.

LUNGS:  Have fair airflow with rhonchi.

HEART:  S1 and S2.

ABDOMEN:  Soft, nontender.  No organomegaly.

EXTREMITIES:  No edema.

NEUROLOGIC:  Awake and alert.  Follows simple command.



MEDICATIONS:  Shows she is on artificial tears both eyes twice a day

p.r.n., Brovana inhaled twice a day, Carafate 1 g twice a day, Cardizem CD

120 mg daily, Klonopin 0.5 mg twice a day p.r.n., potassium 10 mEq daily,

Lasix 40 mg daily, Levaquin 500 mg daily, metoprolol tartrate 50 mg twice a

day, magnesium oxide 400 mg twice a day, Percocet 5/325 one tablet every 6

hours p.r.n., Protonix 40 mg daily, Pulmicort inhaled twice a day, also

getting Xopenex inhaled every 6 hours p.r.n.



LABORATORY DATA:  Shows hemoglobin 8.6, hematocrit 26.5, WBC 14.6, platelet

is 397.  Sodium 137, potassium 3.6, chloride 100, bicarbonate 26, BUN 7,

creatinine 0.9, calcium is 9, total bili 0.2, AST 22, ALT 19, albumin is

3.3.  Has a chest x-ray done this morning, shows no active pulmonary

infiltrate.



IMPRESSION AND PLAN:  Status post gastrointestinal bleed, had recurrent

gastric ulcers, but at the time of esophagogastroduodenoscopy, there is no

bleeding at that time requiring transfusion, history of coagulopathy

requiring reversal of INR because of Coumadin, has atrial fibrillation,

coronary artery disease, chronic obstructive lung disease, suspected sleep

apnea syndrome, being treated for urinary tract infection at present time. 

Case discussed with nursing staff.  Also spoke to nurse and the patient in

detail.  She understands the risk of thromboembolic disease and atrial

fibrillation, and also understands risk of gastrointestinal bleed with

anticoagulation.  We will leave the decision for Cardiology to make.  We

will start the patient on supplemental iron and also give a _____ bone

marrow stimulant to effort to improve hemoglobin dropped to 10 or so.  As

outpatient, will need sleep study, also need pulmonary function tests.  Try

to keep heart rate below 80 if we can to decrease the risk of

thromboembolic disease.



Thank you and we will follow with you.





__________________________________________

Ajay Wisdom MD



DD:  06/08/2018 17:55:18

DT:  06/08/2018 17:59:56

Job # [id number]

## 2018-06-08 NOTE — CP.PCM.CON
History of Present Illness





- History of Present Illness


History of Present Illness: 


62 year old female with PMH of atrial fibrillation, asthma, HTN, severe mitral 

regurgitation S/P mitral valve replacement, esophageal ulcers, carpal tunnel 

syndrome, COPD, dyslipidemia, S/P cholecystectomy, S/P foot surgery was 

admitted in [facility] for GI bleeding. She is also found to have UTI with Klebsiella 

and has improved with antibiotics. She is now transferred to [facility] for continued 

medical therapy and physical rehab. Infectious diseases consult is requested to 

continue her antibiotic therapy. She denies fever or chills, no nausea or 

vomiting, no chest pain, no SOB, no headache or dizziness, no abdominal pain, 

no diarrhea, no dysuria.





Review of Systems





- Review of Systems


All systems: reviewed and no additional remarkable complaints except (as per HPI

)





Past Patient History





- Infectious Disease


Hx of Infectious Diseases: None





- Tetanus Immunizations


Tetanus Immunization: Unknown





- Past Social History


Smoking Status: Never Smoked





- CARDIAC


Hx Cardiac Disorders: Yes (Mitral Valve Repair.)


Hx Congestive Heart Failure: Yes


Hx Hypercholesterolemia: Yes


Hx Hypertension: Yes





- PULMONARY


Hx Chronic Obstructive Pulmonary Disease (COPD): Yes





- NEUROLOGICAL


Hx Neurological Disorder: Yes


Hx Dizziness: Yes


Hx Migraine: Yes





- HEENT


Hx HEENT Problems: No





- RENAL


Hx Chronic Kidney Disease: No





- ENDOCRINE/METABOLIC


Hx Endocrine Disorders: No





- HEMATOLOGICAL/ONCOLOGICAL


Hx Blood Disorders: Yes (H/o blood transfusions.)


Hx Anemia: Yes





- INTEGUMENTARY


Hx Dermatological Problems: No





- MUSCULOSKELETAL/RHEUMATOLOGICAL


Hx Falls: Yes (past)





- GASTROINTESTINAL


Hx Gastrointestinal Disorders: Yes (gi bleed, black stools)





- GENITOURINARY/GYNECOLOGICAL


Hx Genitourinary Disorders: Yes (hx esbl urine 5/29/18)


Hx Reproductive Disorders: No





- PSYCHIATRIC


Hx Substance Use: No (Denied by pt.)





- SURGICAL HISTORY


Hx Surgeries: Yes


Hx Cardiac Catheterization: Yes


Hx Cholecystectomy: Yes


Hx Open Heart Surgery: Yes (Mitral Valve Repair.)


Hx Orthopedic Surgery: Yes (B/L Bunion Sx; Left Carpal Tunnel Sx.)





- ANESTHESIA


Hx Anesthesia: No


Hx Anesthesia Reactions: No


Hx Malignant Hyperthermia: No





Meds


Allergies/Adverse Reactions: 


 Allergies











Allergy/AdvReac Type Severity Reaction Status Date / Time


 


cinnamon Allergy Intermediate ANAPHYLAXIS Verified 06/02/18 21:48














- Medications


Medications: 


 Current Medications





Arformoterol Tartrate (Brovana)  15 mcg IH [id number] [facility]


   Last Admin: 06/07/18 20:42 Dose:  15 mcg


Budesonide (Pulmicort Respules)  0.25 mg IH [id number] [facility]


   PRN Reason: Protocol


   Last Admin: 06/07/18 20:42 Dose:  0.25 mg


Diltiazem HCl (Cardizem Cd)  120 mg PO DAILY [facility]


   PRN Reason: Protocol


Levalbuterol HCl (Xopenex)  0.63 mg IH [id number] PRN; Protocol


   PRN Reason: Shortness of Breath


Levofloxacin (Levaquin)  500 mg PO DAILY [facility]


   PRN Reason: Protocol


Magnesium Oxide (Mag-Ox)  400 mg PO BID [facility]


   PRN Reason: Protocol


   Last Admin: 06/07/18 17:30 Dose:  400 mg


Metoprolol Tartrate (Lopressor)  50 mg PO BID [facility]


   PRN Reason: Protocol


   Last Admin: 06/07/18 17:21 Dose:  50 mg


Oxycodone/Acetaminophen (Percocet 5/325 Mg Tab)  1 tab PO Q6H PRN; Protocol


   PRN Reason: Pain, moderate (4-7)


   Stop: 06/10/18 14:28


   Last Admin: 06/07/18 17:30 Dose:  1 tab


Pantoprazole Sodium (Protonix Ec Tab)  40 mg PO 0600,1600 [facility]


   PRN Reason: Protocol


   Last Admin: 06/07/18 17:00 Dose:  40 mg


Sucralfate (Carafate Oral Susp)  1 gm PO 0600,1600 [facility]


   Last Admin: 06/07/18 17:00 Dose:  1 gm











Physical Exam





- Constitutional


Appears: Non-toxic, Chronically Ill





- Head Exam


Head Exam: NORMAL INSPECTION





- ENT Exam


ENT Exam: Mucous Membranes Moist





- Neck Exam


Neck exam: Negative for: Meningismus





- Respiratory Exam


Respiratory Exam: Decreased Breath Sounds





- Cardiovascular Exam


Cardiovascular Exam: +S1, +S2





- GI/Abdominal Exam


GI & Abdominal Exam: Soft.  absent: Tenderness





Results





- Vital Signs


Recent Vital Signs: 


 Last Vital Signs











Temp  98.2 F   06/07/18 16:26


 


Pulse  84   06/07/18 20:46


 


Resp  18   06/07/18 16:26


 


BP  135/75   06/07/18 17:21


 


Pulse Ox      














- Labs


Result Diagrams: 


 06/08/18 07:00





 06/08/18 07:00





Assessment & Plan





- Assessment and Plan (Free Text)


Plan: 











Assessment


ESBL Klebsiella complicated UTI with Tee catheter - now the Klebsiella is 

sensitive to Quinolones


GI bleeding


chronic CHF


severe mitral regurgitation S/P mitral valve replacement


atrial fibrillation


asthma


HTN


esophageal ulcers


carpal tunnel syndrome


COPD


dyslipidemia


atrial fibrillation


S/P cholecystectomy


S/P foot surgery





Plan


QTc is less than 500 ms - continue PO Levaquin (day 4 total) to complete 5-7 

days total antibiotics


will continue to monitor clinically

## 2018-06-08 NOTE — CP.PCM.CON
History of Present Illness





- History of Present Illness


History of Present Illness: 





Awake,alert, no distress, feels better





Reason for consultation: Continuity of care in TCU, history of mitral valve 

repair, on atrial fibrillation and was on Eliquis, GI bleeding





Brief history of present illness: A 62 year old female who initially came to ER 

due to tarry stools. Hemoglobin at that time was 6.7. She was transfused PRBC's 

and platelets. Endoscopy showed gastric peptic ulcer.  Stabilized GI 

bleeding.Stabilized hemoglobin. She also had complaints of knee swelling which 

Dr. Crabtree was consulted and steroid injection to both knees were done with 

resolution (gout). Urinary tract infection and given antibiotics at that time. 

Eliquis was discontinued and started on Heparin drip for atrial fibrillation. 

She was transferred to TCU for reconditioning. At TCU, no further episodes of 

tarry stools and Heparin was transitioned to Coumadin. Coumadin as we can 

monitor INR however had another episode of GI bleeding thus was sent to ER and 

subsequently to ICU. Transfused PRBC's in ICU, stabilized hemoglobin and 

hematocrit. Transferred again to TCU for reconditioning.


History of Mitral valve repair,hypertension,atrial fibrillation,CHF,COPD,

hyperlipidemia,anemia, falls,cholecystectomy, ESBL in urine





Seen and examined by me and Dr. Gaxiola








Review of Systems





- Constitutional


Constitutional: As Per HPI





- EENT


Eyes: Dry Eye





- Cardiovascular


Cardiovascular: As Per HPI





- Respiratory


Respiratory: As Per HPI





- Gastrointestinal


Gastrointestinal: As Per HPI





- Musculoskeletal


Musculoskeletal: As Per HPI





Past Patient History





- Infectious Disease


Hx of Infectious Diseases: None





- Tetanus Immunizations


Tetanus Immunization: Unknown





- Past Social History


Smoking Status: Never Smoked





- CARDIAC


Hx Cardiac Disorders: Yes (Mitral Valve Repair.)


Hx Congestive Heart Failure: Yes


Hx Hypercholesterolemia: Yes


Hx Hypertension: Yes





- PULMONARY


Hx Chronic Obstructive Pulmonary Disease (COPD): Yes





- NEUROLOGICAL


Hx Neurological Disorder: Yes


Hx Dizziness: Yes


Hx Migraine: Yes





- HEENT


Hx HEENT Problems: No





- RENAL


Hx Chronic Kidney Disease: No





- ENDOCRINE/METABOLIC


Hx Endocrine Disorders: No





- HEMATOLOGICAL/ONCOLOGICAL


Hx Blood Disorders: Yes (H/o blood transfusions.)


Hx Anemia: Yes





- INTEGUMENTARY


Hx Dermatological Problems: No





- MUSCULOSKELETAL/RHEUMATOLOGICAL


Hx Falls: Yes (past)





- GASTROINTESTINAL


Hx Gastrointestinal Disorders: Yes (gi bleed, black stools)





- GENITOURINARY/GYNECOLOGICAL


Hx Genitourinary Disorders: Yes (hx esbl urine 5/29/18)


Hx Reproductive Disorders: No





- PSYCHIATRIC


Hx Substance Use: No (Denied by pt.)





- SURGICAL HISTORY


Hx Surgeries: Yes


Hx Cardiac Catheterization: Yes


Hx Cholecystectomy: Yes


Hx Open Heart Surgery: Yes (Mitral Valve Repair.)


Hx Orthopedic Surgery: Yes (B/L Bunion Sx; Left Carpal Tunnel Sx.)





- ANESTHESIA


Hx Anesthesia: No


Hx Anesthesia Reactions: No


Hx Malignant Hyperthermia: No





Meds


Allergies/Adverse Reactions: 


 Allergies











Allergy/AdvReac Type Severity Reaction Status Date / Time


 


cinnamon Allergy Intermediate ANAPHYLAXIS Verified 06/02/18 21:48














- Medications


Medications: 


 Current Medications





Arformoterol Tartrate (Brovana)  15 mcg IH [id number] [facility]


   Last Admin: 06/08/18 07:21 Dose:  15 mcg


Artificial Tears (Artificial Tears)  0 ml OU BID PRN


   PRN Reason: Dry eyes


   Last Admin: 06/08/18 04:28 Dose:  1 drop


Budesonide (Pulmicort Respules)  0.25 mg IH [id number] [facility]


   PRN Reason: Protocol


   Last Admin: 06/08/18 07:21 Dose:  0.25 mg


Diltiazem HCl (Cardizem Cd)  120 mg PO DAILY [facility]


   PRN Reason: Protocol


Levalbuterol HCl (Xopenex)  0.63 mg IH [id number] PRN; Protocol


   PRN Reason: Shortness of Breath


Levofloxacin (Levaquin)  500 mg PO DAILY [facility]


   PRN Reason: Protocol


Magnesium Oxide (Mag-Ox)  400 mg PO BID [facility]


   PRN Reason: Protocol


   Last Admin: 06/07/18 17:30 Dose:  400 mg


Metoprolol Tartrate (Lopressor)  50 mg PO BID [facility]


   PRN Reason: Protocol


   Last Admin: 06/07/18 17:21 Dose:  50 mg


Oxycodone/Acetaminophen (Percocet 5/325 Mg Tab)  1 tab PO Q6H PRN; Protocol


   PRN Reason: Pain, moderate (4-7)


   Stop: 06/10/18 14:28


   Last Admin: 06/07/18 17:30 Dose:  1 tab


Pantoprazole Sodium (Protonix Ec Tab)  40 mg PO 0600,1600 [facility]


   PRN Reason: Protocol


   Last Admin: 06/08/18 05:32 Dose:  40 mg


Sucralfate (Carafate Oral Susp)  1 gm PO 0600,1600 [facility]


   Last Admin: 06/08/18 05:32 Dose:  1 gm











Physical Exam





- Constitutional


Appears: No Acute Distress





- Head Exam


Head Exam: NORMOCEPHALIC





- Eye Exam


Additional comments: 





dry





- ENT Exam


ENT Exam: Mucous Membranes Moist





- Respiratory Exam


Respiratory Exam: Decreased Breath Sounds, NORMAL BREATHING PATTERN





- Cardiovascular Exam


Cardiovascular Exam: +S1, +S2





- GI/Abdominal Exam


GI & Abdominal Exam: Normal Bowel Sounds, Soft





- Extremities Exam


Extremities exam: Positive for: normal capillary refill


Additional comments: 





1+ knee swelling





- Neurological Exam


Neurological exam: Alert, Oriented x3





- Psychiatric Exam


Psychiatric exam: Normal Affect, Normal Mood





- Skin


Skin Exam: Intact, Normal Color, Warm





Results





- Vital Signs


Recent Vital Signs: 


 Last Vital Signs











Temp  98.2 F   06/08/18 05:47


 


Pulse  96 H  06/08/18 05:47


 


Resp  18   06/08/18 05:47


 


BP  152/86 H  06/08/18 05:47


 


Pulse Ox  95   06/08/18 05:47














- Labs


Result Diagrams: 


 06/08/18 07:00





Labs: 


 Laboratory Results - last 24 hr











  06/08/18





  07:00


 


WBC  14.6 H


 


RBC  2.92 L


 


Hgb  8.6 L


 


Hct  26.5 L


 


MCV  90.8


 


MCH  29.5


 


MCHC  32.5


 


RDW  15.0 H


 


Plt Count  397


 


MPV  9.8


 


Gran %  72.8 H


 


Lymph % (Auto)  11.3 L


 


Mono % (Auto)  12.4 H


 


Eos % (Auto)  3.4


 


Baso % (Auto)  0.1


 


Gran #  10.62 H


 


Lymph # (Auto)  1.6


 


Mono # (Auto)  1.8 H


 


Eos # (Auto)  0.5


 


Baso # (Auto)  0.01














Assessment & Plan





- Assessment and Plan (Free Text)


Assessment: 





 A 62 year old female who initially came to ER due to tarry stools. Hemoglobin 

at that time was 6.7. She was transfused PRBC's and platelets. Endoscopy showed 

gastric peptic ulcer.  Stabilized GI bleeding.Stabilized hemoglobin. She also 

had complaints of knee swelling which Dr. Crabtree was consulted and steroid 

injection to both knees were done with resolution (gout). Urinary tract 

infection and given antibiotics at that time. Eliquis was discontinued and 

started on Heparin drip for atrial fibrillation. She was transferred to TCU for 

reconditioning. At TCU, no further episodes of tarry stools and Heparin was 

transitioned to Coumadin. Coumadin as we can monitor INR however had another 

episode of GI bleeding thus was sent to ER and subsequently to ICU. Transfused 

PRBC's in ICU, stabilized hemoglobin and hematocrit. Transferred again to TCU 

for reconditioning.History of Mitral valve repair,hypertension,atrial 

fibrillation,CHF,COPD,hyperlipidemia,anemia, falls,cholecystectomy, ESBL in 

urine.











Plan: 





Transferred to TCU for reconditioning


Physical therapy


Hold any anticoagulation for now due to GI bleeding


Last EKG normal sinus rhythm


Recommend ablation if atrial fibrillation reoccurs


Will follow up closely


Continue current medications


Continue current treatment





Will follow up





Plan and treatment discussed with Dr. Gaxiola





Thank you Dr. Dinh for the opportunity of taking care of Ms. Malu Meeks





 





- Date & Time


Date: 06/08/18


Time: 06:40

## 2018-06-08 NOTE — CP.PCM.PN
Subjective





- Date & Time of Evaluation


Date of Evaluation: 06/08/18


Time of Evaluation: 06:40





Objective





- Vital Signs/Intake and Output


Vital Signs (last 24 hours): 


 











Temp Pulse Resp BP Pulse Ox


 


 98.2 F   96 H  18   152/86 H  95 


 


 06/08/18 05:47  06/08/18 05:47  06/08/18 05:47  06/08/18 05:47  06/08/18 05:47











- Medications


Medications: 


 Current Medications





Arformoterol Tartrate (Brovana)  15 mcg IH [id number] [facility]


   Last Admin: 06/08/18 07:21 Dose:  15 mcg


Artificial Tears (Artificial Tears)  0 ml OU BID PRN


   PRN Reason: Dry eyes


   Last Admin: 06/08/18 04:28 Dose:  1 drop


Budesonide (Pulmicort Respules)  0.25 mg IH [id number] [facility]


   PRN Reason: Protocol


   Last Admin: 06/08/18 07:21 Dose:  0.25 mg


Diltiazem HCl (Cardizem Cd)  120 mg PO DAILY [facility]


   PRN Reason: Protocol


Levalbuterol HCl (Xopenex)  0.63 mg IH [id number] PRN; Protocol


   PRN Reason: Shortness of Breath


Levofloxacin (Levaquin)  500 mg PO DAILY [facility]


   PRN Reason: Protocol


Magnesium Oxide (Mag-Ox)  400 mg PO BID [facility]


   PRN Reason: Protocol


   Last Admin: 06/07/18 17:30 Dose:  400 mg


Metoprolol Tartrate (Lopressor)  50 mg PO BID [facility]


   PRN Reason: Protocol


   Last Admin: 06/07/18 17:21 Dose:  50 mg


Oxycodone/Acetaminophen (Percocet 5/325 Mg Tab)  1 tab PO Q6H PRN; Protocol


   PRN Reason: Pain, moderate (4-7)


   Stop: 06/10/18 14:28


   Last Admin: 06/07/18 17:30 Dose:  1 tab


Pantoprazole Sodium (Protonix Ec Tab)  40 mg PO 0600,1600 [facility]


   PRN Reason: Protocol


   Last Admin: 06/08/18 05:32 Dose:  40 mg


Sucralfate (Carafate Oral Susp)  1 gm PO 0600,1600 [facility]


   Last Admin: 06/08/18 05:32 Dose:  1 gm











- Labs


Labs: 


 





 06/08/18 07:00

## 2018-06-08 NOTE — RAD
HISTORY:

SOB  



COMPARISON:

06/02/2018 



FINDINGS:



LUNGS:

No active pulmonary disease.



PLEURA:

No significant pleural effusion identified, no pneumothorax apparent.



CARDIOVASCULAR:

Moderate cardiomegaly



OSSEOUS STRUCTURES:

Sternal wires



VISUALIZED UPPER ABDOMEN:

Normal.



OTHER FINDINGS:

None.



IMPRESSION:

No active disease.

## 2018-06-08 NOTE — CON
DATE:  06/07/2018



PULMONARY CONSULT



REFERRING PHYSICIAN:  Dr. Dinh.



REASON FOR CONSULT:  Cough, shortness of breath, status post GI bleeding

and sleep apnea syndrome, atrial fibrillation.



HISTORY OF PRESENT ILLNESS:  This is a 62-year-old female, known to me from

previous admission and most recent admission from Intensive Care Unit, has

a history of chronic lung disease secondary to passive smoking, suspected

sleep apnea syndrome, atrial fibrillation, recurrent GI bleed, history of

gastric ulcer, also has a history of coronary artery disease, history of

mitral valve repair, who had been on anticoagulation, presented to

emergency room with GI bleed, found to have a hemoglobin around 7 and INR

was around 4.  Received FFP packed cells and was admitted to Intensive Care

Unit.  She was resuscitated with the fluid, found to have a UTI, also

treated for pneumonia.  Has EKG done, which shows nonbleeding gastric

ulcers.  The patient seen by GI as well as from Cardiology.  I believe

there was advice not to anticoagulate the patient secondary to recurrent GI

bleed.  Plan is that somewhere along the line to do ablation therapy,

presently sitting up on the side of the chair, has some cough.  Admits to

have loud snoring, daytime sleepy and tired.



PAST MEDICAL HISTORY:  As per history present illness.



SOCIAL HISTORY:  Positive history of passive smoking in the past.  No

history of alcohol abuse.



ALLERGY:  TO [facility].



MEDICATIONS:  She is on Brovana inhaled twice a day, Carafate 1 g twice a

day, Cardizem  mg daily, Levaquin 500 mg daily, metoprolol tartrate

50 mg twice a day, mag oxide 400 mg twice a day, Percocet 5/325 one tab

every 6 hours p.r.n., Protonix 40 mg twice a day, Pulmicort inhaled twice a

day, Xopenex inhaled every 6 hours p.r.n.



REVIEW OF SYSTEMS:  No headache, no rhinitis.  Has some cough, shortness of

breath.  No chest pain.  No nausea, no vomiting, no diarrhea, no abdominal

pain.  Still has some trace blood in the stool.  No leg pain.  No leg

swelling.



PHYSICAL EXAMINATION:

GENERAL:  Sitting on side of the bed, no acute distress.

VITAL SIGNS:  Temperature is 98, heart rate 76, respiratory rate is 20,

blood pressure 135/75.

HEENT:  Moist mucous membrane.  Crowded airway.  Mallampati score is 4. 

Neck:  Supple.  No JVD.

LUNGS:  Have scattered rhonchi.

HEART:  S1 and S2.

ABDOMEN:  Soft, nontender.  No organomegaly.

EXTREMITIES:  There is not much edema.

NEUROLOGICAL:  Awake and alert.  Follows simple commands.



LABORATORY DATA:  Shows hemoglobin 8.6, hematocrit 27.1, WBC 14.3, platelet

count is 377.  INR 1.47.  Sodium 137, potassium 4, chloride 102,

bicarbonate 29, BUN 8, creatinine 0.9, glucose 111, calcium 8.9, phosphorus

3.1, magnesium 1.6, AST 22, ALT 24, alk phos is 85.  Albumin is 3.1.  Stool

occult blood had been negative.  Microbiology:  Blood culture has been

negative.  Urine culture:  Klebsiella pneumoniae.



IMPRESSION AND PLAN:  Status post gastrointestinal bleed, found to have a

gastric ulcer, which was not bleeding though at the time of diagnosis and

scope, anemia requiring transfusion, coagulopathy requiring reversal with

fresh-frozen plasma, has atrial fibrillation, coronary artery disease,

chronic obstructive lung disease, suspected sleep apnea syndrome, being

treated for urinary tract infection.  Case discussed with the patient in

detail.  In the future, will have ablation therapy for atrial fibrillation.

For now, off anticoagulation, gastric prophylaxis, sleep apnea precaution,

bronchodilator.  After discharge from Transitional Care Unit, will need

pulmonary function test and attended sleep study.



Thank you and we will follow with you.





__________________________________________

Ajay Wisdom MD





DD:  06/07/2018 19:10:44

DT:  06/07/2018 19:15:30

Job # [id number]

## 2018-06-08 NOTE — CP.PCM.PN
Subjective





- Date & Time of Evaluation


Date of Evaluation: 06/08/18


Time of Evaluation: 10:35





- Subjective


Subjective: 





Seen and examined at the bedside earlier today, chart reviewed.  Patient 

complained of having some shortness of breath, nasal cannula applied but also 

complained of some chest heaviness.  Patient also return from PT.  The patient 

also reporting that she was concerned about her anticoagulation.  Patient 

status post GI bleed with gastric cratered ulcer.  Patient denies nausea 

vomiting or abdominal pain.  No reports of melena or bright red blood per 

rectum.  





Objective





- Vital Signs/Intake and Output


Vital Signs (last 24 hours): 


 











Temp Pulse Resp BP Pulse Ox


 


 98.2 F   109 H  18   152/86 H  95 


 


 06/08/18 05:47  06/08/18 10:21  06/08/18 05:47  06/08/18 05:47  06/08/18 05:47











- Medications


Medications: 


 Current Medications





Arformoterol Tartrate (Brovana)  15 mcg IH [id number] [facility]


   Last Admin: 06/08/18 07:21 Dose:  15 mcg


Artificial Tears (Artificial Tears)  0 ml OU BID PRN


   PRN Reason: Dry eyes


   Last Admin: 06/08/18 04:28 Dose:  1 drop


Budesonide (Pulmicort Respules)  0.25 mg IH [id number] [facility]


   PRN Reason: Protocol


   Last Admin: 06/08/18 07:21 Dose:  0.25 mg


Clonazepam (Klonopin)  0.5 mg PO BID PRN; Protocol


   PRN Reason: Anxiety


   Stop: 06/14/18 11:18


Diltiazem HCl (Cardizem Cd)  120 mg PO DAILY [facility]


   PRN Reason: Protocol


   Last Admin: 06/08/18 09:40 Dose:  120 mg


Furosemide (Lasix)  40 mg PO DAILY [facility]


Levalbuterol HCl (Xopenex)  0.63 mg IH [id number] PRN; Protocol


   PRN Reason: Shortness of Breath


Levofloxacin (Levaquin)  500 mg PO DAILY [facility]


   PRN Reason: Protocol


   Last Admin: 06/08/18 09:40 Dose:  500 mg


Magnesium Oxide (Mag-Ox)  400 mg PO BID [facility]


   PRN Reason: Protocol


   Last Admin: 06/08/18 09:40 Dose:  400 mg


Metoprolol Tartrate (Lopressor)  50 mg PO BID [facility]


   PRN Reason: Protocol


   Last Admin: 06/08/18 09:40 Dose:  50 mg


Oxycodone/Acetaminophen (Percocet 5/325 Mg Tab)  1 tab PO Q6H PRN; Protocol


   PRN Reason: Pain, moderate (4-7)


   Stop: 06/10/18 14:28


   Last Admin: 06/08/18 09:39 Dose:  1 tab


Pantoprazole Sodium (Protonix Ec Tab)  40 mg PO 0600,1600 [facility]


   PRN Reason: Protocol


   Last Admin: 06/08/18 05:32 Dose:  40 mg


Potassium Chloride (Klor-Con 10)  10 meq PO 0800 [facility]


Sucralfate (Carafate Oral Susp)  1 gm PO 0600,1600 [facility]


   Last Admin: 06/08/18 05:32 Dose:  1 gm











- Labs


Labs: 


 





 06/08/18 07:00 





 06/08/18 07:00 











- Constitutional


Appears: No Acute Distress





- Eye Exam


Eye Exam: Normal appearance





- ENT Exam


ENT Exam: Mucous Membranes Moist





- Neck Exam


Neck Exam: Normal Inspection





- Respiratory Exam


Respiratory Exam: NORMAL BREATHING PATTERN.  absent: Rales, Wheezes, 

Respiratory Distress





- Cardiovascular Exam


Cardiovascular Exam: +S1, +S2





- GI/Abdominal Exam


GI & Abdominal Exam: Soft, Normal Bowel Sounds.  absent: Guarding, Tenderness, 

Organomegaly, Rebound





- Extremities Exam


Extremities Exam: absent: Calf Tenderness





- Neurological Exam


Neurological Exam: Alert, Awake, Oriented x3





- Skin


Skin Exam: Dry, Warm





Assessment and Plan





- Assessment and Plan (Free Text)


Assessment: 





Assessment: 





Chest heaviness/SOB, CXR negative, EGK no acute changes, maybe secondary to 

anxiety


GI bleed, s/p repeat EGD showed 1cm non-bleeding ulcer (Henry Class III).


UTI 


Diarrhea- resolved 


A.fib (anticoagulation on hold) 


CAD


Mitral Valve repair 





Plan: 





Hgb is stable, continue to monitor


Continue high dose PPI 


soft heart healthy diet.


As per Dr. Gaxiola, he does not want patient to be on anti-coagulation due to high 

risk of bleeding


Hematology also is on consult for history of bleeding with anticoagulation


on antibiotics for UTI





request stat EKG and CXR,  spoke to nursing notify cardiology and PCP, as per 

nursing cardiology aware and given a dose of Klonopin prn for anxiety.





Seen and discussed with Dr. Frost.

## 2018-06-08 NOTE — CARD
--------------- APPROVED REPORT --------------





EKG Measurement

Heart [id number]

ME 130P79

[id number]

[id number]

[id number]



<Conclusion>

Sinus rhythm with APCs

Moderate voltage criteria for LVH

LAD

STTW changes c/w ischemia

Prolonged QTc

## 2018-06-08 NOTE — CP.PCM.PN
Subjective





- Date & Time of Evaluation


Date of Evaluation: 06/08/18


Time of Evaluation: 09:00





- Subjective


Subjective: 





Heme-Onc Progress note for Dr. Wright 





Patient seen and examined OOB to chair. Reported she felt much better today and 

having a fan in the room was helping her not feel as hot. Nursing reported no 

acute events overnight. Patient did complain of some epigastric abdominal pain 

and reflux especially when lying down flat. Denied acute complaints of fever, 

chills, headache, dizziness, chest pain, palpitations, SOB, cough, nausea, 

vomiting, bowel/bladder complaints, pain/swelling in her legs bilaterally. 

Patient is having regular BM and denied any blood/melena in her stool.





Objective





- Vital Signs/Intake and Output


Vital Signs (last 24 hours): 


 











Temp Pulse Resp BP Pulse Ox


 


 98.2 F   96 H  18   152/86 H  95 


 


 06/08/18 05:47  06/08/18 05:47  06/08/18 05:47  06/08/18 05:47  06/08/18 05:47











- Medications


Medications: 


 Current Medications





Arformoterol Tartrate (Brovana)  15 mcg IH [id number] [facility]


   Last Admin: 06/08/18 07:21 Dose:  15 mcg


Artificial Tears (Artificial Tears)  0 ml OU BID PRN


   PRN Reason: Dry eyes


   Last Admin: 06/08/18 04:28 Dose:  1 drop


Budesonide (Pulmicort Respules)  0.25 mg IH [id number] [facility]


   PRN Reason: Protocol


   Last Admin: 06/08/18 07:21 Dose:  0.25 mg


Diltiazem HCl (Cardizem Cd)  120 mg PO DAILY [facility]


   PRN Reason: Protocol


   Last Admin: 06/08/18 09:40 Dose:  120 mg


Levalbuterol HCl (Xopenex)  0.63 mg IH [id number] PRN; Protocol


   PRN Reason: Shortness of Breath


Levofloxacin (Levaquin)  500 mg PO DAILY [facility]


   PRN Reason: Protocol


   Last Admin: 06/08/18 09:40 Dose:  500 mg


Magnesium Oxide (Mag-Ox)  400 mg PO BID [facility]


   PRN Reason: Protocol


   Last Admin: 06/08/18 09:40 Dose:  400 mg


Metoprolol Tartrate (Lopressor)  50 mg PO BID [facility]


   PRN Reason: Protocol


   Last Admin: 06/08/18 09:40 Dose:  50 mg


Oxycodone/Acetaminophen (Percocet 5/325 Mg Tab)  1 tab PO Q6H PRN; Protocol


   PRN Reason: Pain, moderate (4-7)


   Stop: 06/10/18 14:28


   Last Admin: 06/08/18 09:39 Dose:  1 tab


Pantoprazole Sodium (Protonix Ec Tab)  40 mg PO 0600,1600 [facility]


   PRN Reason: Protocol


   Last Admin: 06/08/18 05:32 Dose:  40 mg


Sucralfate (Carafate Oral Susp)  1 gm PO 0600,1600 [facility]


   Last Admin: 06/08/18 05:32 Dose:  1 gm











- Labs


Labs: 


 





 06/08/18 07:00 





 06/08/18 07:00 











- Constitutional


Appears: Non-toxic, No Acute Distress





- Head Exam


Head Exam: ATRAUMATIC, NORMAL INSPECTION, NORMOCEPHALIC





- Eye Exam


Eye Exam: EOMI, Normal appearance, PERRL.  absent: Conjunctival injection, 

Scleral icterus





- ENT Exam


ENT Exam: Mucous Membranes Moist





- Neck Exam


Neck Exam: Full ROM, Normal Inspection





- Respiratory Exam


Respiratory Exam: Clear to Ausculation Bilateral, NORMAL BREATHING PATTERN.  

absent: Accessory Muscle Use, Rales, Rhonchi, Wheezes, Respiratory Distress





- Cardiovascular Exam


Cardiovascular Exam: +S1, +S2





- GI/Abdominal Exam


GI & Abdominal Exam: Soft, Normal Bowel Sounds.  absent: Tenderness





- Rectal Exam


Rectal Exam: Deferred





- Extremities Exam


Extremities Exam: Normal Inspection.  absent: Pedal Edema, Tenderness





- Neurological Exam


Neurological Exam: Alert, Awake, CN II-XII Intact, Oriented x3





- Psychiatric Exam


Psychiatric exam: Normal Affect, Normal Mood





- Skin


Skin Exam: Dry, Intact, Normal Color, Warm





Assessment and Plan





- Assessment and Plan (Free Text)


Assessment: 





63yo female PMHx A-fib on coumadin, mitral valve repair (severe mitral 

regurgitation s/p open heart surgery and repair with MVR biprosthetic), CAD, 

COPD was transferred from TCU to MICU for rectal bleed on 6/2. Patient is s/p 

2U PRBC, 1U FFP. s/p EGD showing 1cm nonbleeding ulcer (Zav[place] Class III). 

Patient currently seen back in TCU. H&H stable. Anticoag discontinued as per 

cardio and ASA on hold for 1 month. Recommend radiofrequency ablation at [facility]. GI Dr. Frost on board- appreciate reccs. Cardio Dr. Gaxiola 

on board- appreciate reccs. f/u iron panel. Continue PPI therapy. VS and Labs 

reviewed. Continue management as per primary.





Discussed with attending


Juliette Stacy PGY2

## 2018-06-09 LAB
ALBUMIN SERPL-MCNC: 3.5 G/DL (ref 3–4.8)
ALBUMIN/GLOB SERPL: 1.1 {RATIO} (ref 1.1–1.8)
ALT SERPL-CCNC: 23 U/L (ref 7–56)
AST SERPL-CCNC: 20 U/L (ref 14–36)
BUN SERPL-MCNC: 7 MG/DL (ref 7–21)
CALCIUM SERPL-MCNC: 9.2 MG/DL (ref 8.4–10.5)
ERYTHROCYTE [DISTWIDTH] IN BLOOD BY AUTOMATED COUNT: 15 % (ref 11.5–14.5)
GFR NON-AFRICAN AMERICAN: > 60
HGB BLD-MCNC: 7.9 G/DL (ref 12–16)
MCH RBC QN AUTO: 29.7 PG (ref 25–35)
MCHC RBC AUTO-ENTMCNC: 32.8 G/DL (ref 31–37)
MCV RBC AUTO: 90.6 FL (ref 80–105)
PLATELET # BLD: 381 10^3/UL (ref 120–450)
PMV BLD AUTO: 9.7 FL (ref 7–11)
RBC # BLD AUTO: 2.66 10^6/UL (ref 3.5–6.1)
WBC # BLD AUTO: 9.4 10^3/UL (ref 4.5–11)

## 2018-06-09 RX ADMIN — DILTIAZEM HYDROCHLORIDE SCH: 120 CAPSULE, COATED, EXTENDED RELEASE ORAL at 10:18

## 2018-06-09 RX ADMIN — BUDESONIDE SCH MG: 0.25 SUSPENSION RESPIRATORY (INHALATION) at 21:24

## 2018-06-09 RX ADMIN — Medication SCH MG: at 17:31

## 2018-06-09 RX ADMIN — PANTOPRAZOLE SODIUM SCH MG: 40 TABLET, DELAYED RELEASE ORAL at 17:31

## 2018-06-09 RX ADMIN — OXYCODONE HYDROCHLORIDE AND ACETAMINOPHEN PRN TAB: 5; 325 TABLET ORAL at 02:22

## 2018-06-09 RX ADMIN — OXYCODONE HYDROCHLORIDE AND ACETAMINOPHEN PRN TAB: 5; 325 TABLET ORAL at 21:13

## 2018-06-09 RX ADMIN — Medication SCH MG: at 10:20

## 2018-06-09 RX ADMIN — PANTOPRAZOLE SODIUM SCH MG: 40 TABLET, DELAYED RELEASE ORAL at 06:48

## 2018-06-09 RX ADMIN — BUDESONIDE SCH MG: 0.25 SUSPENSION RESPIRATORY (INHALATION) at 07:27

## 2018-06-09 RX ADMIN — SUCRALFATE SCH GM: 1 SUSPENSION ORAL at 05:32

## 2018-06-09 RX ADMIN — ARFORMOTEROL TARTRATE SCH MCG: 15 SOLUTION RESPIRATORY (INHALATION) at 07:27

## 2018-06-09 RX ADMIN — POTASSIUM CHLORIDE SCH MEQ: 10 TABLET, FILM COATED, EXTENDED RELEASE ORAL at 08:08

## 2018-06-09 RX ADMIN — ARFORMOTEROL TARTRATE SCH MCG: 15 SOLUTION RESPIRATORY (INHALATION) at 21:23

## 2018-06-09 RX ADMIN — SUCRALFATE SCH GM: 1 SUSPENSION ORAL at 17:31

## 2018-06-09 NOTE — PN
DATE:  06/09/2018



PULMONARY PROGRESS NOTE



REFERRING PHYSICIAN:  Philip Dinh MD



SUBJECTIVE:  The patient is sitting up in a reclining chair.  Family is at

bedside.  Night was unremarkable.  No headache.  No rhinitis.  Cough is

better.  No nausea.  No vomiting.  No melena.  No leg pain or leg swelling.



OBJECTIVE:

GENERAL:  In no acute distress.

VITAL SIGNS:  Temperature is 98, heart rate 83, respiratory rate is 20,

blood pressure 134/83, pulse ox 95% on room air.

HEENT:  Moist mucous membranes.  Crowded airway.

NECK:  Supple.  No JVD.

LUNGS:  Have a fair airflow with few rhonchi.

HEART:  S1 and S2.

ABDOMEN:  Soft, nontender.  No organomegaly.

EXTREMITIES:  There is no edema.

NEUROLOGICAL:  Awake and alert.  Follows simple command.



MEDICATIONS:  She is on artificial tears to both eyes, Brovana inhaled

twice a day, Carafate 1 g twice a day, Cardizem  mg daily, iron IV

daily, Klonopin 0.5 mg twice a day p.r.n., potassium 10 mEq daily, Lasix 40

mg daily, Levaquin 500 mg daily, metoprolol tartrate 50 mg twice a day, mag

oxide 400 mg twice a day, Percocet 5/325 one tablet every 6 hours p.r.n.,

Protonix 40 mg twice a day, Pulmicort inhaled twice a day, Xopenex inhaled

every 6 hours p.r.n.



LABORATORY DATA:  Shows hemoglobin 7.9, hematocrit 24.1, WBC 9.4, platelet

is 381.  Sodium 137, potassium 4.2, chloride 100, bicarbonate is 27, BUN 7,

creatinine 0.9, glucose 107, calcium 9.2, magnesium 1.7.  Iron is 16, AST

20, ALT 23, alkaline phosphatase is 86.



IMPRESSION AND PLAN:  Status post gastrointestinal bleed.  Has a recurrent

gastric ulcer status post esophagogastroduodenoscopy showing nonbleeding

gastric ulcer, atrial fibrillation, coronary artery disease, chronic

obstructive lung disease, suspected sleep apnea syndrome, urinary tract

infection, severe anemia.  The patient is off all anticoagulation. 

Considering risk and benefit ratio of recurrent bleed and hemorrhagic

shock, we will control the heart rate under 80, being followed by

Hematology.  Receiving IV iron and hopefully we will get Aranesp to improve

some of the H and H.  Keep head elevated at 45 degrees.  Bronchodilator. 

Sleep apnea precaution.  Fall precaution.  Continue therapy.



Thank you and we will follow with you.





__________________________________________

Ajay Wisdom MD



DD:  06/09/2018 17:24:48

DT:  06/09/2018 17:28:50

Job # [id number]

## 2018-06-09 NOTE — CP.PCM.PN
Subjective





- Date & Time of Evaluation


Date of Evaluation: 06/09/18


Time of Evaluation: 07:15





- Subjective


Subjective: 











Out of bed to chair,awake,alert, no distress, feels better





Reason for consultation and follow up: Continuity of care in TCU, history of 

mitral valve repair, on atrial fibrillation and was on Eliquis, GI bleeding,

hypertension,CHF,COPD,hyperlipidemia,anemia, falls,cholecystectomy, ESBL in 

urine.





Seen and examined by me and Dr. Gaxiola














Objective





- Vital Signs/Intake and Output


Vital Signs (last 24 hours): 


 











Temp Pulse Resp BP Pulse Ox


 


 98.7 F   65   20   122/81   96 


 


 06/08/18 16:00  06/08/18 17:40  06/08/18 16:00  06/08/18 17:40  06/08/18 16:00











- Medications


Medications: 


 Current Medications





Arformoterol Tartrate (Brovana)  15 mcg IH [id number] [facility]


   Last Admin: 06/09/18 07:27 Dose:  15 mcg


Artificial Tears (Artificial Tears)  0 ml OU BID PRN


   PRN Reason: Dry eyes


   Last Admin: 06/08/18 04:28 Dose:  1 drop


Budesonide (Pulmicort Respules)  0.25 mg IH [id number] [facility]


   PRN Reason: Protocol


   Last Admin: 06/09/18 07:27 Dose:  0.25 mg


Clonazepam (Klonopin)  0.5 mg PO BID PRN; Protocol


   PRN Reason: Anxiety


   Stop: 06/14/18 11:18


Diltiazem HCl (Cardizem Cd)  120 mg PO DAILY [facility]


   PRN Reason: Protocol


   Last Admin: 06/08/18 09:40 Dose:  120 mg


Furosemide (Lasix)  40 mg PO DAILY [facility]


Iron Sucrose 100 mg/ Sodium (Chloride)  105 mls @ 210 mls/hr IV DAILY [facility]


Levalbuterol HCl (Xopenex)  0.63 mg IH [id number] PRN; Protocol


   PRN Reason: Shortness of Breath


Levofloxacin (Levaquin)  500 mg PO DAILY [facility]


   PRN Reason: Protocol


   Last Admin: 06/08/18 09:40 Dose:  500 mg


Magnesium Oxide (Mag-Ox)  400 mg PO BID [facility]


   PRN Reason: Protocol


   Last Admin: 06/08/18 17:41 Dose:  400 mg


Metoprolol Tartrate (Lopressor)  50 mg PO BID [facility]


   PRN Reason: Protocol


   Last Admin: 06/08/18 17:40 Dose:  50 mg


Oxycodone/Acetaminophen (Percocet 5/325 Mg Tab)  1 tab PO Q6H PRN; Protocol


   PRN Reason: Pain, moderate (4-7)


   Stop: 06/10/18 14:28


   Last Admin: 06/09/18 02:22 Dose:  1 tab


Pantoprazole Sodium (Protonix Ec Tab)  40 mg PO 0600,1600 [facility]


   PRN Reason: Protocol


   Last Admin: 06/09/18 06:48 Dose:  40 mg


Potassium Chloride (Klor-Con 10)  10 meq PO 0800 [facility]


   Last Admin: 06/09/18 08:08 Dose:  10 meq


Sucralfate (Carafate Oral Susp)  1 gm PO 0600,1600 [facility]


   Last Admin: 06/09/18 05:32 Dose:  1 gm











- Labs


Labs: 


 





 06/08/18 07:00 





 06/08/18 07:00 











- Constitutional


Appears: No Acute Distress





- Head Exam


Head Exam: NORMOCEPHALIC





- Eye Exam


Eye Exam: Normal appearance





- Respiratory Exam


Respiratory Exam: Decreased Breath Sounds, NORMAL BREATHING PATTERN





- Cardiovascular Exam


Cardiovascular Exam: +S1, +S2





- GI/Abdominal Exam


GI & Abdominal Exam: Soft, Normal Bowel Sounds





- Extremities Exam


Extremities Exam: Normal Capillary Refill


Additional comments: 





no knee swelling





- Neurological Exam


Neurological Exam: Alert, Awake, Oriented x3





- Psychiatric Exam


Psychiatric exam: Normal Affect, Normal Mood





- Skin


Skin Exam: Intact, Normal Color, Warm





Assessment and Plan





- Assessment and Plan (Free Text)


Assessment: 








 A 62 year old female who initially came to ER due to tarry stools. Hemoglobin 

at that time was 6.7. She was transfused PRBC's and platelets. Endoscopy showed 

gastric peptic ulcer.  Stabilized GI bleeding.Stabilized hemoglobin. She also 

had complaints of knee swelling which Dr. Crabtree was consulted and steroid 

injection to both knees were done with resolution (gout). Urinary tract 

infection and given antibiotics at that time. Eliquis was discontinued and 

started on Heparin drip for atrial fibrillation. She was transferred to TCU for 

reconditioning. At TCU, no further episodes of tarry stools and Heparin was 

transitioned to Coumadin. Coumadin as we can monitor INR however had another 

episode of GI bleeding thus was sent to ER and subsequently to ICU. Transfused 

PRBC's in ICU, stabilized hemoglobin and hematocrit. Transferred again to TCU 

for reconditioning.History of Mitral valve repair,hypertension,atrial 

fibrillation,CHF,COPD,hyperlipidemia,anemia, falls,cholecystectomy, ESBL in 

urine.














Plan: 





OOB to chair


Transferred to TCU for reconditioning


Physical therapy


Hold any anticoagulation for now due to GI bleeding


Episode of chest heaviness and shortness of breath yesterday


Chest x ray unremarkable


 EKG normal sinus rhythm with APC's


Cardiac status stable, controlled heart and blood pressure


Recommend ablation if atrial fibrillation reoccurs


Will follow up closely


Monitor for GI bleeding


Continue current medications


Continue current treatment


Discharge planning





Will follow up





Plan and treatment discussed with Dr. Gaxiola

## 2018-06-09 NOTE — PN
DATE:  06/09/2018



This is [facility] visit on TCU.



For, Dr. Alatorre.



SUBJECTIVE:  The patient is a 62-year-old female, seen lying awake in bed,

having her lunch, with Dr. Frost having just seen the patient.  The

patient is in no acute distress and is participating with TCU protocols. 

She is status post transfusion of 5 units of packed red blood cells, 3 on

previous admission and 2 after readmission to the intensive care unit for

continued bleeding after ulcer was diagnosed on EGD by Dr. Frost with no

active bleeding then.  The patient needed to be restarted on low-flow

heparin which was then transitioned to Coumadin with a supratherapeutic INR

noted to be obtained with the patient then dropping her hemoglobin.  At

present, she is now recommended as per Dr. Gaxiola not to have any

anticoagulation at all including aspirin, Eliquis, which she was on prior

to admission.  No Coumadin.  No heparin with allowance for her GI bleed to

heal with medications as per Dr. Frost with the risks and benefits

outlined to the patient as per Dr. Gaxiola.  The patient's hemoglobin now was

checked this morning with a value of 7.9, with patient now having received

Aranesp and IV iron as per Dr. Wisdom as the percent saturation of iron is

now 7%.



OBJECTIVE PHYSICAL EXAMINATION:

VITAL SIGNS:  Temperature is 97.8. pulse 98 respirations 20, blood pressure

100/61, pulse ox 95%.

HEENT:  Unremarkable.

NECK:  Supple.

HEART:  Irregularly irregular with controlled rate.

LUNGS:  Rare rhonchi.

ABDOMEN:  Soft.  Nontender.

EXTREMITIES:  No edema.

SKIN:  Warm and dry.

NEUROLOGIC:  Awake and alert.



LABORATORY DATA:  The patient's labs were done this morning.  White blood

cell count of 9.4, hemoglobin 7.9, down from 8.6 yesterday.  Hematocrit is

24.1, platelet count of 381,000 with a chem metabolic panel completely

within normal range, however, her percent saturation of iron yesterday was

7%.  Her most recent INR was 1.47 done 2 days prior.  Her stool for occult

blood was positive on 06/06/2018 when she was in the intensive care unit.



It should be noted that the patient was transferred from the intensive care

unit to transitional care, a fact which we were just made aware of rather

in transition from the medical floor after being downgraded from the

intensive care unit.



ASSESSMENT AND PLAN:  For this patient is that of symptomatic anemia,

status post transfusion of 5 units of packed red blood cells, recently

diagnosed antral ulcer, gastrointestinal bleed, paroxysmal atrial

fibrillation, mitral valve regurgitation with bioprosthetic valve, coronary

artery disease, chronic obstructive pulmonary disease, urinary tract

infection history, deconditioning, degenerative joint disease of the knee ,

status post joint injection, gouty arthritis, hypertension.



PLAN:  The plan for this patient after conversation with Landry Zamora and

Dr. Gaxiola, Dr. Frost, is to continue the present medical regimen which

includes Carafate, Protonix, IV iron and Aranesp, which was given

yesterday.  Also, she is taking Brovana, Miralax, diltiazem CD, potassium,

Klonopin, Lasix, Levaquin, Lopressor, magnesium oxide, Percocet, Pulmicort

and Xopenex.  The patient's labs will be checked again in the morning with

consultants recommendations to be implanted including no anticoagulants for

the time being with the patient being monitored clinically and with labs.



This is a complex patient with comprehensive, medically necessary and

appropriate visit carried out in excess of 30 minutes face-to-face time

with discussions held with doctors as listed above, along with nursing

staff and the patient's questions were answered to her satisfaction.





__________________________________________

Jah Wright MD







DD:  06/09/2018 13:49:35

DT:  06/09/2018 14:08:54

Job # [id number]

## 2018-06-09 NOTE — DS
HISTORY OF PRESENT ILLNESS:  A 62-year-old female with recurrent GI bleed,

was in TCU and discharged to the Emergency Room and admitted to ICU because

of recurrent GI bleed and black tarry stools.  The patient had an endoscopy

2-3 days after being in ICU, stabilized.  She had blood transfusion.  She

has FFP, off Coumadin, and she was managed in the ICU.  She had upper GI

endoscopy, which shows ulcerations, gastric ulcer with no active bleeding.

The patient was monitored.  She seems stable.  She had urinary tract

infection, was given IV meropenem, due to IV access switch to either Cipro

or Levaquin, which she is sensitive to.  The patient seems stable. 

Otherwise, we want to monitor him more because of the recurrent risk of

bleeding and discussed with the Cardiology about quitting anticoagulation. 

He recommends no anticoagulation at this time because risk of bleeding is

too much.  She _____ no heparin, no Coumadin, no aspirin.  The risk of

bleeding is much higher than the risk of any embolization.  At this time,

the patient has a sinus rhythm, she has maintained in sinus rhythm, and she

does not have any history of hypertension.  She takes the blood pressure

medicine that controls the heart rate as per doctors.  So, we will

discharge the patient to TCU to be followed, repeat lab in the morning, and

we will monitor her case for the next few days.



PHYSICAL EXAMINATION:

VITAL SIGNS:  Temperature 98, heart rate is 89, blood pressure 139/68,

respirations 18, and saturation 97%.

HEAD AND NECK:  Normal.  No JVD.  No thyromegaly.

CHEST:  Clear bilaterally.

CARDIAC:  First sound and second sound normal.

ABDOMEN:  Soft, obese, nontender.

EXTREMITIES:  No edema.

NEUROLOGIC:  Normal.



LABORATORY DATA:  Shows white count 15.7, hemoglobin 9, hematocrit 27,

platelets 394.  Chemistry was noted for sodium 136, potassium 3.7, chloride

101, bicarb 27, BUN 6, creatinine 0.9.  Liver function test is normal. 

Magnesium 1.5.



DISCHARGE DIAGNOSES:

1.  Recurrent upper gastrointestinal bleed.  Monitor hemoglobin and

hematocrit.

2.  Generalized weakness, instability.  We will monitor the patient in TCU

for physical therapy and monitor hemoglobin and hematocrit.

3.  History of paroxysmal atrial fibrillation.  Currently, she is stable,

in sinus rhythm, and no other symptoms.

4.  Status post mitral valve replacement, biologic valve.

5.  Hypercholesterolemia.

6.  Pseudogouty arthritis.



PLAN:  Discharge the patient to U.  Monitor H and H.  We will follow up

with the other consultants, GI and Cardiology.







__________________________________________

Philip Dinh MD



DD:  06/08/2018 8:57:50

DT:  06/08/2018 13:12:24

Job # [id number]

## 2018-06-10 VITALS — OXYGEN SATURATION: 91 %

## 2018-06-10 VITALS — TEMPERATURE: 98.4 F | RESPIRATION RATE: 18 BRPM

## 2018-06-10 LAB
ALBUMIN SERPL-MCNC: 3.5 G/DL (ref 3–4.8)
ALBUMIN/GLOB SERPL: 1.2 {RATIO} (ref 1.1–1.8)
ALT SERPL-CCNC: 19 U/L (ref 7–56)
AST SERPL-CCNC: 20 U/L (ref 14–36)
BASOPHILS # BLD AUTO: 0.04 K/MM3 (ref 0–2)
BASOPHILS NFR BLD: 0.4 % (ref 0–3)
BUN SERPL-MCNC: 7 MG/DL (ref 7–21)
CALCIUM SERPL-MCNC: 9.7 MG/DL (ref 8.4–10.5)
EOSINOPHIL # BLD: 0.4 10*3/UL (ref 0–0.7)
EOSINOPHIL NFR BLD: 3.5 % (ref 1.5–5)
ERYTHROCYTE [DISTWIDTH] IN BLOOD BY AUTOMATED COUNT: 15 % (ref 11.5–14.5)
GFR NON-AFRICAN AMERICAN: 56
GRANULOCYTES # BLD: 7.22 10*3/UL (ref 1.4–6.5)
GRANULOCYTES NFR BLD: 71.2 % (ref 50–68)
HGB BLD-MCNC: 9.5 G/DL (ref 12–16)
LYMPHOCYTES # BLD: 1.5 10*3/UL (ref 1.2–3.4)
LYMPHOCYTES NFR BLD AUTO: 14.5 % (ref 22–35)
MCH RBC QN AUTO: 30 PG (ref 25–35)
MCHC RBC AUTO-ENTMCNC: 32.8 G/DL (ref 31–37)
MCV RBC AUTO: 91.5 FL (ref 80–105)
MONOCYTES # BLD AUTO: 1.1 10*3/UL (ref 0.1–0.6)
MONOCYTES NFR BLD: 10.4 % (ref 1–6)
PLATELET # BLD: 439 10^3/UL (ref 120–450)
PMV BLD AUTO: 9.5 FL (ref 7–11)
RBC # BLD AUTO: 3.17 10^6/UL (ref 3.5–6.1)
WBC # BLD AUTO: 10.2 10^3/UL (ref 4.5–11)

## 2018-06-10 RX ADMIN — POTASSIUM CHLORIDE SCH MEQ: 10 TABLET, FILM COATED, EXTENDED RELEASE ORAL at 08:23

## 2018-06-10 RX ADMIN — PANTOPRAZOLE SODIUM SCH MG: 40 TABLET, DELAYED RELEASE ORAL at 05:13

## 2018-06-10 RX ADMIN — SUCRALFATE SCH GM: 1 SUSPENSION ORAL at 17:00

## 2018-06-10 RX ADMIN — BUDESONIDE SCH MG: 0.25 SUSPENSION RESPIRATORY (INHALATION) at 07:32

## 2018-06-10 RX ADMIN — SUCRALFATE SCH GM: 1 SUSPENSION ORAL at 05:13

## 2018-06-10 RX ADMIN — ARFORMOTEROL TARTRATE SCH MCG: 15 SOLUTION RESPIRATORY (INHALATION) at 21:54

## 2018-06-10 RX ADMIN — Medication SCH MG: at 09:28

## 2018-06-10 RX ADMIN — ARFORMOTEROL TARTRATE SCH MCG: 15 SOLUTION RESPIRATORY (INHALATION) at 07:32

## 2018-06-10 RX ADMIN — BUDESONIDE SCH MG: 0.25 SUSPENSION RESPIRATORY (INHALATION) at 21:54

## 2018-06-10 RX ADMIN — DILTIAZEM HYDROCHLORIDE SCH MG: 120 CAPSULE, COATED, EXTENDED RELEASE ORAL at 09:29

## 2018-06-10 RX ADMIN — Medication SCH MG: at 17:00

## 2018-06-10 RX ADMIN — PANTOPRAZOLE SODIUM SCH MG: 40 TABLET, DELAYED RELEASE ORAL at 17:00

## 2018-06-10 NOTE — CON
DATE:



HISTORY OF PRESENT ILLNESS:  The patient is a 62-year-old female who is

currently on _____ for continued medical therapy and physical rehab.  She

was referred to medical consultation for the patient's medical history. 

Psychiatry was consulted for anxiety.  I reviewed the patient's notes and

met with the patient at bedside.  The patient was not aware that

psychiatric consultation was called.  She denies any depression.  She does

indicate her mood has been up and down only because she is anxious to go

home, as she feels like it has been a very long hospitalization.  The

patient reports that she is frustrated and patient leaves, but she does

want to improve and she talks full about improvement in her medical status,

the patient indicates plans to cooperative with medical staff in this

regard.  She denies any hopelessness.  She denies any hallucination.  She

does not appear to be responding to internal stimuli.  Affect is generally

appropriately reactive.  Delusions were not elicited.  She denies having

any panic attacks.  Her speech is restless, but _____ whether she is in the

hospital or outside the hospital, and generally she is well oriented to

circumstances, and can communicate and express her needs well.  Insight and

judgment considered to be fair.



PSYCHIATRIC HISTORY:  The patient denies any psychiatric history including

psychiatric medication, suicide attempts, psychiatric outpatient followup

or inpatient hospitalizations.



SOCIAL HISTORY:  The patient was born was raised in [place].  She is

single.  She has no children.  She lives by herself.  She used to work as a

.  She denies any drug or alcohol use.



IMPRESSION:  Adjustment disorder with anxiety, although the patient appears

to be adjusting in the manner consistent with current circumstances.



RECOMMENDATIONS:  The patient generally feels like she feels as though she

is doing well emotionally.  She appears to be adjusting.  The patient

denies any major concerns psychiatrically.  She does indicate that she is

anxious, but only because she wants to be discharged, though she is going

to work with medical team to optimize her physical health before discharge.

She defers on any other psychiatric intervention including medications or

voice therapy and this provider does not believe that she is a danger to

herself or others, and does not require acute psychiatric interventions at

this time against will.  I will sign off at this time.  Please consult

p.r.n. if there are any new findings or indications for a psychiatric

consultation.





__________________________________________

Lucien Calle MD



DD:  06/09/2018 13:34:56

DT:  06/09/2018 23:08:17

[facility] # [id number]

## 2018-06-10 NOTE — PN
DATE:  06/09/2018



SUBJECTIVE:  She is stable.  No complaint.  Her hemoglobin is stable.  No

nausea.  No vomiting.  No other complaint.  We are still monitoring her

labs.  No rectal bleeding.  No black stools at this time.



PHYSICAL EXAMINATION:

VITAL SIGNS:  Temperature 98, heart rate is 65, blood pressure 122/81,

respirations 18, sat 96% on room air.

HEAD AND NECK:  Normal.  No JVD.  No thyromegaly.

CHEST:  Clear.  Good air entry.

CARDIAC:  First sound and second sound normal.

ABDOMEN:  Soft, nontender.

EXTREMITIES:  No edema.

NEUROLOGICAL:  Normal.



LABORATORY STUDY:  Sodium 137, potassium 4.2, chloride 100, bicarb 27, BUN

7, creatinine 0.9.  Liver function test is normal.  AST and ALT are normal.

Alk phos is normal.  CBC on 06/09/2018:  White count 9.4, hemoglobin 7.9,

hematocrit 24.1 and platelets are 381.



IMPRESSION AND PLAN:

1.  Acute gastrointestinal bleed, recurrent gastrointestinal bleed due to

peptic ulcer disease.  Continue Protonix 40 b.i.d.  Monitor H&H.  We will

repeat labs in the morning.  No active bleeding.  Her stool is normal

color, brown.  No black stool.  Continue monitor H&H.  Repeat labs in the

morning.

2.  Chronic obstructive pulmonary disease, hypertension,

hypercholesterolemia, paroxysmal atrial fibrillations.  We will get an EKG,

which shows sinus rhythm with premature atrial contractions.  Continue

monitoring.  Continue physical therapy.





__________________________________________

Philip Dinh MD





DD:  06/10/2018 14:31:06

DT:  06/10/2018 15:04:51

Job # [id number]

## 2018-06-10 NOTE — CP.PCM.PN
Subjective





- Date & Time of Evaluation


Date of Evaluation: 06/10/18


Time of Evaluation: 06:45





- Subjective


Subjective: 





Out of bed to chair,sleeping but easily awaken, no distress, feels better





Reason for consultation and follow up: Continuity of care in TCU, history of 

mitral valve repair, on atrial fibrillation and was on Eliquis, GI bleeding,

hypertension,CHF,COPD,hyperlipidemia,anemia, falls,cholecystectomy, ESBL in 

urine.





Seen and examined by me and Dr. Gaxiola





Objective





- Vital Signs/Intake and Output


Vital Signs (last 24 hours): 


 











Temp Pulse Resp BP Pulse Ox


 


 98.2 F   86   18   120/82   95 


 


 06/10/18 06:00  06/10/18 06:00  06/10/18 06:00  06/10/18 06:00  06/10/18 06:00











- Medications


Medications: 


 Current Medications





Arformoterol Tartrate (Brovana)  15 mcg IH [id number] [facility]


   Last Admin: 06/10/18 07:32 Dose:  15 mcg


Artificial Tears (Artificial Tears)  0 ml OU BID PRN


   PRN Reason: Dry eyes


   Last Admin: 06/08/18 04:28 Dose:  1 drop


Budesonide (Pulmicort Respules)  0.25 mg IH [id number] [facility]


   PRN Reason: Protocol


   Last Admin: 06/10/18 07:32 Dose:  0.25 mg


Clonazepam (Klonopin)  0.5 mg PO BID PRN; Protocol


   PRN Reason: Anxiety


   Stop: 06/14/18 11:18


Diltiazem HCl (Cardizem Cd)  120 mg PO DAILY [facility]


   PRN Reason: Protocol


   Last Admin: 06/09/18 10:18 Dose:  Not Given


Furosemide (Lasix)  40 mg PO DAILY [facility]


   Last Admin: 06/09/18 10:19 Dose:  40 mg


Iron Sucrose 100 mg/ Sodium (Chloride)  105 mls @ 210 mls/hr IV 0600 [facility]


   Last Admin: 06/10/18 05:10 Dose:  210 mls/hr


Levalbuterol HCl (Xopenex)  0.63 mg IH [id number] PRN; Protocol


   PRN Reason: Shortness of Breath


Levofloxacin (Levaquin)  500 mg PO DAILY [facility]


   PRN Reason: Protocol


Magnesium Oxide (Mag-Ox)  400 mg PO BID [facility]


   PRN Reason: Protocol


   Last Admin: 06/09/18 17:31 Dose:  400 mg


Metoprolol Tartrate (Lopressor)  50 mg PO BID [facility]


   PRN Reason: Protocol


   Last Admin: 06/09/18 17:31 Dose:  50 mg


Oxycodone/Acetaminophen (Percocet 5/325 Mg Tab)  1 tab PO Q6H PRN; Protocol


   PRN Reason: Pain, moderate (4-7)


   Stop: 06/10/18 14:28


   Last Admin: 06/09/18 21:13 Dose:  1 tab


Pantoprazole Sodium (Protonix Ec Tab)  40 mg PO 0600,1600 [facility]


   PRN Reason: Protocol


   Last Admin: 06/10/18 05:13 Dose:  40 mg


Potassium Chloride (Klor-Con 10)  10 meq PO 0800 [facility]


   Last Admin: 06/09/18 08:08 Dose:  10 meq


Sucralfate (Carafate Oral Susp)  1 gm PO 0600,1600 [facility]


   Last Admin: 06/10/18 05:13 Dose:  1 gm











- Labs


Labs: 


 





 06/09/18 12:30 





 06/09/18 12:30 











- Constitutional


Appears: No Acute Distress





- Head Exam


Head Exam: NORMOCEPHALIC





- Eye Exam


Eye Exam: Normal appearance





- ENT Exam


ENT Exam: Mucous Membranes Moist





- Respiratory Exam


Respiratory Exam: Clear to Ausculation Bilateral, NORMAL BREATHING PATTERN





- Cardiovascular Exam


Cardiovascular Exam: +S1, +S2





- GI/Abdominal Exam


GI & Abdominal Exam: Soft, Normal Bowel Sounds





- Extremities Exam


Extremities Exam: Normal Capillary Refill


Additional comments: 





denies knee pain, no swelling





- Neurological Exam


Neurological Exam: Alert, Awake, Oriented x3





- Psychiatric Exam


Psychiatric exam: Normal Affect, Normal Mood





- Skin


Skin Exam: Intact, Normal Color, Warm





Assessment and Plan





- Assessment and Plan (Free Text)


Assessment: 





A 62 year old female who initially came to ER due to tarry stools. Hemoglobin 

at that time was 6.7. She was transfused PRBC's and platelets. Endoscopy showed 

gastric peptic ulcer.  Stabilized GI bleeding.Stabilized hemoglobin. She also 

had complaints of knee swelling which Dr. Crabtree was consulted and steroid 

injection to both knees were done with resolution (gout). Urinary tract 

infection and given antibiotics at that time. Eliquis was discontinued and 

started on Heparin drip for atrial fibrillation. She was transferred to TCU for 

reconditioning. At TCU, no further episodes of tarry stools and Heparin was 

transitioned to Coumadin. Coumadin as we can monitor INR however had another 

episode of GI bleeding thus was sent to ER and subsequently to ICU. Transfused 

PRBC's in ICU, stabilized hemoglobin and hematocrit. Transferred again to TCU 

for reconditioning.History of Mitral valve repair,hypertension,atrial 

fibrillation,CHF,COPD,hyperlipidemia,anemia, falls,cholecystectomy, ESBL in 

urine.














Plan: 





Physical therapy in progress


Out of bed to chair


Cardiac status stable, 


Controlled heart and blood pressure


Hold any anticoagulation for now due to GI bleeding


Recommend ablation if atrial fibrillation reoccurs


Will follow up closely


Monitor for GI bleeding


On Iron IV 


Continue current medications


Continue current treatment


Discharge planning





Will follow up





Plan and treatment discussed with Dr. Gaxiola

## 2018-06-10 NOTE — CP.PCM.PN
Subjective





- Date & Time of Evaluation


Date of Evaluation: 06/10/18


Time of Evaluation: 11:20





- Subjective


Subjective: 





Comfortable, no dysuria, no nausea, no diarrhea.





Objective





- Vital Signs/Intake and Output


Vital Signs (last 24 hours): 


 











Temp Pulse Resp BP Pulse Ox


 


 98.2 F   86   18   120/82   95 


 


 06/10/18 06:00  06/10/18 06:00  06/10/18 06:00  06/10/18 06:00  06/10/18 06:00











- Medications


Medications: 


 Current Medications





Arformoterol Tartrate (Brovana)  15 mcg IH [id number] [facility]


   Last Admin: 06/10/18 07:32 Dose:  15 mcg


Artificial Tears (Artificial Tears)  0 ml OU BID PRN


   PRN Reason: Dry eyes


   Last Admin: 06/08/18 04:28 Dose:  1 drop


Budesonide (Pulmicort Respules)  0.25 mg IH [id number] [facility]


   PRN Reason: Protocol


   Last Admin: 06/10/18 07:32 Dose:  0.25 mg


Clonazepam (Klonopin)  0.5 mg PO BID PRN; Protocol


   PRN Reason: Anxiety


   Stop: 06/14/18 11:18


Diltiazem HCl (Cardizem Cd)  120 mg PO DAILY [facility]


   PRN Reason: Protocol


   Last Admin: 06/09/18 10:18 Dose:  Not Given


Furosemide (Lasix)  40 mg PO DAILY [facility]


   Last Admin: 06/09/18 10:19 Dose:  40 mg


Iron Sucrose 100 mg/ Sodium (Chloride)  105 mls @ 210 mls/hr IV 0600 [facility]


   Last Admin: 06/10/18 05:10 Dose:  210 mls/hr


Levalbuterol HCl (Xopenex)  0.63 mg IH [id number] PRN; Protocol


   PRN Reason: Shortness of Breath


Levofloxacin (Levaquin)  500 mg PO DAILY [facility]


   PRN Reason: Protocol


Magnesium Oxide (Mag-Ox)  400 mg PO BID [facility]


   PRN Reason: Protocol


   Last Admin: 06/09/18 17:31 Dose:  400 mg


Metoprolol Tartrate (Lopressor)  50 mg PO BID [facility]


   PRN Reason: Protocol


   Last Admin: 06/09/18 17:31 Dose:  50 mg


Oxycodone/Acetaminophen (Percocet 5/325 Mg Tab)  1 tab PO Q6H PRN; Protocol


   PRN Reason: Pain, moderate (4-7)


   Stop: 06/10/18 14:28


   Last Admin: 06/09/18 21:13 Dose:  1 tab


Pantoprazole Sodium (Protonix Ec Tab)  40 mg PO 0600,1600 [facility]


   PRN Reason: Protocol


   Last Admin: 06/10/18 05:13 Dose:  40 mg


Potassium Chloride (Klor-Con 10)  10 meq PO 0800 [facility]


   Last Admin: 06/09/18 08:08 Dose:  10 meq


Sucralfate (Carafate Oral Susp)  1 gm PO 0600,1600 [facility]


   Last Admin: 06/10/18 05:13 Dose:  1 gm











- Labs


Labs: 


 





 06/09/18 12:30 





 06/09/18 12:30 











- Constitutional


Appears: Chronically Ill





- Head Exam


Head Exam: NORMAL INSPECTION





- Respiratory Exam


Respiratory Exam: Decreased Breath Sounds





- Cardiovascular Exam


Cardiovascular Exam: +S1, +S2





- GI/Abdominal Exam


GI & Abdominal Exam: Soft.  absent: Tenderness





Assessment and Plan





- Assessment and Plan (Free Text)


Plan: 





Assessment


ESBL Klebsiella complicated UTI with Tee catheter - now the Klebsiella is 

sensitive to Quinolones


GI bleeding


chronic CHF


severe mitral regurgitation S/P mitral valve replacement


atrial fibrillation


asthma


HTN


esophageal ulcers


carpal tunnel syndrome


COPD


dyslipidemia


atrial fibrillation


S/P cholecystectomy


S/P foot surgery





Plan


QTc is less than 500 ms - continue PO Levaquin (day 6 total) to complete 5-7 

days total antibiotics - will d/c after today


will continue to monitor clinically

## 2018-06-10 NOTE — PN
DATE:  06/10/2018



This is [facility]'s [facility] visit on TCU.



For Dr. Alatorre.



SUBJECTIVE:  The patient is a 62-year-old female, now off all

anticoagulants as per Dr. Gaxiola's recommendations, so that her ulcer will

have time to heal as she is in a controlled environment, we are watching

for untoward activity.  The patient's EKG was recently done, read as normal

sinus with APCs with the patient known to have an antral ulcer that was

healing with initial GI bleed requiring 2 units of packed cells, then the

patient again had to be readmitted to the intensive care unit for drop in

her hemoglobin, two additional units of packed cells transfused.  After

low-flow heparin was discontinued in favor of p.o. Coumadin with the

patient now resting comfortably with an improvement of her hemoglobin from

7.9 yesterday to 9.5 value today after Aranesp and IV iron on a daily basis

were recommended by Dr. Wisdom with good effect.  The patient is otherwise

sitting up with the family at the bedside, feeling much betters she reports

with her pain also improved.



PHYSICAL EXAMINATION:

VITAL SIGNS:  Temperature 98.3, pulse 85, respirations 20, blood pressure

117/77, pulse ox 91%.

HEENT:  Unremarkable.  Tongue is moist and midline.

NECK:  Supple.

HEART:  Regular rate.  Occasional ectopic beat.

LUNGS:  Clear.

ABDOMEN:  Soft, nontender.

EXTREMITIES:  No edema.

SKIN:  Warm and dry.

NEUROLOGIC:  Awake, alert, and oriented x3.



LABORATORY DATA:  Patient's labs were done.  White blood cell count of

10.2, hemoglobin of 9.5 up from 7.9 yesterday, hematocrit of 29, platelet

count of 439,000 with a normal chem metabolic panel.



Patient's EKG was done yesterday, it was read as sinus rhythm, APCs,

nonspecific ST changes, prolonged QTc, ST-T wave changes consistent with

ischemia with no change.



ASSESSMENT:  Gastrointestinal bleed; antral ulcer; status post symptomatic

anemia with transfusion of 5 units of packed cells; history of paroxysmal

atrial fibrillation, off Eliquis, off Coumadin, off aspirin for now;

intractable pain of degenerative disk disease, bioprosthetic valve and

mitral valve regurgitation, atherosclerotic cardiovascular disease, chronic

obstructive pulmonary disease, gouty arthritis, hypertension, and

deconditioning.



PLAN:  Continue present medical regimen with IV Venofer day #2 with

monitoring clinically and with labs.  The prognosis for this patient is

guarded.



This is a complex patient with a comprehensive medically necessary and

appropriate visit carried out in excess of 20 minutes face-to-face time

with patient, and her questions and questions of family member were

answered to their satisfaction.







__________________________________________

Jah Wright MD



DD:  06/10/2018 14:56:15

DT:  06/10/2018 17:29:48

Job # [id number]

## 2018-06-10 NOTE — CARD
--------------- APPROVED REPORT --------------





EKG Measurement

Heart [id number]

AR [id number]

[id number]

[id number]

[id number]



<Conclusion>

Sinus rhythm

APCs

NSSTW changes

Prolonged QTc

STTW changes c/w ischemia

No change

## 2018-06-10 NOTE — PN
DATE:  06/10/2018



SUBJECTIVE:  This patient was seen and evaluated earlier today.  Patient,

on examination, is afebrile.  Patient has no complaints of abdominal pain,

tolerating the diet, has normal bowel movements, no bleeding.



PHYSICAL EXAMINATION:

VITAL SIGNS:  Temperature is 98.4, blood pressure 132/82, pulse is 94,

respirations 18.

HEENT:  Atraumatic and anicteric.

NECK:  Supple.

HEART:  S1 and S2 heard.

LUNGS:  Bilateral air entry present.

ABDOMEN:  Soft.  There is no tenderness.

NEUROLOGIC:  Alert and oriented.



LABORATORY DATA:  Hemoglobin 9.5, hematocrit 29, WBC 10.2, platelets 459. 

Chemistry is essentially unremarkable.



IMPRESSION AND PLAN:

1.  Gastric ulcer, has had recurrent GI bleeding, now off the

anticoagulation.  Patient was admitted twice in the Intensive Care Unit

with a significant bleed.  Five units of packed red blood cells transfused

and 3 units of fresh frozen plasma.  The patient is concerned about

recurrence of the bleeding on anticoagulation.

2.  Atrial fibrillation, history of mitral valve replacement.

3.  History of gouty arthritis.



RECOMMENDATIONS:

1.  The patient is on Protonix twice daily and Carafate.  We will continue

that with a close followup of the hemoglobin and hematocrit.

2.  The patient will need a repeat endoscopy in about 2 months or 6 to 8

weeks' time.

3.  Patient's anticoagulation - do not hold.  Patient is being followed by

Cardiology closely.  Further intervention for AFib will be as per the

Cardiology.

4.  Patient had a ESBL UTI before, on _____ and completed the antibiotic

course as per ID.  Continue to closely follow up her care and suggest

further management based on the clinical course.  The importance of the GI

followup has been explained to the patient at length.







__________________________________________

Jonathan Frost MD



DD:  06/10/2018 17:19:02

DT:  06/10/2018 21:02:08

Job # [id number]

## 2018-06-11 VITALS — SYSTOLIC BLOOD PRESSURE: 125 MMHG | HEART RATE: 82 BPM | DIASTOLIC BLOOD PRESSURE: 70 MMHG

## 2018-06-11 LAB
BUN SERPL-MCNC: 9 MG/DL (ref 7–21)
CALCIUM SERPL-MCNC: 9.4 MG/DL (ref 8.4–10.5)
ERYTHROCYTE [DISTWIDTH] IN BLOOD BY AUTOMATED COUNT: 15.2 % (ref 11.5–14.5)
GFR NON-AFRICAN AMERICAN: 45
HGB BLD-MCNC: 8.9 G/DL (ref 12–16)
MCH RBC QN AUTO: 29.5 PG (ref 25–35)
MCHC RBC AUTO-ENTMCNC: 32.2 G/DL (ref 31–37)
MCV RBC AUTO: 91.4 FL (ref 80–105)
PLATELET # BLD: 420 10^3/UL (ref 120–450)
PMV BLD AUTO: 9.8 FL (ref 7–11)
RBC # BLD AUTO: 3.02 10^6/UL (ref 3.5–6.1)
WBC # BLD AUTO: 10.4 10^3/UL (ref 4.5–11)

## 2018-06-11 RX ADMIN — POTASSIUM CHLORIDE SCH MEQ: 10 TABLET, FILM COATED, EXTENDED RELEASE ORAL at 07:54

## 2018-06-11 RX ADMIN — BUDESONIDE SCH MG: 0.25 SUSPENSION RESPIRATORY (INHALATION) at 07:31

## 2018-06-11 RX ADMIN — ARFORMOTEROL TARTRATE SCH MCG: 15 SOLUTION RESPIRATORY (INHALATION) at 07:31

## 2018-06-11 RX ADMIN — SUCRALFATE SCH GM: 1 SUSPENSION ORAL at 05:10

## 2018-06-11 RX ADMIN — DILTIAZEM HYDROCHLORIDE SCH MG: 120 CAPSULE, COATED, EXTENDED RELEASE ORAL at 09:18

## 2018-06-11 RX ADMIN — PANTOPRAZOLE SODIUM SCH MG: 40 TABLET, DELAYED RELEASE ORAL at 05:10

## 2018-06-11 RX ADMIN — Medication SCH MG: at 09:19

## 2018-06-11 NOTE — CP.PCM.PN
Subjective





- Date & Time of Evaluation


Date of Evaluation: 06/11/18


Time of Evaluation: 06:35





- Subjective


Subjective: 





Awake, denies chest pain or shortness of breath, no distress, feels better





Reason for consultation and follow up: Continuity of care in TCU, history of 

mitral valve repair, on atrial fibrillation and was on Eliquis, GI bleeding,

hypertension,CHF,COPD,hyperlipidemia,anemia, falls,cholecystectomy, ESBL in 

urine.





Seen and examined by me and Dr. Gaxiola








Objective





- Vital Signs/Intake and Output


Vital Signs (last 24 hours): 


 











Temp Pulse Resp BP Pulse Ox


 


 98.4 F   94 H  18   132/82   91 L


 


 06/10/18 16:25  06/10/18 17:00  06/10/18 16:25  06/10/18 17:00  06/10/18 13:28











- Medications


Medications: 


 Current Medications





Arformoterol Tartrate (Brovana)  15 mcg IH [id number] [facility]


   Last Admin: 06/10/18 21:54 Dose:  15 mcg


Artificial Tears (Artificial Tears)  0 ml OU BID PRN


   PRN Reason: Dry eyes


   Last Admin: 06/08/18 04:28 Dose:  1 drop


Budesonide (Pulmicort Respules)  0.25 mg IH [id number] [facility]


   PRN Reason: Protocol


   Last Admin: 06/10/18 21:54 Dose:  0.25 mg


Clonazepam (Klonopin)  0.5 mg PO BID PRN; Protocol


   PRN Reason: Anxiety


   Stop: 06/14/18 11:18


Diltiazem HCl (Cardizem Cd)  120 mg PO DAILY [facility]


   PRN Reason: Protocol


   Last Admin: 06/10/18 09:29 Dose:  120 mg


Furosemide (Lasix)  40 mg PO DAILY [facility]


   Last Admin: 06/10/18 09:29 Dose:  40 mg


Iron Sucrose 100 mg/ Sodium (Chloride)  105 mls @ 210 mls/hr IV 0600 [facility]


   Last Admin: 06/11/18 05:10 Dose:  210 mls/hr


Levalbuterol HCl (Xopenex)  0.63 mg IH [id number] PRN; Protocol


   PRN Reason: Shortness of Breath


Levofloxacin (Levaquin)  500 mg PO DAILY [facility]


   PRN Reason: Protocol


   Last Admin: 06/10/18 09:28 Dose:  500 mg


Magnesium Oxide (Mag-Ox)  400 mg PO BID [facility]


   PRN Reason: Protocol


   Last Admin: 06/10/18 17:00 Dose:  400 mg


Metoprolol Tartrate (Lopressor)  50 mg PO BID [facility]


   PRN Reason: Protocol


   Last Admin: 06/10/18 17:00 Dose:  50 mg


Oxycodone/Acetaminophen (Percocet 5/325 Mg Tab)  1 tab PO Q6H PRN


   PRN Reason: moderate pain - 4-7


   Stop: 06/13/18 19:47


   Last Admin: 06/10/18 21:11 Dose:  1 tab


Pantoprazole Sodium (Protonix Ec Tab)  40 mg PO 0600,1600 [facility]


   PRN Reason: Protocol


   Last Admin: 06/11/18 05:10 Dose:  40 mg


Potassium Chloride (Klor-Con 10)  10 meq PO 0800 [facility]


   Last Admin: 06/10/18 08:23 Dose:  10 meq


Sucralfate (Carafate Oral Susp)  1 gm PO 0600,1600 [facility]


   Last Admin: 06/11/18 05:10 Dose:  1 gm











- Labs


Labs: 


 





 06/10/18 09:11 





 06/10/18 09:11 











- Constitutional


Appears: No Acute Distress





- Head Exam


Head Exam: NORMOCEPHALIC





- Eye Exam


Eye Exam: Normal appearance





- ENT Exam


ENT Exam: Mucous Membranes Moist





- Respiratory Exam


Respiratory Exam: Decreased Breath Sounds, NORMAL BREATHING PATTERN





- Cardiovascular Exam


Cardiovascular Exam: +S1, +S2





- GI/Abdominal Exam


GI & Abdominal Exam: Soft, Normal Bowel Sounds





- Extremities Exam


Extremities Exam: Normal Capillary Refill


Additional comments: 





denies knee pain,no swelling





- Neurological Exam


Neurological Exam: Alert, Awake, Oriented x3





- Psychiatric Exam


Psychiatric exam: Normal Affect, Normal Mood





- Skin


Skin Exam: Intact, Normal Color, Warm





Assessment and Plan





- Assessment and Plan (Free Text)


Assessment: 





A 62 year old female who initially came to ER due to tarry stools. Hemoglobin 

at that time was 6.7. She was transfused PRBC's and platelets. Endoscopy showed 

gastric peptic ulcer.  Stabilized GI bleeding.Stabilized hemoglobin. She also 

had complaints of knee swelling which Dr. Crabtree was consulted and steroid 

injection to both knees were done with resolution (gout). Urinary tract 

infection and given antibiotics at that time. Eliquis was discontinued and 

started on Heparin drip for atrial fibrillation. She was transferred to TCU for 

reconditioning. At TCU, no further episodes of tarry stools and Heparin was 

transitioned to Coumadin. Coumadin as we can monitor INR however had another 

episode of GI bleeding thus was sent to ER and subsequently to ICU. Transfused 

PRBC's in ICU, stabilized hemoglobin and hematocrit. Transferred again to TCU 

for reconditioning.History of Mitral valve repair,hypertension,atrial 

fibrillation,CHF,COPD,hyperlipidemia,anemia, falls,cholecystectomy, ESBL in 

urine (Levaquin)

















Plan: 





Cardiac status stable, 


Controlled heart and blood pressure


Clinically improved


Last dose of Levaquin today (UTI)


Physical therapy in progress


Hold any anticoagulation for now due to GI bleeding


Recommend ablation if atrial fibrillation reoccurs


Will follow up closely


Monitor for GI bleeding


On Iron IV 


Continue current medications


Continue current treatment


Discharge planning


Follow up in office 2-3 in  weeks





Will follow up





Plan and treatment discussed with Dr. Gaxiola

## 2018-06-11 NOTE — CP.PCM.PN
Subjective





- Date & Time of Evaluation


Date of Evaluation: 06/11/18


Time of Evaluation: 08:11





- Subjective


Subjective: 





Hematology-Oncology Progress note for Dr. Alatorre





Patient seen and examined at bedside talking on the phone. Nursing reports no 

acute events overnight. Patient had a BM yesterday with no reena blood/melena. 

Patient denied acute complaints of headache, chest pain, SOB, abd pain, pain in 

her legs. She is eager to be discharged home. 





Objective





- Vital Signs/Intake and Output


Vital Signs (last 24 hours): 


 











Temp Pulse Resp BP Pulse Ox


 


 98.4 F   94 H  18   132/82   91 L


 


 06/10/18 16:25  06/10/18 17:00  06/10/18 16:25  06/10/18 17:00  06/10/18 13:28











- Medications


Medications: 


 Current Medications





Arformoterol Tartrate (Brovana)  15 mcg IH [id number] [facility]


   Last Admin: 06/11/18 07:31 Dose:  15 mcg


Artificial Tears (Artificial Tears)  0 ml OU BID PRN


   PRN Reason: Dry eyes


   Last Admin: 06/08/18 04:28 Dose:  1 drop


Budesonide (Pulmicort Respules)  0.25 mg IH [id number] [facility]


   PRN Reason: Protocol


   Last Admin: 06/11/18 07:31 Dose:  0.25 mg


Clonazepam (Klonopin)  0.5 mg PO BID PRN; Protocol


   PRN Reason: Anxiety


   Stop: 06/14/18 11:18


Diltiazem HCl (Cardizem Cd)  120 mg PO DAILY [facility]


   PRN Reason: Protocol


   Last Admin: 06/10/18 09:29 Dose:  120 mg


Furosemide (Lasix)  40 mg PO DAILY [facility]


   Last Admin: 06/10/18 09:29 Dose:  40 mg


Iron Sucrose 100 mg/ Sodium (Chloride)  105 mls @ 210 mls/hr IV 0600 [facility]


   Last Admin: 06/11/18 05:10 Dose:  210 mls/hr


Levalbuterol HCl (Xopenex)  0.63 mg IH [id number] PRN; Protocol


   PRN Reason: Shortness of Breath


Levofloxacin (Levaquin)  500 mg PO DAILY [facility]


   PRN Reason: Protocol


   Last Admin: 06/10/18 09:28 Dose:  500 mg


Magnesium Oxide (Mag-Ox)  400 mg PO BID [facility]


   PRN Reason: Protocol


   Last Admin: 06/10/18 17:00 Dose:  400 mg


Metoprolol Tartrate (Lopressor)  50 mg PO BID [facility]


   PRN Reason: Protocol


   Last Admin: 06/10/18 17:00 Dose:  50 mg


Oxycodone/Acetaminophen (Percocet 5/325 Mg Tab)  1 tab PO Q6H PRN


   PRN Reason: moderate pain - 4-7


   Stop: 06/13/18 19:47


   Last Admin: 06/10/18 21:11 Dose:  1 tab


Pantoprazole Sodium (Protonix Ec Tab)  40 mg PO 0600,1600 [facility]


   PRN Reason: Protocol


   Last Admin: 06/11/18 05:10 Dose:  40 mg


Potassium Chloride (Klor-Con 10)  10 meq PO 0800 [facility]


   Last Admin: 06/11/18 07:54 Dose:  10 meq


Sucralfate (Carafate Oral Susp)  1 gm PO 0600,1600 [facility]


   Last Admin: 06/11/18 05:10 Dose:  1 gm











- Labs


Labs: 


 





 06/10/18 09:11 





 06/11/18 06:20 











- Constitutional


Appears: Non-toxic, No Acute Distress





- Head Exam


Head Exam: ATRAUMATIC, NORMAL INSPECTION, NORMOCEPHALIC





- Eye Exam


Eye Exam: EOMI, Normal appearance, PERRL.  absent: Conjunctival injection, 

Scleral icterus


Pupil Exam: NORMAL ACCOMODATION, PERRL





- ENT Exam


ENT Exam: Mucous Membranes Moist





- Neck Exam


Neck Exam: Full ROM, Normal Inspection





- Respiratory Exam


Respiratory Exam: Clear to Ausculation Bilateral.  absent: Accessory Muscle Use

, Rales, Rhonchi, Wheezes, Respiratory Distress





- Cardiovascular Exam


Cardiovascular Exam: +S1, +S2





- Rectal Exam


Rectal Exam: Deferred





- Extremities Exam


Extremities Exam: Normal Inspection





- Neurological Exam


Neurological Exam: Alert, Awake, Oriented x3





- Psychiatric Exam


Psychiatric exam: Normal Affect, Normal Mood





- Skin


Skin Exam: Dry, Intact





Assessment and Plan





- Assessment and Plan (Free Text)


Assessment: 





63yo female PMHx A-fib on coumadin, mitral valve repair (severe mitral 

regurgitation s/p open heart surgery and repair with MVR biprosthetic), CAD, 

COPD was transferred from TCU to MICU for rectal bleed on 6/2. Patient is s/p 

2U PRBC, 1U FFP. s/p EGD showing 1cm nonbleeding ulcer ([place] Class III). 

Patient currently seen back in TCU. H&H stable. Anticoag discontinued as per 

cardio and ASA on hold for 1 month. Patient is not a candidate for 

anticoagulation and recommended for radiofrequency ablation at [facility] if Afib reoccurs. GI Dr. Frost on board- appreciate reccs. Cardio Dr. Gaxiola on board- appreciate reccs. Iron panel low- continue IV iron. Continue PPI 

therapy. Continue PT. VS and Labs reviewed. Continue management as per primary.





Discussed with Dr. Landry Stacy PGY2

## 2018-06-11 NOTE — PN
DATE:  06/09/2018



SUBJECTIVE:  This patient was seen and evaluated earlier today.  No

bleeding per rectum.  The patient is off the anticoagulation.  _____ any

abdominal pain.



PHYSICAL EXAMINATION

VITAL SIGNS:  Stable.

ABDOMEN:  Soft, but there is _____.

HEART:  S1 and S2 heard.

LUNGS:  Bilateral air entry present.



LABORATORY DATA:  Previous labs reviewed.



IMPRESSION:

1.  Status post gastrointestinal bleeding twice, the patient was in the ICU

with gastrointestinal bleeding.  The patient has large ulcer in the antrum.

_____ endoscopy showed _____.  The patient is on high-dose proton pump

inhibitors.

2.  Atrial fibrillation.

3.  Mitral valve replacement, bioprosthetic valve _____.

4.  Gouty arthritis.



RECOMMENDATION:  The patient had twice significant bleeding.  The patient

needs to be readmitted _____ to the ICU.  Cardiology note was reviewed. 

The plan is for updation of therapy for AFib and also being considered for

WATCHMAN procedure.  At the present, the Cardiology and also the patient is

reluctant to have _____ renewed _____.  Cardiology note reviewed.  As per

Cardiology, the patient first should be off the anticoagulation for a short

period of time and _____.  The patient is to follow up with the office.





__________________________________________

Jonathan Frost MD



DD:  06/10/2018 1:24:56

DT:  06/10/2018 6:04:42

Job # [id number]

## 2018-06-11 NOTE — HP
DATE OF EXAM:  06/08/2018



The patient was admitted for medical management, monitoring her H and H,

and physical therapy, status post acute GI bleed.



HISTORY OF PRESENT ILLNESS:  This is a 62-year-old female who has a large

peptic ulcer.  She was started on heparin and Coumadin while was in the

previous admissions, developed bleeding and stayed in the unit and after

the unit, the patient was re-scoped again.  There was no active bleeding

and monitored her H and H.  Discussion with Dr. Gaxiola.  We will hold off on

any anticoagulation.  She does have paroxysmal atrial fibrillation.  We

will hold off on aspirin and monitor her condition clinically.  Risk of

bleeding outweighs the risk of stroke and embolizations.  We will keep

monitoring her conditions.  The patient has no other complaints.  She is

just anxious, worrying about that, otherwise, stable.



PAST MEDICAL HISTORY:  Mitral valve replacement, hypertension, COPD, peptic

ulcer, anemia requiring transfusions, pseudogout arthritis, chronic

osteoarthritis, and chronic back pain.



ALLERGIES:  CINNAMON.



SOCIAL HISTORY:  No smoking.  No drinking.  She lives by herself.



FAMILY HISTORY:  Noncontributory.



REVIEW OF SYSTEMS:  As in the present illness.  She does complain always of

joint pain, back pain, and pseudogout arthritis, otherwise negative.



LABORATORY DATA:  Her laboratory study shows the following:  On 06/08/2018,

white count 14.6, hemoglobin 8.6, hematocrit 26.5, platelets 397. 

Chemistry:  Sodium 137, potassium 3.6, chloride 100, bicarbonate 26, BUN 7,

creatinine 0.9.  Liver function test is normal.



IMPRESSION AND PLAN:  A 62-year-old female status post gastrointestinal

bleeding, transferred from the unit for medical management in TCU.  We will

admit the patient for medical management, monitoring her hemoglobin and

hematocrit.  We will also get physical therapy, resume her medications, and

we will get the cardiology consult to evaluate her cardiac status and

monitor her hemoglobin and hematocrit.  We will continue and resume all her

medications and we will follow up clinically.







__________________________________________

Philip Dinh MD



DD:  06/10/2018 14:27:42

DT:  06/10/2018 23:27:18

Job # [id number]

## 2018-06-11 NOTE — CP.PCM.PN
Subjective





- Date & Time of Evaluation


Date of Evaluation: 06/11/18


Time of Evaluation: 11:25





- Subjective


Subjective: 





Comfortable, no fevers, no diarrhea.





Objective





- Vital Signs/Intake and Output


Vital Signs (last 24 hours): 


 











Temp Pulse Resp BP Pulse Ox


 


 98.4 F   82   18   125/70   91 L


 


 06/10/18 16:25  06/11/18 09:18  06/10/18 16:25  06/11/18 09:18  06/10/18 13:28











- Medications


Medications: 


 Current Medications





Arformoterol Tartrate (Brovana)  15 mcg IH [id number] [facility]


   Last Admin: 06/11/18 07:31 Dose:  15 mcg


Artificial Tears (Artificial Tears)  0 ml OU BID PRN


   PRN Reason: Dry eyes


   Last Admin: 06/08/18 04:28 Dose:  1 drop


Budesonide (Pulmicort Respules)  0.25 mg IH [id number] [facility]


   PRN Reason: Protocol


   Last Admin: 06/11/18 07:31 Dose:  0.25 mg


Clonazepam (Klonopin)  0.5 mg PO BID PRN; Protocol


   PRN Reason: Anxiety


   Stop: 06/14/18 11:18


Diltiazem HCl (Cardizem Cd)  120 mg PO DAILY [facility]


   PRN Reason: Protocol


   Last Admin: 06/11/18 09:18 Dose:  120 mg


Furosemide (Lasix)  40 mg PO DAILY [facility]


   Last Admin: 06/11/18 09:18 Dose:  40 mg


Iron Sucrose 100 mg/ Sodium (Chloride)  105 mls @ 210 mls/hr IV 0600 [facility]


   Last Admin: 06/11/18 05:10 Dose:  210 mls/hr


Levalbuterol HCl (Xopenex)  0.63 mg IH [id number] PRN; Protocol


   PRN Reason: Shortness of Breath


Magnesium Oxide (Mag-Ox)  400 mg PO BID [facility]


   PRN Reason: Protocol


   Last Admin: 06/11/18 09:19 Dose:  400 mg


Metoprolol Tartrate (Lopressor)  50 mg PO BID [facility]


   PRN Reason: Protocol


   Last Admin: 06/11/18 09:18 Dose:  50 mg


Oxycodone/Acetaminophen (Percocet 5/325 Mg Tab)  1 tab PO Q6H PRN


   PRN Reason: moderate pain - 4-7


   Stop: 06/13/18 19:47


   Last Admin: 06/10/18 21:11 Dose:  1 tab


Pantoprazole Sodium (Protonix Ec Tab)  40 mg PO 0600,1600 [facility]


   PRN Reason: Protocol


   Last Admin: 06/11/18 05:10 Dose:  40 mg


Potassium Chloride (Klor-Con 10)  10 meq PO 0800 [facility]


   Last Admin: 06/11/18 07:54 Dose:  10 meq


Sucralfate (Carafate Oral Susp)  1 gm PO 0600,1600 [facility]


   Last Admin: 06/11/18 05:10 Dose:  1 gm











- Labs


Labs: 


 





 06/11/18 08:34 





 06/11/18 06:20 











- Constitutional


Appears: Chronically Ill





- Respiratory Exam


Respiratory Exam: Decreased Breath Sounds





- Cardiovascular Exam


Cardiovascular Exam: +S1, +S2





- GI/Abdominal Exam


GI & Abdominal Exam: Soft.  absent: Tenderness





Assessment and Plan





- Assessment and Plan (Free Text)


Plan: 





Assessment


S/P ESBL Klebsiella complicated UTI with Tee catheter - now the Klebsiella is 

sensitive to Quinolones


GI bleeding


chronic CHF


severe mitral regurgitation S/P mitral valve replacement


atrial fibrillation


asthma


HTN


esophageal ulcers


carpal tunnel syndrome


COPD


dyslipidemia


atrial fibrillation


S/P cholecystectomy


S/P foot surgery





Plan


completed 6 days of Levaquin and repeat urine cx are negative - will continue 

to monitor off antibiotics since she is at risk for nosocomial infections

## 2018-06-12 NOTE — PN
DATE:  06/11/2018



PULMONARY PROGRESS NOTE



REFERRING PHYSICIAN:  Philip Dinh MD.



SUBJECTIVE:  She is out of bed to chair.  Night was unremarkable.  Feels

better.  No headache.  No rhinitis.  Gets short of breath with exertion. 

No chest pain.  No nausea.  No vomiting.  No diarrhea.  No melena.



OBJECTIVE:

GENERAL:  In no acute distress.

VITAL SIGNS:  Temp is 98, heart rate is 82, respiratory rate is 20, blood

pressure 125/70.

HEENT:  Moist mucous membrane.  Crowded airway.

NECK:  Supple.  No JVD.

LUNGS:  Have a fair airflow with rhonchi.

HEART:  S1 and S2.

ABDOMEN:  Soft, nontender.  No organomegaly.

EXTREMITIES:  No edema.

NEUROLOGICAL:  Awake and alert.  Follows simple command.



MEDICATIONS:  Reviewed and noted.  No new changes in medications reported

since yesterday.



LABORATORY DATA:  Reviewed and shows hemoglobin 8.9, hematocrit 27.6, WBC

10.4, platelet is 420.  Sodium 138, potassium 4, chloride 100, bicarbonate

28, BUN 9, creatinine 1.2, glucose 105, calcium is 9.5.



IMPRESSION AND PLAN:  Status post gastrointestinal bleed with recurrent

gastric ulcer, status post EGD showing nonbleeding ulcers, atrial

fibrillation, coronary artery disease, chronic obstructive lung disease,

suspected sleep apnea syndrome, urinary tract infection, severe anemia. 

Pulmonary point of view, she is doing well.  Continue therapy.  Will need

full pulmonary function test, attended sleep study upon discharge.  Follow

up H&H.



Thank you and we will follow with you.





__________________________________________

Aajy Wisdom MD





DD:  06/11/2018 22:08:55

DT:  06/11/2018 22:10:18

Job # [id number]

## 2018-06-22 ENCOUNTER — HOSPITAL ENCOUNTER (EMERGENCY)
Dept: HOSPITAL 42 - ED | Age: 63
Discharge: HOME | End: 2018-06-22
Payer: MEDICARE

## 2018-06-22 VITALS — BODY MASS INDEX: 25.3 KG/M2

## 2018-06-22 VITALS — TEMPERATURE: 98.2 F

## 2018-06-22 VITALS
HEART RATE: 85 BPM | OXYGEN SATURATION: 97 % | SYSTOLIC BLOOD PRESSURE: 124 MMHG | RESPIRATION RATE: 18 BRPM | DIASTOLIC BLOOD PRESSURE: 80 MMHG

## 2018-06-22 DIAGNOSIS — J44.9: ICD-10-CM

## 2018-06-22 DIAGNOSIS — I25.10: ICD-10-CM

## 2018-06-22 DIAGNOSIS — I10: ICD-10-CM

## 2018-06-22 DIAGNOSIS — I50.9: ICD-10-CM

## 2018-06-22 DIAGNOSIS — I48.0: ICD-10-CM

## 2018-06-22 DIAGNOSIS — M10.9: Primary | ICD-10-CM

## 2018-06-22 LAB
ALBUMIN SERPL-MCNC: 3.8 G/DL (ref 3–4.8)
ALBUMIN/GLOB SERPL: 1.1 {RATIO} (ref 1.1–1.8)
ALT SERPL-CCNC: 18 U/L (ref 7–56)
APTT BLD: 31.3 SECONDS (ref 25.1–36.5)
AST SERPL-CCNC: 23 U/L (ref 14–36)
BASOPHILS # BLD AUTO: 0.04 K/MM3 (ref 0–2)
BASOPHILS NFR BLD: 0.5 % (ref 0–3)
BUN SERPL-MCNC: 12 MG/DL (ref 7–21)
CALCIUM SERPL-MCNC: 9.2 MG/DL (ref 8.4–10.5)
EOSINOPHIL # BLD: 1.1 10*3/UL (ref 0–0.7)
EOSINOPHIL NFR BLD: 13.3 % (ref 1.5–5)
ERYTHROCYTE [DISTWIDTH] IN BLOOD BY AUTOMATED COUNT: 14.7 % (ref 11.5–14.5)
GFR NON-AFRICAN AMERICAN: 56
GRANULOCYTES # BLD: 4.47 10*3/UL (ref 1.4–6.5)
GRANULOCYTES NFR BLD: 54 % (ref 50–68)
HGB BLD-MCNC: 10.3 G/DL (ref 12–16)
INR PPP: 1.06 (ref 0.93–1.08)
LYMPHOCYTES # BLD: 1.9 10*3/UL (ref 1.2–3.4)
LYMPHOCYTES NFR BLD AUTO: 22.6 % (ref 22–35)
MCH RBC QN AUTO: 29.3 PG (ref 25–35)
MCHC RBC AUTO-ENTMCNC: 33.1 G/DL (ref 31–37)
MCV RBC AUTO: 88.6 FL (ref 80–105)
MONOCYTES # BLD AUTO: 0.8 10*3/UL (ref 0.1–0.6)
MONOCYTES NFR BLD: 9.6 % (ref 1–6)
PLATELET # BLD: 321 10^3/UL (ref 120–450)
PMV BLD AUTO: 9 FL (ref 7–11)
PROTHROMBIN TIME: 12.1 SECONDS (ref 9.4–12.5)
RBC # BLD AUTO: 3.51 10^6/UL (ref 3.5–6.1)
WBC # BLD AUTO: 8.3 10^3/UL (ref 4.5–11)

## 2018-06-22 NOTE — US
PROCEDURE:  Left upper extremity venous ultrasound



HISTORY:

Arm pain and swelling. Evaluate for deep venous thrombosis.



PHYSICIAN(S):  Carlton Caceres MD.



FINDINGS:

The visualized leftinternal jugular vein is small in caliber but 

otherwise sonographically normal and compressible. No evidence of 

obstruction or thrombus is seen. 



The visualized segments of the left subclavian vein are patent with 

normal waveforms. No sonographic evidence of obstruction or 

thrombosis is seen. 



The visualized deep venous system of the proximal leftupper extremity 

is sonographically normal and compressible.



IMPRESSION:

1. No sonographic evidence for deep venous thrombosis in the 

visualized segments of the left upper extremity.

## 2018-06-22 NOTE — RAD
PROCEDURE:  Radiographs of the Left Forearm 



HISTORY:

Swelling







COMPARISON:

None available.



TECHNIQUE:

Frontal and lateral views obtained. 



FINDINGS:



BONES:

No fracture or destructive lesion.



JOINT SPACES:

Unremarkable.



OTHER FINDINGS:

None.



IMPRESSION:

Unremarkable radiographs of the left forearm.

## 2018-06-22 NOTE — RAD
PROCEDURE:  Left Hand Radiographs.



HISTORY:

Swelling Distal Tip #2// PIP, DIP #3//Dorsum of Ha  



COMPARISON:

None.



FINDINGS:



BONES:

No acute fracture. 



JOINTS:

Joint space narrowing. 



SOFT TISSUES:

Normal. 



OTHER FINDINGS:

None.



IMPRESSION:

No demonstrated fracture or dislocation.

## 2018-06-22 NOTE — ED PDOC
Arrival/HPI





- General


Chief Complaint: Upper Extremity Problem/Injury


Time Seen by Provider: 06/22/18 12:41


Historian: Patient





- History of Present Illness


Narrative History of Present Illness (Text): 


06/22/18 12:58


A 62 year old female, whose past medical history includes paroxysmal atrial 

fibrillation, hypertension, CAD, mitral valve repair, COPD, carpal tunnel 

syndrome, and bunion surgery, presents to the emergency department complaining 

of left hand and wrist pain for approximately 2 weeks. Patient reports she 

recently had open heart surgery and was admitted for 22 days (discharged 06/11/ 18). Patient had been given Coumadin and Eliquis during s/p surgery. During 

admission, patient stated the IV in her left hand was painful and had asked a 

nurse to have it taken out but the request was denied. Patient believes this 

may have been what causing pain and swelling to left hand/wrist. Patient denies 

any trauma to hand, chest pain, shortness of breath, nausea, vomiting, fever, 

or any other complaints at this time. Also, patient denies any history of EtOH 

consumption or smoking.





PMD: Dr. Dinh








Past Medical History





- Provider Review


Nursing Documentation Reviewed: Yes





- Infectious Disease


Hx of Infectious Diseases: None





- Tetanus Immunization


Tetanus Immunization: Unknown





- Past Medical History


Past Medical History: No Previous





- Cardiac


Hx Cardiac Disorders: Yes (Mitral Valve Repair.)


Hx Congestive Heart Failure: Yes


Hx Hypertension: Yes





- Pulmonary


Hx Chronic Obstructive Pulmonary Disease (COPD): Yes





- Neurological


Hx Neurological Disorder: Yes


Hx Dizziness: Yes


Hx Migraine: Yes





- HEENT


Hx HEENT Disorder: No





- Renal


Hx Renal Disorder: No





- Endocrine/Metabolic


Hx Endocrine Disorders: No





- Hematological/Oncological


Hx Blood Disorders: Yes (H/o blood transfusions.)


Hx Anemia: Yes





- Integumentary


Hx Dermatological Disorder: No





- Musculoskeletal/Rheumatological


Hx Falls: Yes (past)





- Gastrointestinal


Hx Gastrointestinal Disorders: Yes (gi bleed, black stools)





- Genitourinary/Gynecological


Hx Genitourinary Disorders: Yes (hx esbl urine 5/29/18)


Hx Reproductive Disorders: No





- Psychiatric


Hx Psychophysiologic Disorder: Yes (Social drinker.)


Hx Anxiety: Yes


Hx Depression: Yes


Hx Substance Use: No (Denied by pt.)





- Surgical History


Hx Cardiac Catheterization: Yes


Hx Cholecystectomy: Yes


Hx Open Heart Surgery: Yes (Mitral Valve Repair.)


Hx Orthopedic Surgery: Yes (B/L Bunion Sx; Left Carpal Tunnel Sx.)





- Anesthesia


Hx Anesthesia: Yes


Hx Anesthesia Reactions: No


Hx Malignant Hyperthermia: No





- Suicidal Assessment


Feels Threatened In Home Enviroment: No





Family/Social History





- Physician Review


Nursing Documentation Reviewed: Yes


Family/Social History: No Known Family HX


Smoking Status: Never Smoked


Hx Alcohol Use: Yes (Social drinker.)


Amount per day: 6


Hx Substance Use: No (Denied by pt.)


Hx Substance Use Treatment: No





Allergies/Home Meds


Allergies/Adverse Reactions: 


Allergies





cinnamon Allergy (Intermediate, Verified 06/02/18 21:48)


 ANAPHYLAXIS








Home Medications: 


 Home Meds











 Medication  Instructions  Recorded  Confirmed


 


Simvastatin [Zocor] 20 mg PO HS 02/13/18 06/22/18


 


Budesonide/Formoterol Fumarate 1 aer IH DAILY 05/22/18 06/22/18





[Symbicort 80-4.5 Mcg Inhaler]   


 


Cholecalciferol [Vitamin D 1000 IU] 50,000 iu PO QWK 05/22/18 06/22/18


 


Diltiazem HCl [Cartia Xt] 120 mg PO DAILY 05/22/18 06/22/18


 


Ferrous Sulfate [Feosol] 325 mg PO BID 05/22/18 06/22/18


 


Furosemide [Lasix] 20 mg PO DAILY 05/22/18 06/22/18


 


Loratadine [Claritin] 10 mg PO DAILY 05/22/18 06/22/18














Review of Systems





- Physician Review


All systems were reviewed & negative as marked: Yes





- Review of Systems


Constitutional: absent: Fevers, Other (no trauma to left hand)


Respiratory: absent: SOB


Cardiovascular: absent: Chest Pain


Gastrointestinal: absent: Nausea, Vomiting


Musculoskeletal: Joint Swelling (swelling to 3rd digit left hand), Other (left 

hand and wrist pain)





Physical Exam


Vital Signs Reviewed: Yes


Vital Signs











  Temp Pulse Resp BP Pulse Ox


 


 06/22/18 16:21   85  18  124/80  97


 


 06/22/18 15:19   88  17  125/82  98


 


 06/22/18 12:36  98.2 F  91 H  18  123/86  96











Temperature: Afebrile


Blood Pressure: Normal


Pulse: Regular


Respiratory Rate: Normal


Appearance: Positive for: Well-Appearing


Pain Distress: None


Mental Status: Positive for: Alert and Oriented X 3





- Systems Exam


Respiratory/Chest: Present: Clear to Auscultation, Good Air Exchange.  No: 

Respiratory Distress, Accessory Muscle Use


Cardiovascular: Present: Regular Rate and Rhythm, Normal S1, S2.  No: Murmurs


Abdomen: No: Tenderness, Distention, Peritoneal Signs


Upper Extremity: Present: Swelling (swelling to left hand (3rd digit and dorsal 

aspect of hand)), Other (2nd digit (index finger) left hand callus)


Lower Extremity: Present: Normal Inspection.  No: Edema


Neurological: Present: GCS=15, CN II-XII Intact, Speech Normal


Skin: Present: Warm, Dry, Normal Color.  No: Rashes


Psychiatric: Present: Alert, Oriented x 3, Normal Insight, Normal Concentration





Medical Decision Making


ED Course and Treatment: 


06/22/18 13:02


Impression: 62 year old female with left hand and wrist pain. Physical exam 

shows left hand swelling (3rd digit and dorsal aspect of hand); and callus to 

2nd digit of left hand (index finger).





Plan:


-- Upper Extremity Ultrasound


-- Left Forearm X-Ray


-- Left Hand X-Ray


-- Labs


-- Reassess and disposition





Prior Visits:


Notes and results from previous visits were reviewed. Patient was last seen in 

the emergency department on 06/02/2018 for rectal bleeding. Patient was 

admitted to ICU for GI bleed.





Progress Notes:


06/22/2018 15:41


Left Forearm X-Ray


IMPRESSION: Unremarkable radiographs of the left forearm.


Dictator: Mariano Samuel MD





06/22/2018 15:41


Left Hand X-Ray


IMPRESSION: No demonstrated fracture or dislocation.


Dictator: Mariano Samuel MD





06/22/2018 16:17


Upper Extremity Ultrasound


IMPRESSION: 1. No sonographic evidence for deep venous thrombosis in the 

visualized segments of the left upper extremity.


Dictator: Carlton Caceres MD








- Lab Interpretations


Lab Results: 








 06/22/18 13:35 





 06/22/18 13:35 





 Lab Results





06/22/18 13:35: Sodium 139, Potassium 4.3, Chloride 102, Carbon Dioxide 23, 

Anion Gap 20, BUN 12, Creatinine 1.0, Est GFR (African Amer) > 60, Est GFR (Non-

Af Amer) 56, Random Glucose 100, Calcium 9.2, Total Bilirubin 0.2, AST 23, ALT 

18, Alkaline Phosphatase 132 H D, Total Protein 7.1, Albumin 3.8, Globulin 3.3, 

Albumin/Globulin Ratio 1.1


06/22/18 13:35: Uric Acid 9.3 H


06/22/18 13:35: PT 12.1, INR 1.06, APTT 31.3


06/22/18 13:35: WBC 8.3  D, RBC 3.51, Hgb 10.3 L, Hct 31.1 L, MCV 88.6, MCH 29.3

, MCHC 33.1, RDW 14.7 H, Plt Count 321, MPV 9.0, Gran % 54.0, Lymph % (Auto) 

22.6, Mono % (Auto) 9.6 H, Eos % (Auto) 13.3 H, Baso % (Auto) 0.5, Gran # 4.47, 

Lymph # (Auto) 1.9, Mono # (Auto) 0.8 H, Eos # (Auto) 1.1 H, Baso # (Auto) 0.04

, ESR 96 H








I have reviewed the lab results: Yes





- RAD Interpretation


Radiology Orders: 








06/22/18 12:56


FOREARM LEFT [RAD] Stat 


HAND LEFT 3 VIEWS ROUTINE [RAD] Stat 


DUPLEX UPPER EXTRM VEIN LEFT [US] Stat 














- Scribe Statement


The provider has reviewed the documentation as recorded by the Mark Cabezas





Provider Scribe Attestation:


All medical record entries made by the Scribe were at my direction and 

personally dictated by me. I have reviewed the chart and agree that the record 

accurately reflects my personal performance of the history, physical exam, 

medical decision making, and the department course for this patient. I have 

also personally directed, reviewed, and agree with the discharge instructions 

and disposition.








Disposition/Present on Arrival





- Present on Arrival


Any Indicators Present on Arrival: No


History of DVT/PE: No


History of Uncontrolled Diabetes: No


Urinary Catheter: No


History of Decub. Ulcer: No


History Surgical Site Infection Following: None





- Disposition


Have Diagnosis and Disposition been Completed?: Yes


Diagnosis: 


 Gout





Disposition: HOME/ ROUTINE


Disposition Time: 16:04


Patient Plan: Discharge


Patient Problems: 


 Current Active Problems











Problem Status Onset


 


Gout Acute  











Condition: GOOD


Discharge Instructions (ExitCare):  Lifestyle Changes to Manage Gout, Gout (DC)


Additional Instructions: 


Please see a rheumatologist for a second opinion and confirmation of gout as a 

diagnosis.  Dr. Roberto Gamez, Rheumatologist in [place].  [phone].


Referrals: 


Philip Dinh MD [Primary Care Provider] - Follow up with primary


Forms:  [employer] (English)

## 2018-06-28 ENCOUNTER — HOSPITAL ENCOUNTER (INPATIENT)
Dept: HOSPITAL 42 - ED | Age: 63
LOS: 7 days | Discharge: NURSING, REHAB OR LONG-TERM CARE | DRG: 480 | End: 2018-07-05
Attending: INTERNAL MEDICINE | Admitting: INTERNAL MEDICINE
Payer: COMMERCIAL

## 2018-06-28 VITALS — BODY MASS INDEX: 25.3 KG/M2

## 2018-06-28 DIAGNOSIS — Z95.1: ICD-10-CM

## 2018-06-28 DIAGNOSIS — E78.00: ICD-10-CM

## 2018-06-28 DIAGNOSIS — I50.9: ICD-10-CM

## 2018-06-28 DIAGNOSIS — I25.10: ICD-10-CM

## 2018-06-28 DIAGNOSIS — A41.9: ICD-10-CM
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
## 2018-06-28 DIAGNOSIS — S72.22XA: Primary | ICD-10-CM

## 2018-06-28 DIAGNOSIS — E66.9: ICD-10-CM

## 2018-06-28 DIAGNOSIS — G56.00: ICD-10-CM

## 2018-06-28 DIAGNOSIS — I42.9: ICD-10-CM

## 2018-06-28 DIAGNOSIS — Z87.440: ICD-10-CM
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
## 2018-06-28 DIAGNOSIS — I48.0: ICD-10-CM

## 2018-06-28 DIAGNOSIS — K22.10: ICD-10-CM

## 2018-06-28 DIAGNOSIS — J18.9: ICD-10-CM

## 2018-06-28 DIAGNOSIS — K25.7: ICD-10-CM

## 2018-06-28 DIAGNOSIS — J44.0: ICD-10-CM

## 2018-06-28 DIAGNOSIS — F41.9: ICD-10-CM

## 2018-06-28 DIAGNOSIS — D62: ICD-10-CM

## 2018-06-28 DIAGNOSIS — Z95.3: ICD-10-CM

## 2018-06-28 DIAGNOSIS — I11.0: ICD-10-CM

## 2018-06-28 DIAGNOSIS — M10.9: ICD-10-CM
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
## 2018-06-28 DIAGNOSIS — K59.00: ICD-10-CM

## 2018-06-28 LAB
ALBUMIN SERPL-MCNC: 3.3 G/DL (ref 3–4.8)
ALBUMIN/GLOB SERPL: 1 {RATIO} (ref 1.1–1.8)
ALT SERPL-CCNC: 29 U/L (ref 7–56)
APPEARANCE UR: CLEAR
APTT BLD: 25.5 SECONDS (ref 25.1–36.5)
AST SERPL-CCNC: 33 U/L (ref 14–36)
BASOPHILS # BLD AUTO: 0.02 K/MM3 (ref 0–2)
BASOPHILS NFR BLD: 0.1 % (ref 0–3)
BILIRUB UR-MCNC: NEGATIVE MG/DL
BUN SERPL-MCNC: 14 MG/DL (ref 7–21)
CALCIUM SERPL-MCNC: 8.7 MG/DL (ref 8.4–10.5)
COLOR UR: YELLOW
EOSINOPHIL # BLD: 0.2 10*3/UL (ref 0–0.7)
EOSINOPHIL NFR BLD: 1.2 % (ref 1.5–5)
ERYTHROCYTE [DISTWIDTH] IN BLOOD BY AUTOMATED COUNT: 14.6 % (ref 11.5–14.5)
GFR NON-AFRICAN AMERICAN: > 60
GLUCOSE UR STRIP-MCNC: NEGATIVE MG/DL
GRANULOCYTES # BLD: 11.35 10*3/UL (ref 1.4–6.5)
GRANULOCYTES NFR BLD: 83.5 % (ref 50–68)
HGB BLD-MCNC: 9.3 G/DL (ref 12–16)
INR PPP: 1.03 (ref 0.93–1.08)
LEUKOCYTE ESTERASE UR-ACNC: NEGATIVE LEU/UL
LYMPHOCYTES # BLD: 1 10*3/UL (ref 1.2–3.4)
LYMPHOCYTES NFR BLD AUTO: 7.1 % (ref 22–35)
MCH RBC QN AUTO: 29 PG (ref 25–35)
MCHC RBC AUTO-ENTMCNC: 33.2 G/DL (ref 31–37)
MCV RBC AUTO: 87.2 FL (ref 80–105)
MONOCYTES # BLD AUTO: 1.1 10*3/UL (ref 0.1–0.6)
MONOCYTES NFR BLD: 8.1 % (ref 1–6)
PH UR STRIP: 6 [PH] (ref 4.7–8)
PLATELET # BLD: 273 10^3/UL (ref 120–450)
PMV BLD AUTO: 9.4 FL (ref 7–11)
PROT UR STRIP-MCNC: NEGATIVE MG/DL
PROTHROMBIN TIME: 11.8 SECONDS (ref 9.4–12.5)
RBC # BLD AUTO: 3.21 10^6/UL (ref 3.5–6.1)
RBC # UR STRIP: NEGATIVE /UL
SP GR UR STRIP: 1.01 (ref 1–1.03)
UROBILINOGEN UR STRIP-ACNC: 0.2 E.U./DL
WBC # BLD AUTO: 13.6 10^3/UL (ref 4.5–11)

## 2018-06-28 RX ADMIN — MORPHINE SULFATE PRN MG: 4 INJECTION, SOLUTION INTRAMUSCULAR; INTRAVENOUS at 20:05

## 2018-06-28 RX ADMIN — MORPHINE SULFATE PRN MG: 4 INJECTION, SOLUTION INTRAMUSCULAR; INTRAVENOUS at 23:59

## 2018-06-28 NOTE — RAD
PROCEDURE:  Left Knee Radiographs.



HISTORY:

Pain.



COMPARISON:

None.



FINDINGS:



BONES:

There is an acute longitudinal intertrochanteric fracture in the 

lateral tibial plateau. 



JOINTS:

Normal. No osteoarthritis. 



JOINT EFFUSION:

There is a large suprapatellar effusion with fat fluid level. 



OTHER FINDINGS:

There is  prepatellar soft tissue swelling.



IMPRESSION:

Acute longitudinal intra-articular lateral tibial plateau fracture 

and fat fluid level in the suprapatellar joint most compatible with 

lipohemarthrosis.
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
## 2018-06-28 NOTE — CARD
--------------- APPROVED REPORT --------------





EKG Measurement

Heart [id number]

ME 140P

[id number]

[id number]

[id number]



<Conclusion>

Ectopic atrial rhythm

Minimal voltage criteria for LVH, may be normal variant

Cannot rule out Anterior infarct, age undetermined

Abnormal ECG
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
## 2018-06-28 NOTE — RAD
PROCEDURE:  CHEST RADIOGRAPH, 1 VIEW



HISTORY:

chest pain



COMPARISON:

Portable chest 06/08/2018



FINDINGS:



LUNGS:

No infiltrates bilaterally.  Improved aeration noted bilaterally.



PLEURA:

No pneumothorax or pleural fluid seen.



CARDIOVASCULAR:

Stable cardiomegaly.  No pulmonary vascular congestion.



OSSEOUS STRUCTURES:

Sternotomy hours again identified as well as mediastinal surgical 

clips status post CABG most likely.



VISUALIZED UPPER ABDOMEN:

Normal.



OTHER FINDINGS:

None. 



IMPRESSION:

Stable cardiomegaly. No acute infiltrate or pleural effusion.  No 

pulmonary vascular congestion. Improved aeration noted in the 

interval.

## 2018-06-29 LAB
ERYTHROCYTE [DISTWIDTH] IN BLOOD BY AUTOMATED COUNT: 14.8 % (ref 11.5–14.5)
HGB BLD-MCNC: 8.6 G/DL (ref 12–16)
HGB BLD-MCNC: 9.5 G/DL (ref 12–16)
MCH RBC QN AUTO: 29 PG (ref 25–35)
MCHC RBC AUTO-ENTMCNC: 32.8 G/DL (ref 31–37)
MCV RBC AUTO: 88.2 FL (ref 80–105)
PLATELET # BLD: 237 10^3/UL (ref 120–450)
PMV BLD AUTO: 9.6 FL (ref 7–11)
RBC # BLD AUTO: 2.97 10^6/UL (ref 3.5–6.1)
WBC # BLD AUTO: 9.7 10^3/UL (ref 4.5–11)

## 2018-06-29 PROCEDURE — 0QS706Z REPOSITION LEFT UPPER FEMUR WITH INTRAMEDULLARY INTERNAL FIXATION DEVICE, OPEN APPROACH: ICD-10-PCS | Performed by: ORTHOPAEDIC SURGERY

## 2018-06-29 PROCEDURE — 30233N1 TRANSFUSION OF NONAUTOLOGOUS RED BLOOD CELLS INTO PERIPHERAL VEIN, PERCUTANEOUS APPROACH: ICD-10-PCS | Performed by: INTERNAL MEDICINE

## 2018-06-29 RX ADMIN — MORPHINE SULFATE PRN MG: 4 INJECTION, SOLUTION INTRAMUSCULAR; INTRAVENOUS at 04:11

## 2018-06-29 RX ADMIN — Medication SCH: at 08:35

## 2018-06-29 RX ADMIN — DILTIAZEM HYDROCHLORIDE SCH MG: 120 CAPSULE, COATED, EXTENDED RELEASE ORAL at 11:33

## 2018-06-29 RX ADMIN — Medication SCH: at 13:29

## 2018-06-29 RX ADMIN — Medication SCH MG: at 11:32

## 2018-06-29 RX ADMIN — MORPHINE SULFATE PRN MG: 4 INJECTION, SOLUTION INTRAMUSCULAR; INTRAVENOUS at 08:05

## 2018-06-29 RX ADMIN — PANTOPRAZOLE SODIUM SCH MG: 40 TABLET, DELAYED RELEASE ORAL at 11:30

## 2018-06-29 RX ADMIN — Medication SCH MG: at 20:47
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
## 2018-06-29 NOTE — RAD
PROCEDURE:  Fluoroscopy up to 1 hr.



HISTORY:

O.R.I.F.  LEFT  FEMUR



COMPARISON:

None



TECHNIQUE:

Standard protocol for this study/examination.



FINDINGS:

 Total fluoroscopic time (continuous mode) utilized during the 

procedure (seconds) 345.5 Total exam DLP: (mGy) 47.14. 



IMPRESSION:

Less than 1 hr fluoroscopic time utilized during performance of the 

procedure.

## 2018-06-29 NOTE — PCM.SURG1
Surgeon's Initial Post Op Note





- Surgeon's Notes


Surgeon: CLARICE Bermeo MD 


Assistant: ROHINI Chang PA-C


Type of Anesthesia: General Endo


Anesthesia Administered By: Dr. Chester


Pre-Operative Diagnosis: Left subtroch fx


Operative Findings: same


Post-Operative Diagnosis: same


Operation Performed: ORIF left subtrochanteric hip fx


Specimen/Specimens Removed: none


Estimated Blood Loss: EBL {In ML}: 200


Blood Products Given: N/A


Drains Used: No Drains


Post-Op Condition: Fair


Date of Surgery/Procedure: 06/29/18


Time of Surgery/Procedure: 16:58
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
## 2018-06-29 NOTE — RAD
PROCEDURE:  Left Femur Radiographs.



HISTORY:

fracture



COMPARISON:

None.



TECHNIQUE:

AP and Lateral Radiographs of the left femur.



FINDINGS:



FEMUR:

Comminuted predominately diagonal fracture proximal shaft left femur. 

. .  There appears to be lateral displacement of the proximal 

fragment 



SOFT TISSUES:

Normal. 



OTHER FINDINGS:

None.



IMPRESSION:

Comminuted predominant diagonal fracture traversing proximal left 

femur

## 2018-06-30 LAB
ALBUMIN SERPL-MCNC: 2.5 G/DL (ref 3–4.8)
ALBUMIN/GLOB SERPL: 0.9 {RATIO} (ref 1.1–1.8)
ALT SERPL-CCNC: 23 U/L (ref 7–56)
AST SERPL-CCNC: 32 U/L (ref 14–36)
BUN SERPL-MCNC: 14 MG/DL (ref 7–21)
CALCIUM SERPL-MCNC: 7.8 MG/DL (ref 8.4–10.5)
ERYTHROCYTE [DISTWIDTH] IN BLOOD BY AUTOMATED COUNT: 15.3 % (ref 11.5–14.5)
GFR NON-AFRICAN AMERICAN: > 60
HGB BLD-MCNC: 8 G/DL (ref 12–16)
MCH RBC QN AUTO: 29.3 PG (ref 25–35)
MCHC RBC AUTO-ENTMCNC: 33.9 G/DL (ref 31–37)
MCV RBC AUTO: 86.4 FL (ref 80–105)
PLATELET # BLD: 158 10^3/UL (ref 120–450)
PMV BLD AUTO: 9.5 FL (ref 7–11)
RBC # BLD AUTO: 2.73 10^6/UL (ref 3.5–6.1)
WBC # BLD AUTO: 12.3 10^3/UL (ref 4.5–11)

## 2018-06-30 RX ADMIN — VANCOMYCIN HYDROCHLORIDE SCH MLS/HR: 1 INJECTION, POWDER, LYOPHILIZED, FOR SOLUTION INTRAVENOUS at 13:32

## 2018-06-30 RX ADMIN — Medication SCH MG: at 09:07

## 2018-06-30 RX ADMIN — VANCOMYCIN HYDROCHLORIDE SCH MLS/HR: 1 INJECTION, POWDER, LYOPHILIZED, FOR SOLUTION INTRAVENOUS at 23:16

## 2018-06-30 RX ADMIN — VANCOMYCIN HYDROCHLORIDE SCH MLS/HR: 1 INJECTION, POWDER, LYOPHILIZED, FOR SOLUTION INTRAVENOUS at 01:35

## 2018-06-30 RX ADMIN — DILTIAZEM HYDROCHLORIDE SCH MG: 120 CAPSULE, COATED, EXTENDED RELEASE ORAL at 09:06

## 2018-06-30 RX ADMIN — MORPHINE SULFATE PRN MG: 4 INJECTION, SOLUTION INTRAMUSCULAR; INTRAVENOUS at 07:20

## 2018-06-30 RX ADMIN — MORPHINE SULFATE PRN MG: 4 INJECTION, SOLUTION INTRAMUSCULAR; INTRAVENOUS at 13:31

## 2018-06-30 RX ADMIN — MORPHINE SULFATE PRN MG: 4 INJECTION, SOLUTION INTRAMUSCULAR; INTRAVENOUS at 22:37

## 2018-06-30 RX ADMIN — PANTOPRAZOLE SODIUM SCH MG: 40 TABLET, DELAYED RELEASE ORAL at 09:07

## 2018-06-30 RX ADMIN — Medication SCH: at 19:55

## 2018-06-30 RX ADMIN — Medication SCH MG: at 14:05
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
## 2018-06-30 NOTE — CP.PCM.PN
Subjective





- Date & Time of Evaluation


Date of Evaluation: 06/30/18


Time of Evaluation: 09:47





- Subjective


Subjective: 





Pt awake, alert. Adequate pain control.


T 99.5





LLE: dressing clean and intact


PARAS with good seal


immobilizer in place


NVI distally





Hg 8.0


WBC 12.3





POD#1


D/c gallego


MRI L knee


will discuss with medicine transfusion





Objective





- Vital Signs/Intake and Output


Vital Signs (last 24 hours): 


 











Temp Pulse Resp BP Pulse Ox


 


 97.9 F   107 H  15   130/94 H  97 


 


 06/29/18 16:45  06/30/18 09:06  06/29/18 18:00  06/29/18 18:00  06/29/18 18:00








Intake and Output: 


 











 06/30/18 06/30/18





 06:59 18:59


 


Intake Total 2250 


 


Output Total 2400 


 


Balance -150 














- Medications


Medications: 


 Current Medications





Alprazolam (Xanax)  0.25 mg PO BID PRN; Protocol


   PRN Reason: Anxiety


   Stop: 07/06/18 10:01


   Last Admin: 06/29/18 09:51 Dose:  0.25 mg


Atorvastatin Calcium (Lipitor)  10 mg PO DIN [facility]


   Last Admin: 06/29/18 19:10 Dose:  10 mg


Diltiazem HCl (Cardizem Cd)  120 mg PO DAILY [facility]


   Last Admin: 06/30/18 09:06 Dose:  120 mg


Ergocalciferol (Drisdol 50,000 Intl Units Cap)  1 cap PO Q7D [facility]


   Last Admin: 06/29/18 11:33 Dose:  1 cap


Famotidine (Pepcid)  40 mg PO HS [facility]


   Last Admin: 06/29/18 22:27 Dose:  40 mg


Ferrous Sulfate (Feosol)  324 mg PO BID [facility]


   Last Admin: 06/30/18 09:06 Dose:  324 mg


Furosemide (Lasix)  20 mg PO DAILY [facility]


   Last Admin: 06/29/18 11:30 Dose:  20 mg


Vancomycin HCl (Vancomycin 1gm)  1 gm in 250 mls @ 167 mls/hr IVPB Q12H [facility]


   PRN Reason: Protocol


   Stop: 07/01/18 02:30


   Last Admin: 06/30/18 01:35 Dose:  167 mls/hr


Sodium Chloride (Sodium Chloride 0.9%)  1,000 mls @ 75 mls/hr IV .[id number] [facility]


   Last Admin: 06/30/18 09:15 Dose:  75 mls/hr


Levalbuterol HCl (Xopenex)  1.25 mg IH TIDRESP [facility]


   Last Admin: 06/29/18 20:47 Dose:  1.25 mg


Loratadine (Claritin)  10 mg PO DAILY [facility]


   Last Admin: 06/30/18 09:06 Dose:  10 mg


Magnesium Oxide (Mag-Ox)  400 mg PO DAILY [facility]


   Last Admin: 06/30/18 09:07 Dose:  400 mg


Metoprolol Tartrate (Lopressor)  50 mg PO BRKDIN [facility]


   Last Admin: 06/30/18 08:55 Dose:  50 mg


Montelukast Sodium (Singulair)  10 mg PO HS [facility]


   Last Admin: 06/29/18 22:27 Dose:  10 mg


Morphine Sulfate (Morphine)  4 mg IVP Q4H PRN


   PRN Reason: Pain, moderate (4-7)


   Last Admin: 06/30/18 07:20 Dose:  4 mg


Pantoprazole Sodium (Protonix Ec Tab)  40 mg PO DAILY [facility]


   Last Admin: 06/30/18 09:07 Dose:  40 mg











- Labs


Labs: 


 





 06/30/18 07:00 





 06/30/18 07:00 





 











PT  11.8 SECONDS (9.4-12.5)   06/28/18  17:10    


 


INR  1.03  (0.93-1.08)   06/28/18  17:10    


 


APTT  25.5 Seconds (25.1-36.5)   06/28/18  17:10

## 2018-07-01 LAB
ALBUMIN SERPL-MCNC: 2.7 G/DL (ref 3–4.8)
ALBUMIN/GLOB SERPL: 0.9 {RATIO} (ref 1.1–1.8)
ALT SERPL-CCNC: 19 U/L (ref 7–56)
AST SERPL-CCNC: 35 U/L (ref 14–36)
BUN SERPL-MCNC: 15 MG/DL (ref 7–21)
CALCIUM SERPL-MCNC: 8.5 MG/DL (ref 8.4–10.5)
ERYTHROCYTE [DISTWIDTH] IN BLOOD BY AUTOMATED COUNT: 15.3 % (ref 11.5–14.5)
GFR NON-AFRICAN AMERICAN: 56
HGB BLD-MCNC: 9.1 G/DL (ref 12–16)
MCH RBC QN AUTO: 29.7 PG (ref 25–35)
MCHC RBC AUTO-ENTMCNC: 33.8 G/DL (ref 31–37)
MCV RBC AUTO: 87.9 FL (ref 80–105)
PLATELET # BLD: 170 10^3/UL (ref 120–450)
PMV BLD AUTO: 10 FL (ref 7–11)
RBC # BLD AUTO: 3.06 10^6/UL (ref 3.5–6.1)
WBC # BLD AUTO: 15.3 10^3/UL (ref 4.5–11)

## 2018-07-01 PROCEDURE — 3E03328 INTRODUCTION OF OXAZOLIDINONES INTO PERIPHERAL VEIN, PERCUTANEOUS APPROACH: ICD-10-PCS

## 2018-07-01 RX ADMIN — DILTIAZEM HYDROCHLORIDE SCH MG: 120 CAPSULE, COATED, EXTENDED RELEASE ORAL at 09:59

## 2018-07-01 RX ADMIN — Medication SCH MG: at 07:35

## 2018-07-01 RX ADMIN — ENOXAPARIN SODIUM SCH MG: 40 INJECTION SUBCUTANEOUS at 14:27

## 2018-07-01 RX ADMIN — MEROPENEM SCH MLS/HR: 1 INJECTION INTRAVENOUS at 22:59

## 2018-07-01 RX ADMIN — LINEZOLID SCH MLS/HR: 600 INJECTION, SOLUTION INTRAVENOUS at 23:00

## 2018-07-01 RX ADMIN — MORPHINE SULFATE PRN MG: 4 INJECTION, SOLUTION INTRAMUSCULAR; INTRAVENOUS at 23:29

## 2018-07-01 RX ADMIN — Medication SCH MG: at 13:41

## 2018-07-01 RX ADMIN — PANTOPRAZOLE SODIUM SCH MG: 40 TABLET, DELAYED RELEASE ORAL at 10:00

## 2018-07-01 RX ADMIN — Medication SCH MG: at 10:00

## 2018-07-01 RX ADMIN — Medication SCH: at 19:29

## 2018-07-01 RX ADMIN — MORPHINE SULFATE PRN MG: 4 INJECTION, SOLUTION INTRAMUSCULAR; INTRAVENOUS at 14:56

## 2018-07-01 NOTE — PN
DATE:  06/30/2018



SUBJECTIVE:  The patient is comfortable.  She does not complain of any left

hip pain at this time.  She is lying with her friends around.  No

respiratory distress.  No nausea.  No vomiting.



PHYSICAL EXAMINATION:  As follows;

VITAL SIGNS:  Temperature 98.2, heart rate 84, blood pressure 108/60,

respirations 18.

HEAD AND NECK:  Normal.  No JVD.  No thyromegaly.

CHEST:  Clear bilaterally.

CARDIAC:  First sound and second sound normal.

ABDOMEN:  Soft, nontender.

EXTREMITIES:  No edema.  SCD is on and the patient initially refused

Lovenox, she is getting it now.



LABORATORY STUDY:  White count 12.3, hemoglobin 8, hematocrit 23.6,

platelets 158.  Chemistry:  Sodium 137, potassium 3.8, chloride 105, bicarb

20, BUN 15, creatinine 1, blood sugar 127.  Liver function test is normal

except alkaline phos 134.



IMPRESSION:

1.  Status post left hip open reduction and internal fixation.  Continue

current therapy.  Continue deep venous thrombosis, Lovenox, the patient

agreed to take it.  Continue sequential compression devices.

2.  Chronic obstructive pulmonary disease, stable.

3.  History of mitral valve biologic valve.  Seen by Cardiology, Dr. Myers.  Metoprolol is increased from 25 to 50 mg b.i.d.

4.  Paroxysmal atrial fibrillation, stable.  Sinus, atrial premature

complexes.  Continue metoprolol plus Cardizem.

5.  Anemia, stable.  The patient will get transfusion 2 units.  Continue

that.  Discuss with the Hematology/Oncology, Dr. Alatorre, he will take care

of that.  Continue Protonix and Pepcid IV.  Continue current therapy.  We

will follow up clinically.  Noted white count is elevated.  We will repeat

it and we may consider Infectious Disease consult as the patient has no

fever really.  We will monitor it and we will see.





__________________________________________

Philip Dinh MD





DD:  07/01/2018 15:28:58

DT:  07/01/2018 15:59:45

Job # [id number]

## 2018-07-01 NOTE — CP.PCM.PN
Subjective





- Date & Time of Evaluation


Date of Evaluation: 06/30/18


Time of Evaluation: 20:20





- Subjective


Subjective: 





blood drwan.





Objective





- Vital Signs/Intake and Output


Vital Signs (last 24 hours): 


 











Temp Pulse Resp BP Pulse Ox


 


 98.7 F   87   19   110/74   97 


 


 06/30/18 18:00  06/30/18 22:00  06/30/18 18:00  06/30/18 18:00  06/29/18 18:00








Intake and Output: 


 











 07/01/18 07/01/18





 06:59 18:59


 


Intake Total 480 


 


Balance 480 














- Medications


Medications: 


 Current Medications





Alprazolam (Xanax)  0.25 mg PO BID PRN; Protocol


   PRN Reason: Anxiety


   Stop: 07/06/18 10:01


   Last Admin: 06/29/18 09:51 Dose:  0.25 mg


Atorvastatin Calcium (Lipitor)  10 mg PO DIN [facility]


   Last Admin: 06/30/18 17:30 Dose:  10 mg


Diltiazem HCl (Cardizem Cd)  120 mg PO DAILY [facility]


   Last Admin: 06/30/18 09:06 Dose:  120 mg


Enoxaparin Sodium (Lovenox)  40 mg SC DAILY [facility]


   PRN Reason: Protocol


Ergocalciferol (Drisdol 50,000 Intl Units Cap)  1 cap PO Q7D [facility]


   Last Admin: 06/29/18 11:33 Dose:  1 cap


Famotidine (Pepcid)  40 mg PO HS [facility]


   Last Admin: 06/30/18 22:31 Dose:  40 mg


Ferrous Sulfate (Feosol)  324 mg PO BID [facility]


   Last Admin: 06/30/18 17:30 Dose:  324 mg


Furosemide (Lasix)  20 mg PO DAILY [facility]


   Last Admin: 06/30/18 13:32 Dose:  20 mg


Sodium Chloride (Sodium Chloride 0.9%)  1,000 mls @ 75 mls/hr IV .[id number] [facility]


   Last Admin: 06/30/18 22:31 Dose:  75 mls/hr


Levalbuterol HCl (Xopenex)  1.25 mg IH TIDRESP [facility]


   Last Admin: 07/01/18 07:35 Dose:  1.25 mg


Loratadine (Claritin)  10 mg PO DAILY [facility]


   Last Admin: 06/30/18 09:06 Dose:  10 mg


Magnesium Oxide (Mag-Ox)  400 mg PO DAILY [facility]


   Last Admin: 06/30/18 09:07 Dose:  400 mg


Metoprolol Tartrate (Lopressor)  50 mg PO BRKDIN [facility]


   Last Admin: 06/30/18 17:30 Dose:  50 mg


Montelukast Sodium (Singulair)  10 mg PO HS [facility]


   Last Admin: 06/30/18 22:31 Dose:  10 mg


Morphine Sulfate (Morphine)  4 mg IVP Q4H PRN


   PRN Reason: Pain, moderate (4-7)


   Last Admin: 06/30/18 22:37 Dose:  4 mg


Pantoprazole Sodium (Protonix Ec Tab)  40 mg PO DAILY [facility]


   Last Admin: 06/30/18 09:07 Dose:  40 mg











- Labs


Labs: 


 





 06/30/18 07:00 





 06/30/18 07:00 





 











PT  11.8 SECONDS (9.4-12.5)   06/28/18  17:10    


 


INR  1.03  (0.93-1.08)   06/28/18  17:10    


 


APTT  25.5 Seconds (25.1-36.5)   06/28/18  17:10
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
## 2018-07-01 NOTE — PN
DATE:  06/29/2018



SUBJECTIVE:  Patient seems doing well postoperatively, has been seen by

multiple specialists.  She has mild pain, otherwise seems doing well.



PHYSICAL EXAMINATION:  On 06/29/2018, is as follows:

VITAL SIGNS:  Temperature is 97.9, heart rate 118, blood pressure 147/89,

respirations 18, and saturation 98% on 2 L.

HEAD AND NECK:  Normal.  No JVD.  No thyromegaly.

CHEST:  Clear bilaterally.

CARDIAC:  First sound and second sound normal.

ABDOMEN:  Soft, nontender.

EXTREMITIES:  There is no edema in the right leg.  Left leg is wrapped with

_____ and left hip has dressing postoperatively.

NEUROLOGIC:  Normal.



LABORATORY STUDIES:  White count 9.7, hemoglobin 8.6, hematocrit 26.2,

platelets 243.



IMPRESSION AND PLAN:

1.  Left hip fracture, left knee fracture, plateau fracture, post required

surgery at this time on the left hip was done open reduction and internal

fixation with Dr. Crabtree whom I spoke with.  Patient can have deep venous

thrombosis prophylaxis, Lovenox 40 subcu.  We will keep transfuse the

patient as needed.  Discussed with Dr. Alatorre for transfusions. 

Preferably, we will try to see if we can give.

2.  Paroxysmal atrial fibrillation.  Patient is stable.  She has multiple

_____ , otherwise no atrial fibrillation noted.  Continue current regimen.

3.  Anemia, requiring transfusions.  History of gastrointestinal bleed,

stable.  Continue IV Protonix.

4.  Mitral valve biologic valve.  She is stable.  Continue current

treatment.

5.  Chronic obstructive pulmonary disease, stable.  Continue nebulizer

treatment.



CURRENT MEDICATIONS:  Cardizem  daily, Claritin 10, vitamin D, iron

pills, Feosol, Lasix 20 p.o. daily, Lipitor 10 once a day, Lopressor 50

b.i.d., Lovenox 40 subcu, mag oxide b.i.d., morphine p.r.n., Pepcid 40 at

bedtime, Protonix 40 once a day, Singulair, IV fluids, Tylenol p.r.n.,

Xanax 0.25 b.i.d. p.r.n. for anxiety, and Xopenex four times a day.



Continue current regimen.  Discussed with patient's old labs.







__________________________________________

Philip Dinh MD



DD:  07/01/2018 15:25:40

DT:  07/01/2018 16:27:04

Job # [id number]
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
## 2018-07-02 LAB
ALBUMIN SERPL-MCNC: 2.6 G/DL (ref 3–4.8)
ALBUMIN/GLOB SERPL: 0.8 {RATIO} (ref 1.1–1.8)
ALT SERPL-CCNC: 16 U/L (ref 7–56)
AST SERPL-CCNC: 32 U/L (ref 14–36)
BUN SERPL-MCNC: 20 MG/DL (ref 7–21)
CALCIUM SERPL-MCNC: 8.5 MG/DL (ref 8.4–10.5)
ERYTHROCYTE [DISTWIDTH] IN BLOOD BY AUTOMATED COUNT: 15.2 % (ref 11.5–14.5)
GFR NON-AFRICAN AMERICAN: 45
HGB BLD-MCNC: 9 G/DL (ref 12–16)
MCH RBC QN AUTO: 29.5 PG (ref 25–35)
MCHC RBC AUTO-ENTMCNC: 34 G/DL (ref 31–37)
MCV RBC AUTO: 86.9 FL (ref 80–105)
PLATELET # BLD: 184 10^3/UL (ref 120–450)
PMV BLD AUTO: 9.4 FL (ref 7–11)
RBC # BLD AUTO: 3.05 10^6/UL (ref 3.5–6.1)
WBC # BLD AUTO: 11.3 10^3/UL (ref 4.5–11)

## 2018-07-02 RX ADMIN — LINEZOLID SCH MLS/HR: 600 INJECTION, SOLUTION INTRAVENOUS at 22:23

## 2018-07-02 RX ADMIN — MORPHINE SULFATE PRN MG: 4 INJECTION, SOLUTION INTRAMUSCULAR; INTRAVENOUS at 13:57

## 2018-07-02 RX ADMIN — MORPHINE SULFATE PRN MG: 4 INJECTION, SOLUTION INTRAMUSCULAR; INTRAVENOUS at 19:00

## 2018-07-02 RX ADMIN — Medication SCH MG: at 09:22

## 2018-07-02 RX ADMIN — Medication SCH MG: at 07:35

## 2018-07-02 RX ADMIN — MORPHINE SULFATE PRN MG: 4 INJECTION, SOLUTION INTRAMUSCULAR; INTRAVENOUS at 06:41

## 2018-07-02 RX ADMIN — MEROPENEM SCH MLS/HR: 1 INJECTION INTRAVENOUS at 06:33

## 2018-07-02 RX ADMIN — ENOXAPARIN SODIUM SCH MG: 40 INJECTION SUBCUTANEOUS at 09:22

## 2018-07-02 RX ADMIN — DILTIAZEM HYDROCHLORIDE SCH MG: 120 CAPSULE, COATED, EXTENDED RELEASE ORAL at 09:23

## 2018-07-02 RX ADMIN — PANTOPRAZOLE SODIUM SCH MG: 40 TABLET, DELAYED RELEASE ORAL at 09:23

## 2018-07-02 RX ADMIN — Medication SCH MG: at 13:56

## 2018-07-02 RX ADMIN — LINEZOLID SCH MLS/HR: 600 INJECTION, SOLUTION INTRAVENOUS at 09:23

## 2018-07-02 RX ADMIN — MEROPENEM SCH MLS/HR: 1 INJECTION INTRAVENOUS at 13:13

## 2018-07-02 RX ADMIN — MEROPENEM SCH MLS/HR: 1 INJECTION INTRAVENOUS at 22:22

## 2018-07-02 RX ADMIN — Medication SCH MG: at 19:56
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
## 2018-07-02 NOTE — RAD
HISTORY:

inc wbc  



COMPARISON:

No prior. 



FINDINGS:



LUNGS:

Poor inspiration with low lung volumes, crowded bronchovascular 

markings and suspected minor bibasilar atelectasis. There appears to 

be slightly improved pulmonary venous congestion.  



PLEURA:

Questionable small left-sided effusion



CARDIOVASCULAR:

Sternotomy wires and prostatic valve replacements again noted. Heart 

remains enlarged 



OSSEOUS STRUCTURES:

No significant abnormalities.



VISUALIZED UPPER ABDOMEN:

Normal.



OTHER FINDINGS:

None.



IMPRESSION:

Poor inspiration with low lung volumes, crowded bronchovascular 

markings and suspected minor bibasilar atelectasis. There appears to 

be slightly improved pulmonary venous congestion. .  Questionable 

small left-sided effusion
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
## 2018-07-02 NOTE — CP.PCM.PN
Subjective





- Date & Time of Evaluation


Date of Evaluation: 07/02/18


Time of Evaluation: 08:35





- Subjective


Subjective: 





Patient seen and examined at bedside. Patient alert and awake, sitting up in 

bed with knee immobilizer on. Patient states pain is controlled. MRI has not 

been completed, as patient been refusing. Patient agrees to MRI today.





Afebrile


WBC 11.3


Hgb 9.0








L hip: dressings and PARAS removed. Incision sites are clean, dry and intact. 

Incisions cleaned with NSS and new dry light dressings applied. +AROM foot and 

ankle. Sensation intact to light touch. Moderate knee effusion, diffuse 

tenderness to palpation. +DP pulse. Thigh and calf are soft and non-tender. NVI 

distally





MRI today


Cont NWB LLE


Cont DVT prophylaxis


Will follow up with MRI results  





Objective





- Vital Signs/Intake and Output


Vital Signs (last 24 hours): 


 











Temp Pulse Resp BP Pulse Ox


 


 98.6 F   81   20   94/54 L  95 


 


 07/02/18 06:00  07/02/18 08:06  07/02/18 06:00  07/02/18 08:06  07/02/18 06:00








Intake and Output: 


 











 07/02/18 07/02/18





 06:59 18:59


 


Intake Total 1320 


 


Output Total 300 


 


Balance 1020 














- Medications


Medications: 


 Current Medications





Acetaminophen (Tylenol 325mg Tab)  650 mg PO Q4H PRN


   PRN Reason: Pain, Mild (1-3)


Alprazolam (Xanax)  0.25 mg PO BID PRN; Protocol


   PRN Reason: Anxiety


   Stop: 07/06/18 10:01


   Last Admin: 06/29/18 09:51 Dose:  0.25 mg


Atorvastatin Calcium (Lipitor)  10 mg PO DIN [facility]


   Last Admin: 07/01/18 17:23 Dose:  10 mg


Diltiazem HCl (Cardizem Cd)  120 mg PO DAILY [facility]


   Last Admin: 07/01/18 09:59 Dose:  120 mg


Enoxaparin Sodium (Lovenox)  40 mg SC DAILY [facility]


   PRN Reason: Protocol


   Last Admin: 07/01/18 14:27 Dose:  40 mg


Ergocalciferol (Drisdol 50,000 Intl Units Cap)  1 cap PO Q7D [facility]


   Last Admin: 06/29/18 11:33 Dose:  1 cap


Famotidine (Pepcid)  40 mg PO HS [facility]


   Last Admin: 07/01/18 23:01 Dose:  40 mg


Ferrous Sulfate (Feosol)  324 mg PO BID [facility]


   Last Admin: 07/01/18 17:24 Dose:  324 mg


Furosemide (Lasix)  20 mg PO DAILY [facility]


   Last Admin: 07/01/18 10:00 Dose:  20 mg


Sodium Chloride (Sodium Chloride 0.9%)  1,000 mls @ 50 mls/hr IV .Q20H [facility]


   Last Admin: 07/02/18 06:36 Dose:  50 mls/hr


Meropenem (Merrem Iv 1 Gm Premix)  50 mls @ 100 mls/hr IVPB Q8 [facility]


   PRN Reason: Protocol


   Stop: 07/10/18 22:16


   Last Admin: 07/02/18 06:33 Dose:  100 mls/hr


Linezolid (Zyvox 600mg/300ml D5w)  600 mg in 300 mls @ 200 mls/hr IVPB Q12 [facility]


   PRN Reason: Protocol


   Stop: 07/10/18 22:08


   Last Admin: 07/01/18 23:00 Dose:  200 mls/hr


Levalbuterol HCl (Xopenex)  1.25 mg IH TIDRESP [facility]


   Last Admin: 07/02/18 07:35 Dose:  1.25 mg


Loratadine (Claritin)  10 mg PO DAILY [facility]


   Last Admin: 07/01/18 10:00 Dose:  10 mg


Magnesium Oxide (Mag-Ox)  400 mg PO DAILY [facility]


   Last Admin: 07/01/18 10:00 Dose:  400 mg


Metoprolol Tartrate (Lopressor)  50 mg PO BRKDIN [facility]


   Last Admin: 07/02/18 08:06 Dose:  Not Given


Montelukast Sodium (Singulair)  10 mg PO HS [facility]


   Last Admin: 07/01/18 23:01 Dose:  10 mg


Morphine Sulfate (Morphine)  4 mg IVP Q4H PRN


   PRN Reason: Pain, moderate (4-7)


   Last Admin: 07/02/18 06:41 Dose:  4 mg


Pantoprazole Sodium (Protonix Ec Tab)  40 mg PO DAILY [facility]


   Last Admin: 07/01/18 10:00 Dose:  40 mg











- Labs


Labs: 


 





 07/02/18 06:50 





 07/02/18 06:50 





 











PT  11.8 SECONDS (9.4-12.5)   06/28/18  17:10    


 


INR  1.03  (0.93-1.08)   06/28/18  17:10    


 


APTT  25.5 Seconds (25.1-36.5)   06/28/18  17:10

## 2018-07-02 NOTE — CARD
--------------- APPROVED REPORT --------------





EKG Measurement

Heart [id number]

[id number]

[id number]

[id number]

[id number]



<Conclusion>

Sinus rhythm with APCs

LAD
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
## 2018-07-03 LAB
BASOPHILS # BLD AUTO: 0.01 K/MM3 (ref 0–2)
BASOPHILS NFR BLD: 0.1 % (ref 0–3)
BUN SERPL-MCNC: 18 MG/DL (ref 7–21)
CALCIUM SERPL-MCNC: 8.7 MG/DL (ref 8.4–10.5)
EOSINOPHIL # BLD: 0.7 10*3/UL (ref 0–0.7)
EOSINOPHIL NFR BLD: 7.4 % (ref 1.5–5)
ERYTHROCYTE [DISTWIDTH] IN BLOOD BY AUTOMATED COUNT: 15.1 % (ref 11.5–14.5)
GFR NON-AFRICAN AMERICAN: 50
GRANULOCYTES # BLD: 7.02 10*3/UL (ref 1.4–6.5)
GRANULOCYTES NFR BLD: 72.7 % (ref 50–68)
HGB BLD-MCNC: 8.8 G/DL (ref 12–16)
LYMPHOCYTES # BLD: 0.6 10*3/UL (ref 1.2–3.4)
LYMPHOCYTES NFR BLD AUTO: 6.4 % (ref 22–35)
MCH RBC QN AUTO: 29.2 PG (ref 25–35)
MCHC RBC AUTO-ENTMCNC: 33.7 G/DL (ref 31–37)
MCV RBC AUTO: 86.7 FL (ref 80–105)
MONOCYTES # BLD AUTO: 1.3 10*3/UL (ref 0.1–0.6)
MONOCYTES NFR BLD: 13.4 % (ref 1–6)
PLATELET # BLD: 192 10^3/UL (ref 120–450)
PMV BLD AUTO: 9 FL (ref 7–11)
RBC # BLD AUTO: 3.01 10^6/UL (ref 3.5–6.1)
WBC # BLD AUTO: 9.7 10^3/UL (ref 4.5–11)

## 2018-07-03 RX ADMIN — MEROPENEM SCH MLS/HR: 1 INJECTION INTRAVENOUS at 06:49

## 2018-07-03 RX ADMIN — LINEZOLID SCH MLS/HR: 600 INJECTION, SOLUTION INTRAVENOUS at 21:41

## 2018-07-03 RX ADMIN — Medication SCH MG: at 21:12

## 2018-07-03 RX ADMIN — Medication SCH MG: at 10:47

## 2018-07-03 RX ADMIN — ENOXAPARIN SODIUM SCH MG: 40 INJECTION SUBCUTANEOUS at 10:48

## 2018-07-03 RX ADMIN — MORPHINE SULFATE PRN MG: 4 INJECTION, SOLUTION INTRAMUSCULAR; INTRAVENOUS at 15:40

## 2018-07-03 RX ADMIN — Medication SCH: at 13:57

## 2018-07-03 RX ADMIN — LINEZOLID SCH MLS/HR: 600 INJECTION, SOLUTION INTRAVENOUS at 10:47

## 2018-07-03 RX ADMIN — PANTOPRAZOLE SODIUM SCH MG: 40 TABLET, DELAYED RELEASE ORAL at 10:47

## 2018-07-03 RX ADMIN — MEROPENEM SCH MLS/HR: 1 INJECTION INTRAVENOUS at 14:27

## 2018-07-03 RX ADMIN — MORPHINE SULFATE PRN MG: 4 INJECTION, SOLUTION INTRAMUSCULAR; INTRAVENOUS at 21:46

## 2018-07-03 RX ADMIN — Medication SCH: at 08:41

## 2018-07-03 RX ADMIN — DILTIAZEM HYDROCHLORIDE SCH MG: 120 CAPSULE, COATED, EXTENDED RELEASE ORAL at 10:47

## 2018-07-03 RX ADMIN — MEROPENEM SCH MLS/HR: 1 INJECTION INTRAVENOUS at 21:37
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
## 2018-07-03 NOTE — PN
DATE:  07/02/2018



SUBJECTIVE:  The patient is status post internal fixations, left hip and

femur.  The patient is doing better, less pain, no distress, no chest pain.

Planning to go for MRI of her knee.



PHYSICAL EXAMINATION:

VITAL SIGNS:  Temperature is 97.1, heart rate 91, blood pressure 114/68,

respirations 19.

HEAD AND NECK:  Normal.  No JVD.  No thyromegaly.

CHEST:  Clear bilaterally.

CARDIAC:  First sound and second sound normal, regular.

ABDOMEN:  Soft, nontender.

EXTREMITIES:  No edema.  Left hip and femur covered with two separate

gauzes postoperatively.  SCD is on.

NEUROLOGIC:  Normal.



LABORATORY DATA:  Her laboratory studies is as follow:  White count 11.3,

hemoglobin 9, hematocrit 26.5, platelets 184.  Chemistry shows sodium 138,

potassium 3.2, chloride 106, bicarb 23, BUN 20, creatinine 1.2, blood sugar

113.  Liver function test is normal.  Procalcitonin 0.67, which is

elevated.  Troponin was negative.



IMPRESSION:

1.  Leukocytosis, etiology could be postoperatively.  We will concern about

any postoperative infection.  Get Dr. Macdonald, Infectious Disease

consult.  Repeat CBC in the morning.

2.  Left knee fracture, will need MRI.  No contraindications for MRI of the

left knee.  Spoke with Dr. Gaxiola.  The patient has biological valve.

3.  History of paroxysmal atrial fibrillation and premature atrial

contractions.  Since came in, she is in sinus rhythm with premature atrial

contractions.  No atrial fibrillation.  Continue current therapy.  Continue

Lovenox 40 subcu daily for deep venous thrombosis prophylaxis.

4.  History of peptic ulcer disease, anemia.  No anticoagulation.  No

Coumadin, no aspirin.  Seems doing well.  Postoperative anemia, transfused

and doing better.

5.  Status post biological valve replacement of mitral valve, seems doing

well.



PLAN:  Continue current therapy.  Continue current medications include

Cardizem 120, Claritin 10, vitamin D, iron pills, Lasix 20 by mouth daily,

Lipitor 10 at bedtime, Lopressor 50 b.i.d., Lovenox 40 subcu daily, mag

oxide b.i.d., Merrem every 8 hours 1 g and Zyvox 1 g every 12, Singulair

10, IV fluids 50 mL/hour, Tylenol p.r.n., Xanax b.i.d. p.r.n. and Xopenex

four times a day.  Continue current therapy.  Follow up clinically.  The

patient will need another surgery for her knee and rehabilitation at least

for 4 weeks.





__________________________________________

Philip Dinh MD





DD:  07/03/2018 8:27:49

DT:  07/03/2018 9:06:23

Job # [id number]

## 2018-07-03 NOTE — CP.PCM.PN
Subjective





- Date & Time of Evaluation


Date of Evaluation: 07/03/18


Time of Evaluation: 09:27





- Subjective


Subjective: 





Patient seen and examined at bedside.Sitting up in bed with knee immobilizer 

on. MRI has not been completed yet. Spoke with Dr. Gaxiola and confirmed patient 

has a bioprosthetic Mitral valve, ok to proceed with MRI.





Afebrile


WBC 9.7


Hgb 8.8








L hip: dressings removed. Incisions cleaned with NSS and new dry light 

dressings applied. +AROM foot and ankle. Calf and thigh are soft and nontender. 

NVI distally 








MRI today


Cont NWB LLE


Cont DVT prophylaxis


Will follow up with MRI results  





Objective





- Vital Signs/Intake and Output


Vital Signs (last 24 hours): 


 











Temp Pulse Resp BP Pulse Ox


 


 97.5 F L  95 H  20   120/76   100 


 


 07/03/18 06:00  07/03/18 08:29  07/03/18 06:00  07/03/18 08:29  07/03/18 06:00








Intake and Output: 


 











 07/03/18 07/03/18





 06:59 18:59


 


Intake Total 1960 


 


Balance 1960 














- Medications


Medications: 


 Current Medications





Acetaminophen (Tylenol 325mg Tab)  650 mg PO Q4H PRN


   PRN Reason: Pain, Mild (1-3)


Alprazolam (Xanax)  0.25 mg PO BID PRN; Protocol


   PRN Reason: Anxiety


   Stop: 07/06/18 10:01


   Last Admin: 06/29/18 09:51 Dose:  0.25 mg


Atorvastatin Calcium (Lipitor)  10 mg PO DIN [facility]


   Last Admin: 07/02/18 17:24 Dose:  10 mg


Diltiazem HCl (Cardizem Cd)  120 mg PO DAILY [facility]


   Last Admin: 07/02/18 09:23 Dose:  120 mg


Enoxaparin Sodium (Lovenox)  40 mg SC DAILY [facility]


   PRN Reason: Protocol


   Last Admin: 07/02/18 09:22 Dose:  40 mg


Ergocalciferol (Drisdol 50,000 Intl Units Cap)  1 cap PO Q7D [facility]


   Last Admin: 06/29/18 11:33 Dose:  1 cap


Famotidine (Pepcid)  40 mg PO HS [facility]


   Last Admin: 07/02/18 22:23 Dose:  40 mg


Ferrous Sulfate (Feosol)  324 mg PO BID [facility]


   Last Admin: 07/02/18 17:24 Dose:  324 mg


Furosemide (Lasix)  20 mg PO DAILY [facility]


   Last Admin: 07/02/18 09:22 Dose:  20 mg


Sodium Chloride (Sodium Chloride 0.9%)  1,000 mls @ 50 mls/hr IV .Q20H [facility]


   Last Admin: 07/03/18 06:54 Dose:  50 mls/hr


Meropenem (Merrem Iv 1 Gm Premix)  50 mls @ 100 mls/hr IVPB Q8 [facility]


   PRN Reason: Protocol


   Stop: 07/10/18 22:16


   Last Admin: 07/03/18 06:49 Dose:  100 mls/hr


Linezolid (Zyvox 600mg/300ml D5w)  600 mg in 300 mls @ 200 mls/hr IVPB Q12 [facility]


   PRN Reason: Protocol


   Stop: 07/10/18 22:08


   Last Admin: 07/02/18 22:23 Dose:  200 mls/hr


Levalbuterol HCl (Xopenex)  1.25 mg IH TIDRESP [facility]


   Last Admin: 07/03/18 08:41 Dose:  Not Given


Loratadine (Claritin)  10 mg PO DAILY [facility]


   Last Admin: 07/02/18 09:22 Dose:  10 mg


Magnesium Oxide (Mag-Ox)  400 mg PO DAILY [facility]


   Last Admin: 07/02/18 09:22 Dose:  400 mg


Metoprolol Tartrate (Lopressor)  50 mg PO BRKDIN [facility]


   Last Admin: 07/03/18 08:29 Dose:  50 mg


Montelukast Sodium (Singulair)  10 mg PO HS [facility]


   Last Admin: 07/02/18 22:24 Dose:  10 mg


Pantoprazole Sodium (Protonix Ec Tab)  40 mg PO DAILY [facility]


   Last Admin: 07/02/18 09:23 Dose:  40 mg











- Labs


Labs: 


 





 07/03/18 07:10 





 07/03/18 07:10 





 











PT  11.8 SECONDS (9.4-12.5)   06/28/18  17:10    


 


INR  1.03  (0.93-1.08)   06/28/18  17:10    


 


APTT  25.5 Seconds (25.1-36.5)   06/28/18  17:10
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
## 2018-07-03 NOTE — PN
DATE:  07/03/2018



REASON FOR CONSULTATION AND FOLLOWUP:  Cardiac evaluation, admitted with

chest pain, history of mitral valve replacement, nonobstructive coronary

artery disease, clearance for MRI of the hip for hip pain.



SUBJECTIVE:  The patient denies any chest pain, shortness of breath, or any

palpitations.



PHYSICAL EXAMINATION:

GENERAL:  Not in apparent distress.

VITAL SIGNS:  Temperature afebrile, heart rate 95, blood pressure 120/76.

HEENT:  PERRLA.  Extraocular muscles are intact.

NECK:  Supple.  No carotid bruits.  No thyromegaly.

CHEST:  Clear to auscultation.

HEART:  S1 and S2, regular.

ABDOMEN:  Soft.

EXTREMITIES:  Clubbing and cyanosis negative.



LABORATORY DATA:  Blood workup as follows:  WBC 9.7, hemoglobin 8.8,

hematocrit 26.1, platelet count 192.  Chemistry showed sodium _____,

potassium 3.8, chloride 107, carbon dioxide 21, anion gap of 12, BUN 18,

creatinine 1.1.



IMPRESSION:  Status post mitral valve replacement, bioprosthetic; history

of paroxysmal atrial fibrillation; history of multiple gastrointestinal

bleeds; off anticoagulation because of gastrointestinal bleed.  No further

episode of arrhythmia noted.  EKG is normal sinus as of yesterday also. 

History of open heart surgery, history of mitral valve replacement by

bioprosthetic mechanical valve in 04/2018.  History of cardiac

catheterization twice, nonobstructive coronary artery disease.  Mostly seen

just preop before the cardiac catheterization with atypical chest pain. 

Most recent echo on 05/23/2018 showed ejection fraction of 77%, normal

bioprosthetic mitral valve.



RECOMMENDATIONS:  Continue Cardizem  mg daily.  Continue ferrous

sulfate.  Continue to supplement potassium.  Continue gentle diet. 

Continue atorvastatin.  Continue metoprolol.  Continue DVT prophylaxis. 

Discontinue telemetry.  The patient is cleared to go for MRI because of

bioprosthetic valve, not mechanical valve in the mitral valve position, it

is bioprosthetic.  We will follow with you.  Supplement electrolytes as

needed and we will discontinue telemetry.  We will supplement potassium 20

mEq today.  No further episode of arrhythmia noted.  No anticoagulation at

this time because of recurrent GI bleed.



Thank you, Dr. Dinh, for providing us the opportunity in taking care of

the patient, Malu Meeks.







__________________________________________

Ajay Gaxiola MD



DD:  07/03/2018 10:10:00

DT:  07/03/2018 11:51:27

[facility] # [id number]

## 2018-07-04 VITALS — RESPIRATION RATE: 20 BRPM

## 2018-07-04 LAB
APPEARANCE UR: CLEAR
BASOPHILS # BLD AUTO: 0.01 K/MM3 (ref 0–2)
BASOPHILS NFR BLD: 0.1 % (ref 0–3)
BILIRUB UR-MCNC: NEGATIVE MG/DL
COLOR UR: YELLOW
EOSINOPHIL # BLD: 0.7 10*3/UL (ref 0–0.7)
EOSINOPHIL NFR BLD: 8.1 % (ref 1.5–5)
ERYTHROCYTE [DISTWIDTH] IN BLOOD BY AUTOMATED COUNT: 14.6 % (ref 11.5–14.5)
GLUCOSE UR STRIP-MCNC: NEGATIVE MG/DL
GRANULOCYTES # BLD: 6.33 10*3/UL (ref 1.4–6.5)
GRANULOCYTES NFR BLD: 69.2 % (ref 50–68)
HGB BLD-MCNC: 9.2 G/DL (ref 12–16)
LEUKOCYTE ESTERASE UR-ACNC: NEGATIVE LEU/UL
LYMPHOCYTES # BLD: 1.1 10*3/UL (ref 1.2–3.4)
LYMPHOCYTES NFR BLD AUTO: 12.2 % (ref 22–35)
MCH RBC QN AUTO: 29 PG (ref 25–35)
MCHC RBC AUTO-ENTMCNC: 33.3 G/DL (ref 31–37)
MCV RBC AUTO: 87.1 FL (ref 80–105)
MONOCYTES # BLD AUTO: 1 10*3/UL (ref 0.1–0.6)
MONOCYTES NFR BLD: 10.4 % (ref 1–6)
PH UR STRIP: 6 [PH] (ref 4.7–8)
PLATELET # BLD: 225 10^3/UL (ref 120–450)
PMV BLD AUTO: 8.8 FL (ref 7–11)
PROT UR STRIP-MCNC: NEGATIVE MG/DL
RBC # BLD AUTO: 3.17 10^6/UL (ref 3.5–6.1)
RBC # UR STRIP: NEGATIVE /UL
SP GR UR STRIP: 1.01 (ref 1–1.03)
UROBILINOGEN UR STRIP-ACNC: 0.2 E.U./DL
WBC # BLD AUTO: 9.2 10^3/UL (ref 4.5–11)

## 2018-07-04 RX ADMIN — Medication SCH: at 21:18

## 2018-07-04 RX ADMIN — MORPHINE SULFATE PRN MG: 4 INJECTION, SOLUTION INTRAMUSCULAR; INTRAVENOUS at 20:10

## 2018-07-04 RX ADMIN — DILTIAZEM HYDROCHLORIDE SCH MG: 120 CAPSULE, COATED, EXTENDED RELEASE ORAL at 09:34

## 2018-07-04 RX ADMIN — Medication SCH MG: at 09:34

## 2018-07-04 RX ADMIN — MEROPENEM SCH MLS/HR: 1 INJECTION INTRAVENOUS at 05:09

## 2018-07-04 RX ADMIN — MORPHINE SULFATE PRN MG: 4 INJECTION, SOLUTION INTRAMUSCULAR; INTRAVENOUS at 06:17

## 2018-07-04 RX ADMIN — PANTOPRAZOLE SODIUM SCH MG: 40 TABLET, DELAYED RELEASE ORAL at 09:34

## 2018-07-04 RX ADMIN — CEFPODOXIME PROXETIL SCH MG: 200 TABLET, FILM COATED ORAL at 10:48

## 2018-07-04 RX ADMIN — CEFPODOXIME PROXETIL SCH MG: 200 TABLET, FILM COATED ORAL at 21:43

## 2018-07-04 RX ADMIN — LINEZOLID SCH MLS/HR: 600 INJECTION, SOLUTION INTRAVENOUS at 09:35

## 2018-07-04 RX ADMIN — ENOXAPARIN SODIUM SCH MG: 40 INJECTION SUBCUTANEOUS at 09:34

## 2018-07-04 RX ADMIN — Medication SCH MG: at 14:12

## 2018-07-04 RX ADMIN — Medication SCH MG: at 08:08

## 2018-07-04 NOTE — PN
DATE:  07/04/2018



REASON FOR CONSULTATION AND FOLLOWUP:  Cardiac evaluation admitted with

chest pain, history of mitral valve replacement, nonobstructive coronary

artery disease, status post cardiac catheterization tomorrow, clearance for

MRI for the hip.



SUBJECTIVE:  The patient denies any chest pain, shortness of breath, or any

palpitations.



OBJECTIVE:

GENERAL:  Not in apparent distress, lying flat in the bed.

VITAL SIGNS:  Temperature afebrile, heart rate 91, blood pressure 127/57.

HEENT:  PERRLA.  Extraocular muscles intact.

NECK:  Supple.  No carotid bruit or thyromegaly.

CHEST:  Clear to auscultation.

HEART:  S1, S2 regular.

ABDOMEN:  Soft.

EXTREMITIES:  Clubbing and cyanosis negative.



LABORATORY DATA:  Blood workup as follows:  WBC 9.7, hemoglobin 8.8,

hematocrit of 26.1, platelet count of 192.  Chemistry shows sodium 137,

potassium 3.8, chloride 106, carbon dioxide 21, anion gap of 12, BUN 18,

creatinine 1.1.  Glucose 107, phosphorus 3.8, magnesium 1.8.



IMPRESSION:  The patient is a 62-year-old female with past medical history

significant for severe mitral valve regurgitation and mitral valve

prolapse, status post mitral valve repair in 04/2000, and status post

mitral valve replacement, bioprosthetic in April 2018 at [facility] and status post cardiac catheterization twice because of

complaint of recurrent chest pain one just before the mitral valve

replacement; mild coronary artery disease, nonobstructive; history of

paroxysmal atrial fibrillation.  The patient with multiple times on

anticoagulation, but massive gastrointestinal bleed multiple times, off

anticoagulation, now is in normal sinus.  The patient is complaining of hip

pain, a scheduled MRI could not be done because of concern of mitral valve

prosthesis.



RECOMMENDATIONS:  The patient is cleared from the Cardiology point of view

to go for MRI, no contraindication.  It is not a mechanical process, it is

bioprosthetic mitral position.  No evidence of arrhythmia.  We will keep

her off anticoagulation.  Continue Cardizem 120 mg and beta-blocker, gentle

diuretics.  Should the patient become in AFib again, which is unlikely,

then we will consider radiofrequency ablation or occluder device for now. 

Continue current treatment.  No further cardiac arrhythmia workup is

planned or warranted.  We will discontinue Telemetry.  Okay to discharge

from Cardiology point of view.  We will repeat SMA7 blood in the morning.





__________________________________________

Ajay Gaxiola MD





DD:  07/04/2018 9:19:55

DT:  07/04/2018 10:28:43

Job # [id number]
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
## 2018-07-05 VITALS — TEMPERATURE: 99 F | HEART RATE: 66 BPM

## 2018-07-05 VITALS — SYSTOLIC BLOOD PRESSURE: 136 MMHG | DIASTOLIC BLOOD PRESSURE: 79 MMHG

## 2018-07-05 VITALS — OXYGEN SATURATION: 100 %

## 2018-07-05 LAB
BASOPHILS # BLD AUTO: 0.02 K/MM3 (ref 0–2)
BASOPHILS NFR BLD: 0.2 % (ref 0–3)
BUN SERPL-MCNC: 10 MG/DL (ref 7–21)
CALCIUM SERPL-MCNC: 8.5 MG/DL (ref 8.4–10.5)
EOSINOPHIL # BLD: 0.6 10*3/UL (ref 0–0.7)
EOSINOPHIL NFR BLD: 6.6 % (ref 1.5–5)
ERYTHROCYTE [DISTWIDTH] IN BLOOD BY AUTOMATED COUNT: 14.7 % (ref 11.5–14.5)
GFR NON-AFRICAN AMERICAN: > 60
GRANULOCYTES # BLD: 6.39 10*3/UL (ref 1.4–6.5)
GRANULOCYTES NFR BLD: 68 % (ref 50–68)
HGB BLD-MCNC: 8.9 G/DL (ref 12–16)
LYMPHOCYTES # BLD: 1.3 10*3/UL (ref 1.2–3.4)
LYMPHOCYTES NFR BLD AUTO: 13.6 % (ref 22–35)
MCH RBC QN AUTO: 29 PG (ref 25–35)
MCHC RBC AUTO-ENTMCNC: 33.7 G/DL (ref 31–37)
MCV RBC AUTO: 86 FL (ref 80–105)
MONOCYTES # BLD AUTO: 1.1 10*3/UL (ref 0.1–0.6)
MONOCYTES NFR BLD: 11.6 % (ref 1–6)
PLATELET # BLD: 267 10^3/UL (ref 120–450)
PMV BLD AUTO: 8.7 FL (ref 7–11)
RBC # BLD AUTO: 3.07 10^6/UL (ref 3.5–6.1)
WBC # BLD AUTO: 9.4 10^3/UL (ref 4.5–11)

## 2018-07-05 RX ADMIN — ENOXAPARIN SODIUM SCH MG: 40 INJECTION SUBCUTANEOUS at 09:10

## 2018-07-05 RX ADMIN — PANTOPRAZOLE SODIUM SCH MG: 40 TABLET, DELAYED RELEASE ORAL at 09:10

## 2018-07-05 RX ADMIN — CEFPODOXIME PROXETIL SCH MG: 200 TABLET, FILM COATED ORAL at 09:10

## 2018-07-05 RX ADMIN — Medication SCH MG: at 08:04

## 2018-07-05 RX ADMIN — DILTIAZEM HYDROCHLORIDE SCH MG: 120 CAPSULE, COATED, EXTENDED RELEASE ORAL at 09:10

## 2018-07-05 RX ADMIN — Medication SCH MG: at 14:15

## 2018-07-05 RX ADMIN — Medication SCH MG: at 09:10

## 2018-07-05 RX ADMIN — MORPHINE SULFATE PRN MG: 4 INJECTION, SOLUTION INTRAMUSCULAR; INTRAVENOUS at 15:37
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
## 2018-07-05 NOTE — CP.PCM.PN
Subjective





- Date & Time of Evaluation


Date of Evaluation: 07/05/18


Time of Evaluation: 08:23





- Subjective


Subjective: 





Patient seen and examined. Patient alert and awake, laying in bed with knee 

immobilizer on. Denies any significant pain.





afebrile


VSS


WBC 9.4


Hgb 8.9








L hip: dressings changed. Incision sites are clean and intact. No erythema or 

drainage. +AROM foot and ankle. Sensation intact to light touch. Examination of 

the left knee, mild effusion. Joint line tenderness to palpation.Thigh and calf 

are soft and non-tender. NVI distally





Reviewed MRI findings with patient


Cont DVT prophylaxis


Cont PT with WBAT now


Will continue knee immobilizer 


Will follow up with patient next week in Dr. Crabtree's office


Discussed above with Dr. Crabtree, he agrees. 








Objective





- Vital Signs/Intake and Output


Vital Signs (last 24 hours): 


 











Temp Pulse Resp BP Pulse Ox


 


 98.2 F   89   20   139/77   100 


 


 07/05/18 05:54  07/05/18 05:54  07/05/18 05:54  07/05/18 05:54  07/05/18 05:54








Intake and Output: 


 











 07/05/18 07/05/18





 06:59 18:59


 


Intake Total 960 


 


Output Total 200 


 


Balance 760 














- Medications


Medications: 


 Current Medications





Acetaminophen (Tylenol 325mg Tab)  650 mg PO Q4H PRN


   PRN Reason: Pain, Mild (1-3)


Alprazolam (Xanax)  0.25 mg PO BID PRN; Protocol


   PRN Reason: Anxiety


   Stop: 07/06/18 10:01


   Last Admin: 07/04/18 21:43 Dose:  0.25 mg


Atorvastatin Calcium (Lipitor)  10 mg PO DIN [facility]


   Last Admin: 07/04/18 17:39 Dose:  10 mg


Cefpodoxime Proxetil (Vantin)  200 mg PO Q12 [facility]


   PRN Reason: Protocol


   Stop: 07/07/18 10:11


   Last Admin: 07/04/18 21:43 Dose:  200 mg


Diltiazem HCl (Cardizem Cd)  120 mg PO DAILY [facility]


   Last Admin: 07/04/18 09:34 Dose:  120 mg


Diphenhydramine HCl (Benadryl)  25 mg PO Q6 PRN


   PRN Reason: Itching / Pruritus


   Last Admin: 07/04/18 02:03 Dose:  25 mg


Doxycycline Hyclate (Doryx)  100 mg PO Q12 [facility]


   PRN Reason: Protocol


   Stop: 07/07/18 10:11


   Last Admin: 07/04/18 21:43 Dose:  100 mg


Enoxaparin Sodium (Lovenox)  40 mg SC DAILY [facility]


   PRN Reason: Protocol


   Last Admin: 07/04/18 09:34 Dose:  40 mg


Ergocalciferol (Drisdol 50,000 Intl Units Cap)  1 cap PO Q7D [facility]


   Last Admin: 06/29/18 11:33 Dose:  1 cap


Famotidine (Pepcid)  40 mg PO HS [facility]


   Last Admin: 07/04/18 21:43 Dose:  40 mg


Ferrous Sulfate (Feosol)  324 mg PO BID [facility]


   Last Admin: 07/04/18 17:40 Dose:  324 mg


Furosemide (Lasix)  20 mg PO DAILY [facility]


   Last Admin: 07/04/18 09:34 Dose:  20 mg


Sodium Chloride (Sodium Chloride 0.9%)  1,000 mls @ 50 mls/hr IV .Q20H [facility]


   Last Admin: 07/04/18 21:44 Dose:  50 mls/hr


Levalbuterol HCl (Xopenex)  1.25 mg IH TIDRESP [facility]


   Last Admin: 07/05/18 08:04 Dose:  1.25 mg


Loratadine (Claritin)  10 mg PO DAILY [facility]


   Last Admin: 07/04/18 09:34 Dose:  10 mg


Magnesium Oxide (Mag-Ox)  400 mg PO DAILY [facility]


   Last Admin: 07/04/18 09:34 Dose:  400 mg


Metoprolol Tartrate (Lopressor)  50 mg PO BRKDIN [facility]


   Last Admin: 07/04/18 17:39 Dose:  50 mg


Montelukast Sodium (Singulair)  10 mg PO HS [facility]


   Last Admin: 07/04/18 21:43 Dose:  10 mg


Morphine Sulfate (Morphine)  4 mg IVP Q4H [facility]


   PRN Reason: Pain, moderate (4-7)


   Last Admin: 07/04/18 20:10 Dose:  4 mg


Pantoprazole Sodium (Protonix Ec Tab)  40 mg PO DAILY [facility]


   Last Admin: 07/04/18 09:34 Dose:  40 mg











- Labs


Labs: 


 





 07/05/18 07:15 





 07/05/18 07:15 





 











PT  11.8 SECONDS (9.4-12.5)   06/28/18  17:10    


 


INR  1.03  (0.93-1.08)   06/28/18  17:10    


 


APTT  25.5 Seconds (25.1-36.5)   06/28/18  17:10
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
## 2018-07-05 NOTE — PN
DATE:  07/05/2018



REASON FOR CONSULTATION AND FOLLOWUP:  Cardiac evaluation, admitted with

chest pain, history of mitral valve replacement, nonobstructive coronary

artery disease, status post cardiac catheterization twice, clearance for

MRI for the hip.



SUBJECTIVE:  The patient denies any chest pain, shortness of breath, or any

palpitations.



OBJECTIVE:

GENERAL:  Not in apparent distress, lying flat in the bed.

VITAL SIGNS:  Temperature afebrile, heart rate 89, and blood pressure

139/72.

HEENT:  PERRLA.  Extraocular muscles intact.

NECK:  Supple.  No carotid bruit or thyromegaly.

CHEST:  Clear to auscultation.

HEART:  S1 and S2, regular.

ABDOMEN:  Soft.

EXTREMITIES:  Clubbing and cyanosis negative.



LABORATORY DATA:  Blood workup as follows:  WBC 9.4, hemoglobin 8.9,

hematocrit of 26.4, and platelet count of 267.  Chemistry shows sodium 130,

potassium 3.1, chloride 104, carbon dioxide 25, anion gap of 11.  BUN 10,

creatinine 0.8.  Magnesium 1.4.



IMPRESSION:  This is a 62-year-old female with past medical history

significant for mitral valve prolapse, severe mitral valve regurgitation,

status post mitral valve replacement bioprosthetic, multiple admissions

with chest pain post coronary artery bypass grafting, nonischemic

cardiomyopathy.  Pre coronary artery bypass grafting status post cardiac

catheterization twice, one just before, because patient is complaining of

chest pain while at the [facility] for evaluation for valve

replacement.  Prior to that, a year and half ago, the patient had

catheterization done, had mild nonobstructive coronary artery disease.  The

chest pain is nonischemic.  History of paroxysmal atrial fibrillation, was

on anticoagulation; history of multiple gastrointestinal bleed, off

anticoagulation; complaining of hip pain, being evaluated by MRI.



RECOMMENDATIONS:

1.  The patient is okay to go for MRI.  No absolute contraindication.  The

patient has mitral valve bioprosthetic more than 3 months ago.

2.  Nonischemic chest pain.  We will discontinue telemetry.  Aggressive

medical treatment.  We will put some hematinic multivitamin for anemia. 

Continue Cardizem  mg daily, continue metoprolol 50 twice a day.  We

will discontinue telemetry.  Cardiology point of view, the patient is okay

to be discharged when okay from medical point of view.  No further cardiac

invasive or noninvasive workup is warranted at this time or planned.



We will repeat the blood workup in the morning and aggressively supplement

potassium and magnesium.







__________________________________________

Ajay Gaxiola MD



DD:  07/05/2018 12:54:47

DT:  07/05/2018 13:54:20

Job # [id number]
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
## 2018-07-13 ENCOUNTER — HOSPITAL ENCOUNTER (OUTPATIENT)
Dept: HOSPITAL 42 - ED | Age: 63
Setting detail: OBSERVATION
LOS: 3 days | Discharge: NURSING, REHAB OR LONG-TERM CARE | End: 2018-07-16
Attending: INTERNAL MEDICINE | Admitting: INTERNAL MEDICINE
Payer: MEDICARE

## 2018-07-13 VITALS — BODY MASS INDEX: 25 KG/M2

## 2018-07-13 DIAGNOSIS — J44.9: ICD-10-CM

## 2018-07-13 DIAGNOSIS — R53.1: ICD-10-CM

## 2018-07-13 DIAGNOSIS — F41.9: ICD-10-CM

## 2018-07-13 DIAGNOSIS — E83.42: ICD-10-CM

## 2018-07-13 DIAGNOSIS — Z90.49: ICD-10-CM

## 2018-07-13 DIAGNOSIS — D64.9: ICD-10-CM

## 2018-07-13 DIAGNOSIS — Z91.14: ICD-10-CM

## 2018-07-13 DIAGNOSIS — I25.10: ICD-10-CM

## 2018-07-13 DIAGNOSIS — I50.9: ICD-10-CM

## 2018-07-13 DIAGNOSIS — E87.6: Primary | ICD-10-CM

## 2018-07-13 DIAGNOSIS — I11.0: ICD-10-CM

## 2018-07-13 DIAGNOSIS — E78.5: ICD-10-CM

## 2018-07-13 DIAGNOSIS — M10.9: ICD-10-CM

## 2018-07-13 DIAGNOSIS — Z95.3: ICD-10-CM

## 2018-07-13 DIAGNOSIS — I48.0: ICD-10-CM

## 2018-07-13 DIAGNOSIS — I47.9: ICD-10-CM

## 2018-07-13 DIAGNOSIS — E66.9: ICD-10-CM

## 2018-07-13 LAB
ALBUMIN SERPL-MCNC: 3.2 G/DL (ref 3–4.8)
ALBUMIN/GLOB SERPL: 0.9 {RATIO} (ref 1.1–1.8)
ALT SERPL-CCNC: 26 U/L (ref 7–56)
AST SERPL-CCNC: 22 U/L (ref 14–36)
BASOPHILS # BLD AUTO: 0.03 K/MM3 (ref 0–2)
BASOPHILS NFR BLD: 0.3 % (ref 0–3)
BUN SERPL-MCNC: 11 MG/DL (ref 7–21)
CALCIUM SERPL-MCNC: 9 MG/DL (ref 8.4–10.5)
EOSINOPHIL # BLD: 0.6 10*3/UL (ref 0–0.7)
EOSINOPHIL NFR BLD: 5.5 % (ref 1.5–5)
ERYTHROCYTE [DISTWIDTH] IN BLOOD BY AUTOMATED COUNT: 15.5 % (ref 11.5–14.5)
GFR NON-AFRICAN AMERICAN: > 60
GRANULOCYTES # BLD: 7.56 10*3/UL (ref 1.4–6.5)
GRANULOCYTES NFR BLD: 67.7 % (ref 50–68)
HGB BLD-MCNC: 9.3 G/DL (ref 12–16)
LYMPHOCYTES # BLD: 2 10*3/UL (ref 1.2–3.4)
LYMPHOCYTES NFR BLD AUTO: 17.4 % (ref 22–35)
MCH RBC QN AUTO: 28.8 PG (ref 25–35)
MCHC RBC AUTO-ENTMCNC: 33.2 G/DL (ref 31–37)
MCV RBC AUTO: 86.7 FL (ref 80–105)
MONOCYTES # BLD AUTO: 1 10*3/UL (ref 0.1–0.6)
MONOCYTES NFR BLD: 9.1 % (ref 1–6)
PLATELET # BLD: 548 10^3/UL (ref 120–450)
PMV BLD AUTO: 9.6 FL (ref 7–11)
RBC # BLD AUTO: 3.23 10^6/UL (ref 3.5–6.1)
WBC # BLD AUTO: 11.2 10^3/UL (ref 4.5–11)

## 2018-07-13 PROCEDURE — 83735 ASSAY OF MAGNESIUM: CPT

## 2018-07-13 PROCEDURE — 99281 EMR DPT VST MAYX REQ PHY/QHP: CPT

## 2018-07-13 PROCEDURE — 36415 COLL VENOUS BLD VENIPUNCTURE: CPT

## 2018-07-13 PROCEDURE — 97530 THERAPEUTIC ACTIVITIES: CPT

## 2018-07-13 PROCEDURE — 97163 PT EVAL HIGH COMPLEX 45 MIN: CPT

## 2018-07-13 PROCEDURE — 85025 COMPLETE CBC W/AUTO DIFF WBC: CPT

## 2018-07-13 PROCEDURE — 80048 BASIC METABOLIC PNL TOTAL CA: CPT

## 2018-07-13 PROCEDURE — 84100 ASSAY OF PHOSPHORUS: CPT

## 2018-07-13 PROCEDURE — 93005 ELECTROCARDIOGRAM TRACING: CPT

## 2018-07-13 PROCEDURE — 80053 COMPREHEN METABOLIC PANEL: CPT

## 2018-07-13 PROCEDURE — 94640 AIRWAY INHALATION TREATMENT: CPT

## 2018-07-13 PROCEDURE — 71045 X-RAY EXAM CHEST 1 VIEW: CPT

## 2018-07-13 NOTE — ED PDOC
Arrival/HPI





- General


Time Seen by Provider: 07/13/18 22:01


Historian: Patient





- History of Present Illness


Narrative History of Present Illness (Text): 





07/13/18 22:12


63 y/o female, pmh including MVP/anemia/pud/gout/copd/hld, nkda, biba c/o low 

potassium level to 2.8.  Pt. is chronically on the potassium supplement, stated 

that she didn't take it at the [facility] home, stated that she has no fever or 

chills, no chest pain or shortness of breath, no palpitation, no numbness or 

tingling, no other medical or psychological complaints. 





Past Medical History





- Provider Review


Nursing Documentation Reviewed: Yes





- Infectious Disease


Hx of Infectious Diseases: None





- Tetanus Immunization


Tetanus Immunization: Unknown





- Past Medical History


Past Medical History: No Previous





- Cardiac


Hx Cardiac Disorders: Yes (CAD)





- Pulmonary


Hx Chronic Obstructive Pulmonary Disease (COPD): Yes





- Neurological


Hx Neurological Disorder: Yes


Hx Dizziness: Yes


Hx Migraine: Yes





- HEENT


Hx HEENT Disorder: No





- Renal


Hx Renal Disorder: No


Hx Dialysis: No


Hx Kidney Stones: No


Hx Neurogenic Bladder: No


Hx Pyelonephritis: No


Hx Renal Cancer: No


Hx Renal Failure: No





- Endocrine/Metabolic


Hx Endocrine Disorders: No





- Hematological/Oncological


Hx Blood Transfusions: Yes


Hx Blood Transfusion Reaction: No





- Integumentary


Hx Dermatological Disorder: No





- Musculoskeletal/Rheumatological


Hx Arthritis: Yes


Hx Falls: Yes





- Gastrointestinal


Hx Gastrointestinal Disorders: Yes (gi bleed, black stools)





- Genitourinary/Gynecological


Hx Urinary Tract Infection: Yes





- Psychiatric


Hx Anxiety: Yes


Hx Substance Use: No (Denied by pt.)





- Surgical History


Hx Cardiac Catheterization: Yes


Hx Cholecystectomy: Yes


Hx Open Heart Surgery: Yes


Hx Valve Replacement: Yes





- Anesthesia


Hx Anesthesia Reactions: No


Hx Malignant Hyperthermia: No





- Suicidal Assessment


Feels Threatened In Home Enviroment: No





Family/Social History





- Physician Review


Nursing Documentation Reviewed: Yes


Family/Social History: Unknown Family HX


Smoking Status: Never Smoked


Hx Alcohol Use: Yes (Social drinker.)


Amount per day: 6


Hx Substance Use: No (Denied by pt.)


Hx Substance Use Treatment: No





Allergies/Home Meds


Allergies/Adverse Reactions: 


Allergies





cinnamon Allergy (Intermediate, Verified 07/13/18 22:02)


 ANAPHYLAXIS








Home Medications: 


 Home Meds











 Medication  Instructions  Recorded  Confirmed


 


Simvastatin [Zocor] 20 mg PO HS 02/13/18 07/05/18


 


Budesonide/Formoterol Fumarate 1 aer IH DAILY 05/22/18 07/05/18





[Symbicort 80-4.5 Mcg Inhaler]   


 


Cholecalciferol [Vitamin D 1000 IU] 50,000 iu PO QWK 05/22/18 07/05/18


 


Diltiazem HCl [Cartia Xt] 120 mg PO DAILY 05/22/18 07/05/18


 


Ferrous Sulfate [Feosol] 325 mg PO BID 05/22/18 07/05/18


 


Furosemide [Lasix] 20 mg PO DAILY 05/22/18 07/05/18


 


Loratadine [Claritin] 10 mg PO DAILY 05/22/18 07/05/18














Review of Systems





- Review of Systems


Constitutional: absent: Fatigue, Fevers


Eyes: absent: Vision Changes


ENT: absent: Hearing Changes


Respiratory: absent: SOB, Cough


Cardiovascular: absent: Chest Pain


Gastrointestinal: absent: Abdominal Pain, Diarrhea, Nausea, Vomiting


Skin: absent: Rash, Pruritis


Neurological: absent: Headache, Dizziness


Psychiatric: absent: Anxiety, Depression, Suicidal Ideation





Physical Exam


Vital Signs Reviewed: Yes


Vital Signs











  Temp Pulse Resp BP Pulse Ox


 


 07/13/18 22:10  98.2 F  88  18  110/66  98











Temperature: Afebrile


Blood Pressure: Normal


Pulse: Regular


Respiratory Rate: Normal


Appearance: Positive for: Well-Appearing, Non-Toxic, Comfortable


Pain Distress: None


Mental Status: Positive for: Alert and Oriented X 3





- Systems Exam


Head: Present: Atraumatic, Normocephalic


Pupils: Present: PERRL


Extroacular Muscles: Present: EOMI


Conjunctiva: Present: Normal


Mouth: Present: Moist Mucous Membranes


Neck: Present: Normal Range of Motion


Respiratory/Chest: Present: Clear to Auscultation, Good Air Exchange.  No: 

Respiratory Distress, Accessory Muscle Use


Cardiovascular: Present: Regular Rate and Rhythm, Normal S1, S2.  No: Murmurs


Abdomen: No: Tenderness, Distention, Peritoneal Signs


Back: Present: Normal Inspection


Upper Extremity: Present: Normal Inspection.  No: Cyanosis, Edema


Lower Extremity: Present: Normal Inspection.  No: Edema


Neurological: Present: GCS=15, CN II-XII Intact, Speech Normal, Motor Func 

Grossly Intact, Memory Normal


Skin: Present: Warm, Dry, Normal Color.  No: Rashes


Psychiatric: Present: Alert, Oriented x 3, Normal Insight, Normal Concentration





Medical Decision Making


ED Course and Treatment: 





07/13/18 22:24


differential: hypokalemia vs. electrolyte imbalance vs dehydration


-labs/ua


-ekg


-observe and reassess





07/13/18 23:47


-SR @ 84 BPM with PAC, No ST elevation or depression,  T wave inversion on lead 

III and aVF with flattened t waves generally


-Chest xray show RIGHT lower lobe interstitial congestion. Sternotomy


-Labs show no acute findings except mg 1.5 and potassium 2.9, Potassium 

chloride 20meq po with IV potassium 10meq ordered, Mg 2gm IV ordered.


-I spoke to Dr. Dinh about this case/labs, request to admit the patient for 

over night at [facility] for observation.


-ER attending Dr. Jay is busy, will put in the admission for her


-I discussed all labs with the patient and she agreed to be admitted for 

observation





- Lab Interpretations


Lab Results: 








 07/13/18 22:56 





 07/13/18 22:56 





 Lab Results





07/13/18 22:56: WBC 11.2 H, RBC 3.23 L, Hgb 9.3 L, Hct 28.0 L, MCV 86.7, MCH 

28.8, MCHC 33.2, RDW 15.5 H, Plt Count 548 H, MPV 9.6, Gran % 67.7, Lymph % (

Auto) 17.4 L, Mono % (Auto) 9.1 H, Eos % (Auto) 5.5 H, Baso % (Auto) 0.3, Gran 

# 7.56 H, Lymph # (Auto) 2.0, Mono # (Auto) 1.0 H, Eos # (Auto) 0.6, Baso # (

Auto) 0.03


07/13/18 22:56: Sodium 140, Potassium 2.9 L*, Chloride 102, Carbon Dioxide 28, 

Anion Gap 13, BUN 11, Creatinine 0.8, Est GFR (African Amer) > 60, Est GFR (Non-

Af Amer) > 60, Random Glucose 117 H, Calcium 9.0, Magnesium 1.5 L, Total 

Bilirubin 0.5, AST 22, ALT 26, Alkaline Phosphatase 161 H D, Total Protein 7.0, 

Albumin 3.2, Globulin 3.7, Albumin/Globulin Ratio 0.9 L











- RAD Interpretation


Radiology Orders: 








07/13/18 22:27


CHEST PORTABLE [RAD] Stat 








FINDINGS:


Lungs: RIGHT lower lobe interstitial congestion is seen. No consolidation.


Pleural space: Unremarkable. No pneumothorax.


Heart: Unremarkable. No cardiomegaly.


Mediastinum: Unremarkable.


Bones/joints: Sternotomy changes are seen.


IMPRESSION:


1. RIGHT lower lobe interstitial congestion.


2. Sternotomy


Thank you for allowing us to participate in the care of your patient.


Dictated and Authenticated by: Max Foote MD


07/13/2018 11:25 PM Eastern Time ([place] & [place])


: Radiologist





- EKG Interpretation


EKG Interpretation (Text): 





07/13/18 22:25


SR @ 84 BPM with PAC, No ST elevation or depression,  T wave inversion on lead 

III and aVF with flattened t waves generally


Interpreted by ED Physician: Yes


Type: 12 lead EKG





- PA / NP / Resident Statement


MD/DO has reviewed & agrees with the documentation as recorded.





Disposition/Present on Arrival





- Present on Arrival


Any Indicators Present on Arrival: No


History of DVT/PE: No


History of Uncontrolled Diabetes: No


Urinary Catheter: Yes


History of Decub. Ulcer: No


History Surgical Site Infection Following: None





- Disposition


Have Diagnosis and Disposition been Completed?: Yes


Diagnosis: 


 Abnormal EKG, Hypokalemia, Hypomagnesemia





Disposition: HOSPITALIZED


Disposition Time: 22:26


Patient Plan: Admission, Observation, Telemetry


Referrals: 


Philip Dinh MD [Primary Care Provider] - Follow up with primary

## 2018-07-14 LAB
ALBUMIN SERPL-MCNC: 2.7 G/DL (ref 3–4.8)
ALBUMIN/GLOB SERPL: 0.8 {RATIO} (ref 1.1–1.8)
ALT SERPL-CCNC: 24 U/L (ref 7–56)
AST SERPL-CCNC: 20 U/L (ref 14–36)
BASOPHILS # BLD AUTO: 0.04 K/MM3 (ref 0–2)
BASOPHILS NFR BLD: 0.5 % (ref 0–3)
BUN SERPL-MCNC: 8 MG/DL (ref 7–21)
CALCIUM SERPL-MCNC: 8.4 MG/DL (ref 8.4–10.5)
EOSINOPHIL # BLD: 0.6 10*3/UL (ref 0–0.7)
EOSINOPHIL NFR BLD: 7.4 % (ref 1.5–5)
ERYTHROCYTE [DISTWIDTH] IN BLOOD BY AUTOMATED COUNT: 15.4 % (ref 11.5–14.5)
GFR NON-AFRICAN AMERICAN: > 60
GRANULOCYTES # BLD: 4.79 10*3/UL (ref 1.4–6.5)
GRANULOCYTES NFR BLD: 57.8 % (ref 50–68)
HGB BLD-MCNC: 8.5 G/DL (ref 12–16)
LYMPHOCYTES # BLD: 2 10*3/UL (ref 1.2–3.4)
LYMPHOCYTES NFR BLD AUTO: 23.6 % (ref 22–35)
MCH RBC QN AUTO: 28.1 PG (ref 25–35)
MCHC RBC AUTO-ENTMCNC: 32.4 G/DL (ref 31–37)
MCV RBC AUTO: 86.5 FL (ref 80–105)
MONOCYTES # BLD AUTO: 0.9 10*3/UL (ref 0.1–0.6)
MONOCYTES NFR BLD: 10.7 % (ref 1–6)
PLATELET # BLD: 525 10^3/UL (ref 120–450)
PMV BLD AUTO: 9.7 FL (ref 7–11)
RBC # BLD AUTO: 3.03 10^6/UL (ref 3.5–6.1)
WBC # BLD AUTO: 8.3 10^3/UL (ref 4.5–11)

## 2018-07-14 RX ADMIN — POTASSIUM CHLORIDE SCH MEQ: 1.5 SOLUTION ORAL at 07:49

## 2018-07-14 RX ADMIN — Medication SCH MG: at 10:11

## 2018-07-14 RX ADMIN — Medication ONE MLS/HR: at 00:26

## 2018-07-14 RX ADMIN — POTASSIUM CHLORIDE SCH MEQ: 1.5 SOLUTION ORAL at 14:54

## 2018-07-14 RX ADMIN — POTASSIUM CHLORIDE SCH MEQ: 1.5 SOLUTION ORAL at 11:19

## 2018-07-14 RX ADMIN — CIPROFLOXACIN SCH DROP: 3 SOLUTION/ DROPS OPHTHALMIC; TOPICAL at 17:53

## 2018-07-14 RX ADMIN — Medication SCH MG: at 17:24

## 2018-07-14 RX ADMIN — DILTIAZEM HYDROCHLORIDE SCH MG: 120 CAPSULE, COATED, EXTENDED RELEASE ORAL at 10:11

## 2018-07-14 RX ADMIN — ARFORMOTEROL TARTRATE SCH MCG: 15 SOLUTION RESPIRATORY (INHALATION) at 19:29

## 2018-07-14 RX ADMIN — BUDESONIDE SCH MG: 0.25 SUSPENSION RESPIRATORY (INHALATION) at 19:29

## 2018-07-14 RX ADMIN — PANTOPRAZOLE SODIUM SCH MG: 40 TABLET, DELAYED RELEASE ORAL at 10:12

## 2018-07-14 RX ADMIN — Medication ONE MLS/HR: at 01:00

## 2018-07-14 NOTE — CARD
--------------- APPROVED REPORT --------------





Date of service: 07/13/2018



EKG Measurement

Heart [id number]

AZ 130P72

[id number]

[id number]

[id number]



<Conclusion>

Sinus rhythm with premature atrial complexes with aberrant conduction

Nonspecific ST and T wave abnormality

Abnormal ECG

## 2018-07-14 NOTE — RAD
Date of service: 



07/13/2018



HISTORY:

medical clearance  



COMPARISON:

07/02/2018 



FINDINGS:



LUNGS:

No active pulmonary disease.



PLEURA:

No significant pleural effusion identified, no pneumothorax apparent.



CARDIOVASCULAR:

Mild cardiomegaly



OSSEOUS STRUCTURES:

Sternal wires



VISUALIZED UPPER ABDOMEN:

Normal.



OTHER FINDINGS:

None.



IMPRESSION:

No active disease.

## 2018-07-15 LAB
BUN SERPL-MCNC: 6 MG/DL (ref 7–21)
CALCIUM SERPL-MCNC: 9.1 MG/DL (ref 8.4–10.5)
GFR NON-AFRICAN AMERICAN: > 60

## 2018-07-15 RX ADMIN — Medication SCH MG: at 10:35

## 2018-07-15 RX ADMIN — BUDESONIDE SCH MG: 0.25 SUSPENSION RESPIRATORY (INHALATION) at 19:21

## 2018-07-15 RX ADMIN — PANTOPRAZOLE SODIUM SCH MG: 40 TABLET, DELAYED RELEASE ORAL at 10:36

## 2018-07-15 RX ADMIN — DILTIAZEM HYDROCHLORIDE SCH MG: 120 CAPSULE, COATED, EXTENDED RELEASE ORAL at 10:35

## 2018-07-15 RX ADMIN — ARFORMOTEROL TARTRATE SCH MCG: 15 SOLUTION RESPIRATORY (INHALATION) at 19:21

## 2018-07-15 RX ADMIN — ARFORMOTEROL TARTRATE SCH MCG: 15 SOLUTION RESPIRATORY (INHALATION) at 07:27

## 2018-07-15 RX ADMIN — CIPROFLOXACIN SCH DROP: 3 SOLUTION/ DROPS OPHTHALMIC; TOPICAL at 10:36

## 2018-07-15 RX ADMIN — BUDESONIDE SCH MG: 0.25 SUSPENSION RESPIRATORY (INHALATION) at 07:27

## 2018-07-15 RX ADMIN — CIPROFLOXACIN SCH DROP: 3 SOLUTION/ DROPS OPHTHALMIC; TOPICAL at 17:55

## 2018-07-15 RX ADMIN — Medication SCH MG: at 17:52

## 2018-07-16 VITALS — DIASTOLIC BLOOD PRESSURE: 87 MMHG | HEART RATE: 87 BPM | SYSTOLIC BLOOD PRESSURE: 138 MMHG

## 2018-07-16 VITALS — RESPIRATION RATE: 20 BRPM

## 2018-07-16 VITALS — TEMPERATURE: 98.1 F

## 2018-07-16 VITALS — OXYGEN SATURATION: 94 %

## 2018-07-16 LAB
BUN SERPL-MCNC: 3 MG/DL (ref 7–21)
CALCIUM SERPL-MCNC: 9.2 MG/DL (ref 8.4–10.5)
GFR NON-AFRICAN AMERICAN: > 60

## 2018-07-16 RX ADMIN — CIPROFLOXACIN SCH DROP: 3 SOLUTION/ DROPS OPHTHALMIC; TOPICAL at 11:48

## 2018-07-16 RX ADMIN — PANTOPRAZOLE SODIUM SCH MG: 40 TABLET, DELAYED RELEASE ORAL at 11:49

## 2018-07-16 RX ADMIN — DILTIAZEM HYDROCHLORIDE SCH MG: 120 CAPSULE, COATED, EXTENDED RELEASE ORAL at 11:50

## 2018-07-16 RX ADMIN — Medication SCH MG: at 11:50

## 2018-07-16 RX ADMIN — CIPROFLOXACIN SCH DROP: 3 SOLUTION/ DROPS OPHTHALMIC; TOPICAL at 17:08

## 2018-07-16 RX ADMIN — ARFORMOTEROL TARTRATE SCH MCG: 15 SOLUTION RESPIRATORY (INHALATION) at 07:42

## 2018-07-16 RX ADMIN — BUDESONIDE SCH MG: 0.25 SUSPENSION RESPIRATORY (INHALATION) at 07:42

## 2018-07-16 NOTE — HP
HISTORY OF PRESENT ILLNESS:  A 62-year-old female came in, brought in from

[facility] because of very low potassium on blood work.  Patient,

otherwise, has no complaints.  This is a 62-year-old female who came in

from [facility].  She was there for rehabilitation because of left femur

fracture;, left trochanteric fracture, status post insertion of pins and

plates and she seems doing okay, getting physical therapy on daily basis. 

She was currently taking Lasix 20 mg once a day; however, her repeat blood

work shows potassium was 2.9, which was asymptomatic.  She has no chest

pain, no shortness of breath.  She came in for replacement and

observations.



MEDICATIONS:  She was getting over there is Zocor 20 at bedtime, Protonix

40 p.o. daily, Singulair 10 at bedtime, Lopressor 25 mg b.i.d., magnesium

oxide 400 _____ daily, Claritin 10 daily, Lasix 20 p.o. daily, iron pills

325 b.i.d., Pepcid 40 at bedtime, Cartia  p.o. daily, vitamin D 47083

p.o. daily and she is getting Symbicort.  She is getting also Lovenox 40

subcu daily plus Percocet p.r.n. for pain.  Patient had immunization.  She

had influenza and Tdap vaccinations.



PAST SURGICAL HISTORY:  As I mentioned,

1.  She has recently on previous admission left trochanteric fracture, left

femur fracture with status post open reduction and internal fixation.

2.  She has history of GI bleed with peptic ulcer disease and currently due

to ulcer bleeding, has admission to ICU.

3.  Paroxysmal tachycardia.  She is off any anticoagulation at this time.

4.  Patient had history of gouty arthritis.  She also has a history of

cardiac arrhythmia and tachycardia.  She is on Cardizem and metoprolol.

5.  She also has history of mitral valve replacement, biological valve with

normal coronary, nonocclusive coronary.

6.  She has osteoarthritis, chronic back pain, chronic knee osteoarthritis.

7.  Chronic anxiety.



FAMILY HISTORY:  Noncontributory.



SOCIAL HISTORY:  She lives by herself.  She is not , no children. 

She does not smoke and does not drink.



REVIEW OF SYSTEMS:  Chronic back pain, arthritis, wheezing, coughing,

anxiety, joint pain, palpitations.  Otherwise, rest of the review of

systems is negative.



PHYSICAL EXAMINATION:

VITAL SIGNS:  Her temperature is 97.8, heart rate 76, blood pressure

145/86, respirations 18 and saturation 98% on room air.

HEAD AND NECK:  Normal.  No JVD.  No thyromegaly.

CHEST:  Clear bilaterally.

CARDIAC:  First sound and second sound normal.

ABDOMEN:  Soft, nontender.

EXTREMITIES:  Left hip, there is a scar from bandage from the left hip

surgery and the femur surgery and there is a cast on the left knee.

NEUROLOGIC:  Normal.  Right eye improved, however, left eye is still some

red.  Patient does use contact lens.



LABORATORY DATA:  On 07/13/2018, white count 11.2, hemoglobin 9.3,

hematocrit 28, platelets 548.  Chemistry:  Sodium 140, potassium 2.9,

chloride 102, bicarbonate 28, BUN 11, creatinine 0.8.  Blood glucose 117. 

Calcium is 9, magnesium 1.5.  Liver function test is normal.  Alkaline

phosphatase 161.



DIAGNOSTIC DATA:  Patient also had a chest x-ray done, which was read as no

active lung disease.  Electrocardiogram shows sinus rhythm with premature

atrial complexes with aberrant conduction, nonspecific ST-T changes.



IMPRESSION AND PLAN:  This is a 62-year-old female who came in with

abnormal laboratory study for severe hypokalemia, also noted her magnesium

is low.  We will admit the patient.  She got magnesium intravenously and

potassium intravenously.  We will put her in Telemetry unit.  Continue

intravenous infusion of potassium and magnesium and will monitor her case

closely.  Continue current therapy.  Follow up clinically.  Resume all

other medications.  Resume pain medicines, cardiac medications and continue

all previous medications.  Current medications she will be on are Brovana

b.i.d., Cardizem , Claritin, vitamin D, iron pills, heparin subcu,

Lipitor 10 mg at bedtime, Lopressor 25 daily, magnesium oxide p.o. 400

b.i.d., Pepcid 40 at bedtime, Protonix 40 once a day, Pulmicort 0.25 b.i.d.

and Singulair 10 mg and continue current therapy.  We will observe and we

will see how the patient do.  Repeat labs in the morning.







__________________________________________

Philip Dinh MD



DD:  07/15/2018 20:52:12

DT:  07/16/2018 6:42:07

Job # [id number]

## 2018-07-16 NOTE — CARD
--------------- APPROVED REPORT --------------





Date of service: 07/14/2018



EKG Measurement

Heart [id number]

DE [id number]

[id number]

[id number]

[id number]



<Conclusion>

Normal sinus rhythm

Minimal voltage criteria for LVH, may be normal variant

Consider inferior ischemia

Borderline ECG

## 2018-07-17 NOTE — CON
DATE:  07/16/2018



SERVICE:  Cardiology.



PHYSICIAN:  Ajay Gaxiola MD.



REASON FOR THE CONSULTATION:  Electrolyte imbalance, abnormal EKG, status

post open heart surgery.



BRIEF CLINICAL HISTORY:  This is a 62-year-old female with past medical

history significant for severe mitral regurgitation secondary to mitral

valve prolapse, status post MVR, multiple admissions with chest pain since

then.  Admitted yesterday, not feeling good with abnormal electrolytes

imbalance.



PAST MEDICAL HISTORY:  Past history significant for pneumonia, COPD, CHF,

severe mitral regurgitation secondary to mitral valve prolapse, status post

____ history of multiple GI bleed.  Off anticoagulation.  Status post

cardiac catheterization twice and found to have nonobstructive coronary

artery disease, one just before surgery because the patient has chest pain.

Status post MV bioprosthetic replacement in 10/2017.  Hypertension,

noncompliance with the medication.



PAST SURGICAL HISTORY:  Significant for cholecystectomy, carpal tunnel

syndrome, bunionectomy, removal of bunion from the toe and foot.  Most

recently, the patient's MVR, bioprosthetic MV replacement in [facility] in 10/2017.  History of cardiac catheterization

twice, one just before the surgery because the patient is complaining of

chest pain, so the patient just before open heart surgery had another

cardiac catheterization.  Nonobstructive coronary artery disease.  That

catheterization was done on 10/17/2017 at [facility].  History of paroxysmal atrial fibrillation.  Multiple admissions

with paroxysmal atrial fibrillation and GI bleed also.  Now, the patient is

off Coumadin because of recurrent GI bleed.



SOCIAL HISTORY:  Denies any history of alcohol abuse.



CURRENT MEDICATIONS:  The patient is taking at home Zocor, Protonix,

metoprolol 25 twice a day, Lasix 20 mg daily, Cardizem 120 mg daily.



REVIEW OF SYSTEMS:  As per HPI.



PHYSICAL EXAMINATION:

VITAL SIGNS:  Height of the patient 5 feet, 7 inches.  Weight of the

patient 165.  Body mass index 25.8 kg/m2.  Temperature afebrile, heart rate

90, blood pressure 132/71.

HEENT:  PERRLA, intact.

NECK:  Supple.  No carotid bruit.  No thyromegaly.

CHEST:  Clear to auscultation.

HEART:  S1 and S2 regular.

ABDOMEN:  Soft.

EXTREMITIES:  Clubbing and cyanosis negative.



LABORATORY DATA:  EKG shows normal sinus.  No acute ST-T changes noted. 

Heart rate 62.  Occasional APCs noted.  Blood workup; WBC 8.3, hemoglobin

8.5, hematocrit 26.2, platelet count 525.  Chemistry shows sodium 141,

potassium 3.7, chloride 103, carbon dioxide 29, anion gap of 12, BUN 3,

creatinine 0.7, magnesium 1.5.



IMPRESSION:  Multiple electrolyte imbalances.  The patient admitted with

potassium 2.9 and magnesium low.  A 62-year-old female, obese with past

medical history significant for severe mitral regurgitation, status post

mitral valve replacement, bioprosthetic; history paroxysmal atrial

fibrillation with GI bleed; history of multiple electrolytes abnormality. 

Admitted with weakness, and multiple electrolytes abnormalities including

severe hypokalemia, hypomagnesemia.  Denies any chest pain.  History of

paroxysmal atrial fibrillation, off anticoagulation because of GI bleed,

history of anemia.



RECOMMENDATIONS:  _____ guaiac positive.  Supplement aggressively

electrolytes.  Resume back Cardizem.  Resume Lopressor.  Further

recommendations will be made according to hospital course.  We will follow

with you.



Thank you, Dr. Dinh for providing us the opportunity in taking care of

the patient, Malu Meeks.





__________________________________________

Ajay Gaxiola MD





DD:  07/16/2018 16:28:41

DT:  07/17/2018 0:12:47

Job # [id number]

## 2018-07-17 NOTE — DS
HISTORY OF PRESENT ILLNESS:  The patient came in because of electrolyte

abnormalities; severe hypokalemia, she was given potassium IV times three

20 mEq, p.o. potassium, takes a couple of days, her potassium gets better. 

The patient also has hypomagnesemia, was replaced and she also has anemia,

hemoglobin 9 to 8.5, seen by Dr. Alatorre, ______ for any transfusions, no

acute blood loss, hemodynamically stable.  Her left eye was red, was given

ciprofloxacin, advised not to use the contact lens that she has and because

of reinfection and overmonitor her eye.  If the patient is not better, will

get Ophthalmology consult to see her.  The patient was feeling better,

improved.



PHYSICAL EXAMINATION:

VITAL SIGNS:  Her temperature 98.1, heart rate 87, blood pressure 128/87

and respirations 20.

HEAD AND NECK:  Normal.  No JVD.  No thyromegaly.

CHEST:  Clear bilaterally.

CARDIAC:  First sound and second sound normal.

ABDOMEN:  Soft, nontender.

EXTREMITIES:  No edema.

NEUROLOGIC:  Normal.



The patient has been doing well and will be monitored.



DISCHARGE DIAGNOSES:

1.  Acute electrolyte abnormalities.

2.  Hypokalemia.

3.  Hypomagnesemia.

4.  Anemia.

5.  Status post left hip and left femur open reduction and internal

fixation.

6.  Generalized weakness and deconditioning.

7.  History of mitral valve replacement.

8.  Nonobstructive coronary artery disease.

9.  History of gouty arthritis.



PLAN:  Send the patient to a different [facility] [place].  Continue

current medications, nebulizer treatment, DVT and GI prophylaxis.  Continue

iron, Colace and all other medications.







__________________________________________

Philip Dinh MD



DD:  07/16/2018 20:52:31

DT:  07/17/2018 4:48:19

Job # [id number]

## 2018-09-19 ENCOUNTER — HOSPITAL ENCOUNTER (INPATIENT)
Dept: HOSPITAL 42 - ED | Age: 63
LOS: 2 days | Discharge: HOME | DRG: 190 | End: 2018-09-21
Attending: INTERNAL MEDICINE | Admitting: INTERNAL MEDICINE
Payer: MEDICARE

## 2018-09-19 VITALS — BODY MASS INDEX: 23.5 KG/M2

## 2018-09-19 DIAGNOSIS — J44.0: Primary | ICD-10-CM

## 2018-09-19 DIAGNOSIS — J18.9: ICD-10-CM

## 2018-09-19 DIAGNOSIS — E87.5: ICD-10-CM

## 2018-09-19 DIAGNOSIS — Z91.19: ICD-10-CM

## 2018-09-19 DIAGNOSIS — I34.1: ICD-10-CM

## 2018-09-19 DIAGNOSIS — Z87.440: ICD-10-CM

## 2018-09-19 DIAGNOSIS — I48.0: ICD-10-CM

## 2018-09-19 DIAGNOSIS — Z23: ICD-10-CM

## 2018-09-19 DIAGNOSIS — D64.9: ICD-10-CM

## 2018-09-19 DIAGNOSIS — I50.30: ICD-10-CM

## 2018-09-19 DIAGNOSIS — I34.0: ICD-10-CM

## 2018-09-19 DIAGNOSIS — Z95.3: ICD-10-CM

## 2018-09-19 DIAGNOSIS — I25.10: ICD-10-CM

## 2018-09-19 LAB
ALBUMIN SERPL-MCNC: 4 G/DL (ref 3–4.8)
ALBUMIN/GLOB SERPL: 1.1 {RATIO} (ref 1.1–1.8)
ALT SERPL-CCNC: 13 U/L (ref 7–56)
APTT BLD: 29.4 SECONDS (ref 25.1–36.5)
AST SERPL-CCNC: 25 U/L (ref 14–36)
BASE EXCESS BLDV CALC-SCNC: -1.1 MMOL/L (ref 0–2)
BASOPHILS # BLD AUTO: 0.03 K/MM3 (ref 0–2)
BASOPHILS NFR BLD: 0.3 % (ref 0–3)
BNP SERPL-MCNC: 802 PG/ML (ref 0–450)
BUN SERPL-MCNC: 23 MG/DL (ref 7–21)
CALCIUM SERPL-MCNC: 9.9 MG/DL (ref 8.4–10.5)
EOSINOPHIL # BLD: 0.6 10*3/UL (ref 0–0.7)
EOSINOPHIL NFR BLD: 7 % (ref 1.5–5)
ERYTHROCYTE [DISTWIDTH] IN BLOOD BY AUTOMATED COUNT: 17.6 % (ref 11.5–14.5)
GFR NON-AFRICAN AMERICAN: 32
GRANULOCYTES # BLD: 5.03 10*3/UL (ref 1.4–6.5)
GRANULOCYTES NFR BLD: 56 % (ref 50–68)
HGB BLD-MCNC: 10.2 G/DL (ref 12–16)
INR PPP: 1.04
LYMPHOCYTES # BLD: 2.3 10*3/UL (ref 1.2–3.4)
LYMPHOCYTES NFR BLD AUTO: 25.7 % (ref 22–35)
MCH RBC QN AUTO: 27.9 PG (ref 25–35)
MCHC RBC AUTO-ENTMCNC: 32.4 G/DL (ref 31–37)
MCV RBC AUTO: 86.1 FL (ref 80–105)
MONOCYTES # BLD AUTO: 1 10*3/UL (ref 0.1–0.6)
MONOCYTES NFR BLD: 11 % (ref 1–6)
PH BLDV: 7.35 [PH] (ref 7.32–7.43)
PLATELET # BLD: 216 10^3/UL (ref 120–450)
PMV BLD AUTO: 9.8 FL (ref 7–11)
PROTHROMBIN TIME: 11.9 SECONDS (ref 9.4–12.5)
RBC # BLD AUTO: 3.66 10^6/UL (ref 3.5–6.1)
TROPONIN I SERPL-MCNC: < 0.01 NG/ML
VENOUS BLOOD FIO2: 21 %
VENOUS BLOOD GAS PCO2: 45 (ref 40–60)
VENOUS BLOOD GAS PO2: 89 MM/HG (ref 30–55)
WBC # BLD AUTO: 9 10^3/UL (ref 4.5–11)

## 2018-09-19 RX ADMIN — IPRATROPIUM BROMIDE AND ALBUTEROL SULFATE SCH: .5; 3 SOLUTION RESPIRATORY (INHALATION) at 21:05

## 2018-09-19 RX ADMIN — PIPERACILLIN AND TAZOBACTAM SCH MLS/HR: 3; .375 INJECTION, POWDER, LYOPHILIZED, FOR SOLUTION INTRAVENOUS; PARENTERAL at 23:22

## 2018-09-19 NOTE — ED PDOC
Arrival/HPI





<Nick Britt - Last Filed: 09/19/18 17:56>





- General


Historian: Patient





- History of Present Illness


Time/Duration: < week (2 days)


Symptom Onset: Gradual


Symptom Course: Worsening


Quality: Stabbing





<Roberot Branch - Last Filed: 09/19/18 19:12>





- General


Chief Complaint: Chest Pain


Time Seen by Provider: 09/19/18 14:18





- History of Present Illness


Narrative History of Present Illness (Text): 





09/19/18 15:15


Patient is a 63 year old female with a past medical history of Atrial 

Fibrillation (not on anticoag - h/o GI bleed), Mitral Valve Repair, gastric 

ulcer, CAD, anemia and COPD presenting to the emergency room with complaint of 

chest pain and shortness of breath. Patient was leaving her cardiologist's 

office, Dr. Gaxiola, when she realized that she forgot to tell him that she has 

been feeling short breath and experiencing left sided chest pain for the last 

two days. The chest pain is sharp and located under her left breast. It does 

not radiate. She has felt similar pains in the past, most recently 2 months 

ago. She has a productive cough with white sputum since friday. She has been 

experiencing subjective fevers. Denies nausea, vomiting, diarrhea, constipation

, bloody stool, melena, abdominal pain, headaches, vision changes, numbness or 

tingling. 





PMD: Dr. Roman


Cardiologist: Dr. Gaxiola


 (Roberto Branch)





Past Medical History





- Provider Review


Nursing Documentation Reviewed: Yes





- Infectious Disease


Hx of Infectious Diseases: None





- Tetanus Immunization


Tetanus Immunization: Unknown





- Past Medical History


Past Medical History: No Previous





- Cardiac


Hx Cardiac Disorders: Yes (CAD)





- Pulmonary


Hx Respiratory Disorders: Yes


Hx Chronic Obstructive Pulmonary Disease (COPD): Yes





- Neurological


Hx Neurological Disorder: Yes


Hx Dizziness: Yes


Hx Migraine: Yes





- HEENT


Hx HEENT Disorder: No





- Renal


Hx Renal Disorder: No





- Endocrine/Metabolic


Hx Endocrine Disorders: No





- Hematological/Oncological


Hx Blood Disorders: Yes


Hx Blood Transfusions: Yes





- Integumentary


Hx Dermatological Disorder: No





- Musculoskeletal/Rheumatological


Hx Musculoskeletal Disorders: Yes


Hx Arthritis: Yes


Hx Falls: Yes





- Gastrointestinal


Hx Gastrointestinal Disorders: Yes (gi bleed)





- Genitourinary/Gynecological


Hx Genitourinary Disorders: Yes


Hx Urinary Tract Infection: Yes





- Psychiatric


Hx Psychophysiologic Disorder: Yes


Hx Anxiety: Yes


Hx Substance Use: No (Denied by pt.)





- Surgical History


Hx Cardiac Catheterization: Yes


Hx Cholecystectomy: Yes


Hx Open Heart Surgery: Yes


Hx Valve Replacement: Yes





- Anesthesia


Hx Anesthesia: Yes





- Suicidal Assessment


Feels Threatened In Home Enviroment: No





<Roberto Branch - Last Filed: 09/19/18 19:12>





Family/Social History





- Physician Review


Nursing Documentation Reviewed: Yes


Family/Social History: Unknown Family HX


Smoking Status: Never Smoked


Hx Alcohol Use: Yes (Social drinker.)


Amount per day: 6


Hx Substance Use: No (Denied by pt.)


Hx Substance Use Treatment: No





<Roberto Branch - Last Filed: 09/19/18 19:12>





Allergies/Home Meds





<Nick Britt - Last Filed: 09/19/18 17:56>





<Roberto Branch - Last Filed: 09/19/18 19:12>


Allergies/Adverse Reactions: 


Allergies





cinnamon Allergy (Intermediate, Verified 09/19/18 14:57)


 ANAPHYLAXIS








Home Medications: 


 Home Meds











 Medication  Instructions  Recorded  Confirmed


 


Simvastatin [Zocor] 20 mg PO HS 02/13/18 07/14/18


 


Budesonide/Formoterol Fumarate 1 aer IH DAILY 05/22/18 07/14/18





[Symbicort 80-4.5 Mcg Inhaler]   


 


Cholecalciferol [Vitamin D 1000 IU] 50,000 iu PO QWK 05/22/18 07/14/18


 


Diltiazem HCl [Cartia Xt] 120 mg PO DAILY 05/22/18 07/14/18


 


Ferrous Sulfate [Feosol] 325 mg PO BID 05/22/18 07/14/18


 


Furosemide [Lasix] 20 mg PO DAILY 05/22/18 07/14/18


 


Loratadine [Claritin] 10 mg PO DAILY 05/22/18 07/14/18














Review of Systems





- Physician Review


All systems were reviewed & negative as marked: Yes





- Review of Systems


Constitutional: Normal.  absent: Fatigue, Fevers


Eyes: Normal.  absent: Vision Changes


ENT: Normal.  absent: Sore Throat, Rhinorrhea


Respiratory: SOB.  absent: Cough, Sputum, Wheezing


Cardiovascular: Chest Pain (left sided), MCLEAN, Orthopnea, Other (lightheadedness)

.  absent: Palpitations, Edema, Calf Pain, Syncope (l)


Gastrointestinal: Normal, Other (reports dark stool - but is baseline for her 

as she takes iron supplements).  absent: Abdominal Pain, Constipation, Diarrhea

, Nausea, Vomiting, Appetite Changes


Musculoskeletal: Normal.  absent: Back Pain, Neck Pain


Skin: Normal.  absent: Rash


Neurological: Other (lightheadedness).  absent: Headache, Dizziness


Endocrine: Normal


Hemo/Lymphatic: Normal.  absent: Adenopathy


Psychiatric: Normal.  absent: Anxiety, Depression





<Roberto Branch - Last Filed: 09/19/18 19:12>





Physical Exam


Vital Signs Reviewed: Yes


Temperature: Afebrile


Blood Pressure: Normal


Pulse: Regular


Respiratory Rate: Normal


Appearance: Positive for: Well-Appearing, Non-Toxic, Comfortable


Pain Distress: None


Mental Status: Positive for: Alert and Oriented X 3





- Systems Exam


Head: Present: Atraumatic, Normocephalic


Pupils: Present: PERRL


Extroacular Muscles: Present: EOMI


Conjunctiva: Present: Normal


Mouth: Present: Moist Mucous Membranes


Neck: Present: Normal Range of Motion


Respiratory/Chest: Present: Clear to Auscultation, Good Air Exchange.  No: 

Respiratory Distress, Accessory Muscle Use


Cardiovascular: Present: Regular Rate and Rhythm, Normal S1, S2.  No: Murmurs


Abdomen: No: Tenderness, Distention, Peritoneal Signs


Back: Present: Normal Inspection


Upper Extremity: Present: Normal Inspection.  No: Cyanosis, Edema


Lower Extremity: Present: Normal Inspection.  No: Edema


Neurological: Present: GCS=15, CN II-XII Intact, Speech Normal


Skin: Present: Warm, Dry, Normal Color.  No: Rashes


Psychiatric: Present: Alert, Oriented x 3, Normal Insight, Normal Concentration





<Roberto Branch - Last Filed: 09/19/18 19:12>


Vital Signs











  Temp Pulse Pulse Resp BP BP Pulse Ox


 


 09/19/18 18:17   92 H   17  136/84   100


 


 09/19/18 17:47   90   18  118/76   100


 


 09/19/18 15:57   90   16  108/72   100


 


 09/19/18 15:17    90    110/61 


 


 09/19/18 14:57  99.9 F H  86   16  108/55 L   98














Medical Decision Making





<Nick Britt - Last Filed: 09/19/18 17:56>


Re-evaluation Time: 17:29


Reassessment Condition: Re-examined, Improving,but remains with symptoms





- Lab Interpretations


I have reviewed the lab results: Yes





- RAD Interpretation


: ED Physician, Radiologist





- EKG Interpretation


Interpreted by ED Physician: Yes





<Roberto Branch - Last Filed: 09/19/18 19:12>


ED Course and Treatment: 


09/19/18 17:57


Patient Seen with Resident:


In agreement with resident note which contains more details about the patient. 

Patient seen and evaluated with resident. Came up with plan and treatment 

together. 


 (Nick Britt)





09/19/18 17:15


Patient is a 63 year old female with a past medical history of Atrial 

Fibrillation (not on anticoag - h/o GI bleed), Mitral Valve Repair, gastric 

ulcer, CAD, anemia and COPD complaining of chest pain and worsening shortness 

of breath.


Labs, CXR and EKG





Patient is not actively SOB, lung exam unremarkable, saturating 95% on RA. No 

breathing treatments indicated at this time.


Patient is refusing aspirin as she was told by her cardiologist that she is not 

allowed to take it due to her history of GI bleeding. 





09/19/18 17:34


Discussed results of normal labs and unchanged EKG with patient. 


CXR shows possible RLL infiltrate - suspected community acquired pneumonia - 

will treat with Rocephin and Azithromycin.


Called and discussed case with patient's PMD, Dr. Dinh. He states that he is 

not seeing patient's in the hospital this month and requests hospitalist 

service to admit patient.


Called and discussed case with Dr. Lin (hospitalist). Dr. Lin has accepted 

patient for chest pain r/o, pre-syncope and possible CAP. Will admit to 

hospitalist service for full admission to telemetry. 


 (Roberto Branch)





- Lab Interpretations


Lab Results: 








 09/19/18 15:20 





 09/19/18 15:20 





 Lab Results





09/19/18 15:20: Sodium 138, Potassium 5.4 H, Chloride 107, Carbon Dioxide 22, 

Anion Gap 14, BUN 23 H, Creatinine 1.6 H, Est GFR ( Amer) 39, Est GFR (

Non-Af Amer) 32, Random Glucose 109, Calcium 9.9, Magnesium 1.8, Total 

Bilirubin 0.6, AST 25, ALT 13, Alkaline Phosphatase 210 H D, Troponin I < 0.01, 

NT-Pro-B Natriuret Pep 802 H, Total Protein 7.5, Albumin 4.0, Globulin 3.5, 

Albumin/Globulin Ratio 1.1


09/19/18 15:20: PT 11.9, INR 1.04, APTT 29.4


09/19/18 15:20: WBC 9.0, RBC 3.66, Hgb 10.2 L, Hct 31.5 L, MCV 86.1, MCH 27.9, 

MCHC 32.4, RDW 17.6 H, Plt Count 216, MPV 9.8, Gran % 56.0, Lymph % (Auto) 25.7

, Mono % (Auto) 11.0 H, Eos % (Auto) 7.0 H, Baso % (Auto) 0.3, Gran # 5.03, 

Lymph # (Auto) 2.3, Mono # (Auto) 1.0 H, Eos # (Auto) 0.6, Baso # (Auto) 0.03











- RAD Interpretation


Narrative RAD Interpretations (Text): 





09/19/18 17:38


CXR - RLL infiltrate - awaiting official radiologist read.





09/19/18 19:11


CXR - Hyperinflation of the lungs.  Possible small infiltrate at the right 

lower lung.  Correlate clinically for pneumonia.


 (Roberto Branch)


Radiology Orders: 








09/19/18 14:38


CHEST PORTABLE [RAD] Stat 














- EKG Interpretation


EKG Interpretation (Text): 





09/19/18 17:39


EKG - NSR @ 83bpm, left axis deviation, no acute ST segment elevations/

depressions. - similar to EKG from 7/14/2018





 (Roberto Branch)





- Medication Orders


Current Medication Orders: 








Albuterol/Ipratropium (Duoneb 3 Mg/0.5 Mg (3 Ml) Ud)  3 ml IH [id number] OLMAN


Aspirin (Ecotrin)  81 mg PO DAILY OLMAN


Piperacillin Sod/Tazobactam Sod (Zosyn 3.375 In Ns 100ml)  100 mls @ 200 mls/hr 

IVPB Q6 OLMAN


   PRN Reason: Protocol


   Stop: 09/20/18 06:29





Discontinued Medications





Ceftriaxone Sodium (Rocephin 1 Gram Ivpb)  1 gm in 100 mls @ 100 mls/hr IVPB 

STAT STA


   PRN Reason: Protocol


   Stop: 09/19/18 18:15


   Last Admin: 09/19/18 17:44  Dose: 100 mls/hr





eMAR Start Stop


 Document     09/19/18 17:44  ARMANDO  (Rec: 09/19/18 17:44  ARMANDO  [id number])


     Intravenous Solution


      Start Date                                 09/19/18


      Start Time                                 17:44


      End Date                                   09/19/18


      End time                                   18:14


      Total Infusion Time                        30





Azithromycin (Zithromax 500mg In Ns)  500 mg in 250 mls @ 167 mls/hr IVPB STAT 

STA


   PRN Reason: Protocol


   Stop: 09/19/18 18:45


   Last Admin: 09/19/18 18:10  Dose: 167 mls/hr





eMAR Start Stop


 Document     09/19/18 18:10  ARMANDO  (Rec: 09/19/18 18:10  ARMANDO  [id number])


     Intravenous Solution


      Start Date                                 09/19/18


      Start Time                                 18:10














- Scribe Statement


The provider has reviewed the documentation as recorded by the Scribe





<Nick Britt - Last Filed: 09/19/18 17:56>





<Roberto Branch - Last Filed: 09/19/18 19:12>





- Scribe Statement


Claudio Zaragoza


Provider Scribe Attestation:


All medical record entries made by the Scribe were at my direction and 

personally dictated by me. I have reviewed the chart and agree that the record 

accurately reflects my personal performance of the history, physical exam, 

medical decision making, and the department course for this patient. I have 

also personally directed, reviewed, and agree with the discharge instructions 

and disposition.  (Nick Britt)





Disposition/Present on Arrival





<Nick Britt - Last Filed: 09/19/18 17:56>





- Present on Arrival


Any Indicators Present on Arrival: Yes


History of DVT/PE: No


History of Uncontrolled Diabetes: No


Urinary Catheter: Yes


History of Decub. Ulcer: No


History Surgical Site Infection Following: None





- Disposition


Have Diagnosis and Disposition been Completed?: Yes


Disposition Time: 17:30


Patient Plan: Admission, Telemetry





<Roberto Branch - Last Filed: 09/19/18 19:12>





- Disposition


Diagnosis: 


 Chest pain, Pneumonia, Pre-syncope





Disposition: HOSPITALIZED


Patient Problems: 


 Current Active Problems











Problem Status Onset


 


Chest pain Acute  


 


Pneumonia Acute  


 


Pre-syncope Acute  











Condition: GUARDED

## 2018-09-19 NOTE — CARD
--------------- APPROVED REPORT --------------





Date of service: 09/19/2018



EKG Measurement

Heart [id number]

ME [id number]

[id number]

[id number]

[id number]



<Conclusion>

Normal sinus rhythm

Left axis deviation

Minimal voltage criteria for LVH, may be normal variant

Abnormal ECG

## 2018-09-19 NOTE — CP.PCM.HP
<Travis Rutherford - Last Filed: 09/19/18 19:49>





History of Present Illness





- History of Present Illness


History of Present Illness: 





61 y/o female with PMH of paroxysmal AFib, bioprosthetic mitral valve,CAD, COPD

, GI bleed presents to ED with SOB with chest pain since this weekend. She gets 

dyspnea when she walks and stops by rest. It's associated with sharp left sided 

chest pain below costal margin and does not radiate. She reported heart 

palpitations during these episodes that disappear with rest. She repotes mild 

cough with white sputum. She uses bronchodialtors daily for her COPD. She 

denied hemoptysis, hematemesis, fever, chills, headache, blurry vision, change 

of bowel movement, muscle weakness, urinary symptoms. Patient's brother has 

been sick with pneumonia 2 weeks ago that was treated. Patient was at [facility] 

office today before coming to ED.





12 point ROS reviewed with pertinent positive stated above. 








PMH:  paroxysmal AFib, CAD, GI bleed, COPD


PSH: mitral valve replacement, carpal tunnel, cholecystectomy, ORIF


Family History: heart disease


SocHx: denies EtOH, tobacco, drug use.


Allergies: NKDA


PMD: Dr Dinh


Cardiologist: Dr. Gaxiola 








Present on Admission





- Present on Admission


Any Indicators Present on Admission: No





Past Patient History





- Infectious Disease


Hx of Infectious Diseases: None





- Tetanus Immunizations


Tetanus Immunization: Unknown





- Past Social History


Smoking Status: Never Smoked





- CARDIAC


Hx Cardiac Disorders: Yes (CAD)





- PULMONARY


Hx Respiratory Disorders: Yes


Hx Chronic Obstructive Pulmonary Disease (COPD): Yes





- NEUROLOGICAL


Hx Neurological Disorder: Yes


Hx Dizziness: Yes


Hx Migraine: Yes





- HEENT


Hx HEENT Problems: No





- RENAL


Hx Chronic Kidney Disease: No





- ENDOCRINE/METABOLIC


Hx Endocrine Disorders: No





- HEMATOLOGICAL/ONCOLOGICAL


Hx Blood Disorders: Yes


Hx Blood Transfusions: Yes





- INTEGUMENTARY


Hx Dermatological Problems: No





- MUSCULOSKELETAL/RHEUMATOLOGICAL


Hx Musculoskeletal Disorders: Yes


Hx Arthritis: Yes


Hx Falls: Yes





- GASTROINTESTINAL


Hx Gastrointestinal Disorders: Yes (gi bleed)





- GENITOURINARY/GYNECOLOGICAL


Hx Genitourinary Disorders: Yes


Hx Urinary Tract Infection: Yes





- PSYCHIATRIC


Hx Psychophysiologic Disorder: Yes


Hx Anxiety: Yes


Hx Substance Use: No (Denied by pt.)





- SURGICAL HISTORY


Hx Cardiac Catheterization: Yes


Hx Cholecystectomy: Yes


Hx Open Heart Surgery: Yes


Hx Valve Replacement: Yes





- ANESTHESIA


Hx Anesthesia: Yes





Meds


Allergies/Adverse Reactions: 


 Allergies











Allergy/AdvReac Type Severity Reaction Status Date / Time


 


cinnamon Allergy Intermediate ANAPHYLAXIS Verified 09/19/18 14:57














Physical Exam





- Constitutional


Appears: Well, No Acute Distress





- Head Exam


Head Exam: ATRAUMATIC, NORMAL INSPECTION, NORMOCEPHALIC





- Eye Exam


Eye Exam: EOMI, Normal appearance, PERRL


Pupil Exam: NORMAL ACCOMODATION, PERRL





- ENT Exam


ENT Exam: Mucous Membranes Moist, Normal Exam





- Neck Exam


Neck exam: Positive for: Normal Inspection





- Respiratory Exam


Respiratory Exam: Decreased Breath Sounds (at lung bases b/l), NORMAL BREATHING 

PATTERN





- Cardiovascular Exam


Cardiovascular Exam: REGULAR RHYTHM, +S1, +S2, Systolic Murmur





- GI/Abdominal Exam


GI & Abdominal Exam: Normal Bowel Sounds, Soft.  absent: Mass, Tenderness





- Extremities Exam


Extremities exam: Positive for: normal capillary refill, normal inspection, 

pedal pulses present.  Negative for: pedal edema





- Back Exam


Back exam: NORMAL INSPECTION





- Neurological Exam


Neurological exam: Alert, CN II-XII Intact, Oriented x3, Reflexes Normal





- Psychiatric Exam


Psychiatric exam: Normal Affect, Normal Mood





- Skin


Skin Exam: Dry, Intact, Normal Color, Warm





Results





- Vital Signs


Recent Vital Signs: 





 Last Vital Signs











Temp  99.9 F H  09/19/18 14:57


 


Pulse  92 H  09/19/18 18:17


 


Resp  17   09/19/18 18:17


 


BP  136/84   09/19/18 18:17


 


Pulse Ox  100   09/19/18 18:17














- Labs


Result Diagrams: 


 09/19/18 15:20





 09/19/18 15:20


Labs: 





 Laboratory Results - last 24 hr











  09/19/18





  18:04


 


pO2  89 H


 


VBG pH  7.35


 


VBG pCO2  45.0


 


VBG HCO3  24.8


 


VBG Total CO2  26.2


 


VBG O2 Sat (Calc)  99.3 H


 


VBG Base Excess  -1.1 L


 


VBG Potassium  4.7


 


Sodium  137.0


 


Chloride  107.0


 


Glucose  128 H


 


Lactate  1.7


 


FiO2  21.0


 


Venous Blood Potassium  4.7














Assessment & Plan





- Assessment and Plan (Free Text)


Assessment: 





61 y/o female with PMH of paroxysmal AFib, bioprosthetic mitral valve,CAD, COPD

, GI bleed presents to ED with extensional dyspnea associated with sharp  chest 

pain since this weekend. CXR showed right lower lung reticular opacity and b/l 

lung hyperinflation, trop (-)x1, EKG: NSR @83 with left axis. Patient admitted 

for dyspnea work up given her cardiac history


Plan: 





Exertional dyspnea with chest pain


likely due to peumonia or cardiac ischemia


unlikely due to CHF exacerbation. no LE edema, not volume overloaded


CXR showed right lower lung reticular opacity and b/l lung hyperinflation


EKG: NSR @83 with left axis


trop (-)x1 continue to trend


cardiology consulted Dr Gaxiola


continue home med asa, simvastatin


procal ordered


urine streptococcal antigen ordered


urine legionella antigen ordered


blood cx ordered


d/c zithromax and rocephin


started zosyn


O2 NC prn


duoneb q6 winston, q2 prn


started symbicort 


vitals q4h


ABG: pH 7.35. pCO2 45, pO2 89








H/O Parxoysmal AFib


continue with home med cardizem and metoprolol





H/O CHF 


no clinical signs of CHF exacerbation


echo (May 2018) showed EF 65%


hold lasix for now. Cr 1.6 baseline 0.7











Hyperkalemia


K 5.4


15 mg kayxalate 


monitor k at AM





H/O vitamen D deficiency 


continue home med Ergocalciferol 


 





Prophylaxis


DVT ppx SCD


GI ppx pepcid








Heart healthy diet








Case reviewed and plan discussed with attending Dr Lin


























<Ajay Lin - Last Filed: 09/20/18 17:26>





Results





- Vital Signs


Recent Vital Signs: 





 Last Vital Signs











Temp  98 F   09/20/18 12:00


 


Pulse  102 H  09/20/18 14:00


 


Resp  18   09/20/18 12:00


 


BP  127/68   09/20/18 15:44


 


Pulse Ox  100   09/19/18 18:17














- Labs


Result Diagrams: 


 09/20/18 02:30





 09/20/18 02:30


Labs: 





 Laboratory Results - last 24 hr











  09/19/18 09/20/18 09/20/18





  18:04 02:30 02:30


 


WBC   


 


RBC   


 


Hgb   


 


Hct   


 


MCV   


 


MCH   


 


MCHC   


 


RDW   


 


Plt Count   


 


MPV   


 


Gran %   


 


Lymph % (Auto)   


 


Mono % (Auto)   


 


Eos % (Auto)   


 


Baso % (Auto)   


 


Gran #   


 


Lymph # (Auto)   


 


Mono # (Auto)   


 


Eos # (Auto)   


 


Baso # (Auto)   


 


pO2  89 H  


 


VBG pH  7.35  


 


VBG pCO2  45.0  


 


VBG HCO3  24.8  


 


VBG Total CO2  26.2  


 


VBG O2 Sat (Calc)  99.3 H  


 


VBG Base Excess  -1.1 L  


 


VBG Potassium  4.7  


 


Sodium  137.0   139


 


Chloride  107.0   109 H


 


Glucose  128 H  


 


Lactate  1.7  


 


FiO2  21.0  


 


Potassium    4.6


 


Carbon Dioxide    22


 


Anion Gap    13


 


BUN    23 H


 


Creatinine    1.7 H


 


Est GFR ( Amer)    37


 


Est GFR (Non-Af Amer)    30


 


Random Glucose    117 H


 


Calcium    9.3


 


Total Bilirubin    0.4


 


AST    24


 


ALT    10


 


Alkaline Phosphatase    191 H


 


Troponin I    < 0.01


 


Total Protein    6.9


 


Albumin    3.6


 


Globulin    3.4


 


Albumin/Globulin Ratio    1.1


 


Procalcitonin   0.05 L 


 


Venous Blood Potassium  4.7  


 


Urine Color   


 


Urine Appearance   


 


Urine pH   


 


Ur Specific Gravity   


 


Urine Protein   


 


Urine Glucose (UA)   


 


Urine Ketones   


 


Urine Blood   


 


Urine Nitrate   


 


Urine Bilirubin   


 


Urine Urobilinogen   


 


Ur Leukocyte Esterase   


 


Urine RBC   


 


Urine WBC   


 


Ur Epithelial Cells   


 


Urine Bacteria   














  09/20/18 09/20/18 09/20/18





  02:30 09:10 15:00


 


WBC  6.6  D  


 


RBC  3.33 L  


 


Hgb  9.4 L  


 


Hct  28.4 L  


 


MCV  85.3  


 


MCH  28.2  


 


MCHC  33.1  


 


RDW  17.8 H  


 


Plt Count  217  


 


MPV  10.2  


 


Gran %  49.1 L  


 


Lymph % (Auto)  28.2  


 


Mono % (Auto)  13.7 H  


 


Eos % (Auto)  8.5 H  


 


Baso % (Auto)  0.5  


 


Gran #  3.21  


 


Lymph # (Auto)  1.9  


 


Mono # (Auto)  0.9 H  


 


Eos # (Auto)  0.6  


 


Baso # (Auto)  0.03  


 


pO2   


 


VBG pH   


 


VBG pCO2   


 


VBG HCO3   


 


VBG Total CO2   


 


VBG O2 Sat (Calc)   


 


VBG Base Excess   


 


VBG Potassium   


 


Sodium   


 


Chloride   


 


Glucose   


 


Lactate   


 


FiO2   


 


Potassium   


 


Carbon Dioxide   


 


Anion Gap   


 


BUN   


 


Creatinine   


 


Est GFR ( Amer)   


 


Est GFR (Non-Af Amer)   


 


Random Glucose   


 


Calcium   


 


Total Bilirubin   


 


AST   


 


ALT   


 


Alkaline Phosphatase   


 


Troponin I   < 0.01 


 


Total Protein   


 


Albumin   


 


Globulin   


 


Albumin/Globulin Ratio   


 


Procalcitonin   


 


Venous Blood Potassium   


 


Urine Color    Yellow


 


Urine Appearance    Clear


 


Urine pH    5.5


 


Ur Specific Gravity    1.015


 


Urine Protein    Negative


 


Urine Glucose (UA)    Negative


 


Urine Ketones    Negative


 


Urine Blood    Negative


 


Urine Nitrate    Negative


 


Urine Bilirubin    Negative


 


Urine Urobilinogen    0.2


 


Ur Leukocyte Esterase    Small H


 


Urine RBC    Negative


 


Urine WBC    1 - 3


 


Ur Epithelial Cells    0 - 2


 


Urine Bacteria    Small














Attending/Attestation





- Attestation


I have personally seen and examined this patient.: Yes


I have fully participated in the care of the patient.: Yes


I have reviewed all pertinent clinical information: Yes


Notes (Text): 





09/20/18 17:21


63 yrs old  female with PMH of paroxysmal AFib not on oral anticoagulation due 

to H/O GI bleeding,  MR, SP bioprosthetic mitral valve,CAD, COPD, and chronic 

anemia  presents to ED with SOB with chest pain


EKG is negative for ischemic changes.


Patient is not wheezing.


There is no sign of fluid overload.


Patient is afebrile,


We will admit patient in telemetry,


will get serial troponin.


We will get cardiology consult.








Possible Pneumonia , will start on antibiotic, will get check Procalcitionin 

level and urinary antigen for streptocoal Pneumonia urinary antigen and 

legionella antigen.

## 2018-09-19 NOTE — RAD
Date of service: 



09/19/2018



HISTORY:

 sob 



COMPARISON:

Comparison is made with 07/13/2018 



FINDINGS:



LUNGS:

Hyperinflation of the lungs is again noted.  There are reticular 

opacity seen at the right lower lung may represent small infiltrate.  

Otherwise no significant interval change.



PLEURA:

No significant pleural effusion identified, no pneumothorax apparent.



CARDIOVASCULAR:

Normal.



OSSEOUS STRUCTURES:

No significant abnormalities.



VISUALIZED UPPER ABDOMEN:

Normal.



OTHER FINDINGS:

None.



IMPRESSION:

Hyperinflation of the lungs.  Possible small infiltrate at the right 

lower lung.  Correlate clinically for pneumonia.

## 2018-09-20 VITALS — RESPIRATION RATE: 18 BRPM

## 2018-09-20 LAB
ALBUMIN SERPL-MCNC: 3.6 G/DL (ref 3–4.8)
ALBUMIN/GLOB SERPL: 1.1 {RATIO} (ref 1.1–1.8)
ALT SERPL-CCNC: 10 U/L (ref 7–56)
APPEARANCE UR: CLEAR
AST SERPL-CCNC: 24 U/L (ref 14–36)
BACTERIA #/AREA URNS HPF: (no result) /[HPF]
BASOPHILS # BLD AUTO: 0.03 K/MM3 (ref 0–2)
BASOPHILS NFR BLD: 0.5 % (ref 0–3)
BILIRUB UR-MCNC: NEGATIVE MG/DL
BUN SERPL-MCNC: 23 MG/DL (ref 7–21)
CALCIUM SERPL-MCNC: 9.3 MG/DL (ref 8.4–10.5)
COLOR UR: YELLOW
EOSINOPHIL # BLD: 0.6 10*3/UL (ref 0–0.7)
EOSINOPHIL NFR BLD: 8.5 % (ref 1.5–5)
EPI CELLS #/AREA URNS HPF: (no result) /HPF (ref 0–5)
ERYTHROCYTE [DISTWIDTH] IN BLOOD BY AUTOMATED COUNT: 17.8 % (ref 11.5–14.5)
GFR NON-AFRICAN AMERICAN: 30
GLUCOSE UR STRIP-MCNC: NEGATIVE MG/DL
GRANULOCYTES # BLD: 3.21 10*3/UL (ref 1.4–6.5)
GRANULOCYTES NFR BLD: 49.1 % (ref 50–68)
HGB BLD-MCNC: 9.4 G/DL (ref 12–16)
LEUKOCYTE ESTERASE UR-ACNC: (no result) LEU/UL
LYMPHOCYTES # BLD: 1.9 10*3/UL (ref 1.2–3.4)
LYMPHOCYTES NFR BLD AUTO: 28.2 % (ref 22–35)
MCH RBC QN AUTO: 28.2 PG (ref 25–35)
MCHC RBC AUTO-ENTMCNC: 33.1 G/DL (ref 31–37)
MCV RBC AUTO: 85.3 FL (ref 80–105)
MONOCYTES # BLD AUTO: 0.9 10*3/UL (ref 0.1–0.6)
MONOCYTES NFR BLD: 13.7 % (ref 1–6)
PH UR STRIP: 5.5 [PH] (ref 4.7–8)
PLATELET # BLD: 217 10^3/UL (ref 120–450)
PMV BLD AUTO: 10.2 FL (ref 7–11)
PROT UR STRIP-MCNC: NEGATIVE MG/DL
RBC # BLD AUTO: 3.33 10^6/UL (ref 3.5–6.1)
RBC # UR STRIP: NEGATIVE /UL
RBC #/AREA URNS HPF: NEGATIVE /HPF (ref 0–2)
SP GR UR STRIP: 1.01 (ref 1–1.03)
TROPONIN I SERPL-MCNC: < 0.01 NG/ML
UROBILINOGEN UR STRIP-ACNC: 0.2 E.U./DL
WBC # BLD AUTO: 6.6 10^3/UL (ref 4.5–11)

## 2018-09-20 RX ADMIN — DILTIAZEM HYDROCHLORIDE SCH MG: 120 CAPSULE, COATED, EXTENDED RELEASE ORAL at 09:59

## 2018-09-20 RX ADMIN — Medication SCH MG: at 09:59

## 2018-09-20 RX ADMIN — PANTOPRAZOLE SODIUM SCH MG: 40 TABLET, DELAYED RELEASE ORAL at 09:59

## 2018-09-20 RX ADMIN — IPRATROPIUM BROMIDE AND ALBUTEROL SULFATE SCH ML: .5; 3 SOLUTION RESPIRATORY (INHALATION) at 01:23

## 2018-09-20 RX ADMIN — IPRATROPIUM BROMIDE AND ALBUTEROL SULFATE SCH: .5; 3 SOLUTION RESPIRATORY (INHALATION) at 07:52

## 2018-09-20 RX ADMIN — PIPERACILLIN AND TAZOBACTAM SCH MLS/HR: 3; .375 INJECTION, POWDER, LYOPHILIZED, FOR SOLUTION INTRAVENOUS; PARENTERAL at 05:45

## 2018-09-20 RX ADMIN — IPRATROPIUM BROMIDE AND ALBUTEROL SULFATE SCH: .5; 3 SOLUTION RESPIRATORY (INHALATION) at 19:27

## 2018-09-20 RX ADMIN — IPRATROPIUM BROMIDE AND ALBUTEROL SULFATE SCH: .5; 3 SOLUTION RESPIRATORY (INHALATION) at 13:26

## 2018-09-20 NOTE — CP.PCM.PN
<Travis Rutherford - Last Filed: 09/20/18 12:39>





Subjective





- Date & Time of Evaluation


Date of Evaluation: 09/20/18


Time of Evaluation: 06:01





- Subjective


Subjective: 





Travis Rutherford DO PGY-1, Internal Medicine Resident. Hospitalist Progress Note





Patient seen and examined at bedside. Patient is resting in bed, awake and 

oriented. No acute events overnight. Tolerating her diet with regular bowel 

movements. Her CP and SOB are getting better


Patient denied palpitations, headache, fever. 





Objective





- Vital Signs/Intake and Output


Vital Signs (last 24 hours): 


 











Temp Pulse Resp BP Pulse Ox


 


 98.0 F   103 H  19   140/68   100 


 


 09/20/18 06:00  09/20/18 10:00  09/20/18 06:00  09/20/18 10:00  09/19/18 18:17








Intake and Output: 


 











 09/20/18 09/20/18





 06:59 18:59


 


Intake Total 400 


 


Output Total 0 


 


Balance 400 














- Medications


Medications: 


 Current Medications





Albuterol/Ipratropium (Duoneb 3 Mg/0.5 Mg (3 Ml) Ud)  3 ml IH [id number] [facility]


   Last Admin: 09/20/18 07:52 Dose:  Not Given


Aspirin (Ecotrin)  81 mg PO DAILY [facility]


   Last Admin: 09/20/18 09:58 Dose:  81 mg


Atorvastatin Calcium (Lipitor)  10 mg PO HS [facility]


   Last Admin: 09/19/18 21:31 Dose:  10 mg


Diltiazem HCl (Cardizem Cd)  120 mg PO DAILY [facility]


   Last Admin: 09/20/18 09:59 Dose:  120 mg


Ergocalciferol (Drisdol 50,000 Intl Units Cap)  1 cap PO QWK [facility]


Famotidine (Pepcid)  40 mg PO HS [facility]


   Last Admin: 09/19/18 21:31 Dose:  40 mg


Ferrous Sulfate (Feosol)  324 mg PO BID [facility]


   Last Admin: 09/20/18 09:59 Dose:  324 mg


Loratadine (Claritin)  10 mg PO DAILY [facility]


   Last Admin: 09/20/18 09:59 Dose:  10 mg


Magnesium Oxide (Mag-Ox)  400 mg PO DAILY [facility]


   Last Admin: 09/20/18 09:59 Dose:  400 mg


Metoprolol Tartrate (Lopressor)  25 mg PO BID [facility]


   Last Admin: 09/20/18 10:00 Dose:  25 mg


Montelukast Sodium (Singulair)  10 mg PO HS [facility]


   Last Admin: 09/19/18 21:31 Dose:  10 mg


Non-Formulary Medication (Budesonide/Formoterol Fumarate [Symbicort 80-4.5 Mcg 

Inhaler])  1 aer IH DAILY [facility]


   Last Admin: 09/20/18 12:37 Dose:  Not Given


Pantoprazole Sodium (Protonix Ec Tab)  40 mg PO DAILY [facility]


   Last Admin: 09/20/18 09:59 Dose:  40 mg











- Labs


Labs: 


 





 09/20/18 02:30 





 09/20/18 02:30 





 











PT  11.9 SECONDS (9.4-12.5)   09/19/18  15:20    


 


INR  1.04   09/19/18  15:20    


 


APTT  29.4 Seconds (25.1-36.5)   09/19/18  15:20    














- Additional Findings


Additional findings: 





- Constitutional


Appears: Well, No Acute Distress





- Head Exam


Head Exam: ATRAUMATIC, NORMAL INSPECTION, NORMOCEPHALIC





- Eye Exam


Eye Exam: EOMI, Normal appearance, PERRL


Pupil Exam: NORMAL ACCOMODATION, PERRL





- ENT Exam


ENT Exam: Mucous Membranes Moist, Normal Exam





- Neck Exam


Neck exam: Positive for: Normal Inspection





- Respiratory Exam


Respiratory Exam: Decreased Breath Sounds (at lung bases b/l), NORMAL BREATHING 

PATTERN





- Cardiovascular Exam


Cardiovascular Exam: REGULAR RHYTHM, +S1, +S2, Systolic Murmur





- GI/Abdominal Exam


GI & Abdominal Exam: Normal Bowel Sounds, Soft.  absent: Mass, Tenderness





- Extremities Exam


Extremities exam: Positive for: normal capillary refill, normal inspection, 

pedal pulses present.  Negative for: pedal edema





- Back Exam


Back exam: NORMAL INSPECTION





- Neurological Exam


Neurological exam: Alert, CN II-XII Intact, Oriented x3, Reflexes Normal





- Psychiatric Exam


Psychiatric exam: Normal Affect, Normal Mood





- Skin


Skin Exam: Dry, Intact, Normal Color, Warm





Assessment and Plan





- Assessment and Plan (Free Text)


Assessment: 





63 y/o female with PMH of paroxysmal AFib, bioprosthetic mitral valve,CAD, COPD

, GI bleed presents to ED with extensional dyspnea associated with sharp  chest 

pain since this weekend. CXR showed right lower lung reticular opacity and b/l 

lung hyperinflation, trop (-)x1, EKG: NSR @83 with left axis. Patient admitted 

for dyspnea work up given her cardiac history


Plan: 





Exertional dyspnea with chest pain


likely due to peumonia or cardiac ischemia


unlikely due to CHF exacerbation. no LE edema, not volume overloaded


CXR showed right lower lung reticular opacity and b/l lung hyperinflation


EKG: NSR @83 with left axis


trop (-)x1 continue to trend


cardiology consulted Dr Gaxiola


continue home med asa, simvastatin


procal ordered


urine streptococcal antigen ordered


urine legionella antigen ordered


blood cx ordered


d/c zithromax and rocephin


started zosyn


O2 NC prn


duoneb q6 [place], q2 prn


started symbicort 


vitals q4h


ABG: pH 7.35. pCO2 45, pO2 89


repear CXR ordered


d/c IVF








H/O Parxoysmal AFib


continue with home med cardizem and metoprolol





H/O CHF 


no clinical signs of CHF exacerbation


echo (May 2018) showed EF 65%


hold lasix for now. Cr 1.6 baseline 0.7











Hyperkalemia


K 5.4


15 mg kayxalate 


monitor k at AM





H/O vitamen D deficiency 


continue home med Ergocalciferol 


 





Prophylaxis


DVT ppx SCD


GI ppx pepcid








Heart healthy diet








Case reviewed and plan discussed with attending Dr Lin














<Ajay Lin - Last Filed: 09/21/18 17:35>





Objective





- Vital Signs/Intake and Output


Vital Signs (last 24 hours): 


 











Temp Pulse Resp BP Pulse Ox


 


 97.8 F   74   18   114/67   100 


 


 09/21/18 12:00  09/21/18 12:00  09/21/18 12:00  09/21/18 12:00  09/21/18 06:00








Intake and Output: 


 











 09/21/18 09/21/18





 06:59 18:59


 


Intake Total  960


 


Balance  960














- Labs


Labs: 


 





 09/21/18 06:45 





 09/21/18 06:45 





 











PT  11.9 SECONDS (9.4-12.5)   09/19/18  15:20    


 


INR  1.04   09/19/18  15:20    


 


APTT  29.4 Seconds (25.1-36.5)   09/19/18  15:20    














Attending/Attestation





- Attestation


I have personally seen and examined this patient.: Yes


I have fully participated in the care of the patient.: Yes


I have reviewed all pertinent clinical information, including history, physical 

exam and plan: Yes


Notes (Text): 





09/21/18 17:29





Medical record note made by the resident after discussion with my direction and 

input after the patient was personally seen and examined by me. I have reviewed 

the chart and agree that the record accurately reflects by personal performance 

of the history, physical exam, data review, and medical decision-making, in the 

course for the patient. I have also personally directed the plan of care.





63 yrs old  female with PMH of paroxysmal AFib not on oral anticoagulation due 

to H/O GI bleeding,  MR, SP bioprosthetic mitral valve,CAD, COPD, and chronic 

anemia  presents to ED with SOB with chest pain.EKG is negative for ischemic 

changes.Serial troponins are normal.


Patient is not wheezing.There is no sign of fluid overload.





Patient was evaluated by cardiology, no further work up is needed.





Possible Pneumonia ,Procalcitonin level is normal.We will stop IV antibiotics 

and will start patient on oral antibiotics.





Creatinin is 1.7, will gently hydrate patient and will monitor creatinin.

## 2018-09-20 NOTE — RAD
Date of service: 



09/20/2018



HISTORY:

 rule out pneumonia 



COMPARISON:

09/19/2018



TECHNIQUE:

Chest PA and lateral



FINDINGS:



LUNGS:

Linear atelectasis in the right lung



PLEURA:

No significant pleural effusion identified. No pneumothorax apparent.



CARDIOVASCULAR:

Moderate cardiomegaly and aortic tortuosity



OSSEOUS STRUCTURES:

Sternal wires



VISUALIZED UPPER ABDOMEN:

Normal.



OTHER FINDINGS:

None.



IMPRESSION:

No active disease. No evidence of pneumonia

## 2018-09-21 VITALS — SYSTOLIC BLOOD PRESSURE: 114 MMHG | DIASTOLIC BLOOD PRESSURE: 67 MMHG | HEART RATE: 74 BPM | TEMPERATURE: 97.8 F

## 2018-09-21 VITALS — OXYGEN SATURATION: 100 %

## 2018-09-21 LAB
ALBUMIN SERPL-MCNC: 3.6 G/DL (ref 3–4.8)
ALBUMIN/GLOB SERPL: 1.1 {RATIO} (ref 1.1–1.8)
ALT SERPL-CCNC: 13 U/L (ref 7–56)
AST SERPL-CCNC: 25 U/L (ref 14–36)
BASOPHILS # BLD AUTO: 0.02 K/MM3 (ref 0–2)
BASOPHILS NFR BLD: 0.3 % (ref 0–3)
BUN SERPL-MCNC: 26 MG/DL (ref 7–21)
CALCIUM SERPL-MCNC: 9.5 MG/DL (ref 8.4–10.5)
EOSINOPHIL # BLD: 0.5 10*3/UL (ref 0–0.7)
EOSINOPHIL NFR BLD: 7.2 % (ref 1.5–5)
ERYTHROCYTE [DISTWIDTH] IN BLOOD BY AUTOMATED COUNT: 17.6 % (ref 11.5–14.5)
GFR NON-AFRICAN AMERICAN: 35
GRANULOCYTES # BLD: 3.78 10*3/UL (ref 1.4–6.5)
GRANULOCYTES NFR BLD: 55.1 % (ref 50–68)
HGB BLD-MCNC: 9.1 G/DL (ref 12–16)
LYMPHOCYTES # BLD: 1.6 10*3/UL (ref 1.2–3.4)
LYMPHOCYTES NFR BLD AUTO: 22.9 % (ref 22–35)
MCH RBC QN AUTO: 28.7 PG (ref 25–35)
MCHC RBC AUTO-ENTMCNC: 33.7 G/DL (ref 31–37)
MCV RBC AUTO: 85.2 FL (ref 80–105)
MONOCYTES # BLD AUTO: 1 10*3/UL (ref 0.1–0.6)
MONOCYTES NFR BLD: 14.5 % (ref 1–6)
PLATELET # BLD: 219 10^3/UL (ref 120–450)
PMV BLD AUTO: 9.1 FL (ref 7–11)
RBC # BLD AUTO: 3.17 10^6/UL (ref 3.5–6.1)
WBC # BLD AUTO: 6.9 10^3/UL (ref 4.5–11)

## 2018-09-21 PROCEDURE — 3E0234Z INTRODUCTION OF SERUM, TOXOID AND VACCINE INTO MUSCLE, PERCUTANEOUS APPROACH: ICD-10-PCS | Performed by: INTERNAL MEDICINE

## 2018-09-21 RX ADMIN — DILTIAZEM HYDROCHLORIDE SCH MG: 120 CAPSULE, COATED, EXTENDED RELEASE ORAL at 10:25

## 2018-09-21 RX ADMIN — IPRATROPIUM BROMIDE AND ALBUTEROL SULFATE SCH: .5; 3 SOLUTION RESPIRATORY (INHALATION) at 02:00

## 2018-09-21 RX ADMIN — Medication SCH MG: at 10:26

## 2018-09-21 RX ADMIN — IPRATROPIUM BROMIDE AND ALBUTEROL SULFATE SCH: .5; 3 SOLUTION RESPIRATORY (INHALATION) at 13:52

## 2018-09-21 RX ADMIN — IPRATROPIUM BROMIDE AND ALBUTEROL SULFATE SCH: .5; 3 SOLUTION RESPIRATORY (INHALATION) at 07:31

## 2018-09-21 RX ADMIN — PANTOPRAZOLE SODIUM SCH MG: 40 TABLET, DELAYED RELEASE ORAL at 10:26

## 2018-09-21 NOTE — CON
DATE:  09/20/2018



REASON FOR CONSULTATION:  Shortness of breath, history of fall, paroxysmal

atrial fibrillation, mitral valve repair.



BRIEF CLINICAL HISTORY:  This is a 63-year-old female with past medical

history significant for severe mitral valve prolapse status post MV

replacement bioprosthetic, yesterday saw in office, was okay.  Then she

said after the office, she went to the [facility], was

shortness of breath and came to emergency room and admitted with possible

pneumonia.  Denies any chest pain, shortness of breath, or any palpitation

now.



PAST MEDICAL HISTORY:  Significant for pneumonia, COPD, CHF, severe mitral

regurgitation, severe mitral valve prolapse status post mitral valve

replacement, history of GI bleed, off anticoagulation because the patient

was postop remain in possible atrial fibrillation, anticoagulation will be

started though it was very difficult to control because of the

noncompliance with outpatient, patient admitted with multiple times with GI

bleed, off anticoagulation, since then the patient in normal sinus.



PAST SURGICAL HISTORY:  Significant for cholecystectomy, carpal tunnel

syndrome, bunionectomy, removal of bunion from the toe of the foot, most

recently the patient has a mitral valve replacement, bioprosthetic,

replacement in [facility] on 10/10/2017, history of

cardiac catheterization twice just before the valve surgery and one is 2

years before nonobstructive coronary artery disease, history of massive

multiple GI bleeds, history of paroxysmal atrial fibrillation and since

then patient normally is fairly stable, off anticoagulation.



SOCIAL HISTORY:  The patient denies any history of alcohol abuse.  Denies

any history of smoking.



CURRENT MEDICATIONS:  The patient is taking at home simvastatin, Singulair,

Protonix, metoprolol 25, magnesium, losartan, furosemide, Cardizem 

mg daily, inhaler, and aspirin.



REVIEW OF SYSTEMS:  As per HPI.



PHYSICAL EXAMINATION

VITAL SIGNS:  Temperature afebrile, heart rate 60, blood pressure 130/80.

HEENT:  PERRLA.  Extraocular muscles intact.

NECK:  Supple.  No carotid bruit or thyromegaly.

CHEST:  Clear to auscultation.

HEART:  S1 and S2, regular.

ABDOMEN:  Soft.

EXTREMITIES:  Clubbing and cyanosis negative.



LABORATORY DATA:  Blood workup as follows:  WBC 6.6, hemoglobin 9.2,

hematocrit of 28.4, and platelet count of 217.  Chemistry shows sodium 139,

potassium 4.6, chloride 109, carbon dioxide 22, anion gap of 13.  BUN 13,

creatinine 1.7.  History of anemia.



IMPRESSION:  This is a 63-year-old female with past medical history

significant for nonobstructive coronary artery disease, severe mitral valve

prolapse status post mitral valve replacement with bioprosthetic in

10/2017, history of paroxysmal atrial fibrillation, was on anticoagulation,

massive gastrointestinal multiple bleed, off anticoagulation.



RECOMMENDATIONS:  Repeat chest x-ray PA and lateral, as admitting x-ray

shows pneumonia.  If it is negative, clinically the patient does not sound

pneumonic and no leukocytosis.  If remains, we will possibly discharge her

on Zithromax.  Continue diuretics.  Continue Cardizem  mg daily,

continue baby aspirin, and continue metoprolol twice a day.



Thank you, Dr. Lin for providing us the opportunity in taking care of the

patient, Malu Meeks.





__________________________________________

Ajay Gaxiola MD



DD:  09/20/2018 14:46:59

DT:  09/20/2018 22:33:26

Job # [id number]

## 2018-09-21 NOTE — PN
DATE:  09/21/2018



TYPE OF DICTATION:  Addendum to initial progress note dictated by the nurse

practitioner.



REASON FOR ADDENDUM:  The patient was seen, looks okay.  Repeat chest x-ray

did not show any evidence of infiltrate.  We will restart gentle diuretics

and we will discontinue telemetry.  Okay to be discharged.  Discussed with

Ajay Lin MD, attending.  Continue Cardizem  mg daily, Lasix 20

mg daily.  Continue baby aspirin 81 mg, continue metoprolol.





__________________________________________

Ajay Gaxiola MD





DD:  09/21/2018 9:04:46

DT:  09/21/2018 9:49:37

Job # [id number]

## 2018-09-21 NOTE — CP.PCM.PN
Subjective





- Date & Time of Evaluation


Date of Evaluation: 09/21/18


Time of Evaluation: 06:10





- Subjective


Subjective: 





Awake, alert, no distress





Reason for consultation and follow up: Cardiac evaluation of shortness of breath

, history of Afib, history of mitral valve repair, 





Seen and examined by me and Dr. Gaxiola





Objective





- Vital Signs/Intake and Output


Vital Signs (last 24 hours): 


 











Temp Pulse Resp BP Pulse Ox


 


 99.3 F   99 H  18   110/66   97 


 


 09/21/18 00:01  09/21/18 02:00  09/21/18 00:01  09/21/18 00:01  09/21/18 00:01








Intake and Output: 


 











 09/20/18 09/21/18





 18:59 06:59


 


Intake Total 540 


 


Output Total 300 


 


Balance 240 














- Medications


Medications: 


 Current Medications





Albuterol/Ipratropium (Duoneb 3 Mg/0.5 Mg (3 Ml) Ud)  3 ml IH [id number] [facility]


   Last Admin: 09/20/18 19:27 Dose:  Not Given


Aspirin (Ecotrin)  81 mg PO DAILY [facility]


   Last Admin: 09/20/18 09:58 Dose:  81 mg


Atorvastatin Calcium (Lipitor)  10 mg PO [facility]


   Last Admin: 09/20/18 21:20 Dose:  10 mg


Diltiazem HCl (Cardizem Cd)  120 mg PO DAILY [facility]


   Last Admin: 09/20/18 09:59 Dose:  120 mg


Ergocalciferol (Drisdol 50,000 Intl Units Cap)  1 cap PO QWK [facility]


Famotidine (Pepcid)  40 mg PO HS [facility]


   Last Admin: 09/20/18 21:21 Dose:  Not Given


Ferrous Sulfate (Feosol)  324 mg PO BID [facility]


   Last Admin: 09/20/18 18:20 Dose:  324 mg


Loratadine (Claritin)  10 mg PO DAILY [facility]


   Last Admin: 09/20/18 09:59 Dose:  10 mg


Magnesium Oxide (Mag-Ox)  400 mg PO DAILY [facility]


   Last Admin: 09/20/18 09:59 Dose:  400 mg


Metoprolol Tartrate (Lopressor)  25 mg PO BID [facility]


   Last Admin: 09/20/18 18:20 Dose:  25 mg


Montelukast Sodium (Singulair)  10 mg PO [facility]


   Last Admin: 09/20/18 21:20 Dose:  10 mg


Non-Formulary Medication (Budesonide/Formoterol Fumarate [Symbicort 80-4.5 Mcg 

Inhaler])  1 aer IH DAILY [facility]


   Last Admin: 09/20/18 12:37 Dose:  Not Given


Pantoprazole Sodium (Protonix Ec Tab)  40 mg PO DAILY [facility]


   Last Admin: 09/20/18 09:59 Dose:  40 mg











- Labs


Labs: 


 





 09/20/18 02:30 





 09/20/18 02:30 





 











PT  11.9 SECONDS (9.4-12.5)   09/19/18  15:20    


 


INR  1.04   09/19/18  15:20    


 


APTT  29.4 Seconds (25.1-36.5)   09/19/18  15:20    














- Constitutional


Appears: No Acute Distress





- Eye Exam


Eye Exam: Normal appearance





- ENT Exam


ENT Exam: Mucous Membranes Moist





- Respiratory Exam


Respiratory Exam: Decreased Breath Sounds, Clear to Ausculation Bilateral, 

NORMAL BREATHING PATTERN





- Cardiovascular Exam


Cardiovascular Exam: +S1, +S2





- GI/Abdominal Exam


GI & Abdominal Exam: Soft, Normal Bowel Sounds





- Extremities Exam


Extremities Exam: Normal Capillary Refill





- Neurological Exam


Neurological Exam: Alert, Awake, Oriented x3





- Psychiatric Exam


Psychiatric exam: Normal Affect





- Skin


Skin Exam: Intact, Warm





Assessment and Plan





- Assessment and Plan (Free Text)


Assessment: 





A 63 year old female who came in to the ER due to shortness of breath. History 

of mitral valve prolapse MV replacement at [facility] 10/10/2017. Atrial fibrillation

,history of fall, pneumonia, CHF, Gi bleeding requiring blood transfusion, 

cholecystectomy, carpal tunnel syndrome, removal of bunion from toe,non 

obstructuve coronary artery disease. Telemetry shows NSR. no anticoagulation 

due to massive history of GI bleeding. Chest Xray showed questionable pneumonia

, repeat PA/LAT chest X ray showed negative for pneumonia and some atelectasis 

on the right lung.


Plan: 





No distress, wanted to go home


Chest PA/Lat- negative for pneumonia


Heart rate and blood pressure controlled


Stable cardiac status


Remained on Normal sinus rhythm, no anticoagulation


Discontinue telemetry


May discharge from cardiac standpoint


Continue current treatment


Continue current medications





Will follow up





Plan and treatment discussed with Dr. Gaxiola

## 2018-09-21 NOTE — CP.PCM.DIS
<KrishTravis - Last Filed: 09/21/18 12:43>





Provider





- Provider


Date of Admission: 


09/19/18 17:19





Attending physician: 


Ajay Lin MD





Primary care physician: 


Philip Dinh MD





Consults: 





cardiology


Time Spent in preparation of Discharge (in minutes): 45





Hospital Course





- Lab Results


Lab Results: 


 Micro Results





09/20/18 15:00   Urine   Urine Culture - Final


                            No Growth (<1,000 CFU/ML)


09/19/18 17:35   Blood-Venous   Blood Culture - Preliminary


                            NO GROWTH AFTER 24 HOURS





 Most Recent Lab Values











WBC  6.9 10^3/ul (4.5-11.0)   09/21/18  06:45    


 


RBC  3.17 10^6/uL (3.5-6.1)  L  09/21/18  06:45    


 


Hgb  9.1 g/dL (12.0-16.0)  L  09/21/18  06:45    


 


Hct  27.0 % (36.0-48.0)  L  09/21/18  06:45    


 


MCV  85.2 fl (80.0-105.0)   09/21/18  06:45    


 


MCH  28.7 pg (25.0-35.0)   09/21/18  06:45    


 


MCHC  33.7 g/dl (31.0-37.0)   09/21/18  06:45    


 


RDW  17.6 % (11.5-14.5)  H  09/21/18  06:45    


 


Plt Count  219 10^3/uL (120.0-450.0)   09/21/18  06:45    


 


MPV  9.1 fl (7.0-11.0)   09/21/18  06:45    


 


Gran %  55.1 % (50.0-68.0)   09/21/18  06:45    


 


Lymph % (Auto)  22.9 % (22.0-35.0)   09/21/18  06:45    


 


Mono % (Auto)  14.5 % (1.0-6.0)  H  09/21/18  06:45    


 


Eos % (Auto)  7.2 % (1.5-5.0)  H  09/21/18  06:45    


 


Baso % (Auto)  0.3 % (0.0-3.0)   09/21/18  06:45    


 


Gran #  3.78  (1.4-6.5)   09/21/18  06:45    


 


Lymph # (Auto)  1.6  (1.2-3.4)   09/21/18  06:45    


 


Mono # (Auto)  1.0  (0.1-0.6)  H  09/21/18  06:45    


 


Eos # (Auto)  0.5  (0.0-0.7)   09/21/18  06:45    


 


Baso # (Auto)  0.02 K/mm3 (0.0-2.0)   09/21/18  06:45    


 


PT  11.9 SECONDS (9.4-12.5)   09/19/18  15:20    


 


INR  1.04   09/19/18  15:20    


 


APTT  29.4 Seconds (25.1-36.5)   09/19/18  15:20    


 


pO2  89 mm/Hg (30-55)  H  09/19/18  18:04    


 


VBG pH  7.35  (7.32-7.43)   09/19/18  18:04    


 


VBG pCO2  45.0  (40-60)   09/19/18  18:04    


 


VBG HCO3  24.8 mmol/l (21-28)   09/19/18  18:04    


 


VBG Total CO2  26.2 mmol.L (22-28)   09/19/18  18:04    


 


VBG O2 Sat (Calc)  99.3 % (40-65)  H  09/19/18  18:04    


 


VBG Base Excess  -1.1 mmol/L (0.0-2.0)  L  09/19/18  18:04    


 


VBG Potassium  4.7 mmol/L (3.6-5.2)   09/19/18  18:04    


 


Sodium  137.0 mmol/L (132-148)   09/19/18  18:04    


 


Chloride  107.0 mmol/L ()   09/19/18  18:04    


 


Glucose  128 mg/dl ()  H  09/19/18  18:04    


 


Lactate  1.7 mmol/L (0.7-2.1)   09/19/18  18:04    


 


FiO2  21.0 %  09/19/18  18:04    


 


Sodium  137 mmol/L (132-148)   09/21/18  06:45    


 


Potassium  4.1 mmol/L (3.6-5.0)   09/21/18  06:45    


 


Chloride  104 mmol/L ()   09/21/18  06:45    


 


Carbon Dioxide  22 mmol/L (21-33)   09/21/18  06:45    


 


Anion Gap  15  (10-20)   09/21/18  06:45    


 


BUN  26 mg/dL (7-21)  H  09/21/18  06:45    


 


Creatinine  1.5 mg/dl (0.7-1.2)  H  09/21/18  06:45    


 


Est GFR ( Amer)  42   09/21/18  06:45    


 


Est GFR (Non-Af Amer)  35   09/21/18  06:45    


 


Random Glucose  96 mg/dL ()   09/21/18  06:45    


 


Calcium  9.5 mg/dL (8.4-10.5)   09/21/18  06:45    


 


Magnesium  1.8 mg/dL (1.7-2.2)   09/19/18  15:20    


 


Total Bilirubin  0.5 mg/dL (0.2-1.3)   09/21/18  06:45    


 


AST  25 U/L (14-36)   09/21/18  06:45    


 


ALT  13 U/L (7-56)   09/21/18  06:45    


 


Alkaline Phosphatase  180 U/L ()  H  09/21/18  06:45    


 


Troponin I  < 0.01 ng/mL  09/20/18  09:10    


 


NT-Pro-B Natriuret Pep  802 pg/mL (0-450)  H  09/19/18  15:20    


 


Total Protein  7.0 g/dL (5.8-8.3)   09/21/18  06:45    


 


Albumin  3.6 g/dL (3.0-4.8)   09/21/18  06:45    


 


Globulin  3.4 gm/dL  09/21/18  06:45    


 


Albumin/Globulin Ratio  1.1  (1.1-1.8)   09/21/18  06:45    


 


Procalcitonin  0.05 NG/ML (0.19-0.49)  L  09/20/18  02:30    


 


Venous Blood Potassium  4.7 mmol/L (3.6-5.2)   09/19/18  18:04    


 


Urine Color  Yellow  (YELLOW)   09/20/18  15:00    


 


Urine Appearance  Clear  (CLEAR)   09/20/18  15:00    


 


Urine pH  5.5  (4.7-8.0)   09/20/18  15:00    


 


Ur Specific Gravity  1.015  (1.005-1.035)   09/20/18  15:00    


 


Urine Protein  Negative mg/dL (<30 mg/dL)   09/20/18  15:00    


 


Urine Glucose (UA)  Negative mg/dL (NEGATIVE)   09/20/18  15:00    


 


Urine Ketones  Negative mg/dL (NEGATIVE)   09/20/18  15:00    


 


Urine Blood  Negative  (NEGATIVE)   09/20/18  15:00    


 


Urine Nitrate  Negative  (NEGATIVE)   09/20/18  15:00    


 


Urine Bilirubin  Negative  (NEGATIVE)   09/20/18  15:00    


 


Urine Urobilinogen  0.2 E.U./dL (<1 E.U./dL)   09/20/18  15:00    


 


Ur Leukocyte Esterase  Small Malgorzata/uL (NEGATIVE)  H  09/20/18  15:00    


 


Urine RBC  Negative /hpf (0-2)   09/20/18  15:00    


 


Urine WBC  1 - 3 /hpf (0-6)   09/20/18  15:00    


 


Ur Epithelial Cells  0 - 2 /hpf (0-5)   09/20/18  15:00    


 


Urine Bacteria  Small  (NEG)   09/20/18  15:00    


 


Ur L.pneumophila Ag  Negative  (NEGATIVE)   09/20/18  15:00    














- Hospital Course


Hospital Course: 








61 y/o female with PMH of paroxysmal AFib, bioprosthetic mitral valve,CAD, COPD

, GI bleed presents to ED with extensional dyspnea associated with sharp chest 

pain since this weekend. CXR showed right lower lung reticular opacity and b/l 

lung hyperinflation, trop (-)x1, EKG: NSR @83 with left axis. Patient was 

admitted for dyspnea work up given her cardiac history. ACS ruled out. Patient 

was seen by cardiology who recommended to continue home medications with no 

further cardiac workup. Patient was treated for pneumonia with IV antibiotic, 

fluids and bronchodilators and steroids for her COPD and electrolytes repleted 

as well. Patient has a history of parxoysmal AFib for which, home medications 

cardizem and metoprolol continued. Patient creatinine was mildly elevated, 

lasix held and was given fluids. Patient breathing improved and chest pain 

resolved and was clinically optimized to be discharged home. She was given a 

prescription of Levaquin for 5 days and to repeat BMP in one week and follow up 

with her PMD Dr Dinh. 











On discharge:





1.) Please follow up with your PMD with one week of discharge


2.) Please follow up with your cardiologist within one week


3.) Please take your meds as prescribed, including your new antibiotic (5 days)


4.) Please make sure to get blood work done within one week - a prescription 

for a Basal Metabolic Panel (BMP) has been included





If your symptoms persist or worsen please return to nearest emergency room


 


Diet : Heart Healthy diet





Patient requested pneumocccal vaccine prior to discharge to home and will 

receive flu vaccine from Dr. Dinh's office next month.





Discharge Exam





- Head Exam


Head Exam: ATRAUMATIC, NORMAL INSPECTION, NORMOCEPHALIC





- Eye Exam


Eye Exam: EOMI, Normal appearance, PERRL


Pupil Exam: NORMAL ACCOMODATION, PERRL





- ENT Exam


ENT Exam: Normal Exam





- Respiratory Exam


Respiratory Exam: NORMAL BREATHING PATTERN





- Cardiovascular Exam


Cardiovascular Exam: REGULAR RHYTHM, +S1, +S2





- GI/Abdominal Exam


GI & Abdominal Exam: Normal Bowel Sounds, Soft





- Extremities Exam


Extremities exam: full ROM, normal capillary refill, normal inspection





- Back Exam


Back exam: NORMAL INSPECTION





- Neurological Exam


Neurological exam: Alert, CN II-XII Intact, Normal Gait, Oriented x3, Reflexes 

Normal





- Psychiatric Exam


Psychiatric exam: Normal Affect, Normal Mood





- Skin


Skin Exam: Dry, Intact, Normal Color, Warm





Discharge Plan





- Discharge Medications


Prescriptions: 


Aspirin [Ecotrin] 81 mg PO DAILY #30 tabec


Levofloxacin [Levaquin] 500 mg PO DAILY #5 tablet





- Follow Up Plan


Condition: GUARDED


Disposition: HOME/ ROUTINE


Instructions:  Chest Pain (DC), Chest Pain (GEN)


Additional Instructions: 


1.) Please follow up with your PMD with one week of discharge


2.) Please follow up with your cardiologist within one week


3.) Please take your meds as prescribed, including your new antibiotic (5 days)


4.) Please make sure to get blood work done within one week - a prescription 

for a Basal Metabolic Panel (BMP) has been included


If your symptoms persist or worsen please return to nearest emergency room


Diet : Heart Healthy diet


Patient requested pneumocccal vaccine prior to discharge to home and will 

receive flu vaccine from Dr. Dinh's office next month.


Referrals: 


Philip Dinh MD [Primary Care Provider] - 





<Ajay Lin - Last Filed: 09/21/18 17:39>





Provider





- Provider


Date of Admission: 


09/19/18 17:19





Attending physician: 


Ajay Lin MD





Primary care physician: 


Philip Dinh MD








Hospital Course





- Lab Results


Lab Results: 


 Micro Results





09/20/18 15:00   Urine   Urine Culture - Final


                            No Growth (<1,000 CFU/ML)


09/19/18 17:35   Blood-Venous   Blood Culture - Preliminary


                            NO GROWTH AFTER 24 HOURS





 Most Recent Lab Values











WBC  6.9 10^3/ul (4.5-11.0)   09/21/18  06:45    


 


RBC  3.17 10^6/uL (3.5-6.1)  L  09/21/18  06:45    


 


Hgb  9.1 g/dL (12.0-16.0)  L  09/21/18  06:45    


 


Hct  27.0 % (36.0-48.0)  L  09/21/18  06:45    


 


MCV  85.2 fl (80.0-105.0)   09/21/18  06:45    


 


MCH  28.7 pg (25.0-35.0)   09/21/18  06:45    


 


MCHC  33.7 g/dl (31.0-37.0)   09/21/18  06:45    


 


RDW  17.6 % (11.5-14.5)  H  09/21/18  06:45    


 


Plt Count  219 10^3/uL (120.0-450.0)   09/21/18  06:45    


 


MPV  9.1 fl (7.0-11.0)   09/21/18  06:45    


 


Gran %  55.1 % (50.0-68.0)   09/21/18  06:45    


 


Lymph % (Auto)  22.9 % (22.0-35.0)   09/21/18  06:45    


 


Mono % (Auto)  14.5 % (1.0-6.0)  H  09/21/18  06:45    


 


Eos % (Auto)  7.2 % (1.5-5.0)  H  09/21/18  06:45    


 


Baso % (Auto)  0.3 % (0.0-3.0)   09/21/18  06:45    


 


Gran #  3.78  (1.4-6.5)   09/21/18  06:45    


 


Lymph # (Auto)  1.6  (1.2-3.4)   09/21/18  06:45    


 


Mono # (Auto)  1.0  (0.1-0.6)  H  09/21/18  06:45    


 


Eos # (Auto)  0.5  (0.0-0.7)   09/21/18  06:45    


 


Baso # (Auto)  0.02 K/mm3 (0.0-2.0)   09/21/18  06:45    


 


PT  11.9 SECONDS (9.4-12.5)   09/19/18  15:20    


 


INR  1.04   09/19/18  15:20    


 


APTT  29.4 Seconds (25.1-36.5)   09/19/18  15:20    


 


pO2  89 mm/Hg (30-55)  H  09/19/18  18:04    


 


VBG pH  7.35  (7.32-7.43)   09/19/18  18:04    


 


VBG pCO2  45.0  (40-60)   09/19/18  18:04    


 


VBG HCO3  24.8 mmol/l (21-28)   09/19/18  18:04    


 


VBG Total CO2  26.2 mmol.L (22-28)   09/19/18  18:04    


 


VBG O2 Sat (Calc)  99.3 % (40-65)  H  09/19/18  18:04    


 


VBG Base Excess  -1.1 mmol/L (0.0-2.0)  L  09/19/18  18:04    


 


VBG Potassium  4.7 mmol/L (3.6-5.2)   09/19/18  18:04    


 


Sodium  137.0 mmol/L (132-148)   09/19/18  18:04    


 


Chloride  107.0 mmol/L ()   09/19/18  18:04    


 


Glucose  128 mg/dl ()  H  09/19/18  18:04    


 


Lactate  1.7 mmol/L (0.7-2.1)   09/19/18  18:04    


 


FiO2  21.0 %  09/19/18  18:04    


 


Sodium  137 mmol/L (132-148)   09/21/18  06:45    


 


Potassium  4.1 mmol/L (3.6-5.0)   09/21/18  06:45    


 


Chloride  104 mmol/L ()   09/21/18  06:45    


 


Carbon Dioxide  22 mmol/L (21-33)   09/21/18  06:45    


 


Anion Gap  15  (10-20)   09/21/18  06:45    


 


BUN  26 mg/dL (7-21)  H  09/21/18  06:45    


 


Creatinine  1.5 mg/dl (0.7-1.2)  H  09/21/18  06:45    


 


Est GFR ( Amer)  42   09/21/18  06:45    


 


Est GFR (Non-Af Amer)  35   09/21/18  06:45    


 


Random Glucose  96 mg/dL ()   09/21/18  06:45    


 


Calcium  9.5 mg/dL (8.4-10.5)   09/21/18  06:45    


 


Magnesium  1.8 mg/dL (1.7-2.2)   09/19/18  15:20    


 


Total Bilirubin  0.5 mg/dL (0.2-1.3)   09/21/18  06:45    


 


AST  25 U/L (14-36)   09/21/18  06:45    


 


ALT  13 U/L (7-56)   09/21/18  06:45    


 


Alkaline Phosphatase  180 U/L ()  H  09/21/18  06:45    


 


Troponin I  < 0.01 ng/mL  09/20/18  09:10    


 


NT-Pro-B Natriuret Pep  802 pg/mL (0-450)  H  09/19/18  15:20    


 


Total Protein  7.0 g/dL (5.8-8.3)   09/21/18  06:45    


 


Albumin  3.6 g/dL (3.0-4.8)   09/21/18  06:45    


 


Globulin  3.4 gm/dL  09/21/18  06:45    


 


Albumin/Globulin Ratio  1.1  (1.1-1.8)   09/21/18  06:45    


 


Procalcitonin  0.05 NG/ML (0.19-0.49)  L  09/20/18  02:30    


 


Venous Blood Potassium  4.7 mmol/L (3.6-5.2)   09/19/18  18:04    


 


Urine Color  Yellow  (YELLOW)   09/20/18  15:00    


 


Urine Appearance  Clear  (CLEAR)   09/20/18  15:00    


 


Urine pH  5.5  (4.7-8.0)   09/20/18  15:00    


 


Ur Specific Gravity  1.015  (1.005-1.035)   09/20/18  15:00    


 


Urine Protein  Negative mg/dL (<30 mg/dL)   09/20/18  15:00    


 


Urine Glucose (UA)  Negative mg/dL (NEGATIVE)   09/20/18  15:00    


 


Urine Ketones  Negative mg/dL (NEGATIVE)   09/20/18  15:00    


 


Urine Blood  Negative  (NEGATIVE)   09/20/18  15:00    


 


Urine Nitrate  Negative  (NEGATIVE)   09/20/18  15:00    


 


Urine Bilirubin  Negative  (NEGATIVE)   09/20/18  15:00    


 


Urine Urobilinogen  0.2 E.U./dL (<1 E.U./dL)   09/20/18  15:00    


 


Ur Leukocyte Esterase  Small Malgorzata/uL (NEGATIVE)  H  09/20/18  15:00    


 


Urine RBC  Negative /hpf (0-2)   09/20/18  15:00    


 


Urine WBC  1 - 3 /hpf (0-6)   09/20/18  15:00    


 


Ur Epithelial Cells  0 - 2 /hpf (0-5)   09/20/18  15:00    


 


Urine Bacteria  Small  (NEG)   09/20/18  15:00    


 


Ur L.pneumophila Ag  Negative  (NEGATIVE)   09/20/18  15:00    














Attending/Attestation





- Attestation


I have personally seen and examined this patient.: Yes


I have fully participated in the care of the patient.: Yes


I have reviewed all pertinent clinical information, including history, physical 

exam and plan: Yes


Notes (Text): 





09/21/18 17:36


Medical record note made by the resident after discussion with my direction and 

input after the patient was personally seen and examined by me. I have reviewed 

the chart and agree that the record accurately reflects by personal performance 

of the history, physical exam, data review, and medical decision-making, in the 

course for the patient. I have also personally directed the plan of care.





63 yrs old  female with PMH of paroxysmal AFib not on oral anticoagulation due 

to H/O GI bleeding,  MR, SP bioprosthetic mitral valve,CAD, COPD, and chronic 

anemia  was admitted SOB with chest pain.EKG is negative for ischemic 

changes.Serial troponins are normal.


Patient is not wheezing.There is no sign of fluid overload.Patient is pain 

free.She is on room air.There is  no evidence of Pneumonia.Procalcitonin level 

is normal.Patient is feeling at her base line.She is ambulatory.





Patient was evaluated by cardiology, no further work up is needed.








Creatinin has come down to 1.5, will need follow up BMP in one week.This issue 

was discussed with patient in detail.





Patient will follow up with PCP and her cardiologist.








Management plan was discussed in detail with patient. Education was provided.

## 2018-10-01 NOTE — PQF
PROVIDER RESPONSE TEXT:



Patient has Diastolic CHF,EF 05/23/18 was 65%. Patient did not show any sign of exacerbation of diast
olic CHF at the time of admission. She was euvolemic.



REVIEWER QUERY TEXT:



CHF Acuity and Type



Congestive Heart Failure is documented in the Medical Record. Please document the type and acuity (in
cludes probable or suspected)

Such as:

Type:

-- Systolic

-- Diastolic

-- Combined

-- Other, please specify



Acuity:

-- Acute

-- Chronic

-- Acute on chronic

-- Other, please specify



Also please document the underlying cause of the CHF (includes probable or suspected)



The patient's Clinical Indicators include:

Patient with h/o CHF but no exacerbation on lasix 40 mg with elevated BNP =802. Please document the t
ype and acuity. Thank you.





Query created by: Chichi Santoyo on 9/24/2018 10:09 AM





Electronically signed by:  Ajay Lin MD 10/1/2018 7:59 AM

## 2019-02-15 NOTE — CT
Date of service: 



02/15/2019



PROCEDURE:  CT HEAD WITHOUT CONTRAST.



HISTORY:

trauma



COMPARISON:

Comparison is made with 06/28/2018



TECHNIQUE:

Axial computed tomography images were obtained through the head/brain 

without intravenous contrast.  



Radiation dose:



Total exam DLP = 826.02 mGy-cm.



This CT exam was performed using one or more of the following dose 

reduction techniques: Automated exposure control, adjustment of the 

mA and/or kV according to patient size, and/or use of iterative 

reconstruction technique.



FINDINGS:



HEMORRHAGE:

No intracranial hemorrhage. 



BRAIN:

No mass effect or edema.  Mild atrophy and chronic microvascular 

white matter ischemic disease are again



VENTRICLES:

Unremarkable. No hydrocephalus. 



CALVARIUM:

Unremarkable.



PARANASAL SINUSES:

Unremarkable as visualized. No significant inflammatory changes.



MASTOID AIR CELLS:

Unremarkable as visualized. No inflammatory changes.



OTHER FINDINGS:

None.



IMPRESSION:

No evidence of acute intracranial hemorrhage intracranial collection 

mass effect or midline shift.  The atrophy and chronic microvascular 

white matter ischemic disease.
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
## 2019-02-15 NOTE — CT
Date of service: 



02/15/2019



PROCEDURE:  CT MAXILLOFACIAL BONES WITHOUT CONTRAST



HISTORY:

trauma



COMPARISON:

None available.



TECHNIQUE:

Contiguous axial CT  images of the maxillofacial bones were obtained. 

Coronal and sagittal reformats were generated.



Radiation dose:



Total exam DLP = 831.34 mGy-cm.



This CT exam was performed using one or more of the following dose 

reduction techniques: Automated exposure control, adjustment of the 

mA and/or kV according to patient size, and/or use of iterative 

reconstruction technique.



FINDINGS:



NASAL BONES:

Unremarkable.



ORBITS:

Unremarkable.



PARANASAL SINUSES/ MASTOIDS:

Mild mucosal thickening of the right maxillary sinus is noted.  The 

patient is likely status post prior left maxillary sinus surgery.



MAXILLA:

Unremarkable.



MANDIBLE/ TEMPOROMANDIBULAR JOINTS:

Unremarkable.



SKULL BASE:

Unremarkable.



TEMPORAL BONES:

Middle ears and mastoid grossly unremarkable.



OTHER FINDINGS:

None.



IMPRESSION:

No definite evidence of acute fracture.



Right periorbital soft tissue swelling.

## 2020-02-21 NOTE — CON
DATE:  06/04/2018



CARDIOLOGY CONSULTATION



REASON FOR THE CONSULTATION:  Cardiac evaluation, recurrent GI bleed,

paroxysmal atrial fibrillation on anticoagulation, history of mitral valve

replacement bioprosthetic, nonobstructive coronary artery disease.



BRIEF CLINICAL HISTORY:  This is a 62-year-old female with past medical

history significant for MVP, severe mitral regurgitation who has been

consulting for more than 2 years for valve replacement, ultimately patient

agreed, had a cardiac catheterization prior with nonobstructive coronary

disease.  Then patient had a chest pain just before open heart surgery; so

had another cardiac catheterization that revealed nonischemic coronary

artery disease.  She had history of paroxysmal atrial fibrillation, patient

was started on anticoagulation and recently admitted with GI bleed with

ulcer.  So patient underwent endoscopy and found to be no clot but found to

be ulcer.  So after a week, patient was started on heparin and then started

Coumadin; but the patient had black tarry stool and drop in hemoglobin, so

was transferred to ICU.  Denies any chest pain, short of breath, or any

palpitation.



PAST MEDICAL HISTORY:  Significant for mitral valve severe prolapse,

paroxysmal atrial fibrillation, severe mitral regurgitation, status post

cardiac catheterization twice, nonobstructive coronary artery disease,

history of mitral valve replacement with bioprosthetic valve in 10/2017,

hypertension, noncompliance with medication.



PAST SURGICAL HISTORY:  Significant for cholecystectomy; carpal tunnel

syndrome; bunionectomy, removal of bunion from the toe and foot, and most

recently patient had MVR bioprosthetic done at [facility]

in 10/2017; history of cardiac catheterization twice two years ago for the

valve.  The patient on last followup did not want surgery.  Recently

patient had been referred to [facility] with the patient

developing chest pain, and again, cardiac catheterization done on

10/17/2017 found to be nonobstructive coronary artery disease; multiple

admissions with paroxysmal atrial fibrillation and now admission with GI

bleed.  Patient was at transitional care unit where the patient was started

on heparin and then switched over to Coumadin because _____; but the

patient bled, and was transferred to ICU.



CURRENT MEDICATIONS:  Patient is taking in transitional care unit

simvastatin 20 mg daily, Singulair 10 mg, metoprolol 25 b.i.d., magnesium,

_____ furosemide, Cardizem, aspirin and Coumadin.  Prior to that, patient

was taking Eliquis at home.



REVIEW OF SYSTEMS:  As per HPI.



PHYSICAL EXAMINATION:  As follows;

VITAL SIGNS:  Temperature afebrile, heart rate 78 and blood pressure

144/85.

HEENT:  PERRLA.  Extraocular muscles intact.

NECK:  Supple.  No carotid bruit.  No thyromegaly.

CHEST:  Clear to auscultation.

HEART:  S1 and S2 regular.

ABDOMEN:  Soft.

EXTREMITIES:  Clubbing and cyanosis negative.



LABORATORY DATA:  Blood workup as follows; WBC of 10.4, hemoglobin 8.8,

hematocrit 26.2, platelet count 386.  Yesterday hemoglobin dropped to 7,

status post packed RBC transfusion.  Chemistry shows sodium 141, potassium

3.7, chloride 106, carbon dioxide of 28, anion gap of 10, BUN 13,

creatinine 0.8.  Total protein 5, albumin 3.1.



IMPRESSION:  Recurrent gastrointestinal bleed, multiple attempt made with

patient gastrointestinal bleed, history of gastric ulcer, status post

endoscopy, non active bleeding, multiple attempts had been made for

anticoagulation because of paroxysmal atrial fibrillation, history of

mitral valve replacement with bioprosthetic because of severe mitral

regurgitation and mitral valve prolapse, hypertension, history of

paroxysmal atrial fibrillation.



RECOMMENDATION:  Continue beta blocker.  Continue Cardizem, not a candidate

for long-term anticoagulation because patient had multiple attempts done,

and patient bleeds.  So keep off anticoagulation.  INR was 2.72.  Monitor H

and H.  We will give 5 mg of vitamin K.  Monitor H and H closely. 

Discussed in length with the patient.  The patient seems to bled multiple

times, not a candidate for anticoagulation.  We will follow with you.  If

the vital signs remain stable, we will start Cardizem- mg from

tomorrow.



Thank you Dr. Dinh for providing us the opportunity in taking care of the

patient, Malu Meeks.







__________________________________________

Ajay Gaxiola MD



DD:  06/04/2018 14:25:27

DT:  06/04/2018 21:17:48

Job # [id number] No

## 2022-05-21 NOTE — CP.PCM.PN
Subjective





- Date & Time of Evaluation


Date of Evaluation: 06/01/18


Time of Evaluation: 06:15





- Subjective


Subjective: 





Sleeping easily awaken, alert, denies shortness of breath, denies chest pain





Reason for consultation: Continuity of care to TCU, cardiac follow up, history 

of mitral valve replacement (bioprosthetic) , Atrial fibrillation,was on eliquis

,COPD, asthma, GI bleeding, anemia requiring blood transfusion. 





Seen and examined by me and Dr. Gaxiola





Objective





- Vital Signs/Intake and Output


Vital Signs (last 24 hours): 


 











Temp Pulse Resp BP Pulse Ox


 


 98 F   66   20   137/80   100 


 


 05/31/18 16:00  05/31/18 16:00  05/31/18 20:50  05/31/18 17:53  05/31/18 16:00








Intake and Output: 


 











 06/01/18 06/01/18





 06:59 18:59


 


Intake Total 250 


 


Balance 250 














- Medications


Medications: 


 Current Medications





Acetaminophen (Tylenol 325mg Tab)  650 mg PO Q4H PRN; Protocol


   PRN Reason: mild pain


Albuterol/Ipratropium (Duoneb 3 Mg/0.5 Mg (3 Ml) Ud)  3 ml IH [id number] PRN; 

Protocol


   PRN Reason: Shortness of Breath


   Last Admin: 05/31/18 13:12 Dose:  3 ml


Diltiazem HCl (Cardizem Cd)  120 mg PO DAILY OLMAN


   PRN Reason: Protocol


   Last Admin: 05/31/18 09:28 Dose:  120 mg


Diphenhydramine HCl (Benadryl)  25 mg PO Q6 PRN; Protocol


   PRN Reason: Allergy symptoms


   Last Admin: 05/31/18 21:29 Dose:  25 mg


Heparin Sodium/Sodium Chloride (Heparin 33742 Units/250ml 1/2 Normal Saline)  25

,000 units in 250 mls @ 7.054 mls/hr IV .Q24H OLMAN; 9.6 U/KG/HR


   PRN Reason: Protocol


   Last Admin: 06/01/18 05:00 Dose:  9.6 u/kg/hr, 7.054 mls/hr


Metoprolol Tartrate (Lopressor)  50 mg PO 0800,1800 [facility]


   PRN Reason: Protocol


   Last Admin: 05/31/18 17:53 Dose:  50 mg


Montelukast Sodium (Singulair)  10 mg PO HS [facility]MAN


   PRN Reason: Protocol


   Last Admin: 05/31/18 21:27 Dose:  10 mg


Ondansetron HCl (Zofran Inj)  4 mg IVP Q6H PRN; Protocol


   PRN Reason: Nausea/Vomiting


Oxycodone/Acetaminophen (Percocet 5/325 Mg Tab)  1 tab PO Q4H PRN; Protocol


   PRN Reason: severe pain


   Stop: 06/01/18 20:52


Pantoprazole Sodium (Protonix Ec Tab)  40 mg PO 0600,1600 [facility]


   Last Admin: 06/01/18 05:05 Dose:  40 mg


Sucralfate (Carafate Oral Susp)  1 gm PO 0600,1600 [facility]


   PRN Reason: Protocol


   Last Admin: 06/01/18 05:05 Dose:  1 gm


Tramadol HCl (Ultram)  50 mg PO TID PRN; Protocol


   PRN Reason: moderate pain


Vancomycin HCl (Vancocin 25 Mg/Ml (Oral Use))  125 mg PO QID [facility]


   PRN Reason: Protocol


   Last Admin: 05/31/18 21:27 Dose:  125 mg


Warfarin Sodium (Coumadin)  7.5 mg PO 1800 [facility]


   PRN Reason: Protocol


   Last Admin: 05/31/18 17:52 Dose:  7.5 mg











- Labs


Labs: 


 





 05/31/18 11:13 





 05/31/18 06:40 





 











PT  12.7 SECONDS (9.4-12.5)  H  05/30/18  07:15    


 


INR  1.10  (0.93-1.08)  H  05/30/18  07:15    


 


APTT  54.6 Seconds (25.1-36.5)  H  05/31/18  11:13    














- Constitutional


Appears: No Acute Distress





- Head Exam


Head Exam: NORMOCEPHALIC





- Eye Exam


Eye Exam: Normal appearance





- ENT Exam


ENT Exam: Mucous Membranes Moist





- Respiratory Exam


Respiratory Exam: Decreased Breath Sounds, NORMAL BREATHING PATTERN





- Cardiovascular Exam


Cardiovascular Exam: +S1, +S2





- GI/Abdominal Exam


GI & Abdominal Exam: Soft, Normal Bowel Sounds





- Extremities Exam


Extremities Exam: Normal Capillary Refill


Additional comments: 





knee less swelling 1+edema





- Neurological Exam


Neurological Exam: Alert, Awake, Oriented x3





- Psychiatric Exam


Psychiatric exam: Normal Affect, Normal Mood





- Skin


Skin Exam: Intact, Normal Color, Warm





Assessment and Plan





- Assessment and Plan (Free Text)


Assessment: 





A 62 year old female who came in to the ER due to shortness of breath and 

complaining of tarry stools. Hemoglobin showed to be low (6.7) and blood were 

transfused. Endoscopy showed large gastric ulcer. Eliquis was discontinued and 

heparin drip was started. Developed gout on both knees and steroid injection 

done. Had an episode of fall 2 days ago trying to get up  and knees gave up.


Transferred to TCU for reconditioning.Physical therapy








Plan: 





On Heparin drip, started on Coumadin yesterday


once therapeutic will discontinue heparin drip


Able to monitor INR level better with Coumadin 


Denies loose bowel movement or melena


had loose bowel movement (5/29/) sent for C-diff


Started on Vancomycin po


ID on consult


GI on consult


Repeat endoscopy in 8 weeks per GI


Fall precaution


Physical therapy in progress


Continue current treatment


Continue current medications


Discharge planning





Will follow up





Plan and treatment discussed with Dr. Gaxiola abdominal pain
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
